# Patient Record
Sex: FEMALE | Race: BLACK OR AFRICAN AMERICAN | NOT HISPANIC OR LATINO | URBAN - METROPOLITAN AREA
[De-identification: names, ages, dates, MRNs, and addresses within clinical notes are randomized per-mention and may not be internally consistent; named-entity substitution may affect disease eponyms.]

---

## 2017-01-12 ENCOUNTER — EMERGENCY (EMERGENCY)
Facility: HOSPITAL | Age: 57
LOS: 1 days | Discharge: HOME | End: 2017-01-12
Attending: EMERGENCY MEDICINE | Admitting: EMERGENCY MEDICINE
Payer: MEDICARE

## 2017-01-12 VITALS
TEMPERATURE: 98 F | SYSTOLIC BLOOD PRESSURE: 149 MMHG | HEART RATE: 71 BPM | RESPIRATION RATE: 18 BRPM | OXYGEN SATURATION: 98 % | DIASTOLIC BLOOD PRESSURE: 86 MMHG | WEIGHT: 235.01 LBS

## 2017-01-12 VITALS
DIASTOLIC BLOOD PRESSURE: 92 MMHG | OXYGEN SATURATION: 99 % | SYSTOLIC BLOOD PRESSURE: 172 MMHG | RESPIRATION RATE: 18 BRPM | TEMPERATURE: 98 F | HEART RATE: 86 BPM

## 2017-01-12 LAB
ALBUMIN SERPL ELPH-MCNC: 3.9 G/DL — SIGNIFICANT CHANGE UP (ref 3.3–5)
ALP SERPL-CCNC: 118 U/L — SIGNIFICANT CHANGE UP (ref 40–120)
ALT FLD-CCNC: 15 U/L — SIGNIFICANT CHANGE UP (ref 4–33)
AST SERPL-CCNC: 16 U/L — SIGNIFICANT CHANGE UP (ref 4–32)
BASE EXCESS BLDV CALC-SCNC: 6.9 MMOL/L — SIGNIFICANT CHANGE UP
BASOPHILS # BLD AUTO: 0.04 K/UL — SIGNIFICANT CHANGE UP (ref 0–0.2)
BASOPHILS NFR BLD AUTO: 0.6 % — SIGNIFICANT CHANGE UP (ref 0–2)
BILIRUB SERPL-MCNC: 0.4 MG/DL — SIGNIFICANT CHANGE UP (ref 0.2–1.2)
BLOOD GAS VENOUS - CREATININE: 0.98 MG/DL — SIGNIFICANT CHANGE UP (ref 0.5–1.3)
BUN SERPL-MCNC: 22 MG/DL — SIGNIFICANT CHANGE UP (ref 7–23)
CALCIUM SERPL-MCNC: 9.8 MG/DL — SIGNIFICANT CHANGE UP (ref 8.4–10.5)
CHLORIDE BLDV-SCNC: 105 MMOL/L — SIGNIFICANT CHANGE UP (ref 96–108)
CHLORIDE SERPL-SCNC: 100 MMOL/L — SIGNIFICANT CHANGE UP (ref 98–107)
CO2 SERPL-SCNC: 28 MMOL/L — SIGNIFICANT CHANGE UP (ref 22–31)
CREAT SERPL-MCNC: 1.09 MG/DL — SIGNIFICANT CHANGE UP (ref 0.5–1.3)
EOSINOPHIL # BLD AUTO: 0.12 K/UL — SIGNIFICANT CHANGE UP (ref 0–0.5)
EOSINOPHIL NFR BLD AUTO: 1.7 % — SIGNIFICANT CHANGE UP (ref 0–6)
GAS PNL BLDV: 138 MMOL/L — SIGNIFICANT CHANGE UP (ref 136–146)
GLUCOSE BLDV-MCNC: 260 — HIGH (ref 70–99)
GLUCOSE SERPL-MCNC: 261 MG/DL — HIGH (ref 70–99)
HCO3 BLDV-SCNC: 29 MMOL/L — HIGH (ref 20–27)
HCT VFR BLD CALC: 39.4 % — SIGNIFICANT CHANGE UP (ref 34.5–45)
HCT VFR BLDV CALC: 40.1 % — SIGNIFICANT CHANGE UP (ref 34.5–45)
HGB BLD-MCNC: 12.5 G/DL — SIGNIFICANT CHANGE UP (ref 11.5–15.5)
HGB BLDV-MCNC: 13 G/DL — SIGNIFICANT CHANGE UP (ref 11.5–15.5)
IMM GRANULOCYTES NFR BLD AUTO: 0.6 % — SIGNIFICANT CHANGE UP (ref 0–1.5)
LACTATE BLDV-MCNC: 1.8 MMOL/L — SIGNIFICANT CHANGE UP (ref 0.5–2)
LYMPHOCYTES # BLD AUTO: 2.53 K/UL — SIGNIFICANT CHANGE UP (ref 1–3.3)
LYMPHOCYTES # BLD AUTO: 36.2 % — SIGNIFICANT CHANGE UP (ref 13–44)
MCHC RBC-ENTMCNC: 27.5 PG — SIGNIFICANT CHANGE UP (ref 27–34)
MCHC RBC-ENTMCNC: 31.7 % — LOW (ref 32–36)
MCV RBC AUTO: 86.6 FL — SIGNIFICANT CHANGE UP (ref 80–100)
MONOCYTES # BLD AUTO: 0.68 K/UL — SIGNIFICANT CHANGE UP (ref 0–0.9)
MONOCYTES NFR BLD AUTO: 9.7 % — SIGNIFICANT CHANGE UP (ref 2–14)
NEUTROPHILS # BLD AUTO: 3.58 K/UL — SIGNIFICANT CHANGE UP (ref 1.8–7.4)
NEUTROPHILS NFR BLD AUTO: 51.2 % — SIGNIFICANT CHANGE UP (ref 43–77)
PCO2 BLDV: 59 MMHG — HIGH (ref 41–51)
PH BLDV: 7.36 PH — SIGNIFICANT CHANGE UP (ref 7.32–7.43)
PLATELET # BLD AUTO: 241 K/UL — SIGNIFICANT CHANGE UP (ref 150–400)
PMV BLD: 12 FL — SIGNIFICANT CHANGE UP (ref 7–13)
PO2 BLDV: 38 MMHG — SIGNIFICANT CHANGE UP (ref 35–40)
POTASSIUM BLDV-SCNC: 4 MMOL/L — SIGNIFICANT CHANGE UP (ref 3.4–4.5)
POTASSIUM SERPL-MCNC: 4.2 MMOL/L — SIGNIFICANT CHANGE UP (ref 3.5–5.3)
POTASSIUM SERPL-SCNC: 4.2 MMOL/L — SIGNIFICANT CHANGE UP (ref 3.5–5.3)
PROT SERPL-MCNC: 7.8 G/DL — SIGNIFICANT CHANGE UP (ref 6–8.3)
RBC # BLD: 4.55 M/UL — SIGNIFICANT CHANGE UP (ref 3.8–5.2)
RBC # FLD: 15.7 % — HIGH (ref 10.3–14.5)
SAO2 % BLDV: 66.7 % — SIGNIFICANT CHANGE UP (ref 60–85)
SODIUM SERPL-SCNC: 143 MMOL/L — SIGNIFICANT CHANGE UP (ref 135–145)
WBC # BLD: 6.99 K/UL — SIGNIFICANT CHANGE UP (ref 3.8–10.5)
WBC # FLD AUTO: 6.99 K/UL — SIGNIFICANT CHANGE UP (ref 3.8–10.5)

## 2017-01-12 PROCEDURE — 99284 EMERGENCY DEPT VISIT MOD MDM: CPT

## 2017-01-12 PROCEDURE — 71020: CPT | Mod: 26

## 2017-01-12 RX ORDER — IPRATROPIUM/ALBUTEROL SULFATE 18-103MCG
3 AEROSOL WITH ADAPTER (GRAM) INHALATION ONCE
Qty: 0 | Refills: 0 | Status: COMPLETED | OUTPATIENT
Start: 2017-01-12 | End: 2017-01-12

## 2017-01-12 RX ADMIN — Medication 3 MILLILITER(S): at 22:33

## 2017-01-12 NOTE — ED PROVIDER NOTE - OBJECTIVE STATEMENT
55 yo F with history of diabetes, htn, gout presenting with cough and uri symptoms x 5 days productive with yellow sputum. Denies fevers, chills, rhinorrhea, otorrhea, otalgia, nausea, vomiting, constipation, diarrhea, chest pain, shortness of breath or changes in urinary habits. Pt on day 4 of azithromycin without symptom improvement

## 2017-01-12 NOTE — ED ADULT TRIAGE NOTE - CHIEF COMPLAINT QUOTE
Pt. with multiple complaints c/o not feeling well. Pt on z-pac but still c/o feeling sob. Pt also states her b/p is elevated and glucose elevated. Pt spoke to her pmd Dr. layton and told to come to ER.

## 2017-01-12 NOTE — ED PROVIDER NOTE - ATTENDING CONTRIBUTION TO CARE
Dr. Bowman:  I have personally performed a face to face bedside history and physical examination of this patient. I have discussed the history, examination, review of systems, assessment and plan of management with the resident. I have reviewed the electronic medical record and amended it to reflect my history, review of systems, physical exam, assessment and plan.    56F h/o DM, HTN, CHF, pacemaker, c/o cough and URI sx x 5 days.  Started on Z-shad 3 days ago, but continues to have similar sx.  Visiting noted her FS 300s, advised pt to go to ED for further evaluation.    Exam:  - nad  - rrr  - ctab  - abd soft, ntnd  - no pedal edema    A/P  - eval for PNA, r/o DKA  - cxr  - cbc, cmp, vbg Dr. Bowman:  I have personally performed a face to face bedside history and physical examination of this patient. I have discussed the history, examination, review of systems, assessment and plan of management with the resident. I have reviewed the electronic medical record and amended it to reflect my history, review of systems, physical exam, assessment and plan.    56F h/o DM, HTN, CHF, pacemaker, c/o cough and URI sx x 5 days.  Started on Z-shad 3 days ago, but continues to have similar sx.  Visiting nurse noted her FS 300s, advised pt to go to ED for further evaluation.    Exam:  - nad  - rrr  - ctab  - abd soft, ntnd  - no pedal edema    A/P  - eval for PNA, r/o DKA  - cxr  - cbc, cmp, vbg

## 2017-01-12 NOTE — ED PROVIDER NOTE - PMH
AICD (automatic cardioverter/defibrillator) present  ICD (Medtronic generator with Medtronic atrial (4076)and ventricular (6947) leads) 5/14/07  Cardiac Pacemaker    CHF (Congestive Heart Failure)    CKD (chronic kidney disease), stage 3 (moderate)    Diabetes Mellitus  x 10 years, denies nephropathy or retinopathy  Diabetic neuropathy    HLD (Hyperlipidemia)    HTN (hypertension)    Nonischemic cardiomyopathy  EF 20-25%  Sleep apnea  noncompliant with CPAP

## 2017-01-12 NOTE — ED ADULT NURSE NOTE - PSH
AICD (automatic cardioverter/defibrillator) present  Medtronic generator with Medtronic atrial (4076)and ventricular (6947) leads) 07  H/O:     H/O: hysterectomy

## 2017-01-12 NOTE — ED ADULT NURSE NOTE - OBJECTIVE STATEMENT
Pt received to rm 4 aaox4 ambulatory, c/o yellow sputum producing cough x 1 wk and SOB x 2 days.  Pt reports hx of HTN, DM,CHF and pacemaker placement: Normal sinus paced rhythm on monitor   Pt not in respiratory distress and denies pain at this time. Vitals as noted.  Labs and meds as ordered.  Will endorse report to primary IRON Najera.

## 2017-01-27 ENCOUNTER — LABORATORY RESULT (OUTPATIENT)
Age: 57
End: 2017-01-27

## 2017-01-27 ENCOUNTER — APPOINTMENT (OUTPATIENT)
Dept: RHEUMATOLOGY | Facility: CLINIC | Age: 57
End: 2017-01-27

## 2017-01-27 VITALS
OXYGEN SATURATION: 92 % | SYSTOLIC BLOOD PRESSURE: 131 MMHG | DIASTOLIC BLOOD PRESSURE: 83 MMHG | BODY MASS INDEX: 39.95 KG/M2 | HEIGHT: 64 IN | WEIGHT: 234 LBS | TEMPERATURE: 98.7 F | HEART RATE: 80 BPM

## 2017-01-27 DIAGNOSIS — M19.90 UNSPECIFIED OSTEOARTHRITIS, UNSPECIFIED SITE: ICD-10-CM

## 2017-01-27 LAB
APPEARANCE: ABNORMAL
BASOPHILS # BLD AUTO: 0.03 K/UL
BASOPHILS NFR BLD AUTO: 0.5 %
BILIRUBIN URINE: NEGATIVE
BLOOD URINE: NEGATIVE
COLOR: YELLOW
EOSINOPHIL # BLD AUTO: 0.2 K/UL
EOSINOPHIL NFR BLD AUTO: 3 %
GLUCOSE QUALITATIVE U: NORMAL MG/DL
HCT VFR BLD CALC: 38.1 %
HGB BLD-MCNC: 11.8 G/DL
IMM GRANULOCYTES NFR BLD AUTO: 0.3 %
KETONES URINE: NEGATIVE
LEUKOCYTE ESTERASE URINE: NEGATIVE
LYMPHOCYTES # BLD AUTO: 1.47 K/UL
LYMPHOCYTES NFR BLD AUTO: 22.3 %
MAN DIFF?: NORMAL
MCHC RBC-ENTMCNC: 28.3 PG
MCHC RBC-ENTMCNC: 31 GM/DL
MCV RBC AUTO: 91.4 FL
MONOCYTES # BLD AUTO: 0.5 K/UL
MONOCYTES NFR BLD AUTO: 7.6 %
NEUTROPHILS # BLD AUTO: 4.38 K/UL
NEUTROPHILS NFR BLD AUTO: 66.3 %
NITRITE URINE: NEGATIVE
PH URINE: 5
PLATELET # BLD AUTO: 296 K/UL
PROTEIN URINE: ABNORMAL MG/DL
RBC # BLD: 4.17 M/UL
RBC # FLD: 17.4 %
SPECIFIC GRAVITY URINE: 1.02
UROBILINOGEN URINE: 1 MG/DL
WBC # FLD AUTO: 6.6 K/UL

## 2017-01-30 LAB
ALBUMIN SERPL ELPH-MCNC: 3.7 G/DL
ALP BLD-CCNC: 111 U/L
ALT SERPL-CCNC: 10 U/L
ANION GAP SERPL CALC-SCNC: 17 MMOL/L
AST SERPL-CCNC: 11 U/L
BILIRUB SERPL-MCNC: 0.9 MG/DL
BUN SERPL-MCNC: 35 MG/DL
CALCIUM SERPL-MCNC: 9 MG/DL
CCP AB SER IA-ACNC: <8 UNITS
CHLORIDE SERPL-SCNC: 103 MMOL/L
CO2 SERPL-SCNC: 28 MMOL/L
CREAT SERPL-MCNC: 1.75 MG/DL
CRP SERPL-MCNC: 0.57 MG/DL
ERYTHROCYTE [SEDIMENTATION RATE] IN BLOOD BY WESTERGREN METHOD: 51 MM/HR
GLUCOSE SERPL-MCNC: 341 MG/DL
POTASSIUM SERPL-SCNC: 4.6 MMOL/L
PROT SERPL-MCNC: 6.7 G/DL
RF+CCP IGG SER-IMP: NEGATIVE
RHEUMATOID FACT SER QL: 14 IU/ML
SODIUM SERPL-SCNC: 148 MMOL/L
URATE SERPL-MCNC: 12.5 MG/DL

## 2017-02-24 ENCOUNTER — APPOINTMENT (OUTPATIENT)
Dept: RHEUMATOLOGY | Facility: CLINIC | Age: 57
End: 2017-02-24

## 2017-02-24 VITALS
OXYGEN SATURATION: 91 % | WEIGHT: 235 LBS | BODY MASS INDEX: 40.12 KG/M2 | TEMPERATURE: 98.4 F | DIASTOLIC BLOOD PRESSURE: 76 MMHG | SYSTOLIC BLOOD PRESSURE: 117 MMHG | HEIGHT: 64 IN | HEART RATE: 75 BPM

## 2017-02-24 RX ORDER — GABAPENTIN 600 MG/1
600 TABLET, COATED ORAL
Refills: 0 | Status: ACTIVE | COMMUNITY

## 2017-03-17 ENCOUNTER — APPOINTMENT (OUTPATIENT)
Dept: RHEUMATOLOGY | Facility: CLINIC | Age: 57
End: 2017-03-17

## 2017-03-17 VITALS
OXYGEN SATURATION: 91 % | SYSTOLIC BLOOD PRESSURE: 99 MMHG | HEART RATE: 82 BPM | DIASTOLIC BLOOD PRESSURE: 64 MMHG | HEIGHT: 64 IN

## 2017-03-17 VITALS — HEIGHT: 64 IN

## 2017-03-17 LAB
ALBUMIN SERPL ELPH-MCNC: 3.7 G/DL
ALP BLD-CCNC: 125 U/L
ALT SERPL-CCNC: 35 U/L
ANION GAP SERPL CALC-SCNC: 15 MMOL/L
AST SERPL-CCNC: 30 U/L
BILIRUB SERPL-MCNC: 0.5 MG/DL
BUN SERPL-MCNC: 53 MG/DL
CALCIUM SERPL-MCNC: 9.6 MG/DL
CHLORIDE SERPL-SCNC: 105 MMOL/L
CO2 SERPL-SCNC: 24 MMOL/L
CREAT SERPL-MCNC: 1.57 MG/DL
CRP SERPL-MCNC: <0.2 MG/DL
ERYTHROCYTE [SEDIMENTATION RATE] IN BLOOD BY WESTERGREN METHOD: 34 MM/HR
GLUCOSE SERPL-MCNC: 127 MG/DL
POTASSIUM SERPL-SCNC: 4.5 MMOL/L
PROT SERPL-MCNC: 6.7 G/DL
SODIUM SERPL-SCNC: 144 MMOL/L
URATE SERPL-MCNC: 7.4 MG/DL

## 2017-03-17 RX ORDER — COLCHICINE 0.6 MG/1
0.6 TABLET ORAL TWICE DAILY
Qty: 30 | Refills: 1 | Status: ACTIVE | COMMUNITY
Start: 2017-02-24 | End: 1900-01-01

## 2017-03-17 RX ORDER — DICLOFENAC SODIUM 10 MG/G
1 GEL TOPICAL
Qty: 1 | Refills: 3 | Status: ACTIVE | COMMUNITY
Start: 2017-03-17 | End: 1900-01-01

## 2017-03-20 RX ORDER — INSULIN GLARGINE 300 U/ML
300 INJECTION, SOLUTION SUBCUTANEOUS
Refills: 0 | Status: ACTIVE | COMMUNITY
Start: 2017-03-20

## 2017-03-20 RX ORDER — BUMETANIDE 2 MG/1
2 TABLET ORAL DAILY
Refills: 0 | Status: ACTIVE | COMMUNITY
Start: 2017-03-20

## 2017-06-06 ENCOUNTER — LABORATORY RESULT (OUTPATIENT)
Age: 57
End: 2017-06-06

## 2017-06-06 ENCOUNTER — APPOINTMENT (OUTPATIENT)
Dept: RHEUMATOLOGY | Facility: CLINIC | Age: 57
End: 2017-06-06

## 2017-06-06 VITALS — BODY MASS INDEX: 40.8 KG/M2 | WEIGHT: 239 LBS | TEMPERATURE: 98.4 F | OXYGEN SATURATION: 90 % | HEIGHT: 64 IN

## 2017-06-06 DIAGNOSIS — Z82.69 FAMILY HISTORY OF OTHER DISEASES OF THE MUSCULOSKELETAL SYSTEM AND CONNECTIVE TISSUE: ICD-10-CM

## 2017-06-06 LAB
APPEARANCE: CLEAR
BASOPHILS # BLD AUTO: 0.02 K/UL
BASOPHILS NFR BLD AUTO: 0.4 %
BILIRUBIN URINE: NEGATIVE
BLOOD URINE: NEGATIVE
COLOR: YELLOW
EOSINOPHIL # BLD AUTO: 0.17 K/UL
EOSINOPHIL NFR BLD AUTO: 3.3 %
GLUCOSE QUALITATIVE U: NORMAL MG/DL
HCT VFR BLD CALC: 43.7 %
HGB BLD-MCNC: 13.5 G/DL
IMM GRANULOCYTES NFR BLD AUTO: 0.2 %
KETONES URINE: NEGATIVE
LEUKOCYTE ESTERASE URINE: NEGATIVE
LYMPHOCYTES # BLD AUTO: 1.76 K/UL
LYMPHOCYTES NFR BLD AUTO: 34 %
MAN DIFF?: NORMAL
MCHC RBC-ENTMCNC: 27.2 PG
MCHC RBC-ENTMCNC: 30.9 GM/DL
MCV RBC AUTO: 88.1 FL
MONOCYTES # BLD AUTO: 0.52 K/UL
MONOCYTES NFR BLD AUTO: 10 %
NEUTROPHILS # BLD AUTO: 2.7 K/UL
NEUTROPHILS NFR BLD AUTO: 52.1 %
NITRITE URINE: NEGATIVE
PH URINE: 5.5
PLATELET # BLD AUTO: 237 K/UL
PROTEIN URINE: ABNORMAL MG/DL
RBC # BLD: 4.96 M/UL
RBC # FLD: 17.2 %
SPECIFIC GRAVITY URINE: 1.02
UROBILINOGEN URINE: NORMAL MG/DL
WBC # FLD AUTO: 5.18 K/UL

## 2017-06-06 RX ORDER — PREDNISONE 10 MG/1
10 TABLET ORAL
Qty: 20 | Refills: 1 | Status: DISCONTINUED | COMMUNITY
Start: 2017-01-27 | End: 2017-06-06

## 2017-06-06 RX ORDER — SACUBITRIL AND VALSARTAN 49; 51 MG/1; MG/1
49-51 TABLET, FILM COATED ORAL
Refills: 0 | Status: ACTIVE | COMMUNITY

## 2017-06-07 LAB
25(OH)D3 SERPL-MCNC: 14.9 NG/ML
ALBUMIN SERPL ELPH-MCNC: 3.7 G/DL
ALP BLD-CCNC: 104 U/L
ALT SERPL-CCNC: 15 U/L
ANION GAP SERPL CALC-SCNC: 20 MMOL/L
AST SERPL-CCNC: 14 U/L
BILIRUB SERPL-MCNC: 0.2 MG/DL
BUN SERPL-MCNC: 56 MG/DL
CALCIUM SERPL-MCNC: 9.2 MG/DL
CHLORIDE SERPL-SCNC: 100 MMOL/L
CO2 SERPL-SCNC: 21 MMOL/L
CREAT SERPL-MCNC: 1.69 MG/DL
CRP SERPL-MCNC: 0.4 MG/DL
DSDNA AB SER-ACNC: <12 IU/ML
ERYTHROCYTE [SEDIMENTATION RATE] IN BLOOD BY WESTERGREN METHOD: 49 MM/HR
GLUCOSE SERPL-MCNC: 257 MG/DL
POTASSIUM SERPL-SCNC: 5.4 MMOL/L
PROT SERPL-MCNC: 6.9 G/DL
SODIUM SERPL-SCNC: 141 MMOL/L
URATE SERPL-MCNC: 8 MG/DL

## 2017-06-08 LAB — ANA SER IF-ACNC: NEGATIVE

## 2017-06-12 RX ORDER — INSULIN GLARGINE 100 [IU]/ML
70 INJECTION, SOLUTION SUBCUTANEOUS
Qty: 0 | Refills: 0 | COMMUNITY
Start: 2017-06-12

## 2017-07-11 ENCOUNTER — INPATIENT (INPATIENT)
Facility: HOSPITAL | Age: 57
LOS: 16 days | Discharge: HOME | End: 2017-07-28
Attending: INTERNAL MEDICINE | Admitting: INTERNAL MEDICINE
Payer: MEDICARE

## 2017-07-11 VITALS
RESPIRATION RATE: 18 BRPM | SYSTOLIC BLOOD PRESSURE: 145 MMHG | OXYGEN SATURATION: 100 % | DIASTOLIC BLOOD PRESSURE: 93 MMHG | HEART RATE: 92 BPM

## 2017-07-11 DIAGNOSIS — E16.2 HYPOGLYCEMIA, UNSPECIFIED: ICD-10-CM

## 2017-07-11 LAB
ALBUMIN SERPL ELPH-MCNC: 3.8 G/DL — SIGNIFICANT CHANGE UP (ref 3.3–5)
ALP SERPL-CCNC: 72 U/L — SIGNIFICANT CHANGE UP (ref 40–120)
ALT FLD-CCNC: 21 U/L — SIGNIFICANT CHANGE UP (ref 4–33)
AST SERPL-CCNC: 36 U/L — HIGH (ref 4–32)
BASOPHILS # BLD AUTO: 0.03 K/UL — SIGNIFICANT CHANGE UP (ref 0–0.2)
BASOPHILS NFR BLD AUTO: 0.4 % — SIGNIFICANT CHANGE UP (ref 0–2)
BILIRUB SERPL-MCNC: 0.4 MG/DL — SIGNIFICANT CHANGE UP (ref 0.2–1.2)
BUN SERPL-MCNC: 62 MG/DL — HIGH (ref 7–23)
CALCIUM SERPL-MCNC: 8.3 MG/DL — LOW (ref 8.4–10.5)
CHLORIDE SERPL-SCNC: 90 MMOL/L — LOW (ref 98–107)
CO2 SERPL-SCNC: 27 MMOL/L — SIGNIFICANT CHANGE UP (ref 22–31)
CREAT SERPL-MCNC: 7.76 MG/DL — HIGH (ref 0.5–1.3)
EOSINOPHIL # BLD AUTO: 0.07 K/UL — SIGNIFICANT CHANGE UP (ref 0–0.5)
EOSINOPHIL NFR BLD AUTO: 0.8 % — SIGNIFICANT CHANGE UP (ref 0–6)
GLUCOSE SERPL-MCNC: 270 MG/DL — HIGH (ref 70–99)
HBA1C BLD-MCNC: 9.2 % — HIGH (ref 4–5.6)
HCT VFR BLD CALC: 45.3 % — HIGH (ref 34.5–45)
HGB BLD-MCNC: 14 G/DL — SIGNIFICANT CHANGE UP (ref 11.5–15.5)
IMM GRANULOCYTES # BLD AUTO: 0.09 # — SIGNIFICANT CHANGE UP
IMM GRANULOCYTES NFR BLD AUTO: 1.1 % — SIGNIFICANT CHANGE UP (ref 0–1.5)
LYMPHOCYTES # BLD AUTO: 2.49 K/UL — SIGNIFICANT CHANGE UP (ref 1–3.3)
LYMPHOCYTES # BLD AUTO: 30 % — SIGNIFICANT CHANGE UP (ref 13–44)
MCHC RBC-ENTMCNC: 27.6 PG — SIGNIFICANT CHANGE UP (ref 27–34)
MCHC RBC-ENTMCNC: 30.9 % — LOW (ref 32–36)
MCV RBC AUTO: 89.3 FL — SIGNIFICANT CHANGE UP (ref 80–100)
MONOCYTES # BLD AUTO: 1.15 K/UL — HIGH (ref 0–0.9)
MONOCYTES NFR BLD AUTO: 13.8 % — SIGNIFICANT CHANGE UP (ref 2–14)
NEUTROPHILS # BLD AUTO: 4.48 K/UL — SIGNIFICANT CHANGE UP (ref 1.8–7.4)
NEUTROPHILS NFR BLD AUTO: 53.9 % — SIGNIFICANT CHANGE UP (ref 43–77)
NRBC # FLD: 0.65 — SIGNIFICANT CHANGE UP
NRBC FLD-RTO: 7.8 — SIGNIFICANT CHANGE UP
PLATELET # BLD AUTO: 185 K/UL — SIGNIFICANT CHANGE UP (ref 150–400)
PMV BLD: 12.1 FL — SIGNIFICANT CHANGE UP (ref 7–13)
POTASSIUM SERPL-MCNC: 4.3 MMOL/L — SIGNIFICANT CHANGE UP (ref 3.5–5.3)
POTASSIUM SERPL-SCNC: 4.3 MMOL/L — SIGNIFICANT CHANGE UP (ref 3.5–5.3)
PROT SERPL-MCNC: 7.9 G/DL — SIGNIFICANT CHANGE UP (ref 6–8.3)
RBC # BLD: 5.07 M/UL — SIGNIFICANT CHANGE UP (ref 3.8–5.2)
RBC # FLD: 18.6 % — HIGH (ref 10.3–14.5)
SODIUM SERPL-SCNC: 138 MMOL/L — SIGNIFICANT CHANGE UP (ref 135–145)
WBC # BLD: 8.31 K/UL — SIGNIFICANT CHANGE UP (ref 3.8–10.5)
WBC # FLD AUTO: 8.31 K/UL — SIGNIFICANT CHANGE UP (ref 3.8–10.5)

## 2017-07-11 NOTE — ED ADULT TRIAGE NOTE - CHIEF COMPLAINT QUOTE
Patient brought to Er from home by EMS for c/o elevated blood sugar, doubled  up on her insulin. FS was 37, 25 grams d50 given via 20 gauge right AC.  now.

## 2017-07-11 NOTE — ED ADULT NURSE NOTE - OBJECTIVE STATEMENT
pt received to room 1 aox4.  pt c/o, "I took a double doseof my insuli today because my blood sugar was in the 300's".  pt reports weakness.  SL placed, labs sent, MD at bedside.  pt given a tray of food.  awaiting further orders, es lara

## 2017-07-11 NOTE — ED PROVIDER NOTE - MEDICAL DECISION MAKING DETAILS
56F w/ above history p/w hypoglycemia after taking extra dose of insulin, concerning for medication adverse effect  -labs, dinner tray

## 2017-07-11 NOTE — ED PROVIDER NOTE - OBJECTIVE STATEMENT
56F h/o T2DM, HTN, CHF presenting with hypoglycemia. Pt took toujeo 70u last night, went to bed, awoke this morning and noted FSG to be in 350s, took second dose of toujeo 70u this morning. Pt did not eat or drink this morning out of concern of raising her FSG even higher. Also took dose of novolog 30u this afternoon when FSG was still elevated. Began feeling lightheaded and confused, called EMS, was given 25g D50 with improvement of symptoms. Denies F, CP/SOB, abd pain, N/V/D.

## 2017-07-11 NOTE — ED PROVIDER NOTE - ATTENDING CONTRIBUTION TO CARE
saud: hx from pt.   states that her FS at home was elevated last nite. took her toujeo 70 last nite. took a similar does this am. this afternoon fs was still 'high' and she took 30 humalog. soon after, became weak and called ems  fs at home was in 30s, treated, and on ed arrival fs elevated.   exam: pt awake; asking for food. nad.  labs pending; will need inpt or obs stay to observe for long acting glucose lowering agent. saud: hx from pt.   states that her FS at home was elevated last nite. took her toujeo 70 last nite. took a similar does this am. this afternoon fs was still 'high' and she took 30 humalog. soon after, became weak and called ems  fs at home was in 30s, treated, and on ed arrival fs elevated.   exam: pt awake; asking for food. nad.  labs pending; will need inpt or obs stay to observe for long acting glucose lowering agent..

## 2017-07-12 DIAGNOSIS — Z29.9 ENCOUNTER FOR PROPHYLACTIC MEASURES, UNSPECIFIED: ICD-10-CM

## 2017-07-12 DIAGNOSIS — E11.9 TYPE 2 DIABETES MELLITUS WITHOUT COMPLICATIONS: ICD-10-CM

## 2017-07-12 DIAGNOSIS — R74.8 ABNORMAL LEVELS OF OTHER SERUM ENZYMES: ICD-10-CM

## 2017-07-12 DIAGNOSIS — I10 ESSENTIAL (PRIMARY) HYPERTENSION: ICD-10-CM

## 2017-07-12 DIAGNOSIS — I42.8 OTHER CARDIOMYOPATHIES: ICD-10-CM

## 2017-07-12 DIAGNOSIS — N17.9 ACUTE KIDNEY FAILURE, UNSPECIFIED: ICD-10-CM

## 2017-07-12 LAB
ALBUMIN SERPL ELPH-MCNC: 3.6 G/DL — SIGNIFICANT CHANGE UP (ref 3.3–5)
ALP SERPL-CCNC: 68 U/L — SIGNIFICANT CHANGE UP (ref 40–120)
ALT FLD-CCNC: 18 U/L — SIGNIFICANT CHANGE UP (ref 4–33)
ANISOCYTOSIS BLD QL: SLIGHT — SIGNIFICANT CHANGE UP
ANISOCYTOSIS BLD QL: SLIGHT — SIGNIFICANT CHANGE UP
APPEARANCE UR: SIGNIFICANT CHANGE UP
APTT BLD: 25.7 SEC — LOW (ref 27.5–37.4)
AST SERPL-CCNC: 27 U/L — SIGNIFICANT CHANGE UP (ref 4–32)
B-OH-BUTYR SERPL-SCNC: 0.2 MMOL/L — SIGNIFICANT CHANGE UP (ref 0–0.4)
BACTERIA # UR AUTO: HIGH
BASE EXCESS BLDA CALC-SCNC: 1 MMOL/L — SIGNIFICANT CHANGE UP
BASE EXCESS BLDA CALC-SCNC: 1.3 MMOL/L — SIGNIFICANT CHANGE UP
BASE EXCESS BLDV CALC-SCNC: 1.7 MMOL/L — SIGNIFICANT CHANGE UP
BASOPHILS # BLD AUTO: 0.03 K/UL — SIGNIFICANT CHANGE UP (ref 0–0.2)
BASOPHILS NFR BLD AUTO: 0.3 % — SIGNIFICANT CHANGE UP (ref 0–2)
BASOPHILS NFR SPEC: 1 % — SIGNIFICANT CHANGE UP (ref 0–2)
BASOPHILS NFR SPEC: 2 % — SIGNIFICANT CHANGE UP (ref 0–2)
BILIRUB SERPL-MCNC: 0.3 MG/DL — SIGNIFICANT CHANGE UP (ref 0.2–1.2)
BILIRUB UR-MCNC: SIGNIFICANT CHANGE UP
BLOOD GAS VENOUS - CREATININE: 7.8 MG/DL — HIGH (ref 0.5–1.3)
BLOOD UR QL VISUAL: NEGATIVE — SIGNIFICANT CHANGE UP
BUN SERPL-MCNC: 67 MG/DL — HIGH (ref 7–23)
BUN SERPL-MCNC: 73 MG/DL — HIGH (ref 7–23)
BUN SERPL-MCNC: 73 MG/DL — HIGH (ref 7–23)
BUN SERPL-MCNC: 80 MG/DL — HIGH (ref 7–23)
CALCIUM SERPL-MCNC: 7.7 MG/DL — LOW (ref 8.4–10.5)
CALCIUM SERPL-MCNC: 7.8 MG/DL — LOW (ref 8.4–10.5)
CALCIUM SERPL-MCNC: 7.8 MG/DL — LOW (ref 8.4–10.5)
CALCIUM SERPL-MCNC: 8.1 MG/DL — LOW (ref 8.4–10.5)
CHLORIDE BLDA-SCNC: 101 MMOL/L — SIGNIFICANT CHANGE UP (ref 96–108)
CHLORIDE BLDV-SCNC: 101 MMOL/L — SIGNIFICANT CHANGE UP (ref 96–108)
CHLORIDE SERPL-SCNC: 91 MMOL/L — LOW (ref 98–107)
CHLORIDE SERPL-SCNC: 91 MMOL/L — LOW (ref 98–107)
CHLORIDE SERPL-SCNC: 92 MMOL/L — LOW (ref 98–107)
CHLORIDE SERPL-SCNC: 92 MMOL/L — LOW (ref 98–107)
CHLORIDE UR-SCNC: < 20 MMOL/L — SIGNIFICANT CHANGE UP
CHOLEST SERPL-MCNC: 150 MG/DL — SIGNIFICANT CHANGE UP (ref 120–199)
CK MB BLD-MCNC: 0.7 — SIGNIFICANT CHANGE UP (ref 0–2.5)
CK MB BLD-MCNC: 6.55 NG/ML — HIGH (ref 1–4.7)
CK MB BLD-MCNC: 6.72 NG/ML — HIGH (ref 1–4.7)
CK MB BLD-MCNC: 6.89 NG/ML — HIGH (ref 1–4.7)
CK SERPL-CCNC: 1053 U/L — HIGH (ref 25–170)
CK SERPL-CCNC: 1165 U/L — HIGH (ref 25–170)
CK SERPL-CCNC: 849 U/L — HIGH (ref 25–170)
CK SERPL-CCNC: 959 U/L — HIGH (ref 25–170)
CO2 SERPL-SCNC: 22 MMOL/L — SIGNIFICANT CHANGE UP (ref 22–31)
CO2 SERPL-SCNC: 23 MMOL/L — SIGNIFICANT CHANGE UP (ref 22–31)
CO2 SERPL-SCNC: 26 MMOL/L — SIGNIFICANT CHANGE UP (ref 22–31)
CO2 SERPL-SCNC: 28 MMOL/L — SIGNIFICANT CHANGE UP (ref 22–31)
COLOR SPEC: HIGH
CREAT ?TM UR-MCNC: 495.32 MG/DL — SIGNIFICANT CHANGE UP
CREAT BLDA-MCNC: 6.75 MG/DL — HIGH (ref 0.5–1.3)
CREAT SERPL-MCNC: 6.89 MG/DL — HIGH (ref 0.5–1.3)
CREAT SERPL-MCNC: 7.05 MG/DL — HIGH (ref 0.5–1.3)
CREAT SERPL-MCNC: 7.89 MG/DL — HIGH (ref 0.5–1.3)
CREAT SERPL-MCNC: 7.94 MG/DL — HIGH (ref 0.5–1.3)
EOSINOPHIL # BLD AUTO: 0.09 K/UL — SIGNIFICANT CHANGE UP (ref 0–0.5)
EOSINOPHIL NFR BLD AUTO: 0.9 % — SIGNIFICANT CHANGE UP (ref 0–6)
EOSINOPHIL NFR FLD: 1 % — SIGNIFICANT CHANGE UP (ref 0–6)
EOSINOPHIL NFR FLD: 1 % — SIGNIFICANT CHANGE UP (ref 0–6)
GAS PNL BLDV: 135 MMOL/L — LOW (ref 136–146)
GIANT PLATELETS BLD QL SMEAR: PRESENT — SIGNIFICANT CHANGE UP
GIANT PLATELETS BLD QL SMEAR: PRESENT — SIGNIFICANT CHANGE UP
GLUCOSE BLDA-MCNC: 291 MG/DL — HIGH (ref 70–99)
GLUCOSE BLDA-MCNC: 320 MG/DL — HIGH (ref 70–99)
GLUCOSE BLDV-MCNC: 307 — HIGH (ref 70–99)
GLUCOSE SERPL-MCNC: 190 MG/DL — HIGH (ref 70–99)
GLUCOSE SERPL-MCNC: 248 MG/DL — HIGH (ref 70–99)
GLUCOSE SERPL-MCNC: 293 MG/DL — HIGH (ref 70–99)
GLUCOSE SERPL-MCNC: 308 MG/DL — HIGH (ref 70–99)
GLUCOSE UR-MCNC: NEGATIVE — SIGNIFICANT CHANGE UP
HBV CORE AB SER-ACNC: NONREACTIVE — SIGNIFICANT CHANGE UP
HBV SURFACE AB SER-ACNC: NONREACTIVE — SIGNIFICANT CHANGE UP
HBV SURFACE AG SER-ACNC: NEGATIVE — SIGNIFICANT CHANGE UP
HCO3 BLDA-SCNC: 23 MMOL/L — SIGNIFICANT CHANGE UP (ref 22–26)
HCO3 BLDA-SCNC: 24 MMOL/L — SIGNIFICANT CHANGE UP (ref 22–26)
HCO3 BLDV-SCNC: 23 MMOL/L — SIGNIFICANT CHANGE UP (ref 20–27)
HCT VFR BLD CALC: 43.6 % — SIGNIFICANT CHANGE UP (ref 34.5–45)
HCT VFR BLD CALC: 43.9 % — SIGNIFICANT CHANGE UP (ref 34.5–45)
HCT VFR BLDA CALC: 41.8 % — SIGNIFICANT CHANGE UP (ref 34.5–46.5)
HCT VFR BLDA CALC: 43.3 % — SIGNIFICANT CHANGE UP (ref 34.5–46.5)
HCT VFR BLDV CALC: 41.7 % — SIGNIFICANT CHANGE UP (ref 34.5–45)
HCV AB S/CO SERPL IA: 0.15 S/CO — SIGNIFICANT CHANGE UP
HCV AB SERPL-IMP: SIGNIFICANT CHANGE UP
HDLC SERPL-MCNC: 14 MG/DL — LOW (ref 45–65)
HGB BLD-MCNC: 13.6 G/DL — SIGNIFICANT CHANGE UP (ref 11.5–15.5)
HGB BLD-MCNC: 13.7 G/DL — SIGNIFICANT CHANGE UP (ref 11.5–15.5)
HGB BLDA-MCNC: 13.6 G/DL — SIGNIFICANT CHANGE UP (ref 11.5–15.5)
HGB BLDA-MCNC: 14.1 G/DL — SIGNIFICANT CHANGE UP (ref 11.5–15.5)
HGB BLDV-MCNC: 13.6 G/DL — SIGNIFICANT CHANGE UP (ref 11.5–15.5)
HYALINE CASTS # UR AUTO: SIGNIFICANT CHANGE UP (ref 0–?)
IMM GRANULOCYTES # BLD AUTO: 0.18 # — SIGNIFICANT CHANGE UP
IMM GRANULOCYTES NFR BLD AUTO: 1.9 % — HIGH (ref 0–1.5)
INR BLD: 1.17 — SIGNIFICANT CHANGE UP (ref 0.88–1.17)
KETONES UR-MCNC: SIGNIFICANT CHANGE UP
LACTATE BLDA-SCNC: 1.2 MMOL/L — SIGNIFICANT CHANGE UP (ref 0.5–2)
LACTATE BLDV-MCNC: 1.2 MMOL/L — SIGNIFICANT CHANGE UP (ref 0.5–2)
LEUKOCYTE ESTERASE UR-ACNC: NEGATIVE — SIGNIFICANT CHANGE UP
LG PLATELETS BLD QL AUTO: SLIGHT — SIGNIFICANT CHANGE UP
LG PLATELETS BLD QL AUTO: SLIGHT — SIGNIFICANT CHANGE UP
LIPID PNL WITH DIRECT LDL SERPL: 93 MG/DL — SIGNIFICANT CHANGE UP
LYMPHOCYTES # BLD AUTO: 2.7 K/UL — SIGNIFICANT CHANGE UP (ref 1–3.3)
LYMPHOCYTES # BLD AUTO: 28.4 % — SIGNIFICANT CHANGE UP (ref 13–44)
LYMPHOCYTES NFR SPEC AUTO: 27 % — SIGNIFICANT CHANGE UP (ref 13–44)
LYMPHOCYTES NFR SPEC AUTO: 37 % — SIGNIFICANT CHANGE UP (ref 13–44)
MACROCYTES BLD QL: SLIGHT — SIGNIFICANT CHANGE UP
MACROCYTES BLD QL: SLIGHT — SIGNIFICANT CHANGE UP
MAGNESIUM SERPL-MCNC: 2.4 MG/DL — SIGNIFICANT CHANGE UP (ref 1.6–2.6)
MAGNESIUM SERPL-MCNC: 2.5 MG/DL — SIGNIFICANT CHANGE UP (ref 1.6–2.6)
MANUAL SMEAR VERIFICATION: SIGNIFICANT CHANGE UP
MANUAL SMEAR VERIFICATION: SIGNIFICANT CHANGE UP
MCHC RBC-ENTMCNC: 27.8 PG — SIGNIFICANT CHANGE UP (ref 27–34)
MCHC RBC-ENTMCNC: 28.5 PG — SIGNIFICANT CHANGE UP (ref 27–34)
MCHC RBC-ENTMCNC: 31 % — LOW (ref 32–36)
MCHC RBC-ENTMCNC: 31.4 % — LOW (ref 32–36)
MCV RBC AUTO: 89.8 FL — SIGNIFICANT CHANGE UP (ref 80–100)
MCV RBC AUTO: 90.6 FL — SIGNIFICANT CHANGE UP (ref 80–100)
METAMYELOCYTES # FLD: 1 % — SIGNIFICANT CHANGE UP (ref 0–1)
MONOCYTES # BLD AUTO: 1.27 K/UL — HIGH (ref 0–0.9)
MONOCYTES NFR BLD AUTO: 13.3 % — SIGNIFICANT CHANGE UP (ref 2–14)
MONOCYTES NFR BLD: 20 % — HIGH (ref 2–9)
MONOCYTES NFR BLD: 8 % — SIGNIFICANT CHANGE UP (ref 2–9)
MUCOUS THREADS # UR AUTO: SIGNIFICANT CHANGE UP
MYELOCYTES NFR BLD: 1 % — HIGH (ref 0–0)
MYELOCYTES NFR BLD: 1 % — HIGH (ref 0–0)
NEUTROPHIL AB SER-ACNC: 38 % — LOW (ref 43–77)
NEUTROPHIL AB SER-ACNC: 49 % — SIGNIFICANT CHANGE UP (ref 43–77)
NEUTROPHILS # BLD AUTO: 5.25 K/UL — SIGNIFICANT CHANGE UP (ref 1.8–7.4)
NEUTROPHILS NFR BLD AUTO: 55.2 % — SIGNIFICANT CHANGE UP (ref 43–77)
NEUTS BAND # BLD: 3 % — SIGNIFICANT CHANGE UP (ref 0–6)
NEUTS BAND # BLD: 6 % — SIGNIFICANT CHANGE UP (ref 0–6)
NITRITE UR-MCNC: NEGATIVE — SIGNIFICANT CHANGE UP
NRBC # BLD: 4 /100WBC — SIGNIFICANT CHANGE UP
NRBC # BLD: 7 /100WBC — SIGNIFICANT CHANGE UP
NRBC # FLD: 0.61 — SIGNIFICANT CHANGE UP
NRBC # FLD: 0.65 — SIGNIFICANT CHANGE UP
NRBC FLD-RTO: 6.8 — SIGNIFICANT CHANGE UP
NRBC FLD-RTO: 6.9 — SIGNIFICANT CHANGE UP
OSMOLALITY UR: 343 MOSMO/KG — SIGNIFICANT CHANGE UP (ref 50–1200)
OTHER - HEMATOLOGY %: 3 — SIGNIFICANT CHANGE UP
PCO2 BLDA: 64 MMHG — HIGH (ref 32–48)
PCO2 BLDA: 65 MMHG — HIGH (ref 32–48)
PCO2 BLDV: 80 MMHG — CRITICAL HIGH (ref 41–51)
PH BLDA: 7.25 PH — LOW (ref 7.35–7.45)
PH BLDA: 7.26 PH — LOW (ref 7.35–7.45)
PH BLDV: 7.19 PH — CRITICAL LOW (ref 7.32–7.43)
PH UR: 5 — SIGNIFICANT CHANGE UP (ref 4.6–8)
PHOSPHATE SERPL-MCNC: 8.2 MG/DL — HIGH (ref 2.5–4.5)
PHOSPHATE SERPL-MCNC: 8.5 MG/DL — HIGH (ref 2.5–4.5)
PHOSPHATE SERPL-MCNC: 9.1 MG/DL — HIGH (ref 2.5–4.5)
PLATELET # BLD AUTO: 177 K/UL — SIGNIFICANT CHANGE UP (ref 150–400)
PLATELET # BLD AUTO: 184 K/UL — SIGNIFICANT CHANGE UP (ref 150–400)
PLATELET CLUMP BLD QL SMEAR: SIGNIFICANT CHANGE UP
PLATELET COUNT - ESTIMATE: NORMAL — SIGNIFICANT CHANGE UP
PLATELET COUNT - ESTIMATE: NORMAL — SIGNIFICANT CHANGE UP
PMV BLD: 12.5 FL — SIGNIFICANT CHANGE UP (ref 7–13)
PMV BLD: 12.7 FL — SIGNIFICANT CHANGE UP (ref 7–13)
PO2 BLDA: 53 MMHG — LOW (ref 83–108)
PO2 BLDA: 61 MMHG — LOW (ref 83–108)
PO2 BLDV: 54 MMHG — HIGH (ref 35–40)
POLYCHROMASIA BLD QL SMEAR: SLIGHT — SIGNIFICANT CHANGE UP
POLYCHROMASIA BLD QL SMEAR: SLIGHT — SIGNIFICANT CHANGE UP
POTASSIUM BLDA-SCNC: 4.5 MMOL/L — SIGNIFICANT CHANGE UP (ref 3.4–4.5)
POTASSIUM BLDA-SCNC: 4.7 MMOL/L — HIGH (ref 3.4–4.5)
POTASSIUM BLDV-SCNC: 4.6 MMOL/L — HIGH (ref 3.4–4.5)
POTASSIUM SERPL-MCNC: 4.7 MMOL/L — SIGNIFICANT CHANGE UP (ref 3.5–5.3)
POTASSIUM SERPL-MCNC: 4.8 MMOL/L — SIGNIFICANT CHANGE UP (ref 3.5–5.3)
POTASSIUM SERPL-MCNC: 5.1 MMOL/L — SIGNIFICANT CHANGE UP (ref 3.5–5.3)
POTASSIUM SERPL-MCNC: 5.8 MMOL/L — HIGH (ref 3.5–5.3)
POTASSIUM SERPL-SCNC: 4.7 MMOL/L — SIGNIFICANT CHANGE UP (ref 3.5–5.3)
POTASSIUM SERPL-SCNC: 4.8 MMOL/L — SIGNIFICANT CHANGE UP (ref 3.5–5.3)
POTASSIUM SERPL-SCNC: 5.1 MMOL/L — SIGNIFICANT CHANGE UP (ref 3.5–5.3)
POTASSIUM SERPL-SCNC: 5.8 MMOL/L — HIGH (ref 3.5–5.3)
POTASSIUM UR-SCNC: 21.2 MEQ/L — SIGNIFICANT CHANGE UP
PROT SERPL-MCNC: 7.2 G/DL — SIGNIFICANT CHANGE UP (ref 6–8.3)
PROT UR-MCNC: 100 — HIGH
PROT UR-MCNC: 68 MG/DL — SIGNIFICANT CHANGE UP
PROTHROM AB SERPL-ACNC: 13.2 SEC — HIGH (ref 9.8–13.1)
RBC # BLD: 4.81 M/UL — SIGNIFICANT CHANGE UP (ref 3.8–5.2)
RBC # BLD: 4.89 M/UL — SIGNIFICANT CHANGE UP (ref 3.8–5.2)
RBC # FLD: 18 % — HIGH (ref 10.3–14.5)
RBC # FLD: 18.1 % — HIGH (ref 10.3–14.5)
RBC CASTS # UR COMP ASSIST: SIGNIFICANT CHANGE UP (ref 0–?)
REVIEW TO FOLLOW: YES — SIGNIFICANT CHANGE UP
REVIEW TO FOLLOW: YES — SIGNIFICANT CHANGE UP
SAO2 % BLDA: 78.2 % — LOW (ref 95–99)
SAO2 % BLDA: 85.7 % — LOW (ref 95–99)
SAO2 % BLDV: 74.5 % — SIGNIFICANT CHANGE UP (ref 60–85)
SMUDGE CELLS # BLD: PRESENT — SIGNIFICANT CHANGE UP
SMUDGE CELLS # BLD: PRESENT — SIGNIFICANT CHANGE UP
SODIUM BLDA-SCNC: 129 MMOL/L — LOW (ref 136–146)
SODIUM BLDA-SCNC: 129 MMOL/L — LOW (ref 136–146)
SODIUM SERPL-SCNC: 134 MMOL/L — LOW (ref 135–145)
SODIUM SERPL-SCNC: 136 MMOL/L — SIGNIFICANT CHANGE UP (ref 135–145)
SODIUM SERPL-SCNC: 138 MMOL/L — SIGNIFICANT CHANGE UP (ref 135–145)
SODIUM SERPL-SCNC: 138 MMOL/L — SIGNIFICANT CHANGE UP (ref 135–145)
SODIUM UR-SCNC: < 20 MEQ/L — SIGNIFICANT CHANGE UP
SP GR SPEC: > 1.03 — HIGH (ref 1–1.03)
SQUAMOUS # UR AUTO: SIGNIFICANT CHANGE UP
TRIGL SERPL-MCNC: 249 MG/DL — HIGH (ref 10–149)
TROPONIN T SERPL-MCNC: 0.43 NG/ML — HIGH (ref 0–0.06)
TROPONIN T SERPL-MCNC: 0.46 NG/ML — HIGH (ref 0–0.06)
TROPONIN T SERPL-MCNC: 0.5 NG/ML — HIGH (ref 0–0.06)
TROPONIN T SERPL-MCNC: 0.53 NG/ML — HIGH (ref 0–0.06)
TSH SERPL-MCNC: 0.19 UIU/ML — LOW (ref 0.27–4.2)
UROBILINOGEN FLD QL: 0.2 E.U. — SIGNIFICANT CHANGE UP (ref 0.1–0.2)
VARIANT LYMPHS # BLD: 1 % — SIGNIFICANT CHANGE UP
WBC # BLD: 8.85 K/UL — SIGNIFICANT CHANGE UP (ref 3.8–10.5)
WBC # BLD: 9.52 K/UL — SIGNIFICANT CHANGE UP (ref 3.8–10.5)
WBC # FLD AUTO: 8.85 K/UL — SIGNIFICANT CHANGE UP (ref 3.8–10.5)
WBC # FLD AUTO: 9.52 K/UL — SIGNIFICANT CHANGE UP (ref 3.8–10.5)
WBC UR QL: SIGNIFICANT CHANGE UP (ref 0–?)

## 2017-07-12 PROCEDURE — 76937 US GUIDE VASCULAR ACCESS: CPT | Mod: 26

## 2017-07-12 PROCEDURE — 71010: CPT | Mod: 26,77

## 2017-07-12 PROCEDURE — 74176 CT ABD & PELVIS W/O CONTRAST: CPT | Mod: 26

## 2017-07-12 PROCEDURE — 36800 INSERTION OF CANNULA: CPT | Mod: GC

## 2017-07-12 PROCEDURE — 36566 INSERT TUNNELED CV CATH: CPT

## 2017-07-12 PROCEDURE — 99291 CRITICAL CARE FIRST HOUR: CPT | Mod: 25

## 2017-07-12 PROCEDURE — 71010: CPT | Mod: 26

## 2017-07-12 RX ORDER — DEXTROSE 50 % IN WATER 50 %
12.5 SYRINGE (ML) INTRAVENOUS ONCE
Qty: 0 | Refills: 0 | Status: DISCONTINUED | OUTPATIENT
Start: 2017-07-12 | End: 2017-07-12

## 2017-07-12 RX ORDER — IPRATROPIUM/ALBUTEROL SULFATE 18-103MCG
3 AEROSOL WITH ADAPTER (GRAM) INHALATION ONCE
Qty: 0 | Refills: 0 | Status: COMPLETED | OUTPATIENT
Start: 2017-07-12 | End: 2017-07-12

## 2017-07-12 RX ORDER — INSULIN LISPRO 100/ML
VIAL (ML) SUBCUTANEOUS
Qty: 0 | Refills: 0 | Status: DISCONTINUED | OUTPATIENT
Start: 2017-07-12 | End: 2017-07-12

## 2017-07-12 RX ORDER — MIDAZOLAM HYDROCHLORIDE 1 MG/ML
1 INJECTION, SOLUTION INTRAMUSCULAR; INTRAVENOUS ONCE
Qty: 0 | Refills: 0 | Status: DISCONTINUED | OUTPATIENT
Start: 2017-07-12 | End: 2017-07-12

## 2017-07-12 RX ORDER — DEXTROSE 50 % IN WATER 50 %
1 SYRINGE (ML) INTRAVENOUS ONCE
Qty: 0 | Refills: 0 | Status: DISCONTINUED | OUTPATIENT
Start: 2017-07-12 | End: 2017-07-28

## 2017-07-12 RX ORDER — BUMETANIDE 0.25 MG/ML
2 INJECTION INTRAMUSCULAR; INTRAVENOUS
Qty: 0 | Refills: 0 | Status: DISCONTINUED | OUTPATIENT
Start: 2017-07-12 | End: 2017-07-12

## 2017-07-12 RX ORDER — DEXTROSE 50 % IN WATER 50 %
1 SYRINGE (ML) INTRAVENOUS ONCE
Qty: 0 | Refills: 0 | Status: DISCONTINUED | OUTPATIENT
Start: 2017-07-12 | End: 2017-07-12

## 2017-07-12 RX ORDER — ASPIRIN/CALCIUM CARB/MAGNESIUM 324 MG
81 TABLET ORAL DAILY
Qty: 0 | Refills: 0 | Status: DISCONTINUED | OUTPATIENT
Start: 2017-07-12 | End: 2017-07-28

## 2017-07-12 RX ORDER — CALCIUM ACETATE 667 MG
667 TABLET ORAL
Qty: 0 | Refills: 0 | Status: DISCONTINUED | OUTPATIENT
Start: 2017-07-12 | End: 2017-07-28

## 2017-07-12 RX ORDER — GLUCAGON INJECTION, SOLUTION 0.5 MG/.1ML
1 INJECTION, SOLUTION SUBCUTANEOUS ONCE
Qty: 0 | Refills: 0 | Status: DISCONTINUED | OUTPATIENT
Start: 2017-07-12 | End: 2017-07-28

## 2017-07-12 RX ORDER — INSULIN HUMAN 100 [IU]/ML
4 INJECTION, SOLUTION SUBCUTANEOUS
Qty: 100 | Refills: 0 | Status: DISCONTINUED | OUTPATIENT
Start: 2017-07-12 | End: 2017-07-13

## 2017-07-12 RX ORDER — CHOLECALCIFEROL (VITAMIN D3) 125 MCG
1000 CAPSULE ORAL DAILY
Qty: 0 | Refills: 0 | Status: DISCONTINUED | OUTPATIENT
Start: 2017-07-12 | End: 2017-07-28

## 2017-07-12 RX ORDER — GABAPENTIN 400 MG/1
100 CAPSULE ORAL DAILY
Qty: 0 | Refills: 0 | Status: DISCONTINUED | OUTPATIENT
Start: 2017-07-12 | End: 2017-07-13

## 2017-07-12 RX ORDER — DEXTROSE 50 % IN WATER 50 %
25 SYRINGE (ML) INTRAVENOUS ONCE
Qty: 0 | Refills: 0 | Status: DISCONTINUED | OUTPATIENT
Start: 2017-07-12 | End: 2017-07-28

## 2017-07-12 RX ORDER — INSULIN LISPRO 100/ML
30 VIAL (ML) SUBCUTANEOUS
Qty: 0 | Refills: 0 | Status: DISCONTINUED | OUTPATIENT
Start: 2017-07-12 | End: 2017-07-12

## 2017-07-12 RX ORDER — GLUCAGON INJECTION, SOLUTION 0.5 MG/.1ML
1 INJECTION, SOLUTION SUBCUTANEOUS ONCE
Qty: 0 | Refills: 0 | Status: DISCONTINUED | OUTPATIENT
Start: 2017-07-12 | End: 2017-07-12

## 2017-07-12 RX ORDER — INSULIN LISPRO 100/ML
VIAL (ML) SUBCUTANEOUS AT BEDTIME
Qty: 0 | Refills: 0 | Status: DISCONTINUED | OUTPATIENT
Start: 2017-07-12 | End: 2017-07-12

## 2017-07-12 RX ORDER — PANTOPRAZOLE SODIUM 20 MG/1
40 TABLET, DELAYED RELEASE ORAL
Qty: 0 | Refills: 0 | Status: DISCONTINUED | OUTPATIENT
Start: 2017-07-12 | End: 2017-07-28

## 2017-07-12 RX ORDER — SODIUM CHLORIDE 9 MG/ML
1000 INJECTION, SOLUTION INTRAVENOUS
Qty: 0 | Refills: 0 | Status: DISCONTINUED | OUTPATIENT
Start: 2017-07-12 | End: 2017-07-12

## 2017-07-12 RX ORDER — CARVEDILOL PHOSPHATE 80 MG/1
25 CAPSULE, EXTENDED RELEASE ORAL EVERY 12 HOURS
Qty: 0 | Refills: 0 | Status: DISCONTINUED | OUTPATIENT
Start: 2017-07-12 | End: 2017-07-28

## 2017-07-12 RX ORDER — SODIUM CHLORIDE 9 MG/ML
3 INJECTION INTRAMUSCULAR; INTRAVENOUS; SUBCUTANEOUS EVERY 8 HOURS
Qty: 0 | Refills: 0 | Status: DISCONTINUED | OUTPATIENT
Start: 2017-07-12 | End: 2017-07-12

## 2017-07-12 RX ORDER — IPRATROPIUM/ALBUTEROL SULFATE 18-103MCG
3 AEROSOL WITH ADAPTER (GRAM) INHALATION EVERY 6 HOURS
Qty: 0 | Refills: 0 | Status: DISCONTINUED | OUTPATIENT
Start: 2017-07-12 | End: 2017-07-13

## 2017-07-12 RX ORDER — HEPARIN SODIUM 5000 [USP'U]/ML
5000 INJECTION INTRAVENOUS; SUBCUTANEOUS EVERY 12 HOURS
Qty: 0 | Refills: 0 | Status: DISCONTINUED | OUTPATIENT
Start: 2017-07-12 | End: 2017-07-12

## 2017-07-12 RX ORDER — DEXTROSE 50 % IN WATER 50 %
25 SYRINGE (ML) INTRAVENOUS ONCE
Qty: 0 | Refills: 0 | Status: DISCONTINUED | OUTPATIENT
Start: 2017-07-12 | End: 2017-07-12

## 2017-07-12 RX ORDER — INSULIN GLARGINE 100 [IU]/ML
56 INJECTION, SOLUTION SUBCUTANEOUS AT BEDTIME
Qty: 0 | Refills: 0 | Status: DISCONTINUED | OUTPATIENT
Start: 2017-07-12 | End: 2017-07-12

## 2017-07-12 RX ORDER — DEXTROSE 50 % IN WATER 50 %
12.5 SYRINGE (ML) INTRAVENOUS ONCE
Qty: 0 | Refills: 0 | Status: DISCONTINUED | OUTPATIENT
Start: 2017-07-12 | End: 2017-07-28

## 2017-07-12 RX ORDER — HEPARIN SODIUM 5000 [USP'U]/ML
5000 INJECTION INTRAVENOUS; SUBCUTANEOUS EVERY 8 HOURS
Qty: 0 | Refills: 0 | Status: DISCONTINUED | OUTPATIENT
Start: 2017-07-12 | End: 2017-07-28

## 2017-07-12 RX ORDER — ACETAMINOPHEN 500 MG
975 TABLET ORAL ONCE
Qty: 0 | Refills: 0 | Status: COMPLETED | OUTPATIENT
Start: 2017-07-12 | End: 2017-07-12

## 2017-07-12 RX ORDER — SODIUM CHLORIDE 9 MG/ML
500 INJECTION, SOLUTION INTRAVENOUS
Qty: 0 | Refills: 0 | Status: DISCONTINUED | OUTPATIENT
Start: 2017-07-12 | End: 2017-07-12

## 2017-07-12 RX ORDER — INSULIN GLARGINE 100 [IU]/ML
70 INJECTION, SOLUTION SUBCUTANEOUS AT BEDTIME
Qty: 0 | Refills: 0 | Status: DISCONTINUED | OUTPATIENT
Start: 2017-07-12 | End: 2017-07-12

## 2017-07-12 RX ORDER — INSULIN HUMAN 100 [IU]/ML
4 INJECTION, SOLUTION SUBCUTANEOUS
Qty: 100 | Refills: 0 | Status: DISCONTINUED | OUTPATIENT
Start: 2017-07-12 | End: 2017-07-12

## 2017-07-12 RX ORDER — ATORVASTATIN CALCIUM 80 MG/1
80 TABLET, FILM COATED ORAL AT BEDTIME
Qty: 0 | Refills: 0 | Status: DISCONTINUED | OUTPATIENT
Start: 2017-07-12 | End: 2017-07-19

## 2017-07-12 RX ORDER — SODIUM CHLORIDE 9 MG/ML
1000 INJECTION, SOLUTION INTRAVENOUS
Qty: 0 | Refills: 0 | Status: DISCONTINUED | OUTPATIENT
Start: 2017-07-12 | End: 2017-07-28

## 2017-07-12 RX ORDER — DRONEDARONE 400 MG/1
400 TABLET, FILM COATED ORAL
Qty: 0 | Refills: 0 | Status: DISCONTINUED | OUTPATIENT
Start: 2017-07-12 | End: 2017-07-19

## 2017-07-12 RX ADMIN — HEPARIN SODIUM 5000 UNIT(S): 5000 INJECTION INTRAVENOUS; SUBCUTANEOUS at 14:07

## 2017-07-12 RX ADMIN — Medication 3 MILLILITER(S): at 07:20

## 2017-07-12 RX ADMIN — Medication 81 MILLIGRAM(S): at 11:41

## 2017-07-12 RX ADMIN — DRONEDARONE 400 MILLIGRAM(S): 400 TABLET, FILM COATED ORAL at 17:48

## 2017-07-12 RX ADMIN — INSULIN HUMAN 2 UNIT(S)/HR: 100 INJECTION, SOLUTION SUBCUTANEOUS at 10:23

## 2017-07-12 RX ADMIN — HEPARIN SODIUM 5000 UNIT(S): 5000 INJECTION INTRAVENOUS; SUBCUTANEOUS at 23:00

## 2017-07-12 RX ADMIN — SODIUM CHLORIDE 250 MILLILITER(S): 9 INJECTION, SOLUTION INTRAVENOUS at 10:24

## 2017-07-12 RX ADMIN — SODIUM CHLORIDE 3 MILLILITER(S): 9 INJECTION INTRAMUSCULAR; INTRAVENOUS; SUBCUTANEOUS at 07:20

## 2017-07-12 RX ADMIN — MIDAZOLAM HYDROCHLORIDE 1 MILLIGRAM(S): 1 INJECTION, SOLUTION INTRAMUSCULAR; INTRAVENOUS at 21:00

## 2017-07-12 RX ADMIN — SODIUM CHLORIDE 75 MILLILITER(S): 9 INJECTION, SOLUTION INTRAVENOUS at 19:35

## 2017-07-12 RX ADMIN — Medication 125 MILLIGRAM(S): at 07:20

## 2017-07-12 RX ADMIN — Medication 3 MILLILITER(S): at 13:36

## 2017-07-12 RX ADMIN — Medication 3 MILLILITER(S): at 21:53

## 2017-07-12 RX ADMIN — Medication 667 MILLIGRAM(S): at 11:41

## 2017-07-12 RX ADMIN — SODIUM CHLORIDE 3 MILLILITER(S): 9 INJECTION INTRAMUSCULAR; INTRAVENOUS; SUBCUTANEOUS at 13:14

## 2017-07-12 RX ADMIN — Medication 1000 UNIT(S): at 12:20

## 2017-07-12 RX ADMIN — ATORVASTATIN CALCIUM 80 MILLIGRAM(S): 80 TABLET, FILM COATED ORAL at 23:00

## 2017-07-12 RX ADMIN — GABAPENTIN 100 MILLIGRAM(S): 400 CAPSULE ORAL at 23:29

## 2017-07-12 RX ADMIN — INSULIN HUMAN 5 UNIT(S)/HR: 100 INJECTION, SOLUTION SUBCUTANEOUS at 19:35

## 2017-07-12 NOTE — PROGRESS NOTE ADULT - ASSESSMENT
56yoF w/ PMH sig for HTN, HLD DM, COPD, ovarian mass who presented to the ER with hypoglycemia and developed SOB.  She was found to be in ARF with Cr of 7.05, current etiology is unknown but may be due to gabapentin use or the ovarian mass.    #Neuro  - A&O x3, no neurological deficits noted  - if AMS develops, take finger stick and assess oxygenation status    #Cardiology  - troponin 0.5, stable since admission into ER  - cardiology saw in ER, did not believe that any of her current issues are cardiac in origin  - recommend echo and EKG prn for chest pain  - on 80mg atorvastatin  - on 81 mg aspirin  - transthoracic echo    #pulmonary  - currently on 8L NC, saturating well, will likely need BiPAP for sleeping  - monitor ABG, pH 7.26 pCO2 64 pO2 61   - continuing solumedrol, nebulizers  - levofloxacin for presumed COPD exacerbation    #GI  - hx of ovarian mass, unknown type, getting CT Abdomen today  - on ppi    # Renal  - ARF with cr 7.05, baseline appears to be around 1.4, unknown etiology as this point.  possibilities include gabapentin overdose vs ovarian mass vs worsening CHF  - ABG with pH of 7.25  - consider HD tomorrow    #ID  - no current issues    #ENDO  - insulin infusion, 2U/hr, consider restarting basal insulin tomorrow    Heparin SubQ for DVT prophylaxis

## 2017-07-12 NOTE — CHART NOTE - NSCHARTNOTEFT_GEN_A_CORE
Upon changing shifts RN reports pt c/o SOB. Came to assess the patient, she c/o dyspnea at rest with SpO2 desaturating to low 90s on 4L NC. Pt denies fever, chills, chest pain or palpitations. She admits to weight gain of unknown amount for past few weeks (hasn't weighed herself).    PAST MEDICAL HISTORY    CKD (chronic kidney disease), stage 3 (moderate)  Nonischemic cardiomyopathy  Sleep apnea  Diabetic neuropathy  HTN (hypertension)  AICD (automatic cardioverter/defibrillator) present  Cardiac Pacemaker  CHF (Congestive Heart Failure)  HLD (Hyperlipidemia)  Diabetes Mellitus  Diabetes Mellitus Arising in Pregnancy  H/O:   H/O: hysterectomy  AICD (automatic cardioverter/defibrillator) present    MEDICATIONS  (STANDING):  sodium chloride 0.9% lock flush 3 milliLiter(s) IV Push every 8 hours  aspirin enteric coated 81 milliGRAM(s) Oral daily  dronedarone 400 milliGRAM(s) Oral two times a day  insulin lispro Injectable (HumaLOG) 30 Unit(s) SubCutaneous three times a day before meals  insulin lispro (HumaLOG) corrective regimen sliding scale   SubCutaneous three times a day before meals  insulin lispro (HumaLOG) corrective regimen sliding scale   SubCutaneous at bedtime  dextrose 5%. 1000 milliLiter(s) (50 mL/Hr) IV Continuous <Continuous>  dextrose 50% Injectable 12.5 Gram(s) IV Push once  dextrose 50% Injectable 25 Gram(s) IV Push once  dextrose 50% Injectable 25 Gram(s) IV Push once  cholecalciferol 1000 Unit(s) Oral daily  pantoprazole    Tablet 40 milliGRAM(s) Oral before breakfast  atorvastatin 80 milliGRAM(s) Oral at bedtime  carvedilol 25 milliGRAM(s) Oral every 12 hours  buMETAnide 2 milliGRAM(s) Oral two times a day  heparin  Injectable 5000 Unit(s) SubCutaneous every 12 hours  insulin glargine Injectable (LANTUS) 56 Unit(s) SubCutaneous at bedtime    MEDICATIONS  (PRN):  dextrose Gel 1 Dose(s) Oral once PRN Blood Glucose LESS THAN 70 milliGRAM(s)/deciliter  glucagon  Injectable 1 milliGRAM(s) IntraMuscular once PRN Glucose LESS THAN 70 milligrams/deciliter    Vital Signs Last 24 Hrs  T(C): 37.3 (2017 21:27), Max: 37.3 (2017 21:27)  T(F): 99.2 (2017 21:27), Max: 99.2 (2017 21:27)  HR: 83 (2017 06:24) (83 - 92)  BP: 124/40 (2017 06:24) (113/67 - 160/94)  BP(mean): --  RR: 22 (2017 06:24) (17 - 23)  SpO2: 100% (2017 06:24) (94% - 100%)  PHYSICAL EXAM:      Constitutional: pt well groomed in moderate respiratory distress.    Eyes: WNL    ENMT: WNL    Neck: Supple NO JVD.    Back: WNL    Respiratory: B/L Inspiratory and expiratory wheezing, using accessory muscles to breathe.    Cardiovascular: S1 S2 RRR    Gastrointestinal: Abd soft, non-tender, grossly distended    Extremities: B/L LE edema 1+    Neurological: A&O x4    Skin: WNL    Musculoskeletal: WNL    Psychiatric: WNL    56y Female currently:     PAST MEDICAL HISTORY    CKD (chronic kidney disease), stage 3 (moderate)  Nonischemic cardiomyopathy  Sleep apnea  Diabetic neuropathy  HTN (hypertension)  AICD (automatic cardioverter/defibrillator) present  Cardiac Pacemaker  CHF (Congestive Heart Failure)  HLD (Hyperlipidemia)  Diabetes Mellitus  Diabetes Mellitus Arising in Pregnancy  H/O:   H/O: hysterectomy  AICD (automatic cardioverter/defibrillator) present    MEDICATIONS  (STANDING):  sodium chloride 0.9% lock flush 3 milliLiter(s) IV Push every 8 hours  aspirin enteric coated 81 milliGRAM(s) Oral daily  dronedarone 400 milliGRAM(s) Oral two times a day  insulin lispro Injectable (HumaLOG) 30 Unit(s) SubCutaneous three times a day before meals  insulin lispro (HumaLOG) corrective regimen sliding scale   SubCutaneous three times a day before meals  insulin lispro (HumaLOG) corrective regimen sliding scale   SubCutaneous at bedtime  dextrose 5%. 1000 milliLiter(s) (50 mL/Hr) IV Continuous <Continuous>  dextrose 50% Injectable 12.5 Gram(s) IV Push once  dextrose 50% Injectable 25 Gram(s) IV Push once  dextrose 50% Injectable 25 Gram(s) IV Push once  cholecalciferol 1000 Unit(s) Oral daily  pantoprazole    Tablet 40 milliGRAM(s) Oral before breakfast  atorvastatin 80 milliGRAM(s) Oral at bedtime  carvedilol 25 milliGRAM(s) Oral every 12 hours  buMETAnide 2 milliGRAM(s) Oral two times a day  heparin  Injectable 5000 Unit(s) SubCutaneous every 12 hours  insulin glargine Injectable (LANTUS) 56 Unit(s) SubCutaneous at bedtime    MEDICATIONS  (PRN):  dextrose Gel 1 Dose(s) Oral once PRN Blood Glucose LESS THAN 70 milliGRAM(s)/deciliter  glucagon  Injectable 1 milliGRAM(s) IntraMuscular once PRN Glucose LESS THAN 70 milligrams/deciliter    Vital Signs Last 24 Hrs  T(C): 37.3 (2017 21:27), Max: 37.3 (2017 21:27)  T(F): 99.2 (2017 21:27), Max: 99.2 (2017 21:27)  HR: 83 (2017 06:24) (83 - 92)  BP: 124/40 (2017 06:24) (113/67 - 160/94)  BP(mean): --  RR: 22 (2017 06:24) (17 - 23)  SpO2: 100% (2017 06:24) (94% - 100%)        Blood Gas Venous Comprehensive (17 @ 06:52)    Blood Gas Venous - Lactate: 1.2: Please note updated reference range. mmol/L    pH, Venous: 7.19 pH    Blood Gas Venous - Chloride: 101 mmol/L    Blood Gas Venous - Creatinine: 7.80: Delta: 0.98 on   Delta: 0.98 on 17- mg/dL    pCO2, Venous: 80 mmHg    pO2, Venous: 54 mmHg    HCO3, Venous: 23 mmol/L    Base Excess, Venous: 1.7: REFERENCE RANGE = -3 + 2 mmol/L mmol/L    Oxygen Saturation, Venous: 74.5 %    Blood Gas Venous - Sodium: 135 mmol/L    Blood Gas Venous - Potassium: 4.6 mmol/L    Blood Gas Venous - Glucose: 307    Blood Gas Venous - Hemoglobin: 13.6 g/dL    Blood Gas Venous - Hematocrit: 41.7 %    PLAN: pt s/p DuoNebs given.  -ordered IV SoluMedrol 125mg x1  -Started on BIPAP  -MICU consulted, evaluated and Accepted the patient to MICU service.  -Frank ABG  -CT Chest+Abd+Pelvis w/o con ordered.  -House Renal consulted for Urgent Hemodialysis  -Transferred pt to MICU service Upon changing shifts RN reports pt c/o SOB. Came to assess the patient, she c/o dyspnea at rest with SpO2 desaturating to low 90s on 4L NC. Pt denies fever, chills, chest pain or palpitations. She admits to weight gain of unknown amount for past few weeks (hasn't weighed herself).    PAST MEDICAL HISTORY    CKD (chronic kidney disease), stage 3 (moderate)  Nonischemic cardiomyopathy  Sleep apnea  Diabetic neuropathy  HTN (hypertension)  AICD (automatic cardioverter/defibrillator) present  Cardiac Pacemaker  CHF (Congestive Heart Failure)  HLD (Hyperlipidemia)  Diabetes Mellitus  Diabetes Mellitus Arising in Pregnancy  H/O:   H/O: hysterectomy  AICD (automatic cardioverter/defibrillator) present    MEDICATIONS  (STANDING):  sodium chloride 0.9% lock flush 3 milliLiter(s) IV Push every 8 hours  aspirin enteric coated 81 milliGRAM(s) Oral daily  dronedarone 400 milliGRAM(s) Oral two times a day  insulin lispro Injectable (HumaLOG) 30 Unit(s) SubCutaneous three times a day before meals  insulin lispro (HumaLOG) corrective regimen sliding scale   SubCutaneous three times a day before meals  insulin lispro (HumaLOG) corrective regimen sliding scale   SubCutaneous at bedtime  dextrose 5%. 1000 milliLiter(s) (50 mL/Hr) IV Continuous <Continuous>  dextrose 50% Injectable 12.5 Gram(s) IV Push once  dextrose 50% Injectable 25 Gram(s) IV Push once  dextrose 50% Injectable 25 Gram(s) IV Push once  cholecalciferol 1000 Unit(s) Oral daily  pantoprazole    Tablet 40 milliGRAM(s) Oral before breakfast  atorvastatin 80 milliGRAM(s) Oral at bedtime  carvedilol 25 milliGRAM(s) Oral every 12 hours  buMETAnide 2 milliGRAM(s) Oral two times a day  heparin  Injectable 5000 Unit(s) SubCutaneous every 12 hours  insulin glargine Injectable (LANTUS) 56 Unit(s) SubCutaneous at bedtime    MEDICATIONS  (PRN):  dextrose Gel 1 Dose(s) Oral once PRN Blood Glucose LESS THAN 70 milliGRAM(s)/deciliter  glucagon  Injectable 1 milliGRAM(s) IntraMuscular once PRN Glucose LESS THAN 70 milligrams/deciliter    Vital Signs Last 24 Hrs  T(C): 37.3 (2017 21:27), Max: 37.3 (2017 21:27)  T(F): 99.2 (2017 21:27), Max: 99.2 (2017 21:27)  HR: 83 (2017 06:24) (83 - 92)  BP: 124/40 (2017 06:24) (113/67 - 160/94)  BP(mean): --  RR: 22 (2017 06:24) (17 - 23)  SpO2: 100% (2017 06:24) (94% - 100%)  PHYSICAL EXAM:      Constitutional: pt well groomed in moderate respiratory distress.    Eyes: WNL    ENMT: WNL    Neck: Supple NO JVD.    Back: WNL    Respiratory: B/L Inspiratory and expiratory wheezing, using accessory muscles to breathe.    Cardiovascular: S1 S2 RRR    Gastrointestinal: Abd soft, non-tender, grossly distended    Extremities: B/L LE edema 1+    Neurological: A&O x4    Skin: WNL    Musculoskeletal: WNL    Psychiatric: WNL    56y Female currently:     PAST MEDICAL HISTORY    CKD (chronic kidney disease), stage 3 (moderate)  Nonischemic cardiomyopathy  Sleep apnea  Diabetic neuropathy  HTN (hypertension)  AICD (automatic cardioverter/defibrillator) present  Cardiac Pacemaker  CHF (Congestive Heart Failure)  HLD (Hyperlipidemia)  Diabetes Mellitus  Diabetes Mellitus Arising in Pregnancy  H/O:   H/O: hysterectomy  AICD (automatic cardioverter/defibrillator) present    MEDICATIONS  (STANDING):  sodium chloride 0.9% lock flush 3 milliLiter(s) IV Push every 8 hours  aspirin enteric coated 81 milliGRAM(s) Oral daily  dronedarone 400 milliGRAM(s) Oral two times a day  insulin lispro Injectable (HumaLOG) 30 Unit(s) SubCutaneous three times a day before meals  insulin lispro (HumaLOG) corrective regimen sliding scale   SubCutaneous three times a day before meals  insulin lispro (HumaLOG) corrective regimen sliding scale   SubCutaneous at bedtime  dextrose 5%. 1000 milliLiter(s) (50 mL/Hr) IV Continuous <Continuous>  dextrose 50% Injectable 12.5 Gram(s) IV Push once  dextrose 50% Injectable 25 Gram(s) IV Push once  dextrose 50% Injectable 25 Gram(s) IV Push once  cholecalciferol 1000 Unit(s) Oral daily  pantoprazole    Tablet 40 milliGRAM(s) Oral before breakfast  atorvastatin 80 milliGRAM(s) Oral at bedtime  carvedilol 25 milliGRAM(s) Oral every 12 hours  buMETAnide 2 milliGRAM(s) Oral two times a day  heparin  Injectable 5000 Unit(s) SubCutaneous every 12 hours  insulin glargine Injectable (LANTUS) 56 Unit(s) SubCutaneous at bedtime    MEDICATIONS  (PRN):  dextrose Gel 1 Dose(s) Oral once PRN Blood Glucose LESS THAN 70 milliGRAM(s)/deciliter  glucagon  Injectable 1 milliGRAM(s) IntraMuscular once PRN Glucose LESS THAN 70 milligrams/deciliter    Vital Signs Last 24 Hrs  T(C): 37.3 (2017 21:27), Max: 37.3 (2017 21:27)  T(F): 99.2 (2017 21:27), Max: 99.2 (2017 21:27)  HR: 83 (2017 06:24) (83 - 92)  BP: 124/40 (2017 06:24) (113/67 - 160/94)  BP(mean): --  RR: 22 (2017 06:24) (17 - 23)  SpO2: 100% (2017 06:24) (94% - 100%)        Blood Gas Venous Comprehensive (17 @ 06:52)    Blood Gas Venous - Lactate: 1.2: Please note updated reference range. mmol/L    pH, Venous: 7.19 pH    Blood Gas Venous - Chloride: 101 mmol/L    Blood Gas Venous - Creatinine: 7.80: Delta: 0.98 on   Delta: 0.98 on 17- mg/dL    pCO2, Venous: 80 mmHg    pO2, Venous: 54 mmHg    HCO3, Venous: 23 mmol/L    Base Excess, Venous: 1.7: REFERENCE RANGE = -3 + 2 mmol/L mmol/L    Oxygen Saturation, Venous: 74.5 %    Blood Gas Venous - Sodium: 135 mmol/L    Blood Gas Venous - Potassium: 4.6 mmol/L    Blood Gas Venous - Glucose: 307    Blood Gas Venous - Hemoglobin: 13.6 g/dL    Blood Gas Venous - Hematocrit: 41.7 %    PLAN: pt s/p DuoNebs given.  -ordered IV SoluMedrol 125mg x1  -Started on BIPAP  -MICU consulted, evaluated and Accepted the patient to MICU service.  -Frank ABG  -CT Chest+Abd+Pelvis w/o con ordered.  -House Renal consulted for Urgent Hemodialysis  -Transferred pt to MICU service  medical attending  ADD:  pt was seen in ER with MICU team  ER and renal DR ren in

## 2017-07-12 NOTE — CHART NOTE - NSCHARTNOTEFT_GEN_A_CORE
Critically ill pt requring PIV access for medical management. Under US guidance identified Rt UE vein in midline forearm and successfully placed 20g x 1.88 inch angiocath into vessel. . Placement confirmed s/p with ultrasound and catheter determined to be in patent lumen of vein. Pt tolerated well w/o complication.

## 2017-07-12 NOTE — PROGRESS NOTE ADULT - SUBJECTIVE AND OBJECTIVE BOX
MICU ACCEPTANCE NOTE    CHIEF COMPLAINT:  SOB, ARF    Interval Events:  This is a 56yoF with PMHx sig of HTN, HLD, T2DM, CHF who initially presented with hypoglycemia.  She took an extra 60 units of toujeou because of hyperglycemia in the morning, developed confusion, called EMS, and was transferred to the ER.  Her hypoglycemia resolved, however, she developed respiratory distress requiring BiPAP.  She admitted to two weeks of increased sputum production.  She was found to be hypercarbic and to also have ARF with Cr of 7.05 (baseline 1.8).  Cardiology was consulted for mildly elevated troponins but did not feel that her symptoms were due to cardiac issues.      She was transferred to the MICU for management of her hypercarbic respiratory failure as well as ARF.    Per family she was noted to have an ovarian mass for which she is getting a CT abdomen this afternoon.  patient also admits to taking more gabapentin than prescribed because of her neuropathy.    REVIEW OF SYSTEMS:  Constitutional: [ ] negative [ ] fevers [ ] chills [ ] weight loss [ ] weight gain  HEENT: [ ] negative [ ] dry eyes [ ] eye irritation [ ] postnasal drip [ ] nasal congestion  CV: [ ] negative  [ ] chest pain [ ] orthopnea [ ] palpitations [ ] murmur  Resp: [ ] negative [ ] cough [ ] shortness of breath [ ] dyspnea [ ] wheezing [ ] sputum [ ] hemoptysis  GI: [ ] negative [ ] nausea [ ] vomiting [ ] diarrhea [ ] constipation [ ] abd pain [ ] dysphagia   : [ ] negative [ ] dysuria [ ] nocturia [ ] hematuria [ ] increased urinary frequency  Musculoskeletal: [ ] negative [ ] back pain [ ] myalgias [ ] arthralgias [ ] fracture  Skin: [ ] negative [ ] rash [ ] itch  Neurological: [ ] negative [ ] headache [ ] dizziness [ ] syncope [ ] weakness [ ] numbness  Psychiatric: [ ] negative [ ] anxiety [ ] depression  Endocrine: [ ] negative [ ] diabetes [ ] thyroid problem  Hematologic/Lymphatic: [ ] negative [ ] anemia [ ] bleeding problem  Allergic/Immunologic: [ ] negative [ ] itchy eyes [ ] nasal discharge [ ] hives [ ] angioedema  [x ] All other systems negative  [ ] Unable to assess ROS because ________    OBJECTIVE:  ICU Vital Signs Last 24 Hrs  T(C): 36.1 (12 Jul 2017 12:00), Max: 37.3 (11 Jul 2017 21:27)  T(F): 97 (12 Jul 2017 12:00), Max: 99.2 (11 Jul 2017 21:27)  HR: 87 (12 Jul 2017 14:00) (81 - 92)  BP: 118/56 (12 Jul 2017 14:00) (102/75 - 160/94)  BP(mean): 68 (12 Jul 2017 14:00) (68 - 86)  ABP: --  ABP(mean): --  RR: 22 (12 Jul 2017 14:00) (15 - 25)  SpO2: 95% (12 Jul 2017 14:00) (89% - 100%)      07-12 @ 07:01  -  07-12 @ 14:41  --------------------------------------------------------  IN: 893 mL / OUT: 245 mL / NET: 648 mL      CAPILLARY BLOOD GLUCOSE  247 (12 Jul 2017 14:00)      PHYSICAL EXAM:  General: NAD, very obese  Respiratory: bilateral coarse rhonchi  Cardiovascular: regular rate and rhythm, no appreciated murmurs  Abdomen: obese, non-tender, non-distended  Extremities: pulses weakly palpable in all 4 extremities  Skin: intact to gross examination  Neurological: no focal deficits noted  Psychiatry: euthymic    LINES:    HOSPITAL MEDICATIONS:  aspirin enteric coated 81 milliGRAM(s) Oral daily  heparin  Injectable 5000 Unit(s) SubCutaneous every 8 hours  levoFLOXacin IVPB   IV Intermittent   dronedarone 400 milliGRAM(s) Oral two times a day  carvedilol 25 milliGRAM(s) Oral every 12 hours  atorvastatin 80 milliGRAM(s) Oral at bedtime  insulin Infusion 2 Unit(s)/Hr IV Continuous <Continuous>  dextrose Gel 1 Dose(s) Oral once PRN  dextrose 50% Injectable 12.5 Gram(s) IV Push once  dextrose 50% Injectable 25 Gram(s) IV Push once  dextrose 50% Injectable 25 Gram(s) IV Push once  glucagon  Injectable 1 milliGRAM(s) IntraMuscular once PRN  ALBUTerol/ipratropium for Nebulization 3 milliLiter(s) Nebulizer every 6 hours  acetaminophen   Tablet. 975 milliGRAM(s) Oral once  pantoprazole    Tablet 40 milliGRAM(s) Oral before breakfast  cholecalciferol 1000 Unit(s) Oral daily  dextrose 5%. 1000 milliLiter(s) IV Continuous <Continuous>  calcium acetate 667 milliGRAM(s) Oral three times a day with meals  lactated ringers. 500 milliLiter(s) IV Continuous <Continuous>  sodium chloride 0.9% lock flush 3 milliLiter(s) IV Push every 8 hours      LABS:                        13.6   8.85  )-----------( 177      ( 12 Jul 2017 07:45 )             43.9     Hgb Trend: 13.6<--, 13.7<--, 14.0<--  07-12    136  |  91<L>  |  73<H>  ----------------------------<  293<H>  5.1   |  23  |  7.05<H>    Ca    7.8<L>      12 Jul 2017 12:45  Phos  8.2     07-12  Mg     2.4     07-12    TPro  7.2  /  Alb  3.6  /  TBili  0.3  /  DBili  x   /  AST  27  /  ALT  18  /  AlkPhos  68  07-12    Creatinine Trend: 7.05<--, 7.94<--, 7.89<--, 7.76<--  PT/INR - ( 12 Jul 2017 07:45 )   PT: 13.2 SEC;   INR: 1.17          PTT - ( 12 Jul 2017 07:45 )  PTT:25.7 SEC  Urinalysis Basic - ( 12 Jul 2017 04:25 )    Color: [place] / Appearance: x / SG: > 1.030 / pH: 5.0  Gluc: NEGATIVE / Ketone: TRACE  / Bili: MODERATE / Urobili: 0.2 E.U.   Blood: NEGATIVE / Protein: 100 / Nitrite: NEGATIVE   Leuk Esterase: NEGATIVE / RBC: 0-2 / WBC 0-2   Sq Epi: OCC / Non Sq Epi: x / Bacteria: MANY      Arterial Blood Gas:  07-12 @ 13:09  7.26/64/61/23/85.7/1.0  ABG lactate: 1.2  Arterial Blood Gas:  07-12 @ 07:35  7.25/65/53/24/78.2/1.3  ABG lactate: --    Venous Blood Gas:  07-12 @ 06:52  7.19/80/54/23/74.5  VBG Lactate: 1.2      MICROBIOLOGY:     RADIOLOGY:  [ ] Reviewed and interpreted by me    EKG:

## 2017-07-12 NOTE — H&P ADULT - ATTENDING COMMENTS
57 y/o F with hx of DM II,  HTN, systolic  CHFs/p AICD    EF  20% presenting with hypoglycemia. Patient states her blood glucose was elevated this AM to 350s; therefore, she doubled her dose of toujeo. She then did not eat much for the rest of the day and  took her dose of novolog as well. She became lightheaded and called EMS. Patient was hypoglycemic when EMS found her and was given 25g of D50 with improvement of symptoms. patient complains of SOB which is baseline for her secondary from her CHF. denies fever, chills, cough, chest pain, falls, LOC, abdominal pain, nausea, vomiting, LE edema,, dysuira, calf tenderness, orthopnea or LE edema    +On admission: comfortable, no complaints BUT she is hypoxic with respiratoy disterss seen in Er  MICU called at bed side  foly placed  @ 4 am   I was called by medical team at  3:49  about her worsening condition cardiolgy and reanl called .   Pt need urgent dialysis.   DW  MICU team  and NP  in ER in detail    Patient also states she thinks she last urinated almost a day ago

## 2017-07-12 NOTE — PATIENT PROFILE ADULT. - NS PRO ABUSE SCREEN AFRAID ANYONE YN
[facility], [employer]   Psychiatric Progress Note, Hospital Day #8        Interim History:   The patient's care was discussed with the treatment team during the daily team meeting and/or staff's chart notes were reviewed. The patient was seen in her bed. She reported feeling OK, having no serious concerns or questions for me. She met yesterday with [facility] who made her aware that this treatment team still recommended [facility]RT[facility]. Daisy made it clear that she iram like to go home. Denied Auditory hallucinations and Visual hallucinations, was seen frequently blinking during yesterday's meeting and was started on Cogentin. Today she said that blinking was better and that Cogentin might have already started helping her.         Medications:       benztropine  0.5 mg Oral BID     lidocaine  2 patch Transdermal Q24H     lidocaine   Transdermal Q24h     lidocaine   Transdermal Q8H     gabapentin   Topical Q8H     scopolamine  1 patch Transdermal Q72H    And     scopolamine   Transdermal Q8H    And     scopolamine   Transdermal Q72H     lithium  600 mg Oral BID     folic acid  1 mg Oral Daily     multivitamin, therapeutic with minerals  1 tablet Oral Daily     OLANZapine  15 mg Oral At Bedtime     OLANZapine  5 mg Oral Daily     pantoprazole  40 mg Oral QAM          Allergies:     Allergies   Allergen Reactions     Codeine Sulfate      Compazine Rash     Morphine Nausea and Vomiting     Prochlorperazine Rash          Labs/Imaging:     No results found for this or any previous visit (from the past 24 hour(s)).     Brain MRI (4/25):  IMPRESSION: Negative brain and sella MRI examination.    - ALAINA GRAFF MD         Psychiatric Examination:   /78  Pulse 107  Temp 97.9  F (36.6  C) (Oral)  Resp 18  LMP  (LMP Unknown)  SpO2 98%  Weight is 0 lbs 0 oz  There is no height or weight on file to calculate BMI.    Appearance: adequately groomed, casually dressed in scrubs, no acute distress   Attitude:   "Cooperative  Eye Contact: good, frequent blinking  Mood:  \"all right\"  Affect: mood congruent, neutral  Speech:  clear, coherent and normal prosody  Language: English language w/ appropriate syntax and vocabulary  Psychomotor Behavior: less fidgeting, no tremor noted  Gait/Station: normal gait  Thought Process: continues to be somewhat more logical and linear, reports decreased racing thoughts  Associations: less loosening of associations present   Thought Content:  no evidence of suicidal ideation or homicidal ideation, not responding to internal stimuli  Insight:  limited, but improving  Judgement:  limited, but improving  Oriented to:  time, person, and place  Attention Span and Concentration:  fair  Recent and Remote Memory:  intact  Fund of Knowledge: likely below average, not formally evaluated         Precautions:     Behavioral Orders   Procedures     Code 1 - Restrict to Unit     Routine Programming     As clinically indicated     Status 15     Every 15 minutes.          Diagnoses:     Schizoaffective Disorder, bipolar type  Cocaine Use Disorder  Benzodiazepine Use Disorder         Plan:     Continue current meds    Pt's manic sxs have been improving steadily.  She was started on Cogentin yesterday, No medication changes today. She had an intake with [facility]. Per [facility] they won't have a bed for the patient until 6/15, even, if she is accepted. Pt is committed and has an ACT team. [facility] left a message for ACT to bring to the hospital Abishey Duncan as we don't have it in our pharmacy.      " no

## 2017-07-12 NOTE — CONSULT NOTE ADULT - SUBJECTIVE AND OBJECTIVE BOX
HISTORY OF PRESENT ILLNESS:  Patient is a 56y old  Female who presents with a chief complaint of Hypoglycemia (2017 04:42)    HPI:  57 y/o F with hx of DM II,  HTN, HFrEF 20-25% s/p  AICD/PPM, CKD, sleep apnea presenting with hypoglycemia after she doubled her dose of insulin and did not eat or drink the rest of the day. She also reports she fell in the morning. Denies LOC or hitting her head. She then became lightheaded and called EMS. EMS administered 25g of D50 with improvement of symptoms.  In ED CE (+), ALISA on CKD, EKG unchanged.  Denies CP, syncope, change in baseline SOB, cough, fever or chills,     Pt's cardiologist is Dr Cody Nielson at [facility].    Allergies    penicillin (Unknown)	    MEDICATIONS:  aspirin enteric coated 81 milliGRAM(s) Oral daily  dronedarone 400 milliGRAM(s) Oral two times a day  carvedilol 25 milliGRAM(s) Oral every 12 hours  buMETAnide 2 milliGRAM(s) Oral two times a day  heparin  Injectable 5000 Unit(s) SubCutaneous every 12 hours  pantoprazole    Tablet 40 milliGRAM(s) Oral before breakfast  insulin lispro Injectable (HumaLOG) 30 Unit(s) SubCutaneous three times a day before meals  insulin lispro (HumaLOG) corrective regimen sliding scale   SubCutaneous three times a day before meals  insulin lispro (HumaLOG) corrective regimen sliding scale   SubCutaneous at bedtime  dextrose Gel 1 Dose(s) Oral once PRN  dextrose 50% Injectable 12.5 Gram(s) IV Push once  dextrose 50% Injectable 25 Gram(s) IV Push once  dextrose 50% Injectable 25 Gram(s) IV Push once  glucagon  Injectable 1 milliGRAM(s) IntraMuscular once PRN  atorvastatin 80 milliGRAM(s) Oral at bedtime  insulin glargine Injectable (LANTUS) 56 Unit(s) SubCutaneous at bedtime  dextrose 5%. 1000 milliLiter(s) IV Continuous <Continuous>  cholecalciferol 1000 Unit(s) Oral daily      PAST MEDICAL & SURGICAL HISTORY:  CKD (chronic kidney disease), stage 3 (moderate)  Nonischemic cardiomyopathy: EF 20-25%  Sleep apnea: noncompliant with CPAP  Diabetic neuropathy  HTN (hypertension)  AICD (automatic cardioverter/defibrillator) present: ICD (Medtronic generator with Medtronic atrial (1886)and ventricular (6900) leads) 07  Cardiac Pacemaker  CHF (Congestive Heart Failure)  HLD (Hyperlipidemia)  Diabetes Mellitus: x 10 years, denies nephropathy or retinopathy  H/O:   H/O: hysterectomy  AICD (automatic cardioverter/defibrillator) present: Medtronic generator with Medtronic atrial (4076)and ventricular (6947) leads) 07      FAMILY HISTORY:  No pertinent family history in first degree relatives      SOCIAL HISTORY:      Smoker: [ ] Active [ ] never  [x ] previous  Alcohol:  [ ] social [ ] daily [ x] never      REVIEW OF SYSTEMS:  CONSTITUTIONAL: (+) weakness, fevers or chills  EYES/ENT: No visual changes;  No vertigo or throat pain   NECK: No pain or stiffness  RESPIRATORY: No cough, wheezing, hemoptysis; (+) baseline shortness of breath  CARDIOVASCULAR: No chest pain, palpitations, syncope or near syncope  GASTROINTESTINAL: No abdominal or epigastric pain. No nausea, vomiting, or hematemesis; No diarrhea or constipation. No melena or hematochezia.  GENITOURINARY: No dysuria, frequency or hematuria  NEUROLOGICAL: No numbness or weakness  SKIN: No itching, burning, rashes, or lesions   All other review of systems is negative unless indicated above.    PHYSICAL EXAM:  T(C): 37.3 (17 @ 21:27), Max: 37.3 (17 @ 21:27)  HR: 91 (17 @ 04:09) (87 - 92)  BP: 113/67 (17 @ 05:12) (113/67 - 160/94)  RR: 18 (17 @ 04:09) (17 - 23)  SpO2: 100% (17 @ 04:09) (94% - 100%)  Wt(kg): --    Appearance: Normal	  HEENT:   Normal oral mucosa, PERRL, EOMI	  Lymphatic: No lymphadenopathy  Cardiovascular: Normal S1 S2, No JVD, No murmurs, No edema  Respiratory: Wheezing throughout  Psychiatry: A & O x 3, Mood & affect appropriate  Gastrointestinal:  Soft, Non-tender, + BS	  Skin: No rashes, No ecchymoses, No cyanosis	  Neurologic: Non-focal, A&Ox3, nonfocal, VÁSQUEZ x 4  Extremities: Normal range of motion, No clubbing, cyanosis or edema  Vascular: Peripheral pulses palpable 2+ bilaterally    	 	  LABS:	 	                          14.0   8.31  )-----------( 185      ( 2017 20:15 )             45.3         138  |  92<L>  |  67<H>  ----------------------------<  190<H>  4.7   |  28  |  7.89<H>      138  |  90<L>  |  62<H>  ----------------------------<  270<H>  4.3   |  27  |  7.76<H>    Ca    8.1<L>      2017 00:37  Ca    8.3<L>      2017 20:15  Mg     2.4       Mg     2.5         TPro  7.9  /  Alb  3.8  /  TBili  0.4  /  DBili  x   /  AST  36<H>  /  ALT  21  /  AlkPhos  72      LIVER FUNCTIONS - ( 2017 20:15 )  Alb: 3.8 g/dL / Pro: 7.9 g/dL / ALK PHOS: 72 u/L / ALT: 21 u/L / AST: 36 u/L / GGT: x             proBNP:   Lipid Profile:   HgA1c: Hemoglobin A1C, Whole Blood: 9.2 % ( @ 20:15)    TSH:     CARDIAC MARKERS:  Troponin T, Serum: 0.53 ng/mL ( @ 00:37)  Troponin T, Serum: 0.46 ng/mL ( @ 20:15)    Creatine Kinase, Serum: 1053 u/L ( @ 00:37)  Creatine Kinase, Serum: 1165 u/L ( @ 20:15)    CKMB: 6.89 ng/mL ( @ 00:37)    CAPILLARY BLOOD GLUCOSE  170 (2017 04:09)    TELEMETRY: 1 episode NSVT x 9 beats	    ECG: sinus rhythm with frequent PVCs 	  RADIOLOGY: pending  OTHER: renal US pending	    ASSSESSMENT/PLAN    57 y/o F with hx of DM II,  HTN, HFrEF 20-25% s/p  AICD/PPM, CKD, sleep apnea presenting with hypoglycemia after  doubling dose of insulin and s/p fall.  Now with ALISA on CKD and (+) CE. Unlikely cardiac with CKMB index 0.5, no CP and EKG unchanged.    Admit to telemetry  No need for ASA, plavix, heparin at this time  Serial EKG, PRN chest pain  labs include serial cardiac enzymes, risk factor profile to include TSH, HGBA1c, Lipid profile, CMP, Mag, Phos, CBC, pBNP  Echo to evaluate LV function and wall motion abnormality  cxr HISTORY OF PRESENT ILLNESS:  Patient is a 56y old  Female who presents with a chief complaint of Hypoglycemia (2017 04:42)    HPI:  55 y/o F with hx of DM II,  HTN, HFrEF 20-25% s/p  AICD/PPM, CKD, sleep apnea presenting with hypoglycemia after she doubled her dose of insulin and did not eat or drink the rest of the day. She also reports she fell in the morning. Denies LOC or hitting her head. She then became lightheaded and called EMS. EMS administered 25g of D50 with improvement of symptoms.  In ED CE (+), ALISA on CKD, EKG unchanged.  Denies CP, syncope, change in baseline SOB, cough, fever or chills,     Pt's cardiologist is Dr Cody Nielson at [facility].    Allergies    penicillin (Unknown)	    MEDICATIONS:  aspirin enteric coated 81 milliGRAM(s) Oral daily  dronedarone 400 milliGRAM(s) Oral two times a day  carvedilol 25 milliGRAM(s) Oral every 12 hours  buMETAnide 2 milliGRAM(s) Oral two times a day  heparin  Injectable 5000 Unit(s) SubCutaneous every 12 hours  pantoprazole    Tablet 40 milliGRAM(s) Oral before breakfast  insulin lispro Injectable (HumaLOG) 30 Unit(s) SubCutaneous three times a day before meals  insulin lispro (HumaLOG) corrective regimen sliding scale   SubCutaneous three times a day before meals  insulin lispro (HumaLOG) corrective regimen sliding scale   SubCutaneous at bedtime  dextrose Gel 1 Dose(s) Oral once PRN  dextrose 50% Injectable 12.5 Gram(s) IV Push once  dextrose 50% Injectable 25 Gram(s) IV Push once  dextrose 50% Injectable 25 Gram(s) IV Push once  glucagon  Injectable 1 milliGRAM(s) IntraMuscular once PRN  atorvastatin 80 milliGRAM(s) Oral at bedtime  insulin glargine Injectable (LANTUS) 56 Unit(s) SubCutaneous at bedtime  dextrose 5%. 1000 milliLiter(s) IV Continuous <Continuous>  cholecalciferol 1000 Unit(s) Oral daily      PAST MEDICAL & SURGICAL HISTORY:  CKD (chronic kidney disease), stage 3 (moderate)  Nonischemic cardiomyopathy: EF 20-25%  Sleep apnea: noncompliant with CPAP  Diabetic neuropathy  HTN (hypertension)  AICD (automatic cardioverter/defibrillator) present: ICD (Medtronic generator with Medtronic atrial (1346)and ventricular (6939) leads) 07  Cardiac Pacemaker  CHF (Congestive Heart Failure)  HLD (Hyperlipidemia)  Diabetes Mellitus: x 10 years, denies nephropathy or retinopathy  H/O:   H/O: hysterectomy  AICD (automatic cardioverter/defibrillator) present: Medtronic generator with Medtronic atrial (4076)and ventricular (6947) leads) 07      FAMILY HISTORY:  No pertinent family history in first degree relatives      SOCIAL HISTORY:      Smoker: [ ] Active [ ] never  [x ] previous  Alcohol:  [ ] social [ ] daily [ x] never      REVIEW OF SYSTEMS:  CONSTITUTIONAL: (+) weakness, fevers or chills  EYES/ENT: No visual changes;  No vertigo or throat pain   NECK: No pain or stiffness  RESPIRATORY: No cough, wheezing, hemoptysis; (+) baseline shortness of breath  CARDIOVASCULAR: No chest pain, palpitations, syncope or near syncope  GASTROINTESTINAL: No abdominal or epigastric pain. No nausea, vomiting, or hematemesis; No diarrhea or constipation. No melena or hematochezia.  GENITOURINARY: No dysuria, frequency or hematuria  NEUROLOGICAL: No numbness or weakness  SKIN: No itching, burning, rashes, or lesions   All other review of systems is negative unless indicated above.    PHYSICAL EXAM:  T(C): 37.3 (17 @ 21:27), Max: 37.3 (17 @ 21:27)  HR: 91 (17 @ 04:09) (87 - 92)  BP: 113/67 (17 @ 05:12) (113/67 - 160/94)  RR: 18 (17 @ 04:09) (17 - 23)  SpO2: 100% (17 @ 04:09) (94% - 100%)  Wt(kg): --    Appearance: Normal	  HEENT:   Normal oral mucosa, PERRL, EOMI	  Lymphatic: No lymphadenopathy  Cardiovascular: Normal S1 S2, No JVD, No murmurs, No edema  Respiratory: Wheezing throughout  Psychiatry: A & O x 3, Mood & affect appropriate  Gastrointestinal:  Soft, Non-tender, + BS	  Skin: No rashes, No ecchymoses, No cyanosis	  Neurologic: Non-focal, A&Ox3, nonfocal, VÁSQUEZ x 4  Extremities: Normal range of motion, No clubbing, cyanosis or edema  Vascular: Peripheral pulses palpable 2+ bilaterally    	 	  LABS:	 	                          14.0   8.31  )-----------( 185      ( 2017 20:15 )             45.3         138  |  92<L>  |  67<H>  ----------------------------<  190<H>  4.7   |  28  |  7.89<H>      138  |  90<L>  |  62<H>  ----------------------------<  270<H>  4.3   |  27  |  7.76<H>    Ca    8.1<L>      2017 00:37  Ca    8.3<L>      2017 20:15  Mg     2.4       Mg     2.5         TPro  7.9  /  Alb  3.8  /  TBili  0.4  /  DBili  x   /  AST  36<H>  /  ALT  21  /  AlkPhos  72      LIVER FUNCTIONS - ( 2017 20:15 )  Alb: 3.8 g/dL / Pro: 7.9 g/dL / ALK PHOS: 72 u/L / ALT: 21 u/L / AST: 36 u/L / GGT: x             proBNP:   Lipid Profile:   HgA1c: Hemoglobin A1C, Whole Blood: 9.2 % ( @ 20:15)    TSH:     CARDIAC MARKERS:  Troponin T, Serum: 0.53 ng/mL ( @ 00:37)  Troponin T, Serum: 0.46 ng/mL ( @ 20:15)    Creatine Kinase, Serum: 1053 u/L ( @ 00:37)  Creatine Kinase, Serum: 1165 u/L ( @ 20:15)    CKMB: 6.89 ng/mL ( @ 00:37)    CAPILLARY BLOOD GLUCOSE  170 (2017 04:09)    TELEMETRY: 1 episode NSVT x 9 beats	    ECG: sinus rhythm with frequent PVCs 	  RADIOLOGY: pending  OTHER: renal US pending	    ASSSESSMENT/PLAN    55 y/o F with hx of DM II,  HTN, HFrEF 20-25% s/p  AICD/PPM, CKD, sleep apnea presenting with hypoglycemia after  doubling dose of insulin, poor PO intake and s/p fall. Now with ALISA on CKD and (+) CE. Unlikely cardiac with CKMB index 0.5, no CP and EKG unchanged. Elevated CK most likely related to rhabomyolysis s/p fall    Admit to telemetry  No need for ASA, plavix, heparin at this time  Serial EKG, PRN chest pain  labs include serial cardiac enzymes, risk factor profile to include TSH, HGBA1c, Lipid profile, CMP, Mag, Phos, CBC, pBNP  Echo to evaluate LV function and wall motion abnormality  cxr HISTORY OF PRESENT ILLNESS:  Patient is a 56y old  Female who presents with a chief complaint of Hypoglycemia (2017 04:42)    HPI:  57 y/o F with hx of DM II,  HTN, HFrEF 20-25% s/p  AICD/PPM, CKD, sleep apnea presenting with hypoglycemia after she doubled her dose of insulin and did not eat or drink the rest of the day. She also reports she fell in the morning. Denies LOC or hitting her head. She then became lightheaded and called EMS. EMS administered 25g of D50 with improvement of symptoms.  In ED CE (+), ALISA on CKD, EKG unchanged.  Denies CP, syncope, change in baseline SOB, cough, fever or chills,     Pt's cardiologist is Dr Cody Easley at [facility].    Allergies    penicillin (Unknown)	    MEDICATIONS:    aspirin enteric coated 81 milliGRAM(s) Oral daily  dronedarone 400 milliGRAM(s) Oral two times a day  carvedilol 25 milliGRAM(s) Oral every 12 hours  buMETAnide 2 milliGRAM(s) Oral two times a day  heparin  Injectable 5000 Unit(s) SubCutaneous every 12 hours  pantoprazole    Tablet 40 milliGRAM(s) Oral before breakfast  insulin lispro Injectable (HumaLOG) 30 Unit(s) SubCutaneous three times a day before meals  insulin lispro (HumaLOG) corrective regimen sliding scale   SubCutaneous three times a day before meals  insulin lispro (HumaLOG) corrective regimen sliding scale   SubCutaneous at bedtime  dextrose Gel 1 Dose(s) Oral once PRN  dextrose 50% Injectable 12.5 Gram(s) IV Push once  dextrose 50% Injectable 25 Gram(s) IV Push once  dextrose 50% Injectable 25 Gram(s) IV Push once  glucagon  Injectable 1 milliGRAM(s) IntraMuscular once PRN  atorvastatin 80 milliGRAM(s) Oral at bedtime  insulin glargine Injectable (LANTUS) 56 Unit(s) SubCutaneous at bedtime  dextrose 5%. 1000 milliLiter(s) IV Continuous <Continuous>  cholecalciferol 1000 Unit(s) Oral daily      PAST MEDICAL & SURGICAL HISTORY:  CKD (chronic kidney disease), stage 3 (moderate)  Nonischemic cardiomyopathy: EF 20-25%  Sleep apnea: noncompliant with CPAP  Diabetic neuropathy  HTN (hypertension)  AICD (automatic cardioverter/defibrillator) present: ICD (Medtronic generator with Medtronic atrial (7566)and ventricular (6978) leads) 07  Cardiac Pacemaker  CHF (Congestive Heart Failure)  HLD (Hyperlipidemia)  Diabetes Mellitus: x 10 years, denies nephropathy or retinopathy  H/O:   H/O: hysterectomy  AICD (automatic cardioverter/defibrillator) present: Medtronic generator with Medtronic atrial (4076)and ventricular (6947) leads) 07      FAMILY HISTORY:  No pertinent family history in first degree relatives      SOCIAL HISTORY:      Smoker: [ ] Active [ ] never  [x ] previous  Alcohol:  [ ] social [ ] daily [ x] never      REVIEW OF SYSTEMS:  CONSTITUTIONAL: (+) weakness, fevers or chills  EYES/ENT: No visual changes;  No vertigo or throat pain   NECK: No pain or stiffness  RESPIRATORY: No cough, wheezing, hemoptysis; (+) baseline shortness of breath  CARDIOVASCULAR: No chest pain, palpitations, syncope or near syncope  GASTROINTESTINAL: No abdominal or epigastric pain. No nausea, vomiting, or hematemesis; No diarrhea or constipation. No melena or hematochezia.  GENITOURINARY: No dysuria, frequency or hematuria  NEUROLOGICAL: No numbness or weakness  SKIN: No itching, burning, rashes, or lesions   All other review of systems is negative unless indicated above.    PHYSICAL EXAM:  T(C): 37.3 (17 @ 21:27), Max: 37.3 (17 @ 21:27)  HR: 91 (17 @ 04:09) (87 - 92)  BP: 113/67 (17 @ 05:12) (113/67 - 160/94)  RR: 18 (17 @ 04:09) (17 - 23)  SpO2: 100% (17 @ 04:09) (94% - 100%)  Wt(kg): --    Appearance: Normal	  HEENT:   Normal oral mucosa, PERRL, EOMI	  Lymphatic: No lymphadenopathy  Cardiovascular: Normal S1 S2, No JVD, No murmurs, No edema  Respiratory: Wheezing throughout  Psychiatry: A & O x 3, Mood & affect appropriate  Gastrointestinal:  Soft, Non-tender, + BS	  Skin: No rashes, No ecchymoses, No cyanosis	  Neurologic: Non-focal, A&Ox3, nonfocal, VÁSQUEZ x 4  Extremities: Normal range of motion, No clubbing, cyanosis or edema  Vascular: Peripheral pulses palpable 2+ bilaterally    	 	  LABS:	 	                          14.0   8.31  )-----------( 185      ( 2017 20:15 )             45.3         138  |  92<L>  |  67<H>  ----------------------------<  190<H>  4.7   |  28  |  7.89<H>      138  |  90<L>  |  62<H>  ----------------------------<  270<H>  4.3   |  27  |  7.76<H>    Ca    8.1<L>      2017 00:37  Ca    8.3<L>      2017 20:15  Mg     2.4       Mg     2.5         TPro  7.9  /  Alb  3.8  /  TBili  0.4  /  DBili  x   /  AST  36<H>  /  ALT  21  /  AlkPhos  72      LIVER FUNCTIONS - ( 2017 20:15 )  Alb: 3.8 g/dL / Pro: 7.9 g/dL / ALK PHOS: 72 u/L / ALT: 21 u/L / AST: 36 u/L / GGT: x             proBNP:   Lipid Profile:   HgA1c: Hemoglobin A1C, Whole Blood: 9.2 % ( @ 20:15)    TSH:     CARDIAC MARKERS:  Troponin T, Serum: 0.53 ng/mL ( @ 00:37)  Troponin T, Serum: 0.46 ng/mL ( @ 20:15)    Creatine Kinase, Serum: 1053 u/L ( @ 00:37)  Creatine Kinase, Serum: 1165 u/L ( @ 20:15)    CKMB: 6.89 ng/mL ( @ 00:37)    CAPILLARY BLOOD GLUCOSE  170 (2017 04:09)    TELEMETRY: 1 episode NSVT x 9 beats	    ECG: sinus rhythm with frequent PVCs 	  RADIOLOGY: pending  OTHER: renal US pending	    ASSSESSMENT/PLAN    57 y/o F with hx of DM II,  HTN, HFrEF 20-25% s/p  AICD/PPM, CKD, sleep apnea presenting with hypoglycemia after  doubling dose of insulin, poor PO intake and s/p fall. Now with ALISA on CKD and (+) CE. Unlikely cardiac with CKMB index 0.5, no CP and EKG unchanged. Elevated CK most likely related to rhabomyolysis s/p fall    Admit to telemetry  No need for ASA, plavix, heparin at this time  Serial EKG, PRN chest pain  labs include serial cardiac enzymes, risk factor profile to include TSH, HGBA1c, Lipid profile, CMP, Mag, Phos, CBC, pBNP  Echo to evaluate LV function and wall motion abnormality  cxr  Clarify medications with cardiologist Dr Cody Easley [phone]

## 2017-07-12 NOTE — H&P ADULT - PROBLEM SELECTOR PLAN 3
Admit to tele  Doesn't appear fluid overloaded   Strict I&Os  cont bumex for now  hold entresto and aldactone for ARF

## 2017-07-12 NOTE — H&P ADULT - PROBLEM SELECTOR PLAN 1
Admit   check cbc, bmp, a1c, flp, tsh, trend CE  Light ordered  Monitor I&Os  Monitor lytes  will hold Entresto, and spironolactone  avoid nephrotoxic meds  Call renal consult with Dr. Macdonald in AM  f/u MD note  D/W Dr. Lou

## 2017-07-12 NOTE — H&P ADULT - ASSESSMENT
55 y/o F with hx of DM II,  HTN, systolic  CHFs/p AICD presenting with hypoglycemia. admitted for ARF and 10 beats of NSVT

## 2017-07-12 NOTE — H&P ADULT - PROBLEM SELECTOR PLAN 2
Cardiology consult called--> awaiting further recommendations  Echo ordered  Patient has no chest pain

## 2017-07-12 NOTE — H&P ADULT - HISTORY OF PRESENT ILLNESS
55 y/o F with hx of DM II,  HTN, systolic  CHFs/p AICD presenting with hypoglycemia. Patient states her blood glucose was elevated this AM to 350s; therefore, she doubled her dose of toujeo. She then did not eat much for the rest of the day and  took her dose of novology as well. She became lightheaded and called EMS. Patient was hypoglycemic when EMS found her and was given 25g of D50 with improvement of symptoms. patient complains of SOB which is baseline for her secondary from her CHF. denies fever, chills, cough, chest pain, falls, LOC, abdominal pain, nausea, vomiting, LE edema,, dysuira, calf tenderness, orthopnea or LE edema    +On admission: comfortable, no complaints    Patient also states she thinks she last urinated almost a day ago      patient also had 10 beats of NSVT in ED.

## 2017-07-12 NOTE — PROCEDURE NOTE - NSPROCDETAILS_GEN_ALL_CORE
lumen(s) aspirated and flushed/guidewire recovered/sterile technique, catheter placed/sterile dressing applied/ultrasound guidance

## 2017-07-12 NOTE — CONSULT NOTE ADULT - SUBJECTIVE AND OBJECTIVE BOX
HPI:  Ms. Bernstein is a 56 year-old woman with history of hypertension, type 2 diabetes mellitus, and systolic  CHF s/p AICD presenting with hypoglycemia. She was noted by EMS to be lightheaded and short of breath with a associated low blood sugar.     Noted on admission to have a BUN/creatinine of 70s/high 7s. Therefore a renal consultation was requested.      PAST MEDICAL & SURGICAL HISTORY:  CKD (chronic kidney disease), stage 3 (moderate)  Nonischemic cardiomyopathy: EF 20-25%  Sleep apnea: noncompliant with CPAP  Diabetic neuropathy  HTN (hypertension)  AICD (automatic cardioverter/defibrillator) present: ICD (Medtronic generator with Medtronic atrial (4076)and ventricular (6947) leads) 07  Cardiac Pacemaker  CHF (Congestive Heart Failure)  HLD (Hyperlipidemia)  Diabetes Mellitus: x 10 years, denies nephropathy or retinopathy  H/O:   H/O: hysterectomy  AICD (automatic cardioverter/defibrillator) present: Medtronic generator with Medtronic atrial (4076)and ventricular (6947) leads) 07      MEDICATIONS  (STANDING):  sodium chloride 0.9% lock flush 3 milliLiter(s) IV Push every 8 hours  aspirin enteric coated 81 milliGRAM(s) Oral daily  dronedarone 400 milliGRAM(s) Oral two times a day  insulin lispro Injectable (HumaLOG) 30 Unit(s) SubCutaneous three times a day before meals  insulin lispro (HumaLOG) corrective regimen sliding scale   SubCutaneous three times a day before meals  insulin lispro (HumaLOG) corrective regimen sliding scale   SubCutaneous at bedtime  dextrose 5%. 1000 milliLiter(s) (50 mL/Hr) IV Continuous <Continuous>  dextrose 50% Injectable 12.5 Gram(s) IV Push once  dextrose 50% Injectable 25 Gram(s) IV Push once  dextrose 50% Injectable 25 Gram(s) IV Push once  cholecalciferol 1000 Unit(s) Oral daily  pantoprazole    Tablet 40 milliGRAM(s) Oral before breakfast  atorvastatin 80 milliGRAM(s) Oral at bedtime  carvedilol 25 milliGRAM(s) Oral every 12 hours  heparin  Injectable 5000 Unit(s) SubCutaneous every 12 hours  insulin glargine Injectable (LANTUS) 56 Unit(s) SubCutaneous at bedtime      Allergies  penicillin (Unknown)    SOCIAL HISTORY:  Denies ETOh,Smoking,     FAMILY HISTORY:  No pertinent family history in first degree relatives      REVIEW OF SYSTEMS:  CONSTITUTIONAL: No weakness, fevers or chills  EYES/ENT: No visual changes;  No vertigo or throat pain   NECK: No pain or stiffness  RESPIRATORY: No cough, wheezing, hemoptysis; No shortness of breath  CARDIOVASCULAR: No chest pain or palpitations  GASTROINTESTINAL: No abdominal or epigastric pain. No nausea, vomiting, or hematemesis; No diarrhea or constipation. No melena or hematochezia.  GENITOURINARY: No dysuria, frequency or hematuria  NEUROLOGICAL: No numbness or weakness  SKIN: No itching, burning, rashes, or lesions   All other review of systems is negative unless indicated above.    VITAL:  T(C): , Max: 37.3 (17 @ 21:27)  T(F): , Max: 99.2 (17 @ 21:27)  HR: 83 (17 @ 07:50)  BP: 124/40 (17 @ 06:24)  RR: 22 (17 @ 06:24)  SpO2: 100% (17 @ 07:50)      PHYSICAL EXAM:  Constitutional: NAD, Alert  HEENT: NCAT, MMM  Neck: Supple, No JVD  Respiratory: CTA-b/l  Cardiovascular: RRR s1s2, no m/r/g  Gastrointestinal: BS+, soft, NT/ND  Extremities: No peripheral edema b/l  Neurological: no focal deficits; strength grossly intact  Psychiatric: Normal mood, normal affect  Back: no CVAT b/l  Skin: No rashes, no nevi    LABS:                        13.7   9.52  )-----------( 184      ( 2017 06:35 )             43.6     Na(138)/K(4.8)/Cl(92)/HCO3(26)/BUN(73)/Cr(7.94)Glu(308)/Ca(7.8)/Mg(2.4)/PO4(9.1)     @ 06:35  Na(138)/K(4.7)/Cl(92)/HCO3(28)/BUN(67)/Cr(7.89)Glu(190)/Ca(8.1)/Mg(2.4)/PO4(--)     @ 00:37  Na(138)/K(4.3)/Cl(90)/HCO3(27)/BUN(62)/Cr(7.76)Glu(270)/Ca(8.3)/Mg(2.5)/PO4(--)     @ 20:15    Urinalysis Basic - ( 2017 04:25 )    Color: BARRINGTON / Appearance: x / SG: > 1.030 / pH: 5.0  Gluc: NEGATIVE / Ketone: TRACE  / Bili: MODERATE / Urobili: 0.2 E.U.   Blood: NEGATIVE / Protein: 100 / Nitrite: NEGATIVE   Leuk Esterase: NEGATIVE / RBC: 0-2 / WBC 0-2   Sq Epi: OCC / Non Sq Epi: x / Bacteria: MANY      Sodium, Random Urine: < 20 meq/L ( @ 04:25)  Potassium, Random Urine: 21.2 meq/L ( 04:25)  Chloride, Random Urine: < 20 mmol/L ( 04:25)  Creatinine, Random Urine: 495.32 mg/dL ( @ 04:25)  Osmolality, Random Urine: 343 mosmo/kg ( @ 04:25)  Protein, Random Urine: 68.0 mg/dL ( @ 04:25) HPI:  Ms. Bernstein is a 56 year-old woman with history of hypertension, type 2 diabetes mellitus, and systolic CHF s/p AICD presenting with hypoglycemia. She took twice her usual amount of insulin at home yesterday, as her blood sugar was 350mg/dL. She soon developed lightheaded and shortness of breath; her blood sugar was in the 30s. She received D50 from EMS, with associated improvement in her symptoms.     Noted on admission to have a BUN/creatinine of 70s/high 7s. Therefore a renal consultation was requested.  Also noted to have a pH of 7.19 and a PCO2 of 80. Now on BIPAP and accepted into MICU.    Per patient and her PCP Dr. Unique Lou, her baseline creatinine is 1.4-1.5mg/dL. She had not urinated for 1-2 days prior to admission.    PAST MEDICAL & SURGICAL HISTORY:  CKD (chronic kidney disease), stage 3 (moderate)  Nonischemic cardiomyopathy: EF 20-25%  Sleep apnea: noncompliant with CPAP  Diabetic neuropathy  HTN (hypertension)  AICD (automatic cardioverter/defibrillator) present: ICD (Medtronic generator with Medtronic atrial (4076)and ventricular (6947) leads) 07  Cardiac Pacemaker  CHF (Congestive Heart Failure)  HLD (Hyperlipidemia)  Diabetes Mellitus: x 10 years, denies nephropathy or retinopathy  H/O:   H/O: hysterectomy  AICD (automatic cardioverter/defibrillator) present: Medtronic generator with Medtronic atrial (4076)and ventricular (6947) leads) 07      MEDICATIONS  (STANDING):  sodium chloride 0.9% lock flush 3 milliLiter(s) IV Push every 8 hours  aspirin enteric coated 81 milliGRAM(s) Oral daily  dronedarone 400 milliGRAM(s) Oral two times a day  insulin lispro Injectable (HumaLOG) 30 Unit(s) SubCutaneous three times a day before meals  insulin lispro (HumaLOG) corrective regimen sliding scale   SubCutaneous three times a day before meals  insulin lispro (HumaLOG) corrective regimen sliding scale   SubCutaneous at bedtime  dextrose 5%. 1000 milliLiter(s) (50 mL/Hr) IV Continuous <Continuous>  dextrose 50% Injectable 12.5 Gram(s) IV Push once  dextrose 50% Injectable 25 Gram(s) IV Push once  dextrose 50% Injectable 25 Gram(s) IV Push once  cholecalciferol 1000 Unit(s) Oral daily  pantoprazole    Tablet 40 milliGRAM(s) Oral before breakfast  atorvastatin 80 milliGRAM(s) Oral at bedtime  carvedilol 25 milliGRAM(s) Oral every 12 hours  heparin  Injectable 5000 Unit(s) SubCutaneous every 12 hours  insulin glargine Injectable (LANTUS) 56 Unit(s) SubCutaneous at bedtime      Allergies  penicillin (Unknown)    SOCIAL HISTORY:  Denies ETOh,Smoking,     FAMILY HISTORY:  No pertinent family history in first degree relatives      REVIEW OF SYSTEMS:  CONSTITUTIONAL: (+)generalized weakness.  EYES/ENT: No visual changes;  No vertigo or throat pain   NECK: No pain or stiffness  RESPIRATORY: (+)SOB  CARDIOVASCULAR: No chest pain or palpitations  GASTROINTESTINAL: No abdominal or epigastric pain. No nausea, vomiting, or hematemesis; No diarrhea or constipation. No melena or hematochezia.  GENITOURINARY: (+)reduced urine output  NEUROLOGICAL: No numbness or weakness  SKIN: No itching, burning, rashes, or lesions   All other review of systems is negative unless indicated above.    VITAL:  T(C): , Max: 37.3 (17 @ 21:27)  T(F): , Max: 99.2 (17 @ 21:27)  HR: 83 (17 @ 07:50)  BP: 124/40 (17 @ 06:24)  RR: 22 (17 @ 06:24)  SpO2: 100% (17 @ 07:50)      PHYSICAL EXAM:  Constitutional: Alert, obese, on BIPAP  HEENT: NCAT, DMM  Neck: Supple, No JVD  Respiratory: CTA-b/l  Cardiovascular: Irreg S1S2  Gastrointestinal: (+)distended, NT, (+)correa with dark urine  Extremities: warm x 4; trace b/l LE edema  Neurological: no focal deficits; strength grossly intact  Psychiatric: Normal mood, normal affect  Back: no CVAT b/l  Skin: No rashes, no nevi    IMAGING:  CXR (17) - c/w pulmonary edema    LABS:                        13.7   9.52  )-----------( 184      ( 2017 06:35 )             43.6     Na(138)/K(4.8)/Cl(92)/HCO3(26)/BUN(73)/Cr(7.94)Glu(308)/Ca(7.8)/Mg(2.4)/PO4(9.1)     @ 06:35  Na(138)/K(4.7)/Cl(92)/HCO3(28)/BUN(67)/Cr(7.89)Glu(190)/Ca(8.1)/Mg(2.4)/PO4(--)     @ 00:37  Na(138)/K(4.3)/Cl(90)/HCO3(27)/BUN(62)/Cr(7.76)Glu(270)/Ca(8.3)/Mg(2.5)/PO4(--)     @ 20:15    Urinalysis Basic - ( 2017 04:25 )    Color: BARRINGTON / Appearance: x / SG: > 1.030 / pH: 5.0  Gluc: NEGATIVE / Ketone: TRACE  / Bili: MODERATE / Urobili: 0.2 E.U.   Blood: NEGATIVE / Protein: 100 / Nitrite: NEGATIVE   Leuk Esterase: NEGATIVE / RBC: 0-2 / WBC 0-2   Sq Epi: OCC / Non Sq Epi: x / Bacteria: MANY      Sodium, Random Urine: < 20 meq/L ( @ 04:25)  Potassium, Random Urine: 21.2 meq/L ( @ 04:25)  Chloride, Random Urine: < 20 mmol/L ( @ 04:25)  Creatinine, Random Urine: 495.32 mg/dL ( @ 04:25)  Osmolality, Random Urine: 343 mosmo/kg ( @ 04:25)  Protein, Random Urine: 68.0 mg/dL ( @ 04:25)      (1)Renal - ALISA on CKD - oliguric - urine studies are very prerenal-appearing. Due to cardiac disease/decompensated CHF? Seems most likely.    (2)Acid-base - primary respiratory acidosis - on BIPAP and accepted into MICU. Due to decompensated CHF/"tiring out"?    (3)Hyperphosphatemia - ALISA-associated.    (4)Hypoglycemia - improved    (5)CV - seeming fluid overloaded - indicated for HD in effort to ultrafiltrate and reduce work of breathing      RECOMMEND:    (1)HD today - 2kg UF as able; shiley placement by MICU; Hep B + C studies  (2)Renal diet  (3)Phoslo 667mg po tid with meals  (4)BIPAP per MICU  (5)Dose meds for GFR<10  (6)Renal ultrasound  (7)TTE      Thank you for involving [facility] in this patient's care.    With warm regards,    Perfecto Rae MD  [facility]  [phone]

## 2017-07-13 DIAGNOSIS — I10 ESSENTIAL (PRIMARY) HYPERTENSION: ICD-10-CM

## 2017-07-13 DIAGNOSIS — E78.4 OTHER HYPERLIPIDEMIA: ICD-10-CM

## 2017-07-13 DIAGNOSIS — E11.21 TYPE 2 DIABETES MELLITUS WITH DIABETIC NEPHROPATHY: ICD-10-CM

## 2017-07-13 LAB
ALBUMIN SERPL ELPH-MCNC: 3.5 G/DL — SIGNIFICANT CHANGE UP (ref 3.3–5)
ALP SERPL-CCNC: 67 U/L — SIGNIFICANT CHANGE UP (ref 40–120)
ALT FLD-CCNC: 18 U/L — SIGNIFICANT CHANGE UP (ref 4–33)
AST SERPL-CCNC: 30 U/L — SIGNIFICANT CHANGE UP (ref 4–32)
BACTERIA UR CULT: SIGNIFICANT CHANGE UP
BILIRUB SERPL-MCNC: 0.4 MG/DL — SIGNIFICANT CHANGE UP (ref 0.2–1.2)
BUN SERPL-MCNC: 57 MG/DL — HIGH (ref 7–23)
CALCIUM SERPL-MCNC: 7.8 MG/DL — LOW (ref 8.4–10.5)
CHLORIDE SERPL-SCNC: 91 MMOL/L — LOW (ref 98–107)
CO2 SERPL-SCNC: 18 MMOL/L — LOW (ref 22–31)
CREAT SERPL-MCNC: 5.02 MG/DL — HIGH (ref 0.5–1.3)
GLUCOSE SERPL-MCNC: 179 MG/DL — HIGH (ref 70–99)
HBV SURFACE AB SER-ACNC: <3 MLU/ML — LOW
HBV SURFACE AG SER-ACNC: NEGATIVE — SIGNIFICANT CHANGE UP
MAGNESIUM SERPL-MCNC: 2.1 MG/DL — SIGNIFICANT CHANGE UP (ref 1.6–2.6)
PHOSPHATE SERPL-MCNC: 7 MG/DL — HIGH (ref 2.5–4.5)
POTASSIUM SERPL-MCNC: 5.4 MMOL/L — HIGH (ref 3.5–5.3)
POTASSIUM SERPL-SCNC: 5.4 MMOL/L — HIGH (ref 3.5–5.3)
PROT SERPL-MCNC: 7.1 G/DL — SIGNIFICANT CHANGE UP (ref 6–8.3)
SODIUM SERPL-SCNC: 133 MMOL/L — LOW (ref 135–145)
SPECIMEN SOURCE: SIGNIFICANT CHANGE UP

## 2017-07-13 PROCEDURE — 99291 CRITICAL CARE FIRST HOUR: CPT

## 2017-07-13 PROCEDURE — 99223 1ST HOSP IP/OBS HIGH 75: CPT

## 2017-07-13 RX ORDER — CHLORHEXIDINE GLUCONATE 213 G/1000ML
1 SOLUTION TOPICAL DAILY
Qty: 0 | Refills: 0 | Status: DISCONTINUED | OUTPATIENT
Start: 2017-07-13 | End: 2017-07-15

## 2017-07-13 RX ORDER — IPRATROPIUM/ALBUTEROL SULFATE 18-103MCG
3 AEROSOL WITH ADAPTER (GRAM) INHALATION EVERY 6 HOURS
Qty: 0 | Refills: 0 | Status: DISCONTINUED | OUTPATIENT
Start: 2017-07-13 | End: 2017-07-16

## 2017-07-13 RX ORDER — INSULIN LISPRO 100/ML
VIAL (ML) SUBCUTANEOUS EVERY 6 HOURS
Qty: 0 | Refills: 0 | Status: DISCONTINUED | OUTPATIENT
Start: 2017-07-13 | End: 2017-07-14

## 2017-07-13 RX ORDER — NOREPINEPHRINE BITARTRATE/D5W 8 MG/250ML
0.01 PLASTIC BAG, INJECTION (ML) INTRAVENOUS
Qty: 8 | Refills: 0 | Status: DISCONTINUED | OUTPATIENT
Start: 2017-07-13 | End: 2017-07-13

## 2017-07-13 RX ORDER — NOREPINEPHRINE BITARTRATE/D5W 8 MG/250ML
0.01 PLASTIC BAG, INJECTION (ML) INTRAVENOUS
Qty: 8 | Refills: 0 | Status: DISCONTINUED | OUTPATIENT
Start: 2017-07-13 | End: 2017-07-14

## 2017-07-13 RX ORDER — INSULIN GLARGINE 100 [IU]/ML
70 INJECTION, SOLUTION SUBCUTANEOUS AT BEDTIME
Qty: 0 | Refills: 0 | Status: DISCONTINUED | OUTPATIENT
Start: 2017-07-13 | End: 2017-07-17

## 2017-07-13 RX ORDER — HUMAN INSULIN 100 [IU]/ML
20 INJECTION, SUSPENSION SUBCUTANEOUS EVERY 6 HOURS
Qty: 0 | Refills: 0 | Status: DISCONTINUED | OUTPATIENT
Start: 2017-07-13 | End: 2017-07-13

## 2017-07-13 RX ORDER — GABAPENTIN 400 MG/1
100 CAPSULE ORAL DAILY
Qty: 0 | Refills: 0 | Status: DISCONTINUED | OUTPATIENT
Start: 2017-07-13 | End: 2017-07-28

## 2017-07-13 RX ORDER — MIDODRINE HYDROCHLORIDE 2.5 MG/1
20 TABLET ORAL THREE TIMES A DAY
Qty: 0 | Refills: 0 | Status: DISCONTINUED | OUTPATIENT
Start: 2017-07-13 | End: 2017-07-14

## 2017-07-13 RX ADMIN — HEPARIN SODIUM 5000 UNIT(S): 5000 INJECTION INTRAVENOUS; SUBCUTANEOUS at 13:06

## 2017-07-13 RX ADMIN — DRONEDARONE 400 MILLIGRAM(S): 400 TABLET, FILM COATED ORAL at 06:51

## 2017-07-13 RX ADMIN — Medication 1000 UNIT(S): at 13:06

## 2017-07-13 RX ADMIN — Medication 2: at 18:14

## 2017-07-13 RX ADMIN — CHLORHEXIDINE GLUCONATE 1 APPLICATION(S): 213 SOLUTION TOPICAL at 12:00

## 2017-07-13 RX ADMIN — DRONEDARONE 400 MILLIGRAM(S): 400 TABLET, FILM COATED ORAL at 18:28

## 2017-07-13 RX ADMIN — HEPARIN SODIUM 5000 UNIT(S): 5000 INJECTION INTRAVENOUS; SUBCUTANEOUS at 22:52

## 2017-07-13 RX ADMIN — GABAPENTIN 100 MILLIGRAM(S): 400 CAPSULE ORAL at 02:58

## 2017-07-13 RX ADMIN — ATORVASTATIN CALCIUM 80 MILLIGRAM(S): 80 TABLET, FILM COATED ORAL at 22:52

## 2017-07-13 RX ADMIN — Medication 81 MILLIGRAM(S): at 13:06

## 2017-07-13 RX ADMIN — HUMAN INSULIN 20 UNIT(S): 100 INJECTION, SUSPENSION SUBCUTANEOUS at 12:05

## 2017-07-13 RX ADMIN — MIDODRINE HYDROCHLORIDE 20 MILLIGRAM(S): 2.5 TABLET ORAL at 20:19

## 2017-07-13 RX ADMIN — Medication 3 MILLILITER(S): at 10:50

## 2017-07-13 RX ADMIN — Medication 3 MILLILITER(S): at 03:00

## 2017-07-13 RX ADMIN — INSULIN GLARGINE 70 UNIT(S): 100 INJECTION, SOLUTION SUBCUTANEOUS at 22:52

## 2017-07-13 RX ADMIN — Medication 2.04 MICROGRAM(S)/KG/MIN: at 02:01

## 2017-07-13 RX ADMIN — PANTOPRAZOLE SODIUM 40 MILLIGRAM(S): 20 TABLET, DELAYED RELEASE ORAL at 06:56

## 2017-07-13 RX ADMIN — GABAPENTIN 100 MILLIGRAM(S): 400 CAPSULE ORAL at 13:06

## 2017-07-13 RX ADMIN — HEPARIN SODIUM 5000 UNIT(S): 5000 INJECTION INTRAVENOUS; SUBCUTANEOUS at 06:58

## 2017-07-13 RX ADMIN — Medication 667 MILLIGRAM(S): at 18:24

## 2017-07-13 NOTE — PROGRESS NOTE ADULT - SUBJECTIVE AND OBJECTIVE BOX
CHIEF COMPLAINT:    SUBJECTIVE:     REVIEW OF SYSTEMS:    CONSTITUTIONAL: (  )  weakness,  (  ) fevers or chills  EYES/ENT: (  )visual changes;     NECK: (  ) pain or stiffness  RESPIRATORY:   (  )cough, wheezing, hemoptysis;  (  ) shortness of breath  CARDIOVASCULAR:  (  )chest pain or palpitations  GASTROINTESTINAL:   (  )abdominal or epigastric pain.  (  ) nausea, vomiting, or hematemesis;   (   ) diarrhea or constipation.   GENITOURINARY:   (    ) dysuria, frequency or hematuria  NEUROLOGICAL:  (   ) numbness or weakness   All other review of systems is negative unless indicated above    Vital Signs Last 24 Hrs  T(C): 35.8 (13 Jul 2017 04:00), Max: 36.1 (12 Jul 2017 12:00)  T(F): 96.4 (13 Jul 2017 04:00), Max: 97 (12 Jul 2017 12:00)  HR: 80 (13 Jul 2017 08:00) (72 - 88)  BP: 143/51 (13 Jul 2017 08:00) (68/36 - 157/116)  BP(mean): 69 (13 Jul 2017 08:00) (44 - 125)  RR: 14 (13 Jul 2017 08:00) (14 - 29)  SpO2: 90% (13 Jul 2017 08:00) (85% - 100%)    I&O's Summary    12 Jul 2017 07:01  -  13 Jul 2017 07:00  --------------------------------------------------------  IN: 1896.5 mL / OUT: 2035 mL / NET: -138.5 mL        CAPILLARY BLOOD GLUCOSE  195 (13 Jul 2017 08:15)  195 (13 Jul 2017 07:00)  197 (13 Jul 2017 06:13)  177 (13 Jul 2017 05:00)  133 (13 Jul 2017 04:00)  138 (13 Jul 2017 03:00)  114 (13 Jul 2017 02:00)  114 (13 Jul 2017 01:28)  149 (13 Jul 2017 00:00)  160 (12 Jul 2017 23:00)  159 (12 Jul 2017 22:00)  194 (12 Jul 2017 21:00)  227 (12 Jul 2017 19:31)  225 (12 Jul 2017 18:00)  250 (12 Jul 2017 17:20)  250 (12 Jul 2017 16:00)  243 (12 Jul 2017 15:00)  247 (12 Jul 2017 14:00)  267 (12 Jul 2017 12:50)  283 (12 Jul 2017 11:30)  274 (12 Jul 2017 10:10)  288 (12 Jul 2017 09:10)          PHYSICAL EXAM:    Constitutional:  (   ) NAD,   (   )awake and alert  HEENT: PERR, EOMI,    Neck: Soft and supple, No LAD, No JVD  Respiratory:  (    Breath sounds are clear bilaterally,    (   ) wheezing, rales or rhonchi  Cardiovascular:     (   )S1 and S2, regular rate and rhythm, no Murmurs, gallops or rubs  Gastrointestinal:  (   )Bowel Sounds present, soft,   (  )nontender, nondistended,    Extremities:    (  ) peripheral edema  Vascular: 2+ peripheral pulses  Neurological:    (    )A/O x 3,   (  ) focal deficits  Musculoskeletal:    (   )  normal strength b/l upper  (     ) normal  lower extremities  Skin: No rashes    MEDICATIONS:  MEDICATIONS  (STANDING):  aspirin enteric coated 81 milliGRAM(s) Oral daily  dronedarone 400 milliGRAM(s) Oral two times a day  cholecalciferol 1000 Unit(s) Oral daily  pantoprazole    Tablet 40 milliGRAM(s) Oral before breakfast  atorvastatin 80 milliGRAM(s) Oral at bedtime  carvedilol 25 milliGRAM(s) Oral every 12 hours  insulin Infusion 2 Unit(s)/Hr (2 mL/Hr) IV Continuous <Continuous>  dextrose 5%. 1000 milliLiter(s) (50 mL/Hr) IV Continuous <Continuous>  dextrose 50% Injectable 12.5 Gram(s) IV Push once  dextrose 50% Injectable 25 Gram(s) IV Push once  dextrose 50% Injectable 25 Gram(s) IV Push once  levoFLOXacin IVPB   IV Intermittent   calcium acetate 667 milliGRAM(s) Oral three times a day with meals  heparin  Injectable 5000 Unit(s) SubCutaneous every 8 hours  ALBUTerol/ipratropium for Nebulization 3 milliLiter(s) Nebulizer every 6 hours  norepinephrine Infusion 0.01 MICROgram(s)/kG/Min (2.044 mL/Hr) IV Continuous <Continuous>  gabapentin 100 milliGRAM(s) Oral daily  chlorhexidine 4% Liquid 1 Application(s) Topical daily      LABS: All Labs Reviewed:                        13.6   8.85  )-----------( 177      ( 12 Jul 2017 07:45 )             43.9     07-13    133<L>  |  91<L>  |  57<H>  ----------------------------<  179<H>  5.4<H>   |  18<L>  |  5.02<H>    Ca    7.8<L>      13 Jul 2017 05:20  Phos  7.0     07-13  Mg     2.1     07-13    TPro  7.1  /  Alb  3.5  /  TBili  0.4  /  DBili  x   /  AST  30  /  ALT  18  /  AlkPhos  67  07-13    PT/INR - ( 12 Jul 2017 07:45 )   PT: 13.2 SEC;   INR: 1.17          PTT - ( 12 Jul 2017 07:45 )  PTT:25.7 SEC  CARDIAC MARKERS ( 12 Jul 2017 12:45 )  x     / 0.43 ng/mL / 849 u/L / 6.55 ng/mL / x      CARDIAC MARKERS ( 12 Jul 2017 06:35 )  x     / 0.50 ng/mL / 959 u/L / 6.72 ng/mL / x      CARDIAC MARKERS ( 12 Jul 2017 00:37 )  x     / 0.53 ng/mL / 1053 u/L / 6.89 ng/mL / x      CARDIAC MARKERS ( 11 Jul 2017 20:15 )  x     / 0.46 ng/mL / 1165 u/L / x     / x          Blood Culture:   Urine Culture      RADIOLOGY/EKG:    ASSESSMENT AND PLAN:    DVT PPX:    ADVANCED DIRECTIVE:    DISPOSITION: CHIEF COMPLAINT: Pt seen in MICU her condition improved significantly after H.D  Off BIPAP and on nasal cannula. On Levophed gtt (pressor requirements trending down as of this am).  Still. Oliguric.    SUBJECTIVE:     REVIEW OF SYSTEMS:    CONSTITUTIONAL: (- )  weakness,  ( - ) fevers or chills  EYES/ENT: ( - )visual changes;     NECK: ( - ) pain or stiffness  RESPIRATORY:   ( - )cough, wheezing, hemoptysis;  ( - ) shortness of breath  CARDIOVASCULAR:  ( - )chest pain or palpitations  GASTROINTESTINAL:   ( - )abdominal or epigastric pain.  ( - ) nausea, vomiting, or hematemesis;   (  - ) diarrhea or constipation.   GENITOURINARY:   (  -  ) dysuria, + anuria  NEUROLOGICAL:  ( +  ) numbness or weakness   All other review of systems is negative unless indicated above    Vital Signs Last 24 Hrs  T(C): 35.8 (13 Jul 2017 04:00), Max: 36.1 (12 Jul 2017 12:00)  T(F): 96.4 (13 Jul 2017 04:00), Max: 97 (12 Jul 2017 12:00)  HR: 80 (13 Jul 2017 08:00) (72 - 88)  BP: 143/51 (13 Jul 2017 08:00) (68/36 - 157/116)  BP(mean): 69 (13 Jul 2017 08:00) (44 - 125)  RR: 14 (13 Jul 2017 08:00) (14 - 29)  SpO2: 90% (13 Jul 2017 08:00) (85% - 100%)    I&O's Summary    12 Jul 2017 07:01  -  13 Jul 2017 07:00  --------------------------------------------------------  IN: 1896.5 mL / OUT: 2035 mL / NET: -138.5 mL        CAPILLARY BLOOD GLUCOSE  195 (13 Jul 2017 08:15)  195 (13 Jul 2017 07:00)  197 (13 Jul 2017 06:13)  177 (13 Jul 2017 05:00)  133 (13 Jul 2017 04:00)  138 (13 Jul 2017 03:00)  114 (13 Jul 2017 02:00)  114 (13 Jul 2017 01:28)  149 (13 Jul 2017 00:00)  160 (12 Jul 2017 23:00)  159 (12 Jul 2017 22:00)  194 (12 Jul 2017 21:00)  227 (12 Jul 2017 19:31)  225 (12 Jul 2017 18:00)  250 (12 Jul 2017 17:20)  250 (12 Jul 2017 16:00)  243 (12 Jul 2017 15:00)  247 (12 Jul 2017 14:00)  267 (12 Jul 2017 12:50)  283 (12 Jul 2017 11:30)  274 (12 Jul 2017 10:10)  288 (12 Jul 2017 09:10)          PHYSICAL EXAM:    Constitutional:  (  - ) NAD,   ( +  )awake and alert  HEENT: PERR, EOMI,    Neck: Soft and supple, No LAD, No JVD  Respiratory:  (  + Breath sounds are clear bilaterally,    (  - ) wheezing, rales or rhonchi  Cardiovascular:     (   +)S1 and S2, regular rate and rhythm, no Murmurs, gallops or rubs  Gastrointestinal:  (  + )Bowel Sounds present, soft,   (-  )nontender, nondistended,    Extremities:    (  -) peripheral edema  Vascular: 2+ peripheral pulses  Neurological:    (  +  )A/O x 3,   (  ) focal deficits  Musculoskeletal:    (  + )  normal strength b/l upper  (  -   ) normal  lower extremities  Skin: No rashes    MEDICATIONS:  MEDICATIONS  (STANDING):  aspirin enteric coated 81 milliGRAM(s) Oral daily  dronedarone 400 milliGRAM(s) Oral two times a day  cholecalciferol 1000 Unit(s) Oral daily  pantoprazole    Tablet 40 milliGRAM(s) Oral before breakfast  atorvastatin 80 milliGRAM(s) Oral at bedtime  carvedilol 25 milliGRAM(s) Oral every 12 hours  insulin Infusion 2 Unit(s)/Hr (2 mL/Hr) IV Continuous <Continuous>  dextrose 5%. 1000 milliLiter(s) (50 mL/Hr) IV Continuous <Continuous>  dextrose 50% Injectable 12.5 Gram(s) IV Push once  dextrose 50% Injectable 25 Gram(s) IV Push once  dextrose 50% Injectable 25 Gram(s) IV Push once  levoFLOXacin IVPB   IV Intermittent   calcium acetate 667 milliGRAM(s) Oral three times a day with meals  heparin  Injectable 5000 Unit(s) SubCutaneous every 8 hours  ALBUTerol/ipratropium for Nebulization 3 milliLiter(s) Nebulizer every 6 hours  norepinephrine Infusion 0.01 MICROgram(s)/kG/Min (2.044 mL/Hr) IV Continuous <Continuous>  gabapentin 100 milliGRAM(s) Oral daily  chlorhexidine 4% Liquid 1 Application(s) Topical daily      LABS: All Labs Reviewed:                        13.6   8.85  )-----------( 177      ( 12 Jul 2017 07:45 )             43.9     07-13    133<L>  |  91<L>  |  57<H>  ----------------------------<  179<H>  5.4<H>   |  18<L>  |  5.02<H>    Ca    7.8<L>      13 Jul 2017 05:20  Phos  7.0     07-13  Mg     2.1     07-13    TPro  7.1  /  Alb  3.5  /  TBili  0.4  /  DBili  x   /  AST  30  /  ALT  18  /  AlkPhos  67  07-13    PT/INR - ( 12 Jul 2017 07:45 )   PT: 13.2 SEC;   INR: 1.17          PTT - ( 12 Jul 2017 07:45 )  PTT:25.7 SEC  CARDIAC MARKERS ( 12 Jul 2017 12:45 )  x     / 0.43 ng/mL / 849 u/L / 6.55 ng/mL / x      CARDIAC MARKERS ( 12 Jul 2017 06:35 )  x     / 0.50 ng/mL / 959 u/L / 6.72 ng/mL / x      CARDIAC MARKERS ( 12 Jul 2017 00:37 )  x     / 0.53 ng/mL / 1053 u/L / 6.89 ng/mL / x      CARDIAC MARKERS ( 11 Jul 2017 20:15 )  x     / 0.46 ng/mL / 1165 u/L / x     / x        : Blood Gas Arterial Comprehensive (07.12.17 @ 13:09)    pH, Arterial: 7.26 pH    pCO2, Arterial: 64 mmHg    pO2, Arterial: 61 mmHg    HCO3, Arterial: 23 mmol/L    Base Excess, Arterial: 1.0: BASE EXCESS: REFERENCE RANGE = 0 (+/-) 2 mmol/l mmol/L    Oxygen Saturation, Arterial: 85.7 %    Blood Gas Arterial - Sodium: 129 mmol/L    Blood Gas Arterial - Potassium: 4.7 mmol/L    Blood Gas Arterial - Glucose: 291 mg/dL    Blood Gas Arterial - Chloride: 101 mmol/L    Blood Gas Arterial - Creatinine: 6.75 mg/dL    Blood Gas Arterial - Lactate: 1.2: Please note updated reference range. mmol/L    Blood Gas Arterial - Hemoglobin: 14.1 g/dL    Blood Gas Arterial - Hematocrit: 43.3 %      Urine Culture      RADIOLOGY/EKG:    ASSESSMENT AND PLAN:     56F w/ HTN, DM2, none compiant with diet and HFrEF-AICD  ,   - SP  hypoglycemia, stable  - respiratory acidosis, Acid-base - primary respiratory acidosis - presumed more due to COPD than CHF. Improving  - Renal/ ALISA - ; superimposed oligoanuric ALISA - s/p HD yesterday. Urine lytes c/w prerenal azotemia.   for HD today.    .DW dr briones @bed side    -Hyperphosphatemia - ALISA-associated. Improving.    -CAD/CV - EF 20%;  continue  same meds

## 2017-07-13 NOTE — CONSULT NOTE ADULT - PROBLEM SELECTOR RECOMMENDATION 9
Stop NPH.  Start Lantus 70u qhs  Humalog 10/10/10  Humalog moderate scale qac and qhs  will follow and adjust insulin based on glucose values.  DC on Toujeo and Novolog TBD

## 2017-07-13 NOTE — PROGRESS NOTE ADULT - SUBJECTIVE AND OBJECTIVE BOX
CHIEF COMPLAINT:    Interval Events:    REVIEW OF SYSTEMS:  Constitutional: [ ] negative [ ] fevers [ ] chills [ ] weight loss [ ] weight gain  HEENT: [ ] negative [ ] dry eyes [ ] eye irritation [ ] postnasal drip [ ] nasal congestion  CV: [ ] negative  [ ] chest pain [ ] orthopnea [ ] palpitations [ ] murmur  Resp: [ ] negative [ ] cough [ ] shortness of breath [ ] dyspnea [ ] wheezing [ ] sputum [ ] hemoptysis  GI: [ ] negative [ ] nausea [ ] vomiting [ ] diarrhea [ ] constipation [ ] abd pain [ ] dysphagia   : [ ] negative [ ] dysuria [ ] nocturia [ ] hematuria [ ] increased urinary frequency  Musculoskeletal: [ ] negative [ ] back pain [ ] myalgias [ ] arthralgias [ ] fracture  Skin: [ ] negative [ ] rash [ ] itch  Neurological: [ ] negative [ ] headache [ ] dizziness [ ] syncope [ ] weakness [ ] numbness  Psychiatric: [ ] negative [ ] anxiety [ ] depression  Endocrine: [ ] negative [ ] diabetes [ ] thyroid problem  Hematologic/Lymphatic: [ ] negative [ ] anemia [ ] bleeding problem  Allergic/Immunologic: [ ] negative [ ] itchy eyes [ ] nasal discharge [ ] hives [ ] angioedema  [ ] All other systems negative  [ ] Unable to assess ROS because ________    OBJECTIVE:  ICU Vital Signs Last 24 Hrs  T(C): 35.8 (13 Jul 2017 04:00), Max: 36.1 (12 Jul 2017 12:00)  T(F): 96.4 (13 Jul 2017 04:00), Max: 97 (12 Jul 2017 12:00)  HR: 79 (13 Jul 2017 07:00) (72 - 88)  BP: 157/116 (13 Jul 2017 07:00) (68/36 - 157/116)  BP(mean): 125 (13 Jul 2017 07:00) (44 - 125)  ABP: --  ABP(mean): --  RR: 21 (13 Jul 2017 07:00) (15 - 29)  SpO2: 94% (13 Jul 2017 07:00) (85% - 100%)        07-12 @ 07:01  -  07-13 @ 07:00  --------------------------------------------------------  IN: 1896.5 mL / OUT: 2035 mL / NET: -138.5 mL      CAPILLARY BLOOD GLUCOSE  195 (13 Jul 2017 07:00)          PHYSICAL EXAM:  General:   HEENT:   Lymph Nodes:  Neck:   Respiratory:   Cardiovascular:   Abdomen:   Extremities:   Skin:   Neurological:  Psychiatry:    LINES:    HOSPITAL MEDICATIONS:  aspirin enteric coated 81 milliGRAM(s) Oral daily  heparin  Injectable 5000 Unit(s) SubCutaneous every 8 hours    levoFLOXacin IVPB   IV Intermittent     dronedarone 400 milliGRAM(s) Oral two times a day  carvedilol 25 milliGRAM(s) Oral every 12 hours  norepinephrine Infusion 0.01 MICROgram(s)/kG/Min IV Continuous <Continuous>    atorvastatin 80 milliGRAM(s) Oral at bedtime  insulin Infusion 2 Unit(s)/Hr IV Continuous <Continuous>  dextrose Gel 1 Dose(s) Oral once PRN  dextrose 50% Injectable 12.5 Gram(s) IV Push once  dextrose 50% Injectable 25 Gram(s) IV Push once  dextrose 50% Injectable 25 Gram(s) IV Push once  glucagon  Injectable 1 milliGRAM(s) IntraMuscular once PRN    ALBUTerol/ipratropium for Nebulization 3 milliLiter(s) Nebulizer every 6 hours    gabapentin 100 milliGRAM(s) Oral daily    pantoprazole    Tablet 40 milliGRAM(s) Oral before breakfast        cholecalciferol 1000 Unit(s) Oral daily  dextrose 5%. 1000 milliLiter(s) IV Continuous <Continuous>  calcium acetate 667 milliGRAM(s) Oral three times a day with meals      chlorhexidine 4% Liquid 1 Application(s) Topical daily            LABS:                        13.6   8.85  )-----------( 177      ( 12 Jul 2017 07:45 )             43.9     Hgb Trend: 13.6<--, 13.7<--, 14.0<--  07-13    133<L>  |  91<L>  |  57<H>  ----------------------------<  179<H>  5.4<H>   |  18<L>  |  5.02<H>    Ca    7.8<L>      13 Jul 2017 05:20  Phos  7.0     07-13  Mg     2.1     07-13    TPro  7.1  /  Alb  3.5  /  TBili  0.4  /  DBili  x   /  AST  30  /  ALT  18  /  AlkPhos  67  07-13    Creatinine Trend: 5.02<--, 6.89<--, 7.05<--, 7.94<--, 7.89<--, 7.76<--  PT/INR - ( 12 Jul 2017 07:45 )   PT: 13.2 SEC;   INR: 1.17          PTT - ( 12 Jul 2017 07:45 )  PTT:25.7 SEC  Urinalysis Basic - ( 12 Jul 2017 04:25 )    Color: BARRINGTON / Appearance: x / SG: > 1.030 / pH: 5.0  Gluc: NEGATIVE / Ketone: TRACE  / Bili: MODERATE / Urobili: 0.2 E.U.   Blood: NEGATIVE / Protein: 100 / Nitrite: NEGATIVE   Leuk Esterase: NEGATIVE / RBC: 0-2 / WBC 0-2   Sq Epi: OCC / Non Sq Epi: x / Bacteria: MANY      Arterial Blood Gas:  07-12 @ 13:09  7.26/64/61/23/85.7/1.0  ABG lactate: 1.2  Arterial Blood Gas:  07-12 @ 07:35  7.25/65/53/24/78.2/1.3  ABG lactate: --    Venous Blood Gas:  07-12 @ 06:52  7.19/80/54/23/74.5  VBG Lactate: 1.2      MICROBIOLOGY:     RADIOLOGY:  [ ] Reviewed and interpreted by me    EKG: CHIEF COMPLAINT:  SOB, dyspnea    Interval Events:  Overnight a HD catheter was placed and HD was completed with 1L removed.  She was started on pressors overnight for hypotension but developed ectopy and non-sustained episodes of ventricular tachycardia, so pressors were decreased to minimum necessary.  She is now on 0.05 norepinephrine.  No complaints today, feels much better than yesterday.    REVIEW OF SYSTEMS:  Constitutional: [x ] negative [ ] fevers [ ] chills [ ] weight loss [ ] weight gain  HEENT: [ ] negative [ ] dry eyes [ ] eye irritation [ ] postnasal drip [ ] nasal congestion  CV: [ ] negative  [ ] chest pain [ ] orthopnea [ ] palpitations [ ] murmur  Resp: [x ] negative [ ] cough [ ] shortness of breath [ ] dyspnea [ ] wheezing [ ] sputum [ ] hemoptysis  GI: [x ] negative [ ] nausea [ ] vomiting [ ] diarrhea [ ] constipation [ ] abd pain [ ] dysphagia   : [ ] negative [ ] dysuria [ ] nocturia [ ] hematuria [ ] increased urinary frequency  Musculoskeletal: [ ] negative [ ] back pain [ ] myalgias [ ] arthralgias [ ] fracture  Skin: [ ] negative [ ] rash [ ] itch  Neurological: [ ] negative [ ] headache [ ] dizziness [ ] syncope [ ] weakness [ ] numbness  Psychiatric: [ ] negative [ ] anxiety [ ] depression  Endocrine: [ ] negative [ ] diabetes [ ] thyroid problem  Hematologic/Lymphatic: [ ] negative [ ] anemia [ ] bleeding problem  Allergic/Immunologic: [ ] negative [ ] itchy eyes [ ] nasal discharge [ ] hives [ ] angioedema  [ ] All other systems negative  [ ] Unable to assess ROS because ________    OBJECTIVE:  ICU Vital Signs Last 24 Hrs  T(C): 35.8 (13 Jul 2017 04:00), Max: 36.1 (12 Jul 2017 12:00)  T(F): 96.4 (13 Jul 2017 04:00), Max: 97 (12 Jul 2017 12:00)  HR: 79 (13 Jul 2017 07:00) (72 - 88)  BP: 157/116 (13 Jul 2017 07:00) (68/36 - 157/116)  BP(mean): 125 (13 Jul 2017 07:00) (44 - 125)  ABP: --  ABP(mean): --  RR: 21 (13 Jul 2017 07:00) (15 - 29)  SpO2: 94% (13 Jul 2017 07:00) (85% - 100%)        07-12 @ 07:01  -  07-13 @ 07:00  --------------------------------------------------------  IN: 1896.5 mL / OUT: 2035 mL / NET: -138.5 mL      CAPILLARY BLOOD GLUCOSE  195 (13 Jul 2017 07:00)      PHYSICAL EXAM:  General: NAD, obese, sitting in the chair upright  Lymph Nodes: no palpable cervical lymphadenopathy  Neck: wide neck  Respiratory:  distant sounds but without obvious wheeze, rhonchi, rales, crackles  Cardiovascular:  distant sounds, no appreciated murmurs  Abdomen: morbidly obese, bowel sounds distant  Extremities: moving all 4 extremities  Skin: intact to gross observation  Neurological: no focal neurological deficits  Psychiatry: euthymic    LINES:    HOSPITAL MEDICATIONS:  aspirin enteric coated 81 milliGRAM(s) Oral daily  heparin  Injectable 5000 Unit(s) SubCutaneous every 8 hours  levoFLOXacin IVPB   IV Intermittent   dronedarone 400 milliGRAM(s) Oral two times a day  carvedilol 25 milliGRAM(s) Oral every 12 hours  norepinephrine Infusion 0.01 MICROgram(s)/kG/Min IV Continuous <Continuous>  atorvastatin 80 milliGRAM(s) Oral at bedtime  insulin Infusion 2 Unit(s)/Hr IV Continuous <Continuous>  dextrose Gel 1 Dose(s) Oral once PRN  dextrose 50% Injectable 12.5 Gram(s) IV Push once  dextrose 50% Injectable 25 Gram(s) IV Push once  dextrose 50% Injectable 25 Gram(s) IV Push once  glucagon  Injectable 1 milliGRAM(s) IntraMuscular once PRN  ALBUTerol/ipratropium for Nebulization 3 milliLiter(s) Nebulizer every 6 hours  gabapentin 100 milliGRAM(s) Oral daily  pantoprazole    Tablet 40 milliGRAM(s) Oral before breakfast  cholecalciferol 1000 Unit(s) Oral daily  dextrose 5%. 1000 milliLiter(s) IV Continuous <Continuous>  calcium acetate 667 milliGRAM(s) Oral three times a day with meals  chlorhexidine 4% Liquid 1 Application(s) Topical daily        LABS:                        13.6   8.85  )-----------( 177      ( 12 Jul 2017 07:45 )             43.9     Hgb Trend: 13.6<--, 13.7<--, 14.0<--  07-13    133<L>  |  91<L>  |  57<H>  ----------------------------<  179<H>  5.4<H>   |  18<L>  |  5.02<H>    Ca    7.8<L>      13 Jul 2017 05:20  Phos  7.0     07-13  Mg     2.1     07-13    TPro  7.1  /  Alb  3.5  /  TBili  0.4  /  DBili  x   /  AST  30  /  ALT  18  /  AlkPhos  67  07-13    Creatinine Trend: 5.02<--, 6.89<--, 7.05<--, 7.94<--, 7.89<--, 7.76<--  PT/INR - ( 12 Jul 2017 07:45 )   PT: 13.2 SEC;   INR: 1.17          PTT - ( 12 Jul 2017 07:45 )  PTT:25.7 SEC  Urinalysis Basic - ( 12 Jul 2017 04:25 )    Color: BARRINGTON / Appearance: x / SG: > 1.030 / pH: 5.0  Gluc: NEGATIVE / Ketone: TRACE  / Bili: MODERATE / Urobili: 0.2 E.U.   Blood: NEGATIVE / Protein: 100 / Nitrite: NEGATIVE   Leuk Esterase: NEGATIVE / RBC: 0-2 / WBC 0-2   Sq Epi: OCC / Non Sq Epi: x / Bacteria: MANY      Arterial Blood Gas:  07-12 @ 13:09  7.26/64/61/23/85.7/1.0  ABG lactate: 1.2  Arterial Blood Gas:  07-12 @ 07:35  7.25/65/53/24/78.2/1.3  ABG lactate: --    Venous Blood Gas:  07-12 @ 06:52  7.19/80/54/23/74.5  VBG Lactate: 1.2

## 2017-07-13 NOTE — PROGRESS NOTE ADULT - SUBJECTIVE AND OBJECTIVE BOX
MICU Transfer Note    Transfer from: MICU    Transfer to: ( x ) Medicine    (  ) Telemetry     (   ) RCU        (    ) Palliative         (   ) Stroke Unit          (   ) __________________    Accepting Physician:  Signout given to:     MICU COURSE:  This is a 56yoF with PMHx sig of HTN, COPD,T2DM, CHF who initially presented to the ED on 07.11.17 with hypoglycemia after taking too much insulin for a high fingerstick in the morning.  Her hypoglycemia resolved in the ER with several ampules of D50, however, she developed respiratory distress requiring BiPAP.  She admitted to two weeks of increased sputum production.  In the ED she was found to be hypercarbic and to also have ARF with Cr of 7.05 (baseline 1.8) with hyperglycemia.  The ED placed her on BiPAP and consulted cardiology and MICU.  She was transferred to the MICU on 07.12.17 for management of her hypercarbic respiratory failure as well as ARF.   Se was weaned off of BiPAP in the MICU onto nasal canula O2 and was comfortable.  She was started on levofloxacin and steroids per her history of COPD.  A CT abdomen/pelvis was ordered based on a history of ovarian mass and renal failure which was negative.  Nephrology was consulted for management of acute renal failure.  HD was completed on 07.12.17 and again 07.13.17.  She did require some pressor support with levophed overnight on 07.12.17, but was weaned off this AM.  Endocrinology was consulted for her uncontrolled T2DM.    ASSESSMENT & PLAN:   56yoF w/ PMH sig for HTN, HLD DM, COPD, who presented to the ER with hypoglycemia and developed SOB.  She was found to be in ARF and is currently on HD per nephrology.      #Neuro  - A&O x3, no neurological deficits noted  - if AMS develops, take finger stick and assess oxygenation status    #Cardiology  - troponin 0.5, stable since admission into ER  - cardiology saw in ER, did not believe that any of her current issues are cardiac in origin  - recommend echo and EKG prn for chest pain  - on 80mg atorvastatin  - on 81 mg aspirin  - transthoracic echo  - weaned off of pressors    #pulmonary  - currently on 2L NC, saturating well, will likely need BiPAP for sleeping  - if AMS develops check ABG  - continuing solumedrol, nebulizers  - levofloxacin for presumed COPD exacerbation    #GI  - CT abdomen wnl  - on ppi  - can resume eating    # Renal  - ARF with cr 5.05, baseline appears to be around 1.4, unknown etiology as this point.    - ABG with pH of 7.25  - renal consulted  - recommendations of tte, phoslo 667mg po tid w/ meals, renal diet, and dosing medications for GFR<10    #ID  - no current issues    #ENDO  - endocrine consulted, appreciate recommendations  - insulin management per endocrinology    Heparin SubQ for DVT prophylaxis      FOR FOLLOW UP:  -Nephrology to continue with HD recommendations  -Endocrinology to continue insulin recommendations  -Consider ABDIEL/OHS w/u MICU Transfer Note    Transfer from: MICU    Transfer to: ( x ) Medicine    (  ) Telemetry     (   ) RCU        (    ) Palliative         (   ) Stroke Unit          (   ) __________________    Accepting Physician:  Signout given to:     MICU COURSE:  This is a 56yoF with PMHx sig of HTN, COPD,T2DM, CHF who initially presented to the ED on 07.11.17 with hypoglycemia after taking too much insulin for a high fingerstick in the morning.  Her hypoglycemia resolved in the ER with several ampules of D50, however, she developed respiratory distress requiring BiPAP.  She admitted to two weeks of increased sputum production.  In the ED she was found to be hypercarbic and to also have ARF with Cr of 7.05 (baseline 1.8) with hyperglycemia.  The ED placed her on BiPAP and consulted cardiology and MICU.  She was transferred to the MICU on 07.12.17 for management of her hypercarbic respiratory failure as well as ARF.   Se was weaned off of BiPAP in the MICU onto nasal canula O2 and was comfortable.  She was started on levofloxacin and steroids per her history of COPD.  A CT abdomen/pelvis was ordered based on a history of ovarian mass and renal failure which was negative.  Nephrology was consulted for management of acute renal failure.  HD was completed on 07.12.17 and again 07.13.17.  She did require some pressor support with levophed overnight on 07.12.17, but was weaned off this AM.  Endocrinology was consulted for her uncontrolled T2DM.    ASSESSMENT & PLAN:   56yoF w/ PMH sig for HTN, HLD DM, COPD, who presented to the ER with hypoglycemia and developed SOB.  She was found to be in ARF and is currently on HD per nephrology.      #Neuro  - A&O x3, no neurological deficits noted  - if AMS develops, take finger stick and assess oxygenation status    #Cardiology  - troponin 0.5, stable since admission into ER  - cardiology saw in ER, did not believe that any of her current issues are cardiac in origin  - recommend echo and EKG prn for chest pain  - on 80mg atorvastatin  - on 81 mg aspirin  - transthoracic echo  - weaned off of pressors    #pulmonary  - currently on 2L NC, saturating well, will likely need BiPAP for sleeping  - if AMS develops check ABG  - continuing solumedrol, nebulizers  - levofloxacin for presumed COPD exacerbation    #GI  - CT abdomen wnl  - on ppi  - can resume eating    # Renal  - ARF with cr 5.05, baseline appears to be around 1.4, unknown etiology as this point.    - ABG with pH of 7.25  - renal consulted  - recommendations of tte, phoslo 667mg po tid w/ meals, renal diet, and dosing medications for GFR<10    #ID  - no current issues    #ENDO  - endocrine consulted, appreciate recommendations  - insulin management per endocrinology  - consider ABDIEL w/u    Heparin SubQ for DVT prophylaxis      FOR FOLLOW UP:  -Nephrology to continue with HD recommendations  -Endocrinology to continue insulin recommendations  -Consider ABDIEL/OHS w/u

## 2017-07-13 NOTE — PROGRESS NOTE ADULT - SUBJECTIVE AND OBJECTIVE BOX
Intubated/sedated. On Levophed gtt. S/p HD yesterday with net UF of 1100cc.      VITAL:  T(C): , Max: 36.1 (07-12-17 @ 12:00)  T(F): , Max: 97 (07-12-17 @ 12:00)  HR: 76 (07-13-17 @ 07:24)  BP: 157/116 (07-13-17 @ 07:00)  BP(mean): 125 (07-13-17 @ 07:00)  RR: 21 (07-13-17 @ 07:00)  SpO2: 95% (07-13-17 @ 07:24)      PHYSICAL EXAM:  Constitutional: Alert, obese, on BIPAP  HEENT: NCAT, DMM  Neck: Supple, No JVD  Respiratory: CTA-b/l  Cardiovascular: Irreg S1S2  Gastrointestinal: (+)distended, NT, (+)correa with dark urine  Extremities: warm x 4; trace b/l LE edema      LABS:                        13.6   8.85  )-----------( 177      ( 12 Jul 2017 07:45 )             43.9     Na(133)/K(5.4)/Cl(91)/HCO3(18)/BUN(57)/Cr(5.02)Glu(179)/Ca(7.8)/Mg(2.1)/PO4(7.0)    07-13 @ 05:20  Na(134)/K(5.8)/Cl(91)/HCO3(22)/BUN(80)/Cr(6.89)Glu(248)/Ca(7.7)/Mg(2.4)/PO4(8.5)    07-12 @ 18:58  Na(136)/K(5.1)/Cl(91)/HCO3(23)/BUN(73)/Cr(7.05)Glu(293)/Ca(7.8)/Mg(2.4)/PO4(8.2)    07-12 @ 12:45  Na(138)/K(4.8)/Cl(92)/HCO3(26)/BUN(73)/Cr(7.94)Glu(308)/Ca(7.8)/Mg(2.4)/PO4(9.1)    07-12 @ 06:35  Na(138)/K(4.7)/Cl(92)/HCO3(28)/BUN(67)/Cr(7.89)Glu(190)/Ca(8.1)/Mg(2.4)/PO4(--)    07-12 @ 00:37  Na(138)/K(4.3)/Cl(90)/HCO3(27)/BUN(62)/Cr(7.76)Glu(270)/Ca(8.3)/Mg(2.5)/PO4(--)    07-11 @ 20:15    Urinalysis Basic - ( 12 Jul 2017 04:25 )    Color: BARRINGTON / Appearance: x / SG: > 1.030 / pH: 5.0  Gluc: NEGATIVE / Ketone: TRACE  / Bili: MODERATE / Urobili: 0.2 E.U.   Blood: NEGATIVE / Protein: 100 / Nitrite: NEGATIVE   Leuk Esterase: NEGATIVE / RBC: 0-2 / WBC 0-2   Sq Epi: OCC / Non Sq Epi: x / Bacteria: MANY      Sodium, Random Urine: < 20 meq/L (07-12 @ 04:25)  Potassium, Random Urine: 21.2 meq/L (07-12 @ 04:25)  Chloride, Random Urine: < 20 mmol/L (07-12 @ 04:25)  Creatinine, Random Urine: 495.32 mg/dL (07-12 @ 04:25)  Osmolality, Random Urine: 343 mosmo/kg (07-12 @ 04:25)  Protein, Random Urine: 68.0 mg/dL (07-12 @ 04:25)    IMPRESSION: 56F w/ HTN, DM2, and HFrEF-AICD, 7/12/17 a/w hypoglycemia, respiratory acidosis, and ALISA    (1)Renal - base creat mid 1s; superimposed oligoanuric ALISA - s/p HD yesterday. Urine lytes c/w prerenal azotemia.    (2)Acid-base - primary respiratory acidosis - on BIPAP and accepted into MICU. Due to decompensated CHF/"tiring out"?    (3)Hyperphosphatemia - ALISA-associated.    (4)Hypoglycemia - improved    (5)CV - seeming fluid overloaded - indicated for HD in effort to ultrafiltrate and reduce work of breathing      RECOMMEND:    (1)HD again today -   (2)Pressors/vent per MICU  (3)F/u TTE and Renal US  (4)Dose new meds for GFR<10/HD            Perfecto Rae MD  [facility]  [phone] Off BIPAP and on nasal cannula. On Levophed gtt (pressor requirements trending down as of this am). S/p HD yesterday with net UF of 1100cc. Oliguric.      VITAL:  T(C): , Max: 36.1 (07-12-17 @ 12:00)  T(F): , Max: 97 (07-12-17 @ 12:00)  HR: 76 (07-13-17 @ 07:24)  BP: 157/116 (07-13-17 @ 07:00)  BP(mean): 125 (07-13-17 @ 07:00)  RR: 21 (07-13-17 @ 07:00)  SpO2: 95% (07-13-17 @ 07:24)      PHYSICAL EXAM:  Constitutional: Alert, obese, on BIPAP  HEENT: NCAT, DMM  Neck: Supple, No JVD  Respiratory: CTA-b/l  Cardiovascular: Irreg S1S2  Gastrointestinal: (+)distended, NT, (+)correa with dark urine  Extremities: warm x 4; trace b/l LE edema      LABS:                        13.6   8.85  )-----------( 177      ( 12 Jul 2017 07:45 )             43.9     Na(133)/K(5.4)/Cl(91)/HCO3(18)/BUN(57)/Cr(5.02)Glu(179)/Ca(7.8)/Mg(2.1)/PO4(7.0)    07-13 @ 05:20  Na(134)/K(5.8)/Cl(91)/HCO3(22)/BUN(80)/Cr(6.89)Glu(248)/Ca(7.7)/Mg(2.4)/PO4(8.5)    07-12 @ 18:58  Na(136)/K(5.1)/Cl(91)/HCO3(23)/BUN(73)/Cr(7.05)Glu(293)/Ca(7.8)/Mg(2.4)/PO4(8.2)    07-12 @ 12:45  Na(138)/K(4.8)/Cl(92)/HCO3(26)/BUN(73)/Cr(7.94)Glu(308)/Ca(7.8)/Mg(2.4)/PO4(9.1)    07-12 @ 06:35  Na(138)/K(4.7)/Cl(92)/HCO3(28)/BUN(67)/Cr(7.89)Glu(190)/Ca(8.1)/Mg(2.4)/PO4(--)    07-12 @ 00:37  Na(138)/K(4.3)/Cl(90)/HCO3(27)/BUN(62)/Cr(7.76)Glu(270)/Ca(8.3)/Mg(2.5)/PO4(--)    07-11 @ 20:15    Urinalysis Basic - ( 12 Jul 2017 04:25 )    Color: BARRINGTON / Appearance: x / SG: > 1.030 / pH: 5.0  Gluc: NEGATIVE / Ketone: TRACE  / Bili: MODERATE / Urobili: 0.2 E.U.   Blood: NEGATIVE / Protein: 100 / Nitrite: NEGATIVE   Leuk Esterase: NEGATIVE / RBC: 0-2 / WBC 0-2   Sq Epi: OCC / Non Sq Epi: x / Bacteria: MANY      Sodium, Random Urine: < 20 meq/L (07-12 @ 04:25)  Potassium, Random Urine: 21.2 meq/L (07-12 @ 04:25)  Chloride, Random Urine: < 20 mmol/L (07-12 @ 04:25)  Creatinine, Random Urine: 495.32 mg/dL (07-12 @ 04:25)  Osmolality, Random Urine: 343 mosmo/kg (07-12 @ 04:25)  Protein, Random Urine: 68.0 mg/dL (07-12 @ 04:25)    IMPRESSION: 56F w/ HTN, DM2, and HFrEF-AICD, 7/12/17 a/w hypoglycemia, respiratory acidosis, and ALISA    (1)Renal - base creat mid 1s; superimposed oligoanuric ALISA - s/p HD yesterday. Urine lytes c/w prerenal azotemia. Indicated for HD again today.    (2)Acid-base - primary respiratory acidosis - presumed more due to COPD than CHF. Improving.    (3)Hyperphosphatemia - ALISA-associated. Improving.    (4)CV - EF 20%; however, now on pressors s/p UF yesterday. Oliguric. Discussed with MICU team - will only plan for 500cc UF today.      RECOMMEND:  (1)HD again today - 500cc UF, 2K bath  (2)wean off pressors per MICU  (3)F/u TTE and Renal US  (4)Dose new meds for GFR<10/HD          Perfecto Rae MD  [facility]  [phone]    30minutes critical care time spent Off BIPAP and on nasal cannula. On Levophed gtt (pressor requirements trending down as of this am). S/p HD yesterday with net UF of 1100cc. Oliguric.    No pain. shortness of breath improved.    VITAL:  T(C): , Max: 36.1 (07-12-17 @ 12:00)  T(F): , Max: 97 (07-12-17 @ 12:00)  HR: 76 (07-13-17 @ 07:24)  BP: 157/116 (07-13-17 @ 07:00)  BP(mean): 125 (07-13-17 @ 07:00)  RR: 21 (07-13-17 @ 07:00)  SpO2: 95% (07-13-17 @ 07:24)      PHYSICAL EXAM:  Constitutional: Alert, obese, on NCO2  HEENT: NCAT, DMM  Neck: Supple, (+)shiley  Respiratory: (+)bibasilar crackles  Cardiovascular: Irreg S1S2  Gastrointestinal: (+)distended, NT, (+  Extremities: warm x 4; trace b/l LE edema      LABS:                        13.6   8.85  )-----------( 177      ( 12 Jul 2017 07:45 )             43.9     Na(133)/K(5.4)/Cl(91)/HCO3(18)/BUN(57)/Cr(5.02)Glu(179)/Ca(7.8)/Mg(2.1)/PO4(7.0)    07-13 @ 05:20  Na(134)/K(5.8)/Cl(91)/HCO3(22)/BUN(80)/Cr(6.89)Glu(248)/Ca(7.7)/Mg(2.4)/PO4(8.5)    07-12 @ 18:58  Na(136)/K(5.1)/Cl(91)/HCO3(23)/BUN(73)/Cr(7.05)Glu(293)/Ca(7.8)/Mg(2.4)/PO4(8.2)    07-12 @ 12:45  Na(138)/K(4.8)/Cl(92)/HCO3(26)/BUN(73)/Cr(7.94)Glu(308)/Ca(7.8)/Mg(2.4)/PO4(9.1)    07-12 @ 06:35  Na(138)/K(4.7)/Cl(92)/HCO3(28)/BUN(67)/Cr(7.89)Glu(190)/Ca(8.1)/Mg(2.4)/PO4(--)    07-12 @ 00:37  Na(138)/K(4.3)/Cl(90)/HCO3(27)/BUN(62)/Cr(7.76)Glu(270)/Ca(8.3)/Mg(2.5)/PO4(--)    07-11 @ 20:15    Urinalysis Basic - ( 12 Jul 2017 04:25 )    Color: BARRINGTON / Appearance: x / SG: > 1.030 / pH: 5.0  Gluc: NEGATIVE / Ketone: TRACE  / Bili: MODERATE / Urobili: 0.2 E.U.   Blood: NEGATIVE / Protein: 100 / Nitrite: NEGATIVE   Leuk Esterase: NEGATIVE / RBC: 0-2 / WBC 0-2   Sq Epi: OCC / Non Sq Epi: x / Bacteria: MANY      Sodium, Random Urine: < 20 meq/L (07-12 @ 04:25)  Potassium, Random Urine: 21.2 meq/L (07-12 @ 04:25)  Chloride, Random Urine: < 20 mmol/L (07-12 @ 04:25)  Creatinine, Random Urine: 495.32 mg/dL (07-12 @ 04:25)  Osmolality, Random Urine: 343 mosmo/kg (07-12 @ 04:25)  Protein, Random Urine: 68.0 mg/dL (07-12 @ 04:25)    IMPRESSION: 56F w/ HTN, DM2, and HFrEF-AICD, 7/12/17 a/w hypoglycemia, respiratory acidosis, and ALISA    (1)Renal - base creat mid 1s; superimposed oligoanuric ALISA - s/p HD yesterday. Urine lytes c/w prerenal azotemia. Indicated for HD again today.    (2)Acid-base - primary respiratory acidosis - presumed more due to COPD than CHF. Improving.    (3)Hyperphosphatemia - ALISA-associated. Improving.    (4)CV - EF 20%; however, now on pressors s/p UF yesterday. Oliguric. Discussed with MICU team - will only plan for 500cc UF today.      RECOMMEND:  (1)HD again today - 500cc UF, 2K bath  (2)wean off pressors per MICU  (3)F/u TTE and Renal US  (4)Dose new meds for GFR<10/HD          Perfecto Rae MD  [facility]  [phone]    30minutes critical care time spent

## 2017-07-13 NOTE — CONSULT NOTE ADULT - SUBJECTIVE AND OBJECTIVE BOX
HPI:  55 y/o F with hx of DM 2,  HTN, systolic  CHFs/p AICD presenting with hyperglycemia + hypoglycemia and dyspnea. Patient states her blood glucose was elevated this AM to 350s; therefore, she doubled her dose of toujeo 70u x 2. She then did not eat much for the rest of the day and  took her dose of novolog 30u as well. She became lightheaded and called EMS. Patient was hypoglycemic when EMS found her and was given 25g of D50 with improvement of symptoms. patient complains of SOB which is baseline for her secondary from her CHF. denies fever, chills, cough, chest pain, falls, LOC, abdominal pain, nausea, vomiting, LE edema,, dysuira, calf tenderness, orthopnea or LE edema.  She c/o decreased vision, tingling in extremities, fatigue.  DM home regimen: Toujeo 70u daily and Novolog 30/30/30.  Checks FS 4 x daily, reports fluctuating values.  Diet heavy in bread.  She received insulin drip yesterday and today received NPH 20u x 1.  Sees endocrine Dr. Tong.      PAST MEDICAL & SURGICAL HISTORY:  CKD (chronic kidney disease), stage 3 (moderate)  Nonischemic cardiomyopathy: EF 20-25%  Sleep apnea: noncompliant with CPAP  Diabetic neuropathy  HTN (hypertension)  AICD (automatic cardioverter/defibrillator) present: ICD (Medtronic generator with Medtronic atrial (4076)and ventricular (6947) leads) 07  Cardiac Pacemaker  CHF (Congestive Heart Failure)  HLD (Hyperlipidemia)  Diabetes Mellitus: x 10 years, denies nephropathy or retinopathy  H/O:   H/O: hysterectomy  AICD (automatic cardioverter/defibrillator) present: Medtronic generator with Medtronic atrial (4076)and ventricular (6947) leads) 07      FAMILY HISTORY:  Mother - DM, HTN, CHF      Social History: 1ppd since teens, quit earlier this year, lives at home by herself. no etoh.    Outpatient Medications:     omeprazole 40 mg oral delayed release capsule: 1 cap(s) orally once a day, Last Dose Taken:    · 	Metoprolol Succinate  mg oral tablet, extended release: 1 tab(s) orally once a day, Last Dose Taken:    · 	losartan 25 mg oral tablet: 1 tab(s) orally once a day, Last Dose Taken:    · 	aspirin 81 mg oral delayed release tablet: 1 tab(s) orally once a day, Last Dose Taken:    · 	gabapentin 600 mg oral tablet: 1 tab(s) orally 3 times a day, Last Dose Taken:    · 	Vitamin D3 2000 intl units oral capsule: 1 cap(s) orally once a day  · 	Coreg 25 mg oral tablet: 1 tab(s) orally 2 times a day, Last Dose Taken:    · 	Zetia 10 mg oral tablet: 1 tab(s) orally once a day, Last Dose Taken:    · 	Multaq 400 mg oral tablet: 1 tab(s) orally 2 times a day, Last Dose Taken:    · 	Crestor 20 mg oral tablet: 1 tab(s) orally once a day (at bedtime), Last Dose Taken:    · 	Bumex: 2 milligram(s)  2 times a day, Last Dose Taken:    · 	NovoLOG 100 units/mL subcutaneous solution: 30 unit(s) subcutaneous 3 times a day, Last Dose Taken:    · 	Toujeo SoloStar 300 units/mL subcutaneous solution: 70 unit(s) subcutaneous once a day (at bedtime), Last Dose Taken:    · 	Entresto 49 mg-51 mg oral tablet: 1 tab(s) orally 2 times a day, Last Dose Taken:    · 	Uloric 40 mg oral tablet: 1 tab(s) orally once a day, Last Dose Taken:    · 	spironolactone 25 mg oral tablet: 1 tab(s) orally 2 times a day, Last Dose Taken:      MEDICATIONS  (STANDING):  aspirin enteric coated 81 milliGRAM(s) Oral daily  dronedarone 400 milliGRAM(s) Oral two times a day  cholecalciferol 1000 Unit(s) Oral daily  pantoprazole    Tablet 40 milliGRAM(s) Oral before breakfast  atorvastatin 80 milliGRAM(s) Oral at bedtime  carvedilol 25 milliGRAM(s) Oral every 12 hours  dextrose 5%. 1000 milliLiter(s) (50 mL/Hr) IV Continuous <Continuous>  dextrose 50% Injectable 12.5 Gram(s) IV Push once  dextrose 50% Injectable 25 Gram(s) IV Push once  dextrose 50% Injectable 25 Gram(s) IV Push once  levoFLOXacin IVPB   IV Intermittent   calcium acetate 667 milliGRAM(s) Oral three times a day with meals  heparin  Injectable 5000 Unit(s) SubCutaneous every 8 hours  ALBUTerol/ipratropium for Nebulization 3 milliLiter(s) Nebulizer every 6 hours  norepinephrine Infusion 0.01 MICROgram(s)/kG/Min (2.044 mL/Hr) IV Continuous <Continuous>  gabapentin 100 milliGRAM(s) Oral daily  chlorhexidine 4% Liquid 1 Application(s) Topical daily  insulin NPH human recombinant 20 Unit(s) SubCutaneous every 6 hours  insulin lispro (HumaLOG) corrective regimen sliding scale   SubCutaneous every 6 hours    MEDICATIONS  (PRN):  dextrose Gel 1 Dose(s) Oral once PRN Blood Glucose LESS THAN 70 milliGRAM(s)/deciLiter  glucagon  Injectable 1 milliGRAM(s) IntraMuscular once PRN Glucose <70 milliGRAM(s)/deciLiter      Allergies    penicillin (Unknown)    Intolerances      Review of Systems:  Constitutional: + fatigue  Eyes: decreased vision vision  Neuro: tingling in all extremities  HEENT: No pain  Cardiovascular: No chest pain, palpitations  Respiratory: +SOB, no cough  GI: No nausea, vomiting, abdominal pain  : No dysuria  Skin: no rash  Psych: no depression  Endocrine: no polyuria, polydipsia  Hem/lymph: no swelling  Osteoporosis: no fractures    ALL OTHER SYSTEMS REVIEWED AND NEGATIVE      PHYSICAL EXAM:  VITALS: T(C): 36 (17 @ 14:20)  T(F): 96.8 (17 @ 14:20), Max: 96.8 (17 @ 08:00)  HR: 71 (17 @ 15:22) (71 - 85)  BP: 134/39 (17 @ 15:00) (68/36 - 157/116)  RR:  (14 - 29)  SpO2:  (85% - 100%)  Wt(kg): --  GENERAL: NAD, well-groomed, well-developed  EYES: No proptosis, no lid lag, anicteric  HEENT:  Atraumatic, Normocephalic, moist mucous membranes  RESPIRATORY: Clear to auscultation bilaterally; No rales, rhonchi, wheezing  CARDIOVASCULAR: Regular rate and rhythm; No murmurs; no peripheral edema  GI: Soft, nontender, non distended, normal bowel sounds  SKIN: Dry, intact, No rashes or lesions  MUSCULOSKELETAL: Full range of motion, normal strength  NEURO: sensation intact, extraocular movements intact, no tremor  PSYCH: Alert and oriented x 3, normal affect, normal mood    CAPILLARY BLOOD GLUCOSE  209 ( @ 12:00)  218 ( @ 11:15)  282 ( @ 09:45)  195 ( 08:15)  195 ( @ 07:00)  197 ( @ 06:13)  177 ( @ 05:00)  133 ( @ 04:00)  138 ( @ 03:00)  114 ( @ 02:00)  114 ( @ 01:28)  149 ( @ 00:00)  160 ( @ 23:00)  159 ( @ 22:00)  194 ( @ 21:00)  227 ( @ 19:31)  225 ( @ 18:00)  250 ( @ 17:20)  250 ( @ 16:00)  243 ( @ 15:00)  247 ( @ 14:00)  267 ( @ 12:50)  283 ( @ 11:30)  274 ( @ 10:10)  288 ( @ 09:10)  170 ( @ 04:09)  267 ( @ 21:27)  259 ( @ 19:38)                            13.6   8.85  )-----------( 177      ( 2017 07:45 )             43.9           133<L>  |  91<L>  |  57<H>  ----------------------------<  179<H>  5.4<H>   |  18<L>  |  5.02<H>    EGFR if : 10  EGFR if non : 9     Ca    7.8<L>        Mg     2.1       Phos  7.0         TPro  7.1  /  Alb  3.5  /  TBili  0.4  /  DBili  x   /  AST  30  /  ALT  18  /  AlkPhos  67        Thyroid Function Tests:   @ 06:35 TSH 0.19 FreeT4 -- T3 -- Anti TPO -- Anti Thyroglobulin Ab -- TSI --      Hemoglobin A1C, Whole Blood: 9.2 % <H> [4.0 - 5.6] (17 @ 20:15)      - Chol 150 LDL 93 HDL 14<L> Trig 249<H>    Radiology:

## 2017-07-13 NOTE — PROGRESS NOTE ADULT - ASSESSMENT
56yoF w/ PMH sig for HTN, HLD DM, COPD, ovarian mass who presented to the ER with hypoglycemia and developed SOB.  She was found to be in ARF, current Cr is 5.05, she is now on HD per renal.  She received one HD session last night and will receive HD again with 500mL this afternoon.     #Neuro  - A&O x3, no neurological deficits noted  - if AMS develops, take finger stick and assess oxygenation status    #Cardiology  - troponin 0.5, stable since admission into ER  - cardiology saw in ER, did not believe that any of her current issues are cardiac in origin  - recommend echo and EKG prn for chest pain  - on 80mg atorvastatin  - on 81 mg aspirin  - transthoracic echo    #pulmonary  - currently on 8L NC, saturating well, will likely need BiPAP for sleeping  - monitor ABG, pH 7.26 pCO2 64 pO2 61   - continuing solumedrol, nebulizers  - levofloxacin for presumed COPD exacerbation    #GI  - hx of ovarian mass, unknown type, getting CT Abdomen today  - on ppi    # Renal  - ARF with cr 7.05, baseline appears to be around 1.4, unknown etiology as this point.  possibilities include gabapentin overdose vs ovarian mass vs worsening CHF  - ABG with pH of 7.25  - consider HD tomorrow    #ID  - no current issues    #ENDO  - insulin infusion, 2U/hr, consider restarting basal insulin tomorrow    Heparin SubQ for DVT prophylaxis 56yoF w/ PMH sig for HTN, HLD DM, COPD, ovarian mass who presented to the ER with hypoglycemia and developed SOB.  She was found to be in ARF, current Cr is 5.05, she is now on HD per renal.  She received one HD session last night and will receive HD again with 500mL this afternoon.     #Neuro  - A&O x3, no neurological deficits noted  - if AMS develops, take finger stick and assess oxygenation status    #Cardiology  - troponin 0.5, stable since admission into ER  - cardiology saw in ER, did not believe that any of her current issues are cardiac in origin  - recommend echo and EKG prn for chest pain  - on 80mg atorvastatin  - on 81 mg aspirin  - transthoracic echo    #pulmonary  - currently on 2L NC, saturating well, will likely need BiPAP for sleeping  - monitor ABG, pH 7.26 pCO2 64 pO2 61   - continuing solumedrol, nebulizers  - levofloxacin for presumed COPD exacerbation    #GI  - CT abdomen wnl  - on ppi  - can resume eating    # Renal  - ARF with cr 5.05, baseline appears to be around 1.4, unknown etiology as this point.    - ABG with pH of 7.25  - renal consulted  - HD done last night, another HD will be done today  - recommendations of tte, phoslo 667mg po tid w/ meals, renal diet, and dosing medications for GFR<10    #ID  - no current issues    #ENDO  - insulin infusion, 2U/hr, will d/c at 2pm  - restarting insulin 20u nph qid at noon  - can restart eating, renal diet  - endocrine consulted, appreciate recommendations    Heparin SubQ for DVT prophylaxis

## 2017-07-14 DIAGNOSIS — E83.51 HYPOCALCEMIA: ICD-10-CM

## 2017-07-14 LAB
ALBUMIN SERPL ELPH-MCNC: 2.4 G/DL — LOW (ref 3.3–5)
ALBUMIN SERPL ELPH-MCNC: 3.1 G/DL — LOW (ref 3.3–5)
ALP SERPL-CCNC: 47 U/L — SIGNIFICANT CHANGE UP (ref 40–120)
ALP SERPL-CCNC: 64 U/L — SIGNIFICANT CHANGE UP (ref 40–120)
ALT FLD-CCNC: 12 U/L — SIGNIFICANT CHANGE UP (ref 4–33)
ALT FLD-CCNC: 13 U/L — SIGNIFICANT CHANGE UP (ref 4–33)
ANISOCYTOSIS BLD QL: SLIGHT — SIGNIFICANT CHANGE UP
AST SERPL-CCNC: 23 U/L — SIGNIFICANT CHANGE UP (ref 4–32)
AST SERPL-CCNC: 25 U/L — SIGNIFICANT CHANGE UP (ref 4–32)
BASOPHILS # BLD AUTO: 0.01 K/UL — SIGNIFICANT CHANGE UP (ref 0–0.2)
BASOPHILS NFR BLD AUTO: 0.1 % — SIGNIFICANT CHANGE UP (ref 0–2)
BASOPHILS NFR SPEC: 0 % — SIGNIFICANT CHANGE UP (ref 0–2)
BILIRUB SERPL-MCNC: 0.3 MG/DL — SIGNIFICANT CHANGE UP (ref 0.2–1.2)
BILIRUB SERPL-MCNC: 0.4 MG/DL — SIGNIFICANT CHANGE UP (ref 0.2–1.2)
BUN SERPL-MCNC: 52 MG/DL — HIGH (ref 7–23)
BUN SERPL-MCNC: 65 MG/DL — HIGH (ref 7–23)
BURR CELLS BLD QL SMEAR: PRESENT — SIGNIFICANT CHANGE UP
CA-I BLD-SCNC: 0.89 MMOL/L — LOW (ref 1.03–1.23)
CALCIUM SERPL-MCNC: 6.1 MG/DL — CRITICAL LOW (ref 8.4–10.5)
CALCIUM SERPL-MCNC: 7.9 MG/DL — LOW (ref 8.4–10.5)
CHLORIDE SERPL-SCNC: 106 MMOL/L — SIGNIFICANT CHANGE UP (ref 98–107)
CHLORIDE SERPL-SCNC: 96 MMOL/L — LOW (ref 98–107)
CO2 SERPL-SCNC: 18 MMOL/L — LOW (ref 22–31)
CO2 SERPL-SCNC: 21 MMOL/L — LOW (ref 22–31)
CREAT SERPL-MCNC: 3.71 MG/DL — HIGH (ref 0.5–1.3)
CREAT SERPL-MCNC: 4.85 MG/DL — HIGH (ref 0.5–1.3)
EOSINOPHIL # BLD AUTO: 0.03 K/UL — SIGNIFICANT CHANGE UP (ref 0–0.5)
EOSINOPHIL NFR BLD AUTO: 0.4 % — SIGNIFICANT CHANGE UP (ref 0–6)
EOSINOPHIL NFR FLD: 0 % — SIGNIFICANT CHANGE UP (ref 0–6)
GLUCOSE SERPL-MCNC: 110 MG/DL — HIGH (ref 70–99)
GLUCOSE SERPL-MCNC: 172 MG/DL — HIGH (ref 70–99)
HCT VFR BLD CALC: 39.9 % — SIGNIFICANT CHANGE UP (ref 34.5–45)
HGB BLD-MCNC: 12.5 G/DL — SIGNIFICANT CHANGE UP (ref 11.5–15.5)
IMM GRANULOCYTES # BLD AUTO: 0.1 # — SIGNIFICANT CHANGE UP
IMM GRANULOCYTES NFR BLD AUTO: 1.3 % — SIGNIFICANT CHANGE UP (ref 0–1.5)
LYMPHOCYTES # BLD AUTO: 2.02 K/UL — SIGNIFICANT CHANGE UP (ref 1–3.3)
LYMPHOCYTES # BLD AUTO: 26.9 % — SIGNIFICANT CHANGE UP (ref 13–44)
LYMPHOCYTES NFR SPEC AUTO: 22 % — SIGNIFICANT CHANGE UP (ref 13–44)
MACROCYTES BLD QL: SLIGHT — SIGNIFICANT CHANGE UP
MAGNESIUM SERPL-MCNC: 2 MG/DL — SIGNIFICANT CHANGE UP (ref 1.6–2.6)
MAGNESIUM SERPL-MCNC: 2.5 MG/DL — SIGNIFICANT CHANGE UP (ref 1.6–2.6)
MANUAL SMEAR VERIFICATION: SIGNIFICANT CHANGE UP
MCHC RBC-ENTMCNC: 27.7 PG — SIGNIFICANT CHANGE UP (ref 27–34)
MCHC RBC-ENTMCNC: 31.3 % — LOW (ref 32–36)
MCV RBC AUTO: 88.3 FL — SIGNIFICANT CHANGE UP (ref 80–100)
METAMYELOCYTES # FLD: 1 % — SIGNIFICANT CHANGE UP (ref 0–1)
MICROCYTES BLD QL: SLIGHT — SIGNIFICANT CHANGE UP
MONOCYTES # BLD AUTO: 0.7 K/UL — SIGNIFICANT CHANGE UP (ref 0–0.9)
MONOCYTES NFR BLD AUTO: 9.3 % — SIGNIFICANT CHANGE UP (ref 2–14)
MONOCYTES NFR BLD: 7 % — SIGNIFICANT CHANGE UP (ref 2–9)
NEUTROPHIL AB SER-ACNC: 66 % — SIGNIFICANT CHANGE UP (ref 43–77)
NEUTROPHILS # BLD AUTO: 4.65 K/UL — SIGNIFICANT CHANGE UP (ref 1.8–7.4)
NEUTROPHILS NFR BLD AUTO: 62 % — SIGNIFICANT CHANGE UP (ref 43–77)
NEUTS BAND # BLD: 1 % — SIGNIFICANT CHANGE UP (ref 0–6)
NRBC # BLD: 5 /100WBC — SIGNIFICANT CHANGE UP
NRBC # FLD: 0.28 — SIGNIFICANT CHANGE UP
NRBC FLD-RTO: 3.7 — SIGNIFICANT CHANGE UP
OTHER - HEMATOLOGY %: 3 — SIGNIFICANT CHANGE UP
OVALOCYTES BLD QL SMEAR: SLIGHT — SIGNIFICANT CHANGE UP
PHOSPHATE SERPL-MCNC: 5.5 MG/DL — HIGH (ref 2.5–4.5)
PHOSPHATE SERPL-MCNC: 6.5 MG/DL — HIGH (ref 2.5–4.5)
PLATELET # BLD AUTO: 98 K/UL — LOW (ref 150–400)
PLATELET COUNT - ESTIMATE: SIGNIFICANT CHANGE UP
PMV BLD: 13.3 FL — HIGH (ref 7–13)
POIKILOCYTOSIS BLD QL AUTO: SLIGHT — SIGNIFICANT CHANGE UP
POLYCHROMASIA BLD QL SMEAR: SLIGHT — SIGNIFICANT CHANGE UP
POTASSIUM SERPL-MCNC: 3.9 MMOL/L — SIGNIFICANT CHANGE UP (ref 3.5–5.3)
POTASSIUM SERPL-MCNC: 4.7 MMOL/L — SIGNIFICANT CHANGE UP (ref 3.5–5.3)
POTASSIUM SERPL-SCNC: 3.9 MMOL/L — SIGNIFICANT CHANGE UP (ref 3.5–5.3)
POTASSIUM SERPL-SCNC: 4.7 MMOL/L — SIGNIFICANT CHANGE UP (ref 3.5–5.3)
PROT SERPL-MCNC: 5.3 G/DL — LOW (ref 6–8.3)
PROT SERPL-MCNC: 6.5 G/DL — SIGNIFICANT CHANGE UP (ref 6–8.3)
RBC # BLD: 4.52 M/UL — SIGNIFICANT CHANGE UP (ref 3.8–5.2)
RBC # FLD: 18 % — HIGH (ref 10.3–14.5)
SCHISTOCYTES BLD QL AUTO: SLIGHT — SIGNIFICANT CHANGE UP
SODIUM SERPL-SCNC: 138 MMOL/L — SIGNIFICANT CHANGE UP (ref 135–145)
SODIUM SERPL-SCNC: 139 MMOL/L — SIGNIFICANT CHANGE UP (ref 135–145)
WBC # BLD: 7.51 K/UL — SIGNIFICANT CHANGE UP (ref 3.8–10.5)
WBC # FLD AUTO: 7.51 K/UL — SIGNIFICANT CHANGE UP (ref 3.8–10.5)

## 2017-07-14 PROCEDURE — 93306 TTE W/DOPPLER COMPLETE: CPT | Mod: 26

## 2017-07-14 PROCEDURE — 99232 SBSQ HOSP IP/OBS MODERATE 35: CPT

## 2017-07-14 PROCEDURE — 99291 CRITICAL CARE FIRST HOUR: CPT

## 2017-07-14 RX ORDER — CALCIUM GLUCONATE 100 MG/ML
2 VIAL (ML) INTRAVENOUS ONCE
Qty: 2 | Refills: 0 | Status: COMPLETED | OUTPATIENT
Start: 2017-07-14 | End: 2017-07-14

## 2017-07-14 RX ORDER — INSULIN LISPRO 100/ML
6 VIAL (ML) SUBCUTANEOUS
Qty: 0 | Refills: 0 | Status: DISCONTINUED | OUTPATIENT
Start: 2017-07-14 | End: 2017-07-18

## 2017-07-14 RX ORDER — NOREPINEPHRINE BITARTRATE/D5W 8 MG/250ML
0.01 PLASTIC BAG, INJECTION (ML) INTRAVENOUS
Qty: 8 | Refills: 0 | Status: DISCONTINUED | OUTPATIENT
Start: 2017-07-14 | End: 2017-07-14

## 2017-07-14 RX ORDER — INSULIN LISPRO 100/ML
VIAL (ML) SUBCUTANEOUS
Qty: 0 | Refills: 0 | Status: DISCONTINUED | OUTPATIENT
Start: 2017-07-14 | End: 2017-07-18

## 2017-07-14 RX ORDER — CALCIUM CARBONATE 500(1250)
1 TABLET ORAL
Qty: 0 | Refills: 0 | Status: DISCONTINUED | OUTPATIENT
Start: 2017-07-14 | End: 2017-07-17

## 2017-07-14 RX ADMIN — HEPARIN SODIUM 5000 UNIT(S): 5000 INJECTION INTRAVENOUS; SUBCUTANEOUS at 14:11

## 2017-07-14 RX ADMIN — Medication 81 MILLIGRAM(S): at 11:48

## 2017-07-14 RX ADMIN — Medication 667 MILLIGRAM(S): at 11:48

## 2017-07-14 RX ADMIN — Medication 2: at 11:49

## 2017-07-14 RX ADMIN — Medication 6 UNIT(S): at 17:57

## 2017-07-14 RX ADMIN — CHLORHEXIDINE GLUCONATE 1 APPLICATION(S): 213 SOLUTION TOPICAL at 11:49

## 2017-07-14 RX ADMIN — GABAPENTIN 100 MILLIGRAM(S): 400 CAPSULE ORAL at 11:48

## 2017-07-14 RX ADMIN — Medication 200 GRAM(S): at 17:19

## 2017-07-14 RX ADMIN — Medication 1 TABLET(S): at 17:58

## 2017-07-14 RX ADMIN — DRONEDARONE 400 MILLIGRAM(S): 400 TABLET, FILM COATED ORAL at 17:09

## 2017-07-14 RX ADMIN — Medication 1000 UNIT(S): at 11:48

## 2017-07-14 RX ADMIN — HEPARIN SODIUM 5000 UNIT(S): 5000 INJECTION INTRAVENOUS; SUBCUTANEOUS at 06:30

## 2017-07-14 RX ADMIN — MIDODRINE HYDROCHLORIDE 20 MILLIGRAM(S): 2.5 TABLET ORAL at 06:32

## 2017-07-14 RX ADMIN — Medication 8: at 17:56

## 2017-07-14 RX ADMIN — DRONEDARONE 400 MILLIGRAM(S): 400 TABLET, FILM COATED ORAL at 06:30

## 2017-07-14 RX ADMIN — Medication 667 MILLIGRAM(S): at 17:09

## 2017-07-14 RX ADMIN — Medication 4: at 00:05

## 2017-07-14 RX ADMIN — Medication 667 MILLIGRAM(S): at 08:34

## 2017-07-14 RX ADMIN — Medication: at 06:33

## 2017-07-14 RX ADMIN — CARVEDILOL PHOSPHATE 25 MILLIGRAM(S): 80 CAPSULE, EXTENDED RELEASE ORAL at 06:30

## 2017-07-14 RX ADMIN — PANTOPRAZOLE SODIUM 40 MILLIGRAM(S): 20 TABLET, DELAYED RELEASE ORAL at 06:32

## 2017-07-14 NOTE — DIETITIAN INITIAL EVALUATION ADULT. - PERTINENT MEDS FT
Levophed, Levaquin, Coreg, Proamatine, Neurontin, Lipitor, Lantus, Humalog, PHOSLO, Vit. D3, Protonix DR

## 2017-07-14 NOTE — DIETITIAN INITIAL EVALUATION ADULT. - ENERGY NEEDS
Estimated daily nutrition need 1150 - 1462 kcal/d ( 11 - 14 kcal/kg actual wt. ) protein 109 gm/d ( 2.0 gm /kg ideal wt. - 54.5 kg )

## 2017-07-14 NOTE — PROGRESS NOTE ADULT - PROBLEM SELECTOR PLAN 1
BG improved.  Continue Lantus 70 u qhs  Add Humalog 6/6/6 with meals.  Continue Humalog moderate scale qac and qhs.

## 2017-07-14 NOTE — PROGRESS NOTE ADULT - SUBJECTIVE AND OBJECTIVE BOX
CHIEF COMPLAINT:    Interval Events:    REVIEW OF SYSTEMS:  Constitutional: [ ] negative [ ] fevers [ ] chills [ ] weight loss [ ] weight gain  HEENT: [ ] negative [ ] dry eyes [ ] eye irritation [ ] postnasal drip [ ] nasal congestion  CV: [ ] negative  [ ] chest pain [ ] orthopnea [ ] palpitations [ ] murmur  Resp: [ ] negative [ ] cough [ ] shortness of breath [ ] dyspnea [ ] wheezing [ ] sputum [ ] hemoptysis  GI: [ ] negative [ ] nausea [ ] vomiting [ ] diarrhea [ ] constipation [ ] abd pain [ ] dysphagia   : [ ] negative [ ] dysuria [ ] nocturia [ ] hematuria [ ] increased urinary frequency  Musculoskeletal: [ ] negative [ ] back pain [ ] myalgias [ ] arthralgias [ ] fracture  Skin: [ ] negative [ ] rash [ ] itch  Neurological: [ ] negative [ ] headache [ ] dizziness [ ] syncope [ ] weakness [ ] numbness  Psychiatric: [ ] negative [ ] anxiety [ ] depression  Endocrine: [ ] negative [ ] diabetes [ ] thyroid problem  Hematologic/Lymphatic: [ ] negative [ ] anemia [ ] bleeding problem  Allergic/Immunologic: [ ] negative [ ] itchy eyes [ ] nasal discharge [ ] hives [ ] angioedema  [ ] All other systems negative  [ ] Unable to assess ROS because ________    OBJECTIVE:  ICU Vital Signs Last 24 Hrs  T(C): 36.1 (14 Jul 2017 03:30), Max: 36.6 (13 Jul 2017 20:00)  T(F): 97 (14 Jul 2017 03:30), Max: 97.8 (13 Jul 2017 20:00)  HR: 63 (14 Jul 2017 06:20) (63 - 80)  BP: 130/50 (14 Jul 2017 04:45) (92/51 - 157/116)  BP(mean): 69 (14 Jul 2017 04:45) (54 - 125)  ABP: --  ABP(mean): --  RR: 18 (14 Jul 2017 04:45) (14 - 24)  SpO2: 98% (14 Jul 2017 06:20) (90% - 100%)        07-12 @ 07:01  -  07-13 @ 07:00  --------------------------------------------------------  IN: 1896.5 mL / OUT: 2075 mL / NET: -178.5 mL    07-13 @ 07:01 - 07-14 @ 06:41  --------------------------------------------------------  IN: 803 mL / OUT: 1645 mL / NET: -842 mL      CAPILLARY BLOOD GLUCOSE  223 (13 Jul 2017 23:59)          PHYSICAL EXAM:  General:   HEENT:   Lymph Nodes:  Neck:   Respiratory:   Cardiovascular:   Abdomen:   Extremities:   Skin:   Neurological:  Psychiatry:    LINES:    HOSPITAL MEDICATIONS:  aspirin enteric coated 81 milliGRAM(s) Oral daily  heparin  Injectable 5000 Unit(s) SubCutaneous every 8 hours    levoFLOXacin IVPB 500 milliGRAM(s) IV Intermittent every 48 hours  levoFLOXacin IVPB   IV Intermittent     dronedarone 400 milliGRAM(s) Oral two times a day  carvedilol 25 milliGRAM(s) Oral every 12 hours  midodrine 20 milliGRAM(s) Oral three times a day  norepinephrine Infusion 0.01 MICROgram(s)/kG/Min IV Continuous <Continuous>    atorvastatin 80 milliGRAM(s) Oral at bedtime  dextrose Gel 1 Dose(s) Oral once PRN  dextrose 50% Injectable 12.5 Gram(s) IV Push once  dextrose 50% Injectable 25 Gram(s) IV Push once  dextrose 50% Injectable 25 Gram(s) IV Push once  glucagon  Injectable 1 milliGRAM(s) IntraMuscular once PRN  insulin lispro (HumaLOG) corrective regimen sliding scale   SubCutaneous every 6 hours  insulin glargine Injectable (LANTUS) 70 Unit(s) SubCutaneous at bedtime    ALBUTerol/ipratropium for Nebulization 3 milliLiter(s) Nebulizer every 6 hours PRN    gabapentin 100 milliGRAM(s) Oral daily    pantoprazole    Tablet 40 milliGRAM(s) Oral before breakfast        cholecalciferol 1000 Unit(s) Oral daily  dextrose 5%. 1000 milliLiter(s) IV Continuous <Continuous>  calcium acetate 667 milliGRAM(s) Oral three times a day with meals      chlorhexidine 4% Liquid 1 Application(s) Topical daily            LABS:                        12.5   7.51  )-----------( 98       ( 14 Jul 2017 04:00 )             39.9     Hgb Trend: 12.5<--, 13.6<--, 13.7<--, 14.0<--  07-14    139  |  106  |  52<H>  ----------------------------<  110<H>  3.9   |  18<L>  |  3.71<H>    Ca    6.1<LL>      14 Jul 2017 04:00  Phos  5.5     07-14  Mg     2.0     07-14    TPro  5.3<L>  /  Alb  2.4<L>  /  TBili  0.3  /  DBili  x   /  AST  23  /  ALT  12  /  AlkPhos  47  07-14    Creatinine Trend: 3.71<--, 5.02<--, 6.89<--, 7.05<--, 7.94<--, 7.89<--  PT/INR - ( 12 Jul 2017 07:45 )   PT: 13.2 SEC;   INR: 1.17          PTT - ( 12 Jul 2017 07:45 )  PTT:25.7 SEC    Arterial Blood Gas:  07-12 @ 13:09  7.26/64/61/23/85.7/1.0  ABG lactate: 1.2  Arterial Blood Gas:  07-12 @ 07:35  7.25/65/53/24/78.2/1.3  ABG lactate: --    Venous Blood Gas:  07-12 @ 06:52  7.19/80/54/23/74.5  VBG Lactate: 1.2      MICROBIOLOGY:     RADIOLOGY:  [ ] Reviewed and interpreted by me    EKG: CHIEF COMPLAINT:    Interval Events:    REVIEW OF SYSTEMS:  Constitutional: [x ] negative [ ] fevers [ ] chills [ ] weight loss [ ] weight gain  HEENT: [ ] negative [ ] dry eyes [ ] eye irritation [ ] postnasal drip [ ] nasal congestion  CV: [ x] negative  [ ] chest pain [ ] orthopnea [ ] palpitations [ ] murmur  Resp: [ x] negative [ ] cough [ ] shortness of breath [ ] dyspnea [ ] wheezing [ ] sputum [ ] hemoptysis  GI: [x ] negative [ ] nausea [ ] vomiting [ ] diarrhea [ ] constipation [ ] abd pain [ ] dysphagia   : [x ] negative [ ] dysuria [ ] nocturia [ ] hematuria [ ] increased urinary frequency  Musculoskeletal: [ ] negative [ ] back pain [ ] myalgias [ ] arthralgias [ ] fracture  Skin: [ ] negative [ ] rash [ ] itch  Neurological: [ x] negative [ ] headache [ ] dizziness [ ] syncope [ ] weakness [ ] numbness  Psychiatric: [ ] negative [ ] anxiety [ ] depression  Endocrine: [ ] negative [ ] diabetes [ ] thyroid problem  Hematologic/Lymphatic: [ ] negative [ ] anemia [ ] bleeding problem  Allergic/Immunologic: [ ] negative [ ] itchy eyes [ ] nasal discharge [ ] hives [ ] angioedema  [ ] All other systems negative    OBJECTIVE:  ICU Vital Signs Last 24 Hrs  T(C): 36.1 (2017 03:30), Max: 36.6 (2017 20:00)  T(F): 97 (2017 03:30), Max: 97.8 (2017 20:00)  HR: 63 (2017 06:20) (63 - 80)  BP: 130/50 (2017 04:45) (92/51 - 157/116)  BP(mean): 69 (2017 04:45) (54 - 125)  ABP: --  ABP(mean): --  RR: 18 (2017 04:45) (14 - 24)  SpO2: 98% (2017 06:20) (90% - 100%)         @ 07:01  -  -13 @ 07:00  --------------------------------------------------------  IN: 1896.5 mL / OUT: 2075 mL / NET: -178.5 mL     @ 07: @ 06:41  --------------------------------------------------------  IN: 803 mL / OUT: 1645 mL / NET: -842 mL      CAPILLARY BLOOD GLUCOSE  223 (2017 23:59)      PHYSICAL EXAM:  General: sleeping upright in armchair, NAD, comfortable appearing  Neck: obese  Respiratory:  breath sounds diminished due to obesity, but no apparent crackles/murmurs   Cardiovascular: heart sounds diminished but no apparent murmur  Abdomen: morbidly obese, but nt nd  Skin: intact to gross observation  Neurological: no focal deficits noted  Psychiatry: euthymic    LINES:    HOSPITAL MEDICATIONS:  aspirin enteric coated 81 milliGRAM(s) Oral daily  heparin  Injectable 5000 Unit(s) SubCutaneous every 8 hours  levoFLOXacin IVPB 500 milliGRAM(s) IV Intermittent every 48 hours  levoFLOXacin IVPB   IV Intermittent   dronedarone 400 milliGRAM(s) Oral two times a day  carvedilol 25 milliGRAM(s) Oral every 12 hours  midodrine 20 milliGRAM(s) Oral three times a day  norepinephrine Infusion 0.01 MICROgram(s)/kG/Min IV Continuous <Continuous>  atorvastatin 80 milliGRAM(s) Oral at bedtime  dextrose Gel 1 Dose(s) Oral once PRN  dextrose 50% Injectable 12.5 Gram(s) IV Push once  dextrose 50% Injectable 25 Gram(s) IV Push once  dextrose 50% Injectable 25 Gram(s) IV Push once  glucagon  Injectable 1 milliGRAM(s) IntraMuscular once PRN  insulin lispro (HumaLOG) corrective regimen sliding scale   SubCutaneous every 6 hours  insulin glargine Injectable (LANTUS) 70 Unit(s) SubCutaneous at bedtime  ALBUTerol/ipratropium for Nebulization 3 milliLiter(s) Nebulizer every 6 hours PRN  gabapentin 100 milliGRAM(s) Oral daily  pantoprazole    Tablet 40 milliGRAM(s) Oral before breakfast  cholecalciferol 1000 Unit(s) Oral daily  dextrose 5%. 1000 milliLiter(s) IV Continuous <Continuous>  calcium acetate 667 milliGRAM(s) Oral three times a day with meals  chlorhexidine 4% Liquid 1 Application(s) Topical daily        LABS:                        12.5   7.51  )-----------( 98       ( 2017 04:00 )             39.9     Hgb Trend: 12.5<--, 13.6<--, 13.7<--, 14.0<--      139  |  106  |  52<H>  ----------------------------<  110<H>  3.9   |  18<L>  |  3.71<H>    Ca    6.1<LL>      2017 04:00  Phos  5.5     14  Mg     2.0     14    TPro  5.3<L>  /  Alb  2.4<L>  /  TBili  0.3  /  DBili  x   /  AST  23  /  ALT  12  /  AlkPhos  47      Creatinine Trend: 3.71<--, 5.02<--, 6.89<--, 7.05<--, 7.94<--, 7.89<--  PT/INR - ( 2017 07:45 )   PT: 13.2 SEC;   INR: 1.17          PTT - ( 2017 07:45 )  PTT:25.7 SEC    Arterial Blood Gas:   @ 13:09  7.26/64/61/23/85.7/1.0  ABG lactate: 1.2  Arterial Blood Gas:   @ 07:35  7.25/65/53/24/78.2/1.3  ABG lactate: --    Venous Blood Gas:   @ 06:52  7.19/80/54/23/74.5  VBG Lactate: 1.2      EK lead EKG this AM

## 2017-07-14 NOTE — PROGRESS NOTE ADULT - SUBJECTIVE AND OBJECTIVE BOX
No pain, no shortness of breath - feels well today      VITAL:  T(C): , Max: 36.6 (07-13-17 @ 20:00)  T(F): , Max: 97.8 (07-13-17 @ 20:00)  HR: 51 (07-14-17 @ 10:00)  BP: 106/60 (07-14-17 @ 10:00)  BP(mean): 70 (07-14-17 @ 10:00)  RR: 15 (07-14-17 @ 10:00)  SpO2: 100% (07-14-17 @ 10:00)      PHYSICAL EXAM:  Constitutional: Alert, obese, on NCO2  HEENT: NCAT, DMM  Neck: Supple, (+)shiley  Respiratory: grossly clear b/l  Cardiovascular: Irreg S1S2  Gastrointestinal: soft, NTND  Extremities: warm x 4; trace b/l LE edema      LABS:                        12.5   7.51  )-----------( 98       ( 14 Jul 2017 04:00 )             39.9     Na(139)/K(3.9)/Cl(106)/HCO3(18)/BUN(52)/Cr(3.71)Glu(110)/Ca(6.1)/Mg(2.0)/PO4(5.5)    07-14 @ 04:00  Na(133)/K(5.4)/Cl(91)/HCO3(18)/BUN(57)/Cr(5.02)Glu(179)/Ca(7.8)/Mg(2.1)/PO4(7.0)    07-13 @ 05:20  Na(134)/K(5.8)/Cl(91)/HCO3(22)/BUN(80)/Cr(6.89)Glu(248)/Ca(7.7)/Mg(2.4)/PO4(8.5)    07-12 @ 18:58  Na(136)/K(5.1)/Cl(91)/HCO3(23)/BUN(73)/Cr(7.05)Glu(293)/Ca(7.8)/Mg(2.4)/PO4(8.2)    07-12 @ 12:45  Na(138)/K(4.8)/Cl(92)/HCO3(26)/BUN(73)/Cr(7.94)Glu(308)/Ca(7.8)/Mg(2.4)/PO4(9.1)    07-12 @ 06:35  Na(138)/K(4.7)/Cl(92)/HCO3(28)/BUN(67)/Cr(7.89)Glu(190)/Ca(8.1)/Mg(2.4)/PO4(--)    07-12 @ 00:37  Na(138)/K(4.3)/Cl(90)/HCO3(27)/BUN(62)/Cr(7.76)Glu(270)/Ca(8.3)/Mg(2.5)/PO4(--)    07-11 @ 20:15    CT A/P I- (7/12) - no hydronephrosis    IMPRESSION: 56F w/ HTN, DM2, and HFrEF-AICD, 7/12/17 a/w hypoglycemia, respiratory acidosis, and ALISA    (1)Renal - base creat mid 1s; superimposed oligoanuric ALISA - s/p HD yesterday. Urine lytes c/w prerenal azotemia. Indicated for HD again today.    (2)Acid-base - primary respiratory acidosis - presumed more due to COPD than CHF. Improving.    (3)Hyperphosphatemia - ALISA-associated. Improving.    (4)CV - EF 20%; however, now on pressors s/p UF yesterday. Oliguric. Discussed with MICU team - will only plan for 500cc UF today.      RECOMMEND:  (1)No HD today; will plan for HD tomorrow and reassess next week whether dialysis is still required; high-calcium bath dialysate tomorrow.  (2)Recheck serum total calcium and ionized calcium; give at least 1 amp of IV calcium gluconate if repeat Ca <6.5 and/or ionized calcium is <0.85  (3)Continue Phoslo      Plan d/w'd MICU team.      Perfecto Rae MD  [facility]  [phone]

## 2017-07-14 NOTE — DIETITIAN INITIAL EVALUATION ADULT. - PERTINENT LABORATORY DATA
7/14/17 BUN 52 high, Cr. 3.71 high, Glu. 110 high, Ca 6.1 low , Phos. 5.5 high, ; 7/11/17 Hemoglobin A1C  9.2%

## 2017-07-14 NOTE — PROGRESS NOTE ADULT - PROBLEM SELECTOR PLAN 2
Corrected calcium 8.6 low normal.  Would add calcium carbonate 1250mg 1 tab BID.  Continue cholecalciferol.

## 2017-07-14 NOTE — DIETITIAN INITIAL EVALUATION ADULT. - OTHER INFO
Nutrition consult received on 7/12/17 for BMI > 40 . Pt. alert, oriented , reports fair to good appetite, PO intake, no N/V, diarrhea, constipation, no known food allergies, no difficulty chewing, swallowing , wt. increase related to fluid retention . Pt. states she was on renal diabetic diet prior to admission and follows up w/ renal RDN.

## 2017-07-14 NOTE — PROGRESS NOTE ADULT - ASSESSMENT
56yoF w/ PMH sig for HTN, HLD DM, COPD, who presented to the ER with hypoglycemia and developed SOB.  She was found to be in ARF and is currently on HD per nephrology.  Hemodynamically stable, off of pressors, bedboard.      #Neuro  - A&O x3, no neurological deficits noted  - if AMS develops, take finger stick and assess oxygenation status    #Cardiology  - troponin 0.5, stable since admission into ER  - cardiology saw in ER, did not believe that any of her current issues are cardiac in origin  - recommend echo and EKG prn for chest pain  - on 80mg atorvastatin  - on 81 mg aspirin  - transthoracic echo  - weaned off of pressors    #pulmonary  - currently on 2L NC, saturating well, will likely need BiPAP for sleeping  - if AMS develops check ABG  - continuing solumedrol, nebulizers  - levofloxacin for presumed COPD exacerbation    #GI  - CT abdomen wnl  - on ppi  - can resume eating    # Renal  - ARF with cr 3.71, baseline appears to be around 1.4, unknown etiology as this point.    - ABG with pH of 7.25  - renal consulted  - recommendations of tte, phoslo 667mg po tid w/ meals, renal diet, and dosing medications for GFR<10    #ID  - no current issues    #ENDO  - endocrine consulted, appreciate recommendations  - insulin management per endocrinology    Heparin SubQ for DVT prophylaxis

## 2017-07-14 NOTE — PROGRESS NOTE ADULT - SUBJECTIVE AND OBJECTIVE BOX
Chief Complaint: DM2    History: tolerating po. No hypoglycemia.    MEDICATIONS  (STANDING):  aspirin enteric coated 81 milliGRAM(s) Oral daily  dronedarone 400 milliGRAM(s) Oral two times a day  cholecalciferol 1000 Unit(s) Oral daily  pantoprazole    Tablet 40 milliGRAM(s) Oral before breakfast  atorvastatin 80 milliGRAM(s) Oral at bedtime  carvedilol 25 milliGRAM(s) Oral every 12 hours  dextrose 5%. 1000 milliLiter(s) (50 mL/Hr) IV Continuous <Continuous>  dextrose 50% Injectable 12.5 Gram(s) IV Push once  dextrose 50% Injectable 25 Gram(s) IV Push once  dextrose 50% Injectable 25 Gram(s) IV Push once  calcium acetate 667 milliGRAM(s) Oral three times a day with meals  heparin  Injectable 5000 Unit(s) SubCutaneous every 8 hours  gabapentin 100 milliGRAM(s) Oral daily  chlorhexidine 4% Liquid 1 Application(s) Topical daily  insulin glargine Injectable (LANTUS) 70 Unit(s) SubCutaneous at bedtime  norepinephrine Infusion 0.01 MICROgram(s)/kG/Min (2.044 mL/Hr) IV Continuous <Continuous>  insulin lispro (HumaLOG) corrective regimen sliding scale   SubCutaneous Before meals and at bedtime  calcium gluconate IVPB 2 Gram(s) IV Intermittent once    MEDICATIONS  (PRN):  dextrose Gel 1 Dose(s) Oral once PRN Blood Glucose LESS THAN 70 milliGRAM(s)/deciLiter  glucagon  Injectable 1 milliGRAM(s) IntraMuscular once PRN Glucose <70 milliGRAM(s)/deciLiter  ALBUTerol/ipratropium for Nebulization 3 milliLiter(s) Nebulizer every 6 hours PRN Shortness of Breath and/or Wheezing      Allergies    penicillin (Unknown)    Intolerances      Review of Systems:  Constitutional: No fever  Eyes: No blurry vision  Neuro: No tremors  HEENT: No pain  Cardiovascular: No chest pain, palpitations  Respiratory: No SOB, no cough  GI: No nausea, vomiting, abdominal pain  : No dysuria  Skin: no rash  Psych: no depression  Endocrine: no polyuria, polydipsia  Hem/lymph: no swelling  Osteoporosis: no fractures    ALL OTHER SYSTEMS REVIEWED AND NEGATIVE      PHYSICAL EXAM:  VITALS: T(C): 36.2 (07-14-17 @ 12:00)  T(F): 97.2 (07-14-17 @ 12:00), Max: 97.8 (07-13-17 @ 20:00)  HR: 70 (07-14-17 @ 15:49) (51 - 79)  BP: 124/70 (07-14-17 @ 12:00) (96/63 - 144/70)  RR:  (15 - 22)  SpO2:  (93% - 100%)  Wt(kg): --  GENERAL: NAD, well-groomed, well-developed  EYES: No proptosis, no lid lag, anicteric  HEENT:  Atraumatic, Normocephalic, moist mucous membranes  RESPIRATORY: Clear to auscultation bilaterally; No rales, rhonchi, wheezing  CARDIOVASCULAR: Regular rate and rhythm; No murmurs; no peripheral edema  GI: Soft, nontender, non distended  SKIN: Dry, intact, No rashes or lesions  MUSCULOSKELETAL: Full range of motion, normal strength  NEURO: extraocular movements intact, no tremor  PSYCH: Alert and oriented x 3, normal affect, normal mood    CAPILLARY BLOOD GLUCOSE  194 (07-14 @ 11:47)  176 (07-14 @ 06:41)  223 (07-13 @ 23:59)  190 (07-13 @ 18:10)  164 (07-13 @ 16:36)  209 (07-13 @ 12:00)  218 (07-13 @ 11:15)  282 (07-13 @ 09:45)  195 (07-13 @ 08:15)  195 (07-13 @ 07:00)  197 (07-13 @ 06:13)  177 (07-13 @ 05:00)  133 (07-13 @ 04:00)  138 (07-13 @ 03:00)  114 (07-13 @ 02:00)  114 (07-13 @ 01:28)  149 (07-13 @ 00:00)  160 (07-12 @ 23:00)  159 (07-12 @ 22:00)  194 (07-12 @ 21:00)  227 (07-12 @ 19:31)  225 (07-12 @ 18:00)  250 (07-12 @ 17:20)  250 (07-12 @ 16:00)  243 (07-12 @ 15:00)  247 (07-12 @ 14:00)  267 (07-12 @ 12:50)  283 (07-12 @ 11:30)  274 (07-12 @ 10:10)  288 (07-12 @ 09:10)  170 (07-12 @ 04:09)  267 (07-11 @ 21:27)  259 (07-11 @ 19:38)      07-14    138  |  96<L>  |  65<H>  ----------------------------<  172<H>  4.7   |  21<L>  |  4.85<H>    EGFR if : 11  EGFR if non : 9     Ca    7.9<L>      07-14  Mg     2.5     07-14  Phos  6.5     07-14    TPro  6.5  /  Alb  3.1<L>  /  TBili  0.4  /  DBili  x   /  AST  25  /  ALT  13  /  AlkPhos  64  07-14          Thyroid Function Tests:  07-12 @ 06:35 TSH 0.19 FreeT4 -- T3 -- Anti TPO -- Anti Thyroglobulin Ab -- TSI --      Hemoglobin A1C, Whole Blood: 9.2 % <H> [4.0 - 5.6] (07-11-17 @ 20:15)

## 2017-07-14 NOTE — PROGRESS NOTE ADULT - SUBJECTIVE AND OBJECTIVE BOX
CHIEF COMPLAINT:  : Pt seen in MICU   at  3  pm  her condition improved significantly after H.D pt urinating  Light  d/c  Off BIPAP and on nasal cannula. Off Levophed     SUBJECTIVE:     REVIEW OF SYSTEMS:    CONSTITUTIONAL: (- )  weakness,  ( - ) fevers or chills  EYES/ENT: ( - )visual changes;     NECK: ( - ) pain or stiffness  RESPIRATORY:   ( - )cough, wheezing, hemoptysis;  ( - ) shortness of breath  CARDIOVASCULAR:  ( - )chest pain or palpitations  GASTROINTESTINAL:   ( - )abdominal or epigastric pain.  ( - ) nausea, vomiting, or hematemesis;   (  - ) diarrhea or constipation.   GENITOURINARY:   (  -  ) dysuria, + anuria  NEUROLOGICAL:  ( +  ) numbness or weakness   All other review of systems is negative unless indicated above        Vital Signs Last 24 Hrs  T(C): 36.3 (14 Jul 2017 18:57), Max: 36.6 (13 Jul 2017 20:00)  T(F): 97.4 (14 Jul 2017 18:57), Max: 97.8 (13 Jul 2017 20:00)  HR: 60 (14 Jul 2017 18:57) (51 - 79)  BP: 117/67 (14 Jul 2017 18:57) (96/63 - 144/70)  BP(mean): 87 (14 Jul 2017 18:00) (54 - 96)  RR: 20 (14 Jul 2017 18:57) (15 - 22)  SpO2: 100% (14 Jul 2017 18:57) (93% - 100%)    I&O's Summary    13 Jul 2017 07:01  -  14 Jul 2017 07:00  --------------------------------------------------------  IN: 803 mL / OUT: 1645 mL / NET: -842 mL        CAPILLARY BLOOD GLUCOSE  305 (14 Jul 2017 17:00)  194 (14 Jul 2017 11:47)  176 (14 Jul 2017 06:41)  223 (13 Jul 2017 23:59)          PHYSICAL EXAM:      Constitutional:  (  - ) NAD,   ( +  )awake and alert  HEENT: PERR, EOMI,    Neck: Soft and supple, No LAD, No JVD  Respiratory:  (  + Breath sounds are clear bilaterally,    (  - ) wheezing, rales or rhonchi  Cardiovascular:     (   +)S1 and S2, regular rate and rhythm, no Murmurs, gallops or rubs  Gastrointestinal:  (  + )Bowel Sounds present, soft,   (-  )nontender, nondistended,    Extremities:    (  -) peripheral edema  Vascular: 2+ peripheral pulses  Neurological:    (  +  )A/O 3,   ( - ) focal deficits  Musculoskeletal:    (  + )  normal strength b/l upper  (  -   ) normal  lower extremities  Skin: No rashes  MEDICATIONS:  MEDICATIONS  (STANDING):  aspirin enteric coated 81 milliGRAM(s) Oral daily  dronedarone 400 milliGRAM(s) Oral two times a day  cholecalciferol 1000 Unit(s) Oral daily  pantoprazole    Tablet 40 milliGRAM(s) Oral before breakfast  atorvastatin 80 milliGRAM(s) Oral at bedtime  carvedilol 25 milliGRAM(s) Oral every 12 hours  dextrose 5%. 1000 milliLiter(s) (50 mL/Hr) IV Continuous <Continuous>  dextrose 50% Injectable 12.5 Gram(s) IV Push once  dextrose 50% Injectable 25 Gram(s) IV Push once  dextrose 50% Injectable 25 Gram(s) IV Push once  calcium acetate 667 milliGRAM(s) Oral three times a day with meals  heparin  Injectable 5000 Unit(s) SubCutaneous every 8 hours  gabapentin 100 milliGRAM(s) Oral daily  chlorhexidine 4% Liquid 1 Application(s) Topical daily  insulin glargine Injectable (LANTUS) 70 Unit(s) SubCutaneous at bedtime  insulin lispro (HumaLOG) corrective regimen sliding scale   SubCutaneous Before meals and at bedtime  calcium carbonate 1250 mG (OsCal) 1 Tablet(s) Oral two times a day  insulin lispro Injectable (HumaLOG) 6 Unit(s) SubCutaneous three times a day before meals      LABS: All Labs Reviewed:                        12.5   7.51  )-----------( 98       ( 14 Jul 2017 04:00 )             39.9     07-14    138  |  96<L>  |  65<H>  ----------------------------<  172<H>  4.7   |  21<L>  |  4.85<H>    Ca    7.9<L>      14 Jul 2017 13:42  Phos  6.5     07-14  Mg     2.5     07-14    TPro  6.5  /  Alb  3.1<L>  /  TBili  0.4  /  DBili  x   /  AST  25  /  ALT  13  /  AlkPhos  64  07-14          Blood Culture:   Urine Culture      RADIOLOGY/EKG:    ASSESSMENT AND PLAN:  ASSESSMENT AND PLAN:     56F w/ HTN, DM2, none compliant with diet and HFrEF-AICD  ,   - SP  hypoglycemia, stable  - respiratory acidosis, Acid-base - primary respiratory acidosis - presumed more due to COPD than CHF. Improving  - Renal/ ALISA - ; superimposed oligoanuric ALISA - s/p HD yesterday.    -DM currently Lantus 70 at bedtime and Humalog 6 units before meals    -Hyperphosphatemia - ALISA-associated. Improving.    -CAD/CV - EF 20%;  continue  same meds  patient can be transferred to the floor

## 2017-07-14 NOTE — DIETITIAN INITIAL EVALUATION ADULT. - NS AS NUTRI INTERV MEALS SNACK
Fluid - modified diet/Mineral - modified diet/General/healthful diet/Protein - modified diet/Carbohydrate - modified diet

## 2017-07-14 NOTE — DIETITIAN INITIAL EVALUATION ADULT. - NS AS NUTRI INTERV MEALS SNACK3
General/healthful diet/Mineral - modified diet/Energy - modified diet/Protein - modified diet/Fluid - modified diet/Carbohydrate - modified diet

## 2017-07-14 NOTE — DIETITIAN INITIAL EVALUATION ADULT. - ETIOLOGY
related to physiological causes - renal and endocrine dysfunction related to excessive energy intake , physical inactivity

## 2017-07-15 DIAGNOSIS — E16.2 HYPOGLYCEMIA, UNSPECIFIED: ICD-10-CM

## 2017-07-15 DIAGNOSIS — J44.1 CHRONIC OBSTRUCTIVE PULMONARY DISEASE WITH (ACUTE) EXACERBATION: ICD-10-CM

## 2017-07-15 DIAGNOSIS — I25.10 ATHEROSCLEROTIC HEART DISEASE OF NATIVE CORONARY ARTERY WITHOUT ANGINA PECTORIS: ICD-10-CM

## 2017-07-15 LAB
BASOPHILS # BLD AUTO: 0.03 K/UL — SIGNIFICANT CHANGE UP (ref 0–0.2)
BASOPHILS NFR BLD AUTO: 0.5 % — SIGNIFICANT CHANGE UP (ref 0–2)
BUN SERPL-MCNC: 76 MG/DL — HIGH (ref 7–23)
CA-I BLD-SCNC: 1.09 MMOL/L — SIGNIFICANT CHANGE UP (ref 1.03–1.23)
CALCIUM SERPL-MCNC: 8.9 MG/DL — SIGNIFICANT CHANGE UP (ref 8.4–10.5)
CHLORIDE SERPL-SCNC: 96 MMOL/L — LOW (ref 98–107)
CO2 SERPL-SCNC: 25 MMOL/L — SIGNIFICANT CHANGE UP (ref 22–31)
CREAT SERPL-MCNC: 4.89 MG/DL — HIGH (ref 0.5–1.3)
EOSINOPHIL # BLD AUTO: 0.08 K/UL — SIGNIFICANT CHANGE UP (ref 0–0.5)
EOSINOPHIL NFR BLD AUTO: 1.3 % — SIGNIFICANT CHANGE UP (ref 0–6)
GLUCOSE SERPL-MCNC: 192 MG/DL — HIGH (ref 70–99)
HCT VFR BLD CALC: 42.2 % — SIGNIFICANT CHANGE UP (ref 34.5–45)
HGB BLD-MCNC: 13.1 G/DL — SIGNIFICANT CHANGE UP (ref 11.5–15.5)
IMM GRANULOCYTES # BLD AUTO: 0.11 # — SIGNIFICANT CHANGE UP
IMM GRANULOCYTES NFR BLD AUTO: 1.7 % — HIGH (ref 0–1.5)
LYMPHOCYTES # BLD AUTO: 1.64 K/UL — SIGNIFICANT CHANGE UP (ref 1–3.3)
LYMPHOCYTES # BLD AUTO: 25.9 % — SIGNIFICANT CHANGE UP (ref 13–44)
MAGNESIUM SERPL-MCNC: 2.6 MG/DL — SIGNIFICANT CHANGE UP (ref 1.6–2.6)
MCHC RBC-ENTMCNC: 27.8 PG — SIGNIFICANT CHANGE UP (ref 27–34)
MCHC RBC-ENTMCNC: 31 % — LOW (ref 32–36)
MCV RBC AUTO: 89.4 FL — SIGNIFICANT CHANGE UP (ref 80–100)
MONOCYTES # BLD AUTO: 0.67 K/UL — SIGNIFICANT CHANGE UP (ref 0–0.9)
MONOCYTES NFR BLD AUTO: 10.6 % — SIGNIFICANT CHANGE UP (ref 2–14)
NEUTROPHILS # BLD AUTO: 3.79 K/UL — SIGNIFICANT CHANGE UP (ref 1.8–7.4)
NEUTROPHILS NFR BLD AUTO: 60 % — SIGNIFICANT CHANGE UP (ref 43–77)
NRBC # FLD: 0.11 — SIGNIFICANT CHANGE UP
NRBC FLD-RTO: 1.7 — SIGNIFICANT CHANGE UP
PHOSPHATE SERPL-MCNC: 6.4 MG/DL — HIGH (ref 2.5–4.5)
PLATELET # BLD AUTO: 151 K/UL — SIGNIFICANT CHANGE UP (ref 150–400)
PMV BLD: 13.2 FL — HIGH (ref 7–13)
POTASSIUM SERPL-MCNC: 4 MMOL/L — SIGNIFICANT CHANGE UP (ref 3.5–5.3)
POTASSIUM SERPL-SCNC: 4 MMOL/L — SIGNIFICANT CHANGE UP (ref 3.5–5.3)
RBC # BLD: 4.72 M/UL — SIGNIFICANT CHANGE UP (ref 3.8–5.2)
RBC # FLD: 18.1 % — HIGH (ref 10.3–14.5)
SODIUM SERPL-SCNC: 139 MMOL/L — SIGNIFICANT CHANGE UP (ref 135–145)
WBC # BLD: 6.32 K/UL — SIGNIFICANT CHANGE UP (ref 3.8–10.5)
WBC # FLD AUTO: 6.32 K/UL — SIGNIFICANT CHANGE UP (ref 3.8–10.5)

## 2017-07-15 RX ORDER — ACETAMINOPHEN 500 MG
650 TABLET ORAL EVERY 6 HOURS
Qty: 0 | Refills: 0 | Status: DISCONTINUED | OUTPATIENT
Start: 2017-07-15 | End: 2017-07-28

## 2017-07-15 RX ORDER — ALTEPLASE 100 MG
2 KIT INTRAVENOUS ONCE
Qty: 0 | Refills: 0 | Status: COMPLETED | OUTPATIENT
Start: 2017-07-15 | End: 2017-07-15

## 2017-07-15 RX ADMIN — Medication 2: at 01:01

## 2017-07-15 RX ADMIN — Medication 2: at 18:12

## 2017-07-15 RX ADMIN — Medication 667 MILLIGRAM(S): at 12:50

## 2017-07-15 RX ADMIN — Medication 667 MILLIGRAM(S): at 08:56

## 2017-07-15 RX ADMIN — Medication 3 MILLILITER(S): at 21:45

## 2017-07-15 RX ADMIN — Medication 1 TABLET(S): at 06:54

## 2017-07-15 RX ADMIN — Medication 2: at 08:56

## 2017-07-15 RX ADMIN — Medication 650 MILLIGRAM(S): at 04:56

## 2017-07-15 RX ADMIN — Medication 200 MILLIGRAM(S): at 14:30

## 2017-07-15 RX ADMIN — Medication 1000 UNIT(S): at 12:50

## 2017-07-15 RX ADMIN — DRONEDARONE 400 MILLIGRAM(S): 400 TABLET, FILM COATED ORAL at 06:54

## 2017-07-15 RX ADMIN — ALTEPLASE 2 MILLIGRAM(S): KIT at 22:30

## 2017-07-15 RX ADMIN — HEPARIN SODIUM 5000 UNIT(S): 5000 INJECTION INTRAVENOUS; SUBCUTANEOUS at 06:54

## 2017-07-15 RX ADMIN — Medication 650 MILLIGRAM(S): at 18:13

## 2017-07-15 RX ADMIN — Medication 6 UNIT(S): at 18:12

## 2017-07-15 RX ADMIN — GABAPENTIN 100 MILLIGRAM(S): 400 CAPSULE ORAL at 12:50

## 2017-07-15 RX ADMIN — Medication 1 TABLET(S): at 18:12

## 2017-07-15 RX ADMIN — Medication 650 MILLIGRAM(S): at 06:48

## 2017-07-15 RX ADMIN — Medication 81 MILLIGRAM(S): at 12:50

## 2017-07-15 RX ADMIN — HEPARIN SODIUM 5000 UNIT(S): 5000 INJECTION INTRAVENOUS; SUBCUTANEOUS at 14:31

## 2017-07-15 RX ADMIN — Medication 2: at 12:49

## 2017-07-15 RX ADMIN — PANTOPRAZOLE SODIUM 40 MILLIGRAM(S): 20 TABLET, DELAYED RELEASE ORAL at 06:55

## 2017-07-15 RX ADMIN — INSULIN GLARGINE 70 UNIT(S): 100 INJECTION, SOLUTION SUBCUTANEOUS at 01:00

## 2017-07-15 RX ADMIN — Medication 650 MILLIGRAM(S): at 18:43

## 2017-07-15 RX ADMIN — Medication 667 MILLIGRAM(S): at 18:12

## 2017-07-15 RX ADMIN — CARVEDILOL PHOSPHATE 25 MILLIGRAM(S): 80 CAPSULE, EXTENDED RELEASE ORAL at 06:54

## 2017-07-15 RX ADMIN — DRONEDARONE 400 MILLIGRAM(S): 400 TABLET, FILM COATED ORAL at 18:12

## 2017-07-15 RX ADMIN — ATORVASTATIN CALCIUM 80 MILLIGRAM(S): 80 TABLET, FILM COATED ORAL at 00:59

## 2017-07-15 RX ADMIN — HEPARIN SODIUM 5000 UNIT(S): 5000 INJECTION INTRAVENOUS; SUBCUTANEOUS at 00:59

## 2017-07-15 RX ADMIN — Medication 6 UNIT(S): at 08:56

## 2017-07-15 RX ADMIN — Medication 6 UNIT(S): at 12:49

## 2017-07-15 NOTE — PROGRESS NOTE ADULT - SUBJECTIVE AND OBJECTIVE BOX
CHIEF COMPLAINT:patient condition stable was transferred to medical floor last night no complaint today    SUBJECTIVE:     REVIEW OF SYSTEMS:    CONSTITUTIONAL: ( - )  weakness,  (-  ) fevers or chills  EYES/ENT: ( - )visual changes;     NECK: (-  ) pain or stiffness  RESPIRATORY:   ( - )cough, wheezing, hemoptysis;  ( - ) shortness of breath  CARDIOVASCULAR:  (-  )chest pain or palpitations  GASTROINTESTINAL:   (-  )abdominal or epigastric pain.  (-  ) nausea, vomiting, or hematemesis;   ( -) diarrhea or constipation.   GENITOURINARY:   (   - ) dysuria, frequency or hematuria  NEUROLOGICAL:  ( -  ) numbness or weakness   All other review of systems is negative unless indicated above    Vital Signs Last 24 Hrs  T(C): 36.7 (15 Jul 2017 20:40), Max: 37.1 (15 Jul 2017 03:30)  T(F): 98 (15 Jul 2017 20:40), Max: 98.7 (15 Jul 2017 03:30)  HR: 77 (15 Jul 2017 20:40) (64 - 100)  BP: 128/64 (15 Jul 2017 20:40) (98/60 - 140/88)  BP(mean): --  RR: 18 (15 Jul 2017 20:40) (17 - 20)  SpO2: 97% (15 Jul 2017 18:09) (97% - 100%)    I&O's Summary    15 Jul 2017 07:01  -  15 Jul 2017 21:55  --------------------------------------------------------  IN: 840 mL / OUT: 50 mL / NET: 790 mL        CAPILLARY BLOOD GLUCOSE  163 (15 Jul 2017 17:40)  184 (15 Jul 2017 12:21)  170 (15 Jul 2017 08:44)  198 (15 Jul 2017 01:00)  215 (14 Jul 2017 22:56)          PHYSICAL EXAM:    Constitutional:  (   -) NAD,   ( +  )awake and alert  HEENT: PERR, EOMI,    Neck: Soft and supple, No LAD, No JVD  Respiratory:  (  +  Breath sounds are clear bilaterally,    (  - ) wheezing, rales or rhonchi  Cardiovascular:     (   +)S1 and S2, regular rate and rhythm, no Murmurs, gallops or rubs  Gastrointestinal:  (   +)Bowel Sounds present, soft,   (-)nontender, nondistended,    Extremities:    (  -) peripheral edema  Vascular: 2+ peripheral pulses  Neurological:    (  +  )A/O x 3,   ( - ) focal deficits  Musculoskeletal:    (  + )  normal strength b/l upper  (  +   ) normal  lower extremities  Skin: No rashes    MEDICATIONS:  MEDICATIONS  (STANDING):  aspirin enteric coated 81 milliGRAM(s) Oral daily  dronedarone 400 milliGRAM(s) Oral two times a day  cholecalciferol 1000 Unit(s) Oral daily  pantoprazole    Tablet 40 milliGRAM(s) Oral before breakfast  atorvastatin 80 milliGRAM(s) Oral at bedtime  carvedilol 25 milliGRAM(s) Oral every 12 hours  dextrose 5%. 1000 milliLiter(s) (50 mL/Hr) IV Continuous <Continuous>  dextrose 50% Injectable 12.5 Gram(s) IV Push once  dextrose 50% Injectable 25 Gram(s) IV Push once  dextrose 50% Injectable 25 Gram(s) IV Push once  calcium acetate 667 milliGRAM(s) Oral three times a day with meals  heparin  Injectable 5000 Unit(s) SubCutaneous every 8 hours  gabapentin 100 milliGRAM(s) Oral daily  insulin glargine Injectable (LANTUS) 70 Unit(s) SubCutaneous at bedtime  insulin lispro (HumaLOG) corrective regimen sliding scale   SubCutaneous Before meals and at bedtime  calcium carbonate 1250 mG (OsCal) 1 Tablet(s) Oral two times a day  insulin lispro Injectable (HumaLOG) 6 Unit(s) SubCutaneous three times a day before meals      LABS: All Labs Reviewed:                        13.1   6.32  )-----------( 151      ( 15 Jul 2017 03:00 )             42.2     07-15    139  |  96<L>  |  76<H>  ----------------------------<  192<H>  4.0   |  25  |  4.89<H>    Ca    8.9      15 Jul 2017 03:00  Phos  6.4     07-15  Mg     2.6     07-15    TPro  6.5  /  Alb  3.1<L>  /  TBili  0.4  /  DBili  x   /  AST  25  /  ALT  13  /  AlkPhos  64  07-14          Blood Culture:   Urine Culture      RADIOLOGY/EKG:< from: Xray Chest 1 View AP- PORTABLE-Urgent (07.12.17 @ 22:02) >  IMPRESSION:  Right IJ Shiley catheter with tip in the SVC. No retained   guidewire or pneumothorax.    Question mild pulmonary vascular congestion versus vascular crowding from   low lung findings.    Question of left retrocardiac opacity which could be in part due to   overlying soft tissues however pleural effusion with passive atelectasis,   atelectasis of other cause, and/or pneumonia is not excluded.    < end of copied text >      ASSESSMENT AND PLAN:  1-status post hypoglycemia/diabetes continue Lantus 70 units and NovoLog 6 units before meals  2-ALISA/CRF for hemodialysis tonight  3-CHF/CAD r left ventricular dysfunction ccontinue current management    DISPOSITION:

## 2017-07-15 NOTE — PROGRESS NOTE ADULT - SUBJECTIVE AND OBJECTIVE BOX
Patient is a 56y old  Female who presents with a chief complaint of hypoglycemia (12 Jul 2017 18:07)      SUBJECTIVE / OVERNIGHT EVENTS:    Pt. seen and examined at bedside. AAOX3. Denies overnight events. C/o headache 5/10 due to shiley catheter. Last bm was yesterday. Able to void normally. Denies fever, chills, nausea, vomiting, abdominal pain, chest pain, shortness of breath, diarrhea, constipation, leg pain.    MEDICATIONS  (STANDING):  aspirin enteric coated 81 milliGRAM(s) Oral daily  dronedarone 400 milliGRAM(s) Oral two times a day  cholecalciferol 1000 Unit(s) Oral daily  pantoprazole    Tablet 40 milliGRAM(s) Oral before breakfast  atorvastatin 80 milliGRAM(s) Oral at bedtime  carvedilol 25 milliGRAM(s) Oral every 12 hours  dextrose 5%. 1000 milliLiter(s) (50 mL/Hr) IV Continuous <Continuous>  dextrose 50% Injectable 12.5 Gram(s) IV Push once  dextrose 50% Injectable 25 Gram(s) IV Push once  dextrose 50% Injectable 25 Gram(s) IV Push once  calcium acetate 667 milliGRAM(s) Oral three times a day with meals  heparin  Injectable 5000 Unit(s) SubCutaneous every 8 hours  gabapentin 100 milliGRAM(s) Oral daily  insulin glargine Injectable (LANTUS) 70 Unit(s) SubCutaneous at bedtime  insulin lispro (HumaLOG) corrective regimen sliding scale   SubCutaneous Before meals and at bedtime  calcium carbonate 1250 mG (OsCal) 1 Tablet(s) Oral two times a day  insulin lispro Injectable (HumaLOG) 6 Unit(s) SubCutaneous three times a day before meals    MEDICATIONS  (PRN):  dextrose Gel 1 Dose(s) Oral once PRN Blood Glucose LESS THAN 70 milliGRAM(s)/deciLiter  glucagon  Injectable 1 milliGRAM(s) IntraMuscular once PRN Glucose <70 milliGRAM(s)/deciLiter  ALBUTerol/ipratropium for Nebulization 3 milliLiter(s) Nebulizer every 6 hours PRN Shortness of Breath and/or Wheezing  acetaminophen   Tablet. 650 milliGRAM(s) Oral every 6 hours PRN Mild Pain (1 - 3)      Vital Signs Last 24 Hrs  T(C): 36.6 (07-15-17 @ 04:52), Max: 37.1 (07-15-17 @ 03:30)  HR: 70 (07-15-17 @ 06:51) (51 - 88)  BP: 112/82 (07-15-17 @ 06:51) (99/39 - 133/100)  RR: 20 (07-15-17 @ 06:51) (15 - 21)  SpO2: 100% (07-15-17 @ 06:51) (97% - 100%)  CAPILLARY BLOOD GLUCOSE  198 (15 Jul 2017 01:00)  215 (14 Jul 2017 22:56)  305 (14 Jul 2017 17:00)  194 (14 Jul 2017 11:47)        I&O's Summary      PHYSICAL EXAM:  GENERAL: NAD, obese  HEAD:  Atraumatic, Normocephalic  EYES: EOMI, PERRLA, conjunctiva and sclera clear  NECK: Supple, No JVD, shiley in place  CHEST/LUNG: Clear to auscultation bilaterally; No wheeze  HEART: Regular rate and rhythm; No murmurs, rubs, or gallops  ABDOMEN: Soft, Nontender, Nondistended; Bowel sounds present  EXTREMITIES:  2+ Peripheral Pulses, No clubbing, cyanosis, or edema  PSYCH: AAOx3  NEUROLOGY: non-focal  SKIN: White lesion appreciated on upper forehead below hairline    LABS:  (07-15 @ 03:00)                        13.1  6.32 )-----------( 151                 42.2    Neutrophils = 3.79 (60.0%)  Lymphocytes = 1.64 (25.9%)  Eosinophils = 0.08 (1.3%)  Basophils = 0.03 (0.5%)  Monocytes = 0.67 (10.6%)  Bands = --%    WBC Trend: 6.32<--, 7.51<--, 8.85<--  Hb Trend: 13.1<--, 12.5<--, 13.6<--, 13.7<--, 14.0<--  Plt Trend: 151<--, 98<--, 177<--, 184<--, 185<--  07-14    138  |  96<L>  |  65<H>  ----------------------------<  172<H>  4.7   |  21<L>  |  4.85<H>    Ca    7.9<L>      14 Jul 2017 13:42  Phos  6.5     07-14  Mg     2.5     07-14    TPro  6.5  /  Alb  3.1<L>  /  TBili  0.4  /  DBili  x   /  AST  25  /  ALT  13  /  AlkPhos  64  07-14    Creatinine Trend: 4.85<--, 3.71<--, 5.02<--, 6.89<--, 7.05<--, 7.94<--            Microbiology:  Urine Cx:  Blood Cx:    RADIOLOGY & ADDITIONAL TESTS:  X- Ray:  CT:  Ultrasound:  [ ] imaging personally reviewed and interpreted by me    Consultant(s) Notes Reviewed:      Care Discussed with Consultants/Other Providers:

## 2017-07-15 NOTE — PROGRESS NOTE ADULT - ASSESSMENT
56yoF w/ PMH sig for HTN, HLD DM, COPD, who presented to the ER with hypoglycemia and developed SOB.  She was found to be in ARF and is currently on HD per nephrology.  Hemodynamically stable, off of pressors, downgraded to floor.

## 2017-07-15 NOTE — PROGRESS NOTE ADULT - PROBLEM SELECTOR PLAN 2
Cr baseline 1.8, Cr. 7.05 on admission, currently 4.85 (trending down)  Jose in place for HD in rt IJ, received HD on 7/12 and 7/13.   f/u nephro recs for possible removal of jose cath

## 2017-07-15 NOTE — PROGRESS NOTE ADULT - SUBJECTIVE AND OBJECTIVE BOX
Patient seen and examined in chair with no new complaints.  NAD noted.  No new complaints.    REVIEW OF SYSTEMS:  Unremarkable; denies cp, sob, palpitations n/v    MEDICATIONS  (STANDING):  aspirin enteric coated 81 milliGRAM(s) Oral daily  dronedarone 400 milliGRAM(s) Oral two times a day  cholecalciferol 1000 Unit(s) Oral daily  pantoprazole    Tablet 40 milliGRAM(s) Oral before breakfast  atorvastatin 80 milliGRAM(s) Oral at bedtime  carvedilol 25 milliGRAM(s) Oral every 12 hours  dextrose 5%. 1000 milliLiter(s) (50 mL/Hr) IV Continuous <Continuous>  dextrose 50% Injectable 12.5 Gram(s) IV Push once  dextrose 50% Injectable 25 Gram(s) IV Push once  dextrose 50% Injectable 25 Gram(s) IV Push once  calcium acetate 667 milliGRAM(s) Oral three times a day with meals  heparin  Injectable 5000 Unit(s) SubCutaneous every 8 hours  gabapentin 100 milliGRAM(s) Oral daily  insulin glargine Injectable (LANTUS) 70 Unit(s) SubCutaneous at bedtime  insulin lispro (HumaLOG) corrective regimen sliding scale   SubCutaneous Before meals and at bedtime  calcium carbonate 1250 mG (OsCal) 1 Tablet(s) Oral two times a day  insulin lispro Injectable (HumaLOG) 6 Unit(s) SubCutaneous three times a day before meals      VITAL:  T(C): , Max: 37.1 (07-15-17 @ 03:30)  T(F): , Max: 98.7 (07-15-17 @ 03:30)  HR: 65 (07-15-17 @ 10:11)  BP: 98/60 (07-15-17 @ 10:11)  BP(mean): 87 (07-14-17 @ 18:00)  RR: 18 (07-15-17 @ 10:11)  SpO2: 99% (07-15-17 @ 10:11)  Wt(kg): --    I and O's:        PHYSICAL EXAM:    Constitutional: NAD  HEENT: PERRLA, EOMI,  MMM  Neck: No LAD, No JVD  Respiratory: CTAB  Cardiovascular: S1 and S2  Gastrointestinal: BS+, soft, NT/ND  Extremities: Trace l/e edema  Neurological: A/O x 3, no focal deficits  Psychiatric: Normal mood, normal affect  : No Light  Skin: No rashes  Access: Right neck [facility]    LABS:                        13.1   6.32  )-----------( 151      ( 15 Jul 2017 03:00 )             42.2     07-15    139  |  96<L>  |  76<H>  ----------------------------<  192<H>  4.0   |  25  |  4.89<H>    Ca    8.9      15 Jul 2017 03:00  Phos  6.4     07-15  Mg     2.6     07-15    TPro  6.5  /  Alb  3.1<L>  /  TBili  0.4  /  DBili  x   /  AST  25  /  ALT  13  /  AlkPhos  64  07-14      IMPRESSION: 56F w/ HTN, DM2, and HFrEF-AICD, 7/12/17 a/w hypoglycemia, respiratory acidosis, and ALISA    (1)Renal - ALISA:  non-oliguric; ~Baseline creatinine mid 1s; post HD during hospitalization with overall improvement in creatinine noted.  Urine lytes consistent with prerenal azotemia.    (2) Respiratory acidosis:  likely dt COPD; overall improving    (3)Hyperphosphatemia - ALISA associated. Slowly improving; remains on phos binders + renal diet    (4)CV - EF 20%;  off pressors at this time.  Scheduled for HD today.      RECOMMENDATIONS  (1)Plan for HD today as ordered           -Ordered for high Ca+ bath dialysate with HD  (2)No need for IV calcium repletion at this time  (3)Continue calcium acetate 667mg PO TID as ordered  (4) Continue Oscal supplementation as ordered

## 2017-07-16 PROCEDURE — 93010 ELECTROCARDIOGRAM REPORT: CPT

## 2017-07-16 PROCEDURE — 93971 EXTREMITY STUDY: CPT | Mod: 26

## 2017-07-16 RX ORDER — METOCLOPRAMIDE HCL 10 MG
10 TABLET ORAL ONCE
Qty: 0 | Refills: 0 | Status: COMPLETED | OUTPATIENT
Start: 2017-07-16 | End: 2017-07-16

## 2017-07-16 RX ORDER — COLCHICINE 0.6 MG
0.6 TABLET ORAL ONCE
Qty: 0 | Refills: 0 | Status: COMPLETED | OUTPATIENT
Start: 2017-07-16 | End: 2017-07-16

## 2017-07-16 RX ORDER — IPRATROPIUM/ALBUTEROL SULFATE 18-103MCG
3 AEROSOL WITH ADAPTER (GRAM) INHALATION EVERY 6 HOURS
Qty: 0 | Refills: 0 | Status: DISCONTINUED | OUTPATIENT
Start: 2017-07-16 | End: 2017-07-28

## 2017-07-16 RX ADMIN — Medication 667 MILLIGRAM(S): at 12:46

## 2017-07-16 RX ADMIN — Medication 200 MILLIGRAM(S): at 19:17

## 2017-07-16 RX ADMIN — Medication 2: at 12:47

## 2017-07-16 RX ADMIN — DRONEDARONE 400 MILLIGRAM(S): 400 TABLET, FILM COATED ORAL at 06:18

## 2017-07-16 RX ADMIN — Medication 1 TABLET(S): at 18:05

## 2017-07-16 RX ADMIN — Medication 6 UNIT(S): at 18:05

## 2017-07-16 RX ADMIN — Medication 200 MILLIGRAM(S): at 00:53

## 2017-07-16 RX ADMIN — Medication 3 MILLILITER(S): at 11:50

## 2017-07-16 RX ADMIN — Medication 200 MILLIGRAM(S): at 06:54

## 2017-07-16 RX ADMIN — Medication 667 MILLIGRAM(S): at 09:02

## 2017-07-16 RX ADMIN — Medication 3 MILLILITER(S): at 16:24

## 2017-07-16 RX ADMIN — HEPARIN SODIUM 5000 UNIT(S): 5000 INJECTION INTRAVENOUS; SUBCUTANEOUS at 00:53

## 2017-07-16 RX ADMIN — DRONEDARONE 400 MILLIGRAM(S): 400 TABLET, FILM COATED ORAL at 18:05

## 2017-07-16 RX ADMIN — Medication 10 MILLIGRAM(S): at 23:21

## 2017-07-16 RX ADMIN — HEPARIN SODIUM 5000 UNIT(S): 5000 INJECTION INTRAVENOUS; SUBCUTANEOUS at 06:18

## 2017-07-16 RX ADMIN — Medication 6 UNIT(S): at 09:01

## 2017-07-16 RX ADMIN — Medication 0.6 MILLIGRAM(S): at 21:50

## 2017-07-16 RX ADMIN — ATORVASTATIN CALCIUM 80 MILLIGRAM(S): 80 TABLET, FILM COATED ORAL at 00:53

## 2017-07-16 RX ADMIN — HEPARIN SODIUM 5000 UNIT(S): 5000 INJECTION INTRAVENOUS; SUBCUTANEOUS at 15:46

## 2017-07-16 RX ADMIN — ATORVASTATIN CALCIUM 80 MILLIGRAM(S): 80 TABLET, FILM COATED ORAL at 21:52

## 2017-07-16 RX ADMIN — Medication 6 UNIT(S): at 12:47

## 2017-07-16 RX ADMIN — INSULIN GLARGINE 70 UNIT(S): 100 INJECTION, SOLUTION SUBCUTANEOUS at 21:51

## 2017-07-16 RX ADMIN — Medication 650 MILLIGRAM(S): at 15:46

## 2017-07-16 RX ADMIN — PANTOPRAZOLE SODIUM 40 MILLIGRAM(S): 20 TABLET, DELAYED RELEASE ORAL at 06:18

## 2017-07-16 RX ADMIN — CARVEDILOL PHOSPHATE 25 MILLIGRAM(S): 80 CAPSULE, EXTENDED RELEASE ORAL at 06:18

## 2017-07-16 RX ADMIN — Medication 2: at 18:05

## 2017-07-16 RX ADMIN — CARVEDILOL PHOSPHATE 25 MILLIGRAM(S): 80 CAPSULE, EXTENDED RELEASE ORAL at 18:05

## 2017-07-16 RX ADMIN — Medication 1000 UNIT(S): at 12:45

## 2017-07-16 RX ADMIN — Medication 667 MILLIGRAM(S): at 18:05

## 2017-07-16 RX ADMIN — Medication 650 MILLIGRAM(S): at 16:30

## 2017-07-16 RX ADMIN — Medication 1 TABLET(S): at 06:18

## 2017-07-16 RX ADMIN — Medication 2: at 21:50

## 2017-07-16 RX ADMIN — GABAPENTIN 100 MILLIGRAM(S): 400 CAPSULE ORAL at 12:45

## 2017-07-16 RX ADMIN — HEPARIN SODIUM 5000 UNIT(S): 5000 INJECTION INTRAVENOUS; SUBCUTANEOUS at 21:51

## 2017-07-16 RX ADMIN — Medication 3 MILLILITER(S): at 23:30

## 2017-07-16 RX ADMIN — Medication 81 MILLIGRAM(S): at 12:45

## 2017-07-16 NOTE — PROGRESS NOTE ADULT - SUBJECTIVE AND OBJECTIVE BOX
CHIEF COMPLAINT:  patient laying in the bed without discomfort or complaint HD yesterday was unsuccessful due to non-working shiley; HD was only able to remove 0.1kg fluid. Returning to HD today  SUBJECTIVE:     REVIEW OF SYSTEMS:    CONSTITUTIONAL: (+  )  weakness,  ( - ) fevers or chills  EYES/ENT: ( - )visual changes;     NECK: (-  ) pain or stiffness  RESPIRATORY:   ( - )cough, wheezing, hemoptysis;  (  +) shortness of breath  CARDIOVASCULAR:  ( - )chest pain or palpitations  GASTROINTESTINAL:   (-  )abdominal or epigastric pain.  (-  ) nausea, vomiting, or hematemesis;   ( -  ) diarrhea or constipation.   GENITOURINARY:   (   - ) dysuria, frequency or hematuria  NEUROLOGICAL:  (  - ) numbness or weakness   All other review of systems is negative unless indicated above    Vital Signs Last 24 Hrs  T(C): 37.1 (16 Jul 2017 21:06), Max: 37.1 (16 Jul 2017 13:42)  T(F): 98.7 (16 Jul 2017 21:06), Max: 98.8 (16 Jul 2017 13:42)  HR: 80 (16 Jul 2017 21:06) (78 - 88)  BP: 132/77 (16 Jul 2017 21:06) (105/56 - 142/80)  BP(mean): --  RR: 18 (16 Jul 2017 21:06) (18 - 20)  SpO2: 100% (16 Jul 2017 21:06) (94% - 100%)    I&O's Summary    15 Jul 2017 07:01  -  16 Jul 2017 07:00  --------------------------------------------------------  IN: 1080 mL / OUT: 350 mL / NET: 730 mL        CAPILLARY BLOOD GLUCOSE  152 (16 Jul 2017 21:40)  170 (16 Jul 2017 17:38)  190 (16 Jul 2017 12:26)  135 (16 Jul 2017 08:22)  146 (16 Jul 2017 06:25)  97 (16 Jul 2017 00:41)      PHYSICAL EXAM:  GENERAL: NAD, well-developed  HEAD:  Atraumatic, Normocephalic  EYES: EOMI, PERRLA, conjunctiva and sclera clear  NECK: Supple, No JVD  CHEST/LUNG: Clear to auscultation bilaterally; No wheeze  HEART: Regular rate and rhythm; No murmurs, rubs, or gallops  ABDOMEN: Soft, Nontender, Nondistended; Bowel sounds present  EXTREMITIES:  2+ Peripheral Pulses, No clubbing, cyanosis, or edema  PSYCH: AAOx3  NEUROLOGY: non-focal  SKIN: No rashes or lesions      MEDICATIONS:  MEDICATIONS  (STANDING):  aspirin enteric coated 81 milliGRAM(s) Oral daily  dronedarone 400 milliGRAM(s) Oral two times a day  cholecalciferol 1000 Unit(s) Oral daily  pantoprazole    Tablet 40 milliGRAM(s) Oral before breakfast  atorvastatin 80 milliGRAM(s) Oral at bedtime  carvedilol 25 milliGRAM(s) Oral every 12 hours  dextrose 5%. 1000 milliLiter(s) (50 mL/Hr) IV Continuous <Continuous>  dextrose 50% Injectable 12.5 Gram(s) IV Push once  dextrose 50% Injectable 25 Gram(s) IV Push once  dextrose 50% Injectable 25 Gram(s) IV Push once  calcium acetate 667 milliGRAM(s) Oral three times a day with meals  heparin  Injectable 5000 Unit(s) SubCutaneous every 8 hours  gabapentin 100 milliGRAM(s) Oral daily  insulin glargine Injectable (LANTUS) 70 Unit(s) SubCutaneous at bedtime  insulin lispro (HumaLOG) corrective regimen sliding scale   SubCutaneous Before meals and at bedtime  calcium carbonate 1250 mG (OsCal) 1 Tablet(s) Oral two times a day  insulin lispro Injectable (HumaLOG) 6 Unit(s) SubCutaneous three times a day before meals  ALBUTerol/ipratropium for Nebulization 3 milliLiter(s) Nebulizer every 6 hours      LABS: All Labs Reviewed:                        13.1   6.32  )-----------( 151      ( 15 Jul 2017 03:00 )             42.2     07-15    139  |  96<L>  |  76<H>  ----------------------------<  192<H>  4.0   |  25  |  4.89<H>    Ca    8.9      15 Jul 2017 03:00  Phos  6.4     07-15  Mg     2.6     07-15            Blood Culture:   Urine Culture      RADIOLOGY/EKG:    ASSESSMENT AND PLAN:      Assessment and Plan:   · Assessment	  56yoF w/ PMH sig for HTN, HLD DM, COPD, who presented to the ER with hypoglycemia and developed SOB.  She was found to be in ARF and is currently on HD per nephrology.  Hemodynamically stable, off of pressors, downgraded to floor.    Problem/Plan - 1:  ·  Problem: Hypoglycemia.  Plan: -FS stable, last glucose 172  -c/w ISS humalog and home med lantus qhs.     Problem/Plan - 2:  ·  Problem: Acute on chronic kidney failure.  Plan: Cr baseline 1.8, Cr. 7.05 on admission, currently 4.85 (trending down)  Shiley in place for HD in rt IJ, received HD on 7/12 and 7/13.   f/u nephro recs for possible removal of shiley cath.     Problem/Plan - 3:  ·  Problem: Chronic obstructive pulmonary disease with acute exacerbation.  Plan: resolving, denies sob  dc levaquin 7/14.     Problem/Plan - 4:  ·  Problem: Coronary artery disease involving native coronary artery of native heart without angina pectoris.  Plan: -c/w home meds lipitor, aspirin  -TTE demonstrated mitral annular calcification.     Problem/Plan - 5:  ·  Problem: Need for prophylactic measure.  Plan: DVT ppx  protonix  c/w gabapentin for chronic pain  hypocalcemia: on D3 and phoslo.     DISPOSITION:

## 2017-07-16 NOTE — PROGRESS NOTE ADULT - SUBJECTIVE AND OBJECTIVE BOX
Patient seen and examined in bed with no new complaints.  NAD noted.  No new complaints.    REVIEW OF SYSTEMS:  Unremarkable; denies cp, sob, palpitations n/v    MEDICATIONS  (STANDING):  aspirin enteric coated 81 milliGRAM(s) Oral daily  dronedarone 400 milliGRAM(s) Oral two times a day  cholecalciferol 1000 Unit(s) Oral daily  pantoprazole    Tablet 40 milliGRAM(s) Oral before breakfast  atorvastatin 80 milliGRAM(s) Oral at bedtime  carvedilol 25 milliGRAM(s) Oral every 12 hours  dextrose 5%. 1000 milliLiter(s) (50 mL/Hr) IV Continuous <Continuous>  dextrose 50% Injectable 12.5 Gram(s) IV Push once  dextrose 50% Injectable 25 Gram(s) IV Push once  dextrose 50% Injectable 25 Gram(s) IV Push once  calcium acetate 667 milliGRAM(s) Oral three times a day with meals  heparin  Injectable 5000 Unit(s) SubCutaneous every 8 hours  gabapentin 100 milliGRAM(s) Oral daily  insulin glargine Injectable (LANTUS) 70 Unit(s) SubCutaneous at bedtime  insulin lispro (HumaLOG) corrective regimen sliding scale   SubCutaneous Before meals and at bedtime  calcium carbonate 1250 mG (OsCal) 1 Tablet(s) Oral two times a day  insulin lispro Injectable (HumaLOG) 6 Unit(s) SubCutaneous three times a day before meals      VITAL:  T(C): , Max: 37.1 (07-15-17 @ 03:30)  T(F): , Max: 98.7 (07-15-17 @ 03:30)  HR: 65 (07-15-17 @ 10:11)  BP: 98/60 (07-15-17 @ 10:11)  BP(mean): 87 (07-14-17 @ 18:00)  RR: 18 (07-15-17 @ 10:11)  SpO2: 99% (07-15-17 @ 10:11)  Wt(kg): --    I and O's:      PHYSICAL EXAM:    Constitutional: NAD  HEENT: PERRLA, EOMI,  MMM  Neck: No LAD, No JVD  Respiratory: CTAB  Cardiovascular: S1 and S2  Gastrointestinal: BS+, soft, NT/ND  Extremities: Trace l/e edema  Neurological: A/O x 3, no focal deficits  Psychiatric: Normal mood, normal affect  : No Light  Skin: No rashes  Access: Right neck shiley    LABS:                        13.1   6.32  )-----------( 151      ( 15 Jul 2017 03:00 )             42.2     07-15    139  |  96<L>  |  76<H>  ----------------------------<  192<H>  4.0   |  25  |  4.89<H>    Ca    8.9      15 Jul 2017 03:00  Phos  6.4     07-15  Mg     2.6     07-15    TPro  6.5  /  Alb  3.1<L>  /  TBili  0.4  /  DBili  x   /  AST  25  /  ALT  13  /  AlkPhos  64  07-14      IMPRESSION: 56F w/ HTN, DM2, and HFrEF-AICD, 7/12/17 a/w hypoglycemia, respiratory acidosis, and ALISA    (1)Renal - ALISA:  non-oliguric; ~Baseline creatinine mid 1s; post HD during hospitalization with overall improvement in creatinine noted.  Urine lytes consistent with prerenal azotemia.  S/P HD yesterday; 0.8kg removed; tolerated well.    (2) Respiratory acidosis:  likely dt COPD; overall improving; patient denies NICHOLSON/SOB.    (3)Hyperphosphatemia - ALISA associated. Slowly improving; no new labs today    (4)CV - EF 20%;  off pressors at this time.  S/P HD yesterday      RECOMMENDATIONS  1.  No need for additional HD at this time; will continue to trend renal function  2.  BMP tomorrow AM  3.  Maintain shiley in place for now  4.  Continue calcium acetate 667mg PO TID as ordered; trend phos level  5.   Continue Oscal supplementation as ordered

## 2017-07-16 NOTE — PROGRESS NOTE ADULT - SUBJECTIVE AND OBJECTIVE BOX
Patient is a 56y old  Female who presents with a chief complaint of hypoglycemia (12 Jul 2017 18:07)      SUBJECTIVE / OVERNIGHT EVENTS:    Pt. seen and examined at bedside. HD yesterday was unsuccessful due to non-working shiley; HD was only able to remove 0.1kg fluid. Returning to HD today. Denies HA/f/c/n/v/d abdominal pain, constipation.    MEDICATIONS  (STANDING):  aspirin enteric coated 81 milliGRAM(s) Oral daily  dronedarone 400 milliGRAM(s) Oral two times a day  cholecalciferol 1000 Unit(s) Oral daily  pantoprazole    Tablet 40 milliGRAM(s) Oral before breakfast  atorvastatin 80 milliGRAM(s) Oral at bedtime  carvedilol 25 milliGRAM(s) Oral every 12 hours  dextrose 5%. 1000 milliLiter(s) (50 mL/Hr) IV Continuous <Continuous>  dextrose 50% Injectable 12.5 Gram(s) IV Push once  dextrose 50% Injectable 25 Gram(s) IV Push once  dextrose 50% Injectable 25 Gram(s) IV Push once  calcium acetate 667 milliGRAM(s) Oral three times a day with meals  heparin  Injectable 5000 Unit(s) SubCutaneous every 8 hours  gabapentin 100 milliGRAM(s) Oral daily  insulin glargine Injectable (LANTUS) 70 Unit(s) SubCutaneous at bedtime  insulin lispro (HumaLOG) corrective regimen sliding scale   SubCutaneous Before meals and at bedtime  calcium carbonate 1250 mG (OsCal) 1 Tablet(s) Oral two times a day  insulin lispro Injectable (HumaLOG) 6 Unit(s) SubCutaneous three times a day before meals    MEDICATIONS  (PRN):  dextrose Gel 1 Dose(s) Oral once PRN Blood Glucose LESS THAN 70 milliGRAM(s)/deciLiter  glucagon  Injectable 1 milliGRAM(s) IntraMuscular once PRN Glucose <70 milliGRAM(s)/deciLiter  ALBUTerol/ipratropium for Nebulization 3 milliLiter(s) Nebulizer every 6 hours PRN Shortness of Breath and/or Wheezing  acetaminophen   Tablet. 650 milliGRAM(s) Oral every 6 hours PRN Mild Pain (1 - 3)  guaiFENesin   Syrup  (Sugar-Free) 200 milliGRAM(s) Oral every 6 hours PRN Cough      Vital Signs Last 24 Hrs  T(C): 36.2 (07-16-17 @ 05:26), Max: 36.8 (07-15-17 @ 12:20)  HR: 84 (07-16-17 @ 06:16) (65 - 100)  BP: 122/72 (07-16-17 @ 05:26) (98/60 - 140/88)  RR: 18 (07-16-17 @ 05:26) (17 - 20)  SpO2: 95% (07-16-17 @ 06:16) (94% - 99%)  CAPILLARY BLOOD GLUCOSE  135 (16 Jul 2017 08:22)  146 (16 Jul 2017 06:25)  97 (16 Jul 2017 00:41)  163 (15 Jul 2017 17:40)  184 (15 Jul 2017 12:21)  170 (15 Jul 2017 08:44)        I&O's Summary    15 Jul 2017 07:01  -  16 Jul 2017 07:00  --------------------------------------------------------  IN: 1080 mL / OUT: 350 mL / NET: 730 mL        PHYSICAL EXAM:  GENERAL: NAD, well-developed  HEAD:  Atraumatic, Normocephalic  EYES: EOMI, PERRLA, conjunctiva and sclera clear  NECK: Supple, No JVD  CHEST/LUNG: Clear to auscultation bilaterally; No wheeze  HEART: Regular rate and rhythm; No murmurs, rubs, or gallops  ABDOMEN: Soft, Nontender, Nondistended; Bowel sounds present  EXTREMITIES:  2+ Peripheral Pulses, No clubbing, cyanosis, or edema  PSYCH: AAOx3  NEUROLOGY: non-focal  SKIN: No rashes or lesions    LABS:    WBC Trend: 6.32<--, 7.51<--, 8.85<--  Hb Trend: 13.1<--, 12.5<--, 13.6<--, 13.7<--, 14.0<--  Plt Trend: 151<--, 98<--, 177<--, 184<--, 185<--  07-15    139  |  96<L>  |  76<H>  ----------------------------<  192<H>  4.0   |  25  |  4.89<H>    Ca    8.9      15 Jul 2017 03:00  Phos  6.4     07-15  Mg     2.6     07-15    TPro  6.5  /  Alb  3.1<L>  /  TBili  0.4  /  DBili  x   /  AST  25  /  ALT  13  /  AlkPhos  64  07-14    Creatinine Trend: 4.89<--, 4.85<--, 3.71<--, 5.02<--, 6.89<--, 7.05<--            Microbiology:  Urine Cx:  Blood Cx:    RADIOLOGY & ADDITIONAL TESTS:  X- Ray:  CT:  Ultrasound:  [ ] imaging personally reviewed and interpreted by me    Consultant(s) Notes Reviewed:      Care Discussed with Consultants/Other Providers:

## 2017-07-17 LAB
BUN SERPL-MCNC: 70 MG/DL — HIGH (ref 7–23)
CALCIUM SERPL-MCNC: 9 MG/DL — SIGNIFICANT CHANGE UP (ref 8.4–10.5)
CHLORIDE SERPL-SCNC: 97 MMOL/L — LOW (ref 98–107)
CO2 SERPL-SCNC: 26 MMOL/L — SIGNIFICANT CHANGE UP (ref 22–31)
CREAT SERPL-MCNC: 3.6 MG/DL — HIGH (ref 0.5–1.3)
GLUCOSE SERPL-MCNC: 44 MG/DL — CRITICAL LOW (ref 70–99)
HCT VFR BLD CALC: 35.9 % — SIGNIFICANT CHANGE UP (ref 34.5–45)
HGB BLD-MCNC: 11.6 G/DL — SIGNIFICANT CHANGE UP (ref 11.5–15.5)
MAGNESIUM SERPL-MCNC: 2.3 MG/DL — SIGNIFICANT CHANGE UP (ref 1.6–2.6)
MCHC RBC-ENTMCNC: 28.9 PG — SIGNIFICANT CHANGE UP (ref 27–34)
MCHC RBC-ENTMCNC: 32.3 % — SIGNIFICANT CHANGE UP (ref 32–36)
MCV RBC AUTO: 89.5 FL — SIGNIFICANT CHANGE UP (ref 80–100)
NRBC # FLD: 0.02 — SIGNIFICANT CHANGE UP
PHOSPHATE SERPL-MCNC: 3.7 MG/DL — SIGNIFICANT CHANGE UP (ref 2.5–4.5)
PLATELET # BLD AUTO: 139 K/UL — LOW (ref 150–400)
PMV BLD: 12.7 FL — SIGNIFICANT CHANGE UP (ref 7–13)
POTASSIUM SERPL-MCNC: 4.7 MMOL/L — SIGNIFICANT CHANGE UP (ref 3.5–5.3)
POTASSIUM SERPL-SCNC: 4.7 MMOL/L — SIGNIFICANT CHANGE UP (ref 3.5–5.3)
RBC # BLD: 4.01 M/UL — SIGNIFICANT CHANGE UP (ref 3.8–5.2)
RBC # FLD: 17.6 % — HIGH (ref 10.3–14.5)
SODIUM SERPL-SCNC: 138 MMOL/L — SIGNIFICANT CHANGE UP (ref 135–145)
WBC # BLD: 6.88 K/UL — SIGNIFICANT CHANGE UP (ref 3.8–10.5)
WBC # FLD AUTO: 6.88 K/UL — SIGNIFICANT CHANGE UP (ref 3.8–10.5)

## 2017-07-17 PROCEDURE — 71010: CPT | Mod: 26

## 2017-07-17 PROCEDURE — 99232 SBSQ HOSP IP/OBS MODERATE 35: CPT

## 2017-07-17 RX ORDER — INSULIN GLARGINE 100 [IU]/ML
55 INJECTION, SOLUTION SUBCUTANEOUS AT BEDTIME
Qty: 0 | Refills: 0 | Status: DISCONTINUED | OUTPATIENT
Start: 2017-07-17 | End: 2017-07-18

## 2017-07-17 RX ADMIN — HEPARIN SODIUM 5000 UNIT(S): 5000 INJECTION INTRAVENOUS; SUBCUTANEOUS at 15:16

## 2017-07-17 RX ADMIN — HEPARIN SODIUM 5000 UNIT(S): 5000 INJECTION INTRAVENOUS; SUBCUTANEOUS at 22:35

## 2017-07-17 RX ADMIN — CARVEDILOL PHOSPHATE 25 MILLIGRAM(S): 80 CAPSULE, EXTENDED RELEASE ORAL at 17:34

## 2017-07-17 RX ADMIN — DRONEDARONE 400 MILLIGRAM(S): 400 TABLET, FILM COATED ORAL at 17:34

## 2017-07-17 RX ADMIN — Medication 200 MILLIGRAM(S): at 01:37

## 2017-07-17 RX ADMIN — Medication 667 MILLIGRAM(S): at 09:48

## 2017-07-17 RX ADMIN — Medication 3 MILLILITER(S): at 03:55

## 2017-07-17 RX ADMIN — Medication 3 MILLILITER(S): at 15:44

## 2017-07-17 RX ADMIN — INSULIN GLARGINE 55 UNIT(S): 100 INJECTION, SOLUTION SUBCUTANEOUS at 22:35

## 2017-07-17 RX ADMIN — Medication 2: at 12:43

## 2017-07-17 RX ADMIN — Medication 1000 UNIT(S): at 12:44

## 2017-07-17 RX ADMIN — PANTOPRAZOLE SODIUM 40 MILLIGRAM(S): 20 TABLET, DELAYED RELEASE ORAL at 06:02

## 2017-07-17 RX ADMIN — GABAPENTIN 100 MILLIGRAM(S): 400 CAPSULE ORAL at 12:44

## 2017-07-17 RX ADMIN — Medication 81 MILLIGRAM(S): at 12:43

## 2017-07-17 RX ADMIN — HEPARIN SODIUM 5000 UNIT(S): 5000 INJECTION INTRAVENOUS; SUBCUTANEOUS at 06:01

## 2017-07-17 RX ADMIN — Medication 100 MILLIGRAM(S): at 17:33

## 2017-07-17 RX ADMIN — Medication 6 UNIT(S): at 12:43

## 2017-07-17 RX ADMIN — Medication 3 MILLILITER(S): at 22:25

## 2017-07-17 RX ADMIN — Medication 667 MILLIGRAM(S): at 17:34

## 2017-07-17 RX ADMIN — ATORVASTATIN CALCIUM 80 MILLIGRAM(S): 80 TABLET, FILM COATED ORAL at 22:35

## 2017-07-17 RX ADMIN — DRONEDARONE 400 MILLIGRAM(S): 400 TABLET, FILM COATED ORAL at 06:02

## 2017-07-17 RX ADMIN — Medication 6 UNIT(S): at 18:17

## 2017-07-17 RX ADMIN — Medication 3 MILLILITER(S): at 11:06

## 2017-07-17 RX ADMIN — Medication 667 MILLIGRAM(S): at 12:43

## 2017-07-17 RX ADMIN — Medication 1 TABLET(S): at 06:02

## 2017-07-17 RX ADMIN — Medication 200 MILLIGRAM(S): at 09:47

## 2017-07-17 RX ADMIN — CARVEDILOL PHOSPHATE 25 MILLIGRAM(S): 80 CAPSULE, EXTENDED RELEASE ORAL at 06:02

## 2017-07-17 RX ADMIN — Medication 6 UNIT(S): at 09:47

## 2017-07-17 RX ADMIN — Medication 2: at 22:35

## 2017-07-17 NOTE — PROGRESS NOTE ADULT - SUBJECTIVE AND OBJECTIVE BOX
Chief Complaint: DM2    History: Tolerating po. Noted with hypoglycemia on serum this AM to 44 mg/dl.    MEDICATIONS  (STANDING):  aspirin enteric coated 81 milliGRAM(s) Oral daily  dronedarone 400 milliGRAM(s) Oral two times a day  cholecalciferol 1000 Unit(s) Oral daily  pantoprazole    Tablet 40 milliGRAM(s) Oral before breakfast  atorvastatin 80 milliGRAM(s) Oral at bedtime  carvedilol 25 milliGRAM(s) Oral every 12 hours  dextrose 5%. 1000 milliLiter(s) (50 mL/Hr) IV Continuous <Continuous>  dextrose 50% Injectable 12.5 Gram(s) IV Push once  dextrose 50% Injectable 25 Gram(s) IV Push once  dextrose 50% Injectable 25 Gram(s) IV Push once  calcium acetate 667 milliGRAM(s) Oral three times a day with meals  heparin  Injectable 5000 Unit(s) SubCutaneous every 8 hours  gabapentin 100 milliGRAM(s) Oral daily  insulin glargine Injectable (LANTUS) 70 Unit(s) SubCutaneous at bedtime  insulin lispro (HumaLOG) corrective regimen sliding scale   SubCutaneous Before meals and at bedtime  insulin lispro Injectable (HumaLOG) 6 Unit(s) SubCutaneous three times a day before meals  ALBUTerol/ipratropium for Nebulization 3 milliLiter(s) Nebulizer every 6 hours    MEDICATIONS  (PRN):  dextrose Gel 1 Dose(s) Oral once PRN Blood Glucose LESS THAN 70 milliGRAM(s)/deciLiter  glucagon  Injectable 1 milliGRAM(s) IntraMuscular once PRN Glucose <70 milliGRAM(s)/deciLiter  acetaminophen   Tablet. 650 milliGRAM(s) Oral every 6 hours PRN Mild Pain (1 - 3)  guaiFENesin   Syrup  (Sugar-Free) 200 milliGRAM(s) Oral every 6 hours PRN Cough      Allergies    penicillin (Unknown)    Intolerances      Review of Systems:  ALL OTHER SYSTEMS REVIEWED AND NEGATIVE        PHYSICAL EXAM:  VITALS: T(C): 36.3 (07-17-17 @ 14:09)  T(F): 97.4 (07-17-17 @ 14:09), Max: 98.7 (07-16-17 @ 21:06)  HR: 81 (07-17-17 @ 15:44) (76 - 105)  BP: 136/79 (07-17-17 @ 14:09) (129/73 - 142/80)  RR:  (17 - 18)  SpO2:  (94% - 100%)  Wt(kg): --  GENERAL: NAD, well-groomed, well-developed  EYES: No proptosis, no lid lag, anicteric  HEENT:  Atraumatic, Normocephalic, moist mucous membranes  SKIN: neck shiley in place  MUSCULOSKELETAL: Full range of motion, normal strength  NEURO: extraocular movements intact, no tremor  PSYCH: Alert and oriented x 3, normal affect, normal mood    CAPILLARY BLOOD GLUCOSE  156 (07-17 @ 12:40)  91 (07-17 @ 09:00)  152 (07-16 @ 21:40)  170 (07-16 @ 17:38)  190 (07-16 @ 12:26)  135 (07-16 @ 08:22)  146 (07-16 @ 06:25)  97 (07-16 @ 00:41)  163 (07-15 @ 17:40)  184 (07-15 @ 12:21)  170 (07-15 @ 08:44)  198 (07-15 @ 01:00)  215 (07-14 @ 22:56)  305 (07-14 @ 17:00)      07-17    138  |  97<L>  |  70<H>  ----------------------------<  44<LL>  4.7   |  26  |  3.60<H>    EGFR if : 15  EGFR if non : 13    Ca    9.0      07-17  Mg     2.3     07-17  Phos  3.7     07-17            Thyroid Function Tests:  07-12 @ 06:35 TSH 0.19 FreeT4 -- T3 -- Anti TPO -- Anti Thyroglobulin Ab -- TSI --      Hemoglobin A1C, Whole Blood: 9.2 % <H> [4.0 - 5.6] (07-11-17 @ 20:15)

## 2017-07-17 NOTE — PROGRESS NOTE ADULT - PROBLEM SELECTOR PLAN 1
Hypoglycemia this AM.  Decrease Lantus to 55 u qhs  continue Humalog 6/6/6 with meals.  Continue Humalog moderate scale qac and qhs.

## 2017-07-17 NOTE — PROGRESS NOTE ADULT - PROBLEM SELECTOR PLAN 2
Cr baseline 1.8, Cr. 7.05 on admission, currently 4.85 (trending down)  Jose in place for HD in rt IJ, received HD on 7/12 and 7/13.   nephro recs: no HD today, will follow bloodwork to see if patient needs outpatient HD

## 2017-07-17 NOTE — PROGRESS NOTE ADULT - SUBJECTIVE AND OBJECTIVE BOX
Patient is a 56y old  Female who presents with a chief complaint of hypoglycemia (12 Jul 2017 18:07)      SUBJECTIVE / OVERNIGHT EVENTS:    Pt. seen and examined at bedside. overnight the patient complained about bilateral medial foot pain and was given colchicine. also c/o cough with sputum. cxr negative for overload. also c/o peeling skin. Denies headache, f/c/n/v/d/cp/sob    MEDICATIONS  (STANDING):  aspirin enteric coated 81 milliGRAM(s) Oral daily  dronedarone 400 milliGRAM(s) Oral two times a day  cholecalciferol 1000 Unit(s) Oral daily  pantoprazole    Tablet 40 milliGRAM(s) Oral before breakfast  atorvastatin 80 milliGRAM(s) Oral at bedtime  carvedilol 25 milliGRAM(s) Oral every 12 hours  dextrose 5%. 1000 milliLiter(s) (50 mL/Hr) IV Continuous <Continuous>  dextrose 50% Injectable 12.5 Gram(s) IV Push once  dextrose 50% Injectable 25 Gram(s) IV Push once  dextrose 50% Injectable 25 Gram(s) IV Push once  calcium acetate 667 milliGRAM(s) Oral three times a day with meals  heparin  Injectable 5000 Unit(s) SubCutaneous every 8 hours  gabapentin 100 milliGRAM(s) Oral daily  insulin glargine Injectable (LANTUS) 70 Unit(s) SubCutaneous at bedtime  insulin lispro (HumaLOG) corrective regimen sliding scale   SubCutaneous Before meals and at bedtime  insulin lispro Injectable (HumaLOG) 6 Unit(s) SubCutaneous three times a day before meals  ALBUTerol/ipratropium for Nebulization 3 milliLiter(s) Nebulizer every 6 hours    MEDICATIONS  (PRN):  dextrose Gel 1 Dose(s) Oral once PRN Blood Glucose LESS THAN 70 milliGRAM(s)/deciLiter  glucagon  Injectable 1 milliGRAM(s) IntraMuscular once PRN Glucose <70 milliGRAM(s)/deciLiter  acetaminophen   Tablet. 650 milliGRAM(s) Oral every 6 hours PRN Mild Pain (1 - 3)  guaiFENesin   Syrup  (Sugar-Free) 200 milliGRAM(s) Oral every 6 hours PRN Cough      Vital Signs Last 24 Hrs  T(C): 36.9 (07-17-17 @ 05:32), Max: 37.1 (07-16-17 @ 13:42)  HR: 76 (07-17-17 @ 11:10) (76 - 105)  BP: 129/73 (07-17-17 @ 05:32) (129/73 - 142/80)  RR: 17 (07-17-17 @ 05:32) (17 - 18)  SpO2: 94% (07-17-17 @ 11:10) (94% - 100%)  CAPILLARY BLOOD GLUCOSE  156 (17 Jul 2017 12:40)  91 (17 Jul 2017 09:00)  152 (16 Jul 2017 21:40)  170 (16 Jul 2017 17:38)        I&O's Summary    16 Jul 2017 07:01  -  17 Jul 2017 07:00  --------------------------------------------------------  IN: 720 mL / OUT: 0 mL / NET: 720 mL        PHYSICAL EXAM:  GENERAL: NAD, well-developed  HEAD:  Atraumatic, Normocephalic  EYES: EOMI, PERRLA, conjunctiva and sclera clear  NECK: Supple, No JVD  CHEST/LUNG: bilateral rhonchi in lower lung fields  HEART: Regular rate and rhythm; No murmurs, rubs, or gallops  ABDOMEN: Soft, Nontender, Nondistended; Bowel sounds present  EXTREMITIES:  2+ Peripheral Pulses, No clubbing, cyanosis, or edema  PSYCH: AAOx3  NEUROLOGY: non-focal  SKIN: No rashes or lesions    LABS:  (07-17 @ 05:30)                        11.6  6.88 )-----------( 139                 35.9    Neutrophils = -- (--%)  Lymphocytes = -- (--%)  Eosinophils = -- (--%)  Basophils = -- (--%)  Monocytes = -- (--%)  Bands = --%    WBC Trend: 6.88<--, 6.32<--, 7.51<--  Hb Trend: 11.6<--, 13.1<--, 12.5<--, 13.6<--, 13.7<--  Plt Trend: 139<--, 151<--, 98<--, 177<--, 184<--  07-17    138  |  97<L>  |  70<H>  ----------------------------<  44<LL>  4.7   |  26  |  3.60<H>    Ca    9.0      17 Jul 2017 05:50  Phos  3.7     07-17  Mg     2.3     07-17      Creatinine Trend: 3.60<--, 4.89<--, 4.85<--, 3.71<--, 5.02<--, 6.89<--            Microbiology:  Urine Cx:  Blood Cx:    RADIOLOGY & ADDITIONAL TESTS:  X- Ray:  CT:  Ultrasound:  [ ] imaging personally reviewed and interpreted by me    Consultant(s) Notes Reviewed:      Care Discussed with Consultants/Other Providers: Patient is a 56y old  Female who presents with a chief complaint of hypoglycemia (12 Jul 2017 18:07)      SUBJECTIVE / OVERNIGHT EVENTS:    Pt. seen and examined at bedside. overnight the patient complained about bilateral medial foot pain and was given colchicine, states pain is improving. also c/o cough with sputum. cxr negative for overload. also c/o peeling skin. States she is urinating more than she has been recently. Denies headache, f/c/n/v/d/cp/sob    MEDICATIONS  (STANDING):  aspirin enteric coated 81 milliGRAM(s) Oral daily  dronedarone 400 milliGRAM(s) Oral two times a day  cholecalciferol 1000 Unit(s) Oral daily  pantoprazole    Tablet 40 milliGRAM(s) Oral before breakfast  atorvastatin 80 milliGRAM(s) Oral at bedtime  carvedilol 25 milliGRAM(s) Oral every 12 hours  dextrose 5%. 1000 milliLiter(s) (50 mL/Hr) IV Continuous <Continuous>  dextrose 50% Injectable 12.5 Gram(s) IV Push once  dextrose 50% Injectable 25 Gram(s) IV Push once  dextrose 50% Injectable 25 Gram(s) IV Push once  calcium acetate 667 milliGRAM(s) Oral three times a day with meals  heparin  Injectable 5000 Unit(s) SubCutaneous every 8 hours  gabapentin 100 milliGRAM(s) Oral daily  insulin glargine Injectable (LANTUS) 70 Unit(s) SubCutaneous at bedtime  insulin lispro (HumaLOG) corrective regimen sliding scale   SubCutaneous Before meals and at bedtime  insulin lispro Injectable (HumaLOG) 6 Unit(s) SubCutaneous three times a day before meals  ALBUTerol/ipratropium for Nebulization 3 milliLiter(s) Nebulizer every 6 hours    MEDICATIONS  (PRN):  dextrose Gel 1 Dose(s) Oral once PRN Blood Glucose LESS THAN 70 milliGRAM(s)/deciLiter  glucagon  Injectable 1 milliGRAM(s) IntraMuscular once PRN Glucose <70 milliGRAM(s)/deciLiter  acetaminophen   Tablet. 650 milliGRAM(s) Oral every 6 hours PRN Mild Pain (1 - 3)  guaiFENesin   Syrup  (Sugar-Free) 200 milliGRAM(s) Oral every 6 hours PRN Cough      Vital Signs Last 24 Hrs  T(C): 36.9 (07-17-17 @ 05:32), Max: 37.1 (07-16-17 @ 13:42)  HR: 76 (07-17-17 @ 11:10) (76 - 105)  BP: 129/73 (07-17-17 @ 05:32) (129/73 - 142/80)  RR: 17 (07-17-17 @ 05:32) (17 - 18)  SpO2: 94% (07-17-17 @ 11:10) (94% - 100%)  CAPILLARY BLOOD GLUCOSE  156 (17 Jul 2017 12:40)  91 (17 Jul 2017 09:00)  152 (16 Jul 2017 21:40)  170 (16 Jul 2017 17:38)        I&O's Summary    16 Jul 2017 07:01  -  17 Jul 2017 07:00  --------------------------------------------------------  IN: 720 mL / OUT: 0 mL / NET: 720 mL        PHYSICAL EXAM:  GENERAL: NAD, well-developed  HEAD:  Atraumatic, Normocephalic  EYES: EOMI, PERRLA, conjunctiva and sclera clear  NECK: Supple, No JVD  CHEST/LUNG: bilateral rhonchi in lower lung fields  HEART: Regular rate and rhythm; No murmurs, rubs, or gallops  ABDOMEN: Soft, Nontender, Nondistended; Bowel sounds present  EXTREMITIES:  2+ Peripheral Pulses, No clubbing, cyanosis, or edema  PSYCH: AAOx3  NEUROLOGY: non-focal  SKIN: No rashes or lesions    LABS:  (07-17 @ 05:30)                        11.6  6.88 )-----------( 139                 35.9    Neutrophils = -- (--%)  Lymphocytes = -- (--%)  Eosinophils = -- (--%)  Basophils = -- (--%)  Monocytes = -- (--%)  Bands = --%    WBC Trend: 6.88<--, 6.32<--, 7.51<--  Hb Trend: 11.6<--, 13.1<--, 12.5<--, 13.6<--, 13.7<--  Plt Trend: 139<--, 151<--, 98<--, 177<--, 184<--  07-17    138  |  97<L>  |  70<H>  ----------------------------<  44<LL>  4.7   |  26  |  3.60<H>    Ca    9.0      17 Jul 2017 05:50  Phos  3.7     07-17  Mg     2.3     07-17      Creatinine Trend: 3.60<--, 4.89<--, 4.85<--, 3.71<--, 5.02<--, 6.89<--            Microbiology:  Urine Cx:  Blood Cx:    RADIOLOGY & ADDITIONAL TESTS:  X- Ray:  CT:  Ultrasound:  [ ] imaging personally reviewed and interpreted by me    Consultant(s) Notes Reviewed:      Care Discussed with Consultants/Other Providers:

## 2017-07-17 NOTE — PROGRESS NOTE ADULT - SUBJECTIVE AND OBJECTIVE BOX
No pain, no shortness of breath      VITAL:  T(C): , Max: 37.1 (07-16-17 @ 13:42)  T(F): , Max: 98.8 (07-16-17 @ 13:42)  HR: 85 (07-17-17 @ 07:11)  BP: 129/73 (07-17-17 @ 05:32)  BP(mean): --  RR: 17 (07-17-17 @ 05:32)  SpO2: 100% (07-17-17 @ 07:11)      PHYSICAL EXAM:  Constitutional: Alert, obese, on NCO2  HEENT: NCAT, DMM  Neck: Supple, (+)shiley  Respiratory: grossly clear b/l  Cardiovascular: Irreg S1S2  Gastrointestinal: soft, NTND  Extremities: warm x 4; trace b/l LE edema      LABS:                        11.6   6.88  )-----------( 139      ( 17 Jul 2017 05:30 )             35.9     Na(139)/K(4.0)/Cl(96)/HCO3(25)/BUN(76)/Cr(4.89)Glu(192)/Ca(8.9)/Mg(2.6)/PO4(6.4)    07-15 @ 03:00  Na(138)/K(4.7)/Cl(96)/HCO3(21)/BUN(65)/Cr(4.85)Glu(172)/Ca(7.9)/Mg(2.5)/PO4(6.5)    07-14 @ 13:42        IMPRESSION: 56F w/ HTN, DM2, and HFrEF(20%)-AICD, 7/12/17 a/w hypoglycemia, respiratory acidosis, and ALISA    (1)Renal - base creat mid 1s; superimposed oligoanuric ALISA - dialyzed multiple times this admission; last dialyzed Saturday 7/15/17. Does she need further HD? Will depend on results of bloodwork this week.    (2)Hyperphosphatemia - ALISA-associated. On Phoslo TID with meals    (3)Hypocalcemia - improving with Phoslo/Oscal - could try discontinuing the Oscal at this point.    (4)CV - normotensive at this point.      RECOMMEND:  (1)D/C Oscal; continue Phoslo for now  (2)BMP +Mg + PO4 daily  (3)No HD - will determine if patient needs further HD, and if patient requires setup of outpatient HD based on results of bloodwork this week.          Perfecto Rae MD  [facility]  [phone] No pain, no shortness of breath      VITAL:  T(C): , Max: 37.1 (07-16-17 @ 13:42)  T(F): , Max: 98.8 (07-16-17 @ 13:42)  HR: 85 (07-17-17 @ 07:11)  BP: 129/73 (07-17-17 @ 05:32)  BP(mean): --  RR: 17 (07-17-17 @ 05:32)  SpO2: 100% (07-17-17 @ 07:11)      PHYSICAL EXAM:  Constitutional: Alert, obese, NAD  HEENT: NCAT, DMM  Neck: Supple, (+)RIJ shiley  Respiratory: grossly clear b/l  Cardiovascular: Irreg S1S2  Gastrointestinal: soft, NTND  Extremities: warm x 4; trace b/l LE edema      LABS:                        11.6   6.88  )-----------( 139      ( 17 Jul 2017 05:30 )             35.9     Na(139)/K(4.0)/Cl(96)/HCO3(25)/BUN(76)/Cr(4.89)Glu(192)/Ca(8.9)/Mg(2.6)/PO4(6.4)    07-15 @ 03:00  Na(138)/K(4.7)/Cl(96)/HCO3(21)/BUN(65)/Cr(4.85)Glu(172)/Ca(7.9)/Mg(2.5)/PO4(6.5)    07-14 @ 13:42        IMPRESSION: 56F w/ HTN, DM2, and HFrEF(20%)-AICD, 7/12/17 a/w hypoglycemia, respiratory acidosis, and ALISA    (1)Renal - CKD3 with base creatinine mid 1s; ALISA requiring dialysis of late. Does she still need dialysis? Unclear at this point. Would look to hold off with HD for now, and figure out over next few days if dialysis is still needed (based on bloodwork). If requires further HD this week, would then plan to set her up for chronic HD.    (2)Hyperphosphatemia - ALISA-associated. On Phoslo TID with meals    (3)Hypocalcemia - improving with Phoslo/Oscal - could try discontinuing the Oscal at this point.    (4)CV - normotensive at this point.      RECOMMEND:  (1)D/C Oscal; continue Phoslo for now  (2)BMP +Mg + PO4 daily  (3)No HD - will determine if patient needs further HD, and if patient requires setup of outpatient HD based on results of bloodwork this week.  (4)Maintain MATHEW diop.    D/W'd house staff.      Perfecto Rae MD  [facility]  [phone]

## 2017-07-17 NOTE — PROGRESS NOTE ADULT - SUBJECTIVE AND OBJECTIVE BOX
CHIEF COMPLAINT: sitting on chair  tired?    SUBJECTIVE:     REVIEW OF SYSTEMS:    CONSTITUTIONAL: (+  )  weakness,  ( - ) fevers or chills  EYES/ENT: ( - )visual changes;     NECK: ( - ) pain or stiffness  RESPIRATORY:   (  -)cough, wheezing, hemoptysis;  ( + ) shortness of breath  CARDIOVASCULAR:  (-  )chest pain or palpitations  GASTROINTESTINAL:   ( - )abdominal or epigastric pain.  ( - ) nausea, vomiting, or hematemesis;   (-   ) diarrhea or constipation.   GENITOURINARY:   (  -  ) dysuria, frequency or hematuria  NEUROLOGICAL:  ( +  ) numbness or weakness   All other review of systems is negative unless indicated above    Vital Signs Last 24 Hrs  T(C): 36.9 (17 Jul 2017 05:32), Max: 37.1 (16 Jul 2017 13:42)  T(F): 98.4 (17 Jul 2017 05:32), Max: 98.8 (16 Jul 2017 13:42)  HR: 85 (17 Jul 2017 07:11) (78 - 105)  BP: 129/73 (17 Jul 2017 05:32) (129/73 - 142/80)  BP(mean): --  RR: 17 (17 Jul 2017 05:32) (17 - 18)  SpO2: 100% (17 Jul 2017 07:11) (96% - 100%)    I&O's Summary    16 Jul 2017 07:01  -  17 Jul 2017 07:00  --------------------------------------------------------  IN: 720 mL / OUT: 0 mL / NET: 720 mL        CAPILLARY BLOOD GLUCOSE  152 (16 Jul 2017 21:40)  170 (16 Jul 2017 17:38)  190 (16 Jul 2017 12:26)          PHYSICAL EXAM:    Constitutional:  ( -  ) NAD,   ( +  )awake and alert  HEENT: PERR, EOMI,    Neck: Soft and supple, No LAD, No JVD  Respiratory:  ( +   Breath sounds are clear bilaterally,    (  - ) wheezing, rales or rhonchi  Cardiovascular:     ( +  )S1 and S2, regular rate and rhythm, no Murmurs, gallops or rubs  Gastrointestinal:  ( +  )Bowel Sounds present, soft,   ( - )nontender, nondistended,    Extremities:    ( - ) peripheral edema  Vascular: 2+ peripheral pulses  Neurological:    (   + )A/O x 3,   (-  ) focal deficits  Musculoskeletal:    (  + )  normal strength b/l upper  (   +  ) normal  lower extremities  Skin: No rashes    MEDICATIONS:  MEDICATIONS  (STANDING):  aspirin enteric coated 81 milliGRAM(s) Oral daily  dronedarone 400 milliGRAM(s) Oral two times a day  cholecalciferol 1000 Unit(s) Oral daily  pantoprazole    Tablet 40 milliGRAM(s) Oral before breakfast  atorvastatin 80 milliGRAM(s) Oral at bedtime  carvedilol 25 milliGRAM(s) Oral every 12 hours  dextrose 5%. 1000 milliLiter(s) (50 mL/Hr) IV Continuous <Continuous>  dextrose 50% Injectable 12.5 Gram(s) IV Push once  dextrose 50% Injectable 25 Gram(s) IV Push once  dextrose 50% Injectable 25 Gram(s) IV Push once  calcium acetate 667 milliGRAM(s) Oral three times a day with meals  heparin  Injectable 5000 Unit(s) SubCutaneous every 8 hours  gabapentin 100 milliGRAM(s) Oral daily  insulin glargine Injectable (LANTUS) 70 Unit(s) SubCutaneous at bedtime  insulin lispro (HumaLOG) corrective regimen sliding scale   SubCutaneous Before meals and at bedtime  calcium carbonate 1250 mG (OsCal) 1 Tablet(s) Oral two times a day  insulin lispro Injectable (HumaLOG) 6 Unit(s) SubCutaneous three times a day before meals  ALBUTerol/ipratropium for Nebulization 3 milliLiter(s) Nebulizer every 6 hours      LABS: All Labs Reviewed:                        11.6   6.88  )-----------( 139      ( 17 Jul 2017 05:30 )             35.9                 Blood Culture:   Urine Culture      RADIOLOGY/EKG:    ASSESSMENT AND PLAN:  1- s/p ALISA  FOR ? HD TODAY will ask renal  2- DM/s/p hypoglycemia stable  3- CAD/CHF  STABLE continue PIBAP    DVT PPX:    ADVANCED DIRECTIVE:    DISPOSITION:

## 2017-07-18 LAB
BUN SERPL-MCNC: 89 MG/DL — HIGH (ref 7–23)
CALCIUM SERPL-MCNC: 9.4 MG/DL — SIGNIFICANT CHANGE UP (ref 8.4–10.5)
CHLORIDE SERPL-SCNC: 96 MMOL/L — LOW (ref 98–107)
CO2 SERPL-SCNC: 20 MMOL/L — LOW (ref 22–31)
CREAT SERPL-MCNC: 3.39 MG/DL — HIGH (ref 0.5–1.3)
GLUCOSE SERPL-MCNC: 101 MG/DL — HIGH (ref 70–99)
HCT VFR BLD CALC: 35.5 % — SIGNIFICANT CHANGE UP (ref 34.5–45)
HGB BLD-MCNC: 11.4 G/DL — LOW (ref 11.5–15.5)
MAGNESIUM SERPL-MCNC: 2.3 MG/DL — SIGNIFICANT CHANGE UP (ref 1.6–2.6)
MCHC RBC-ENTMCNC: 28.8 PG — SIGNIFICANT CHANGE UP (ref 27–34)
MCHC RBC-ENTMCNC: 32.1 % — SIGNIFICANT CHANGE UP (ref 32–36)
MCV RBC AUTO: 89.6 FL — SIGNIFICANT CHANGE UP (ref 80–100)
NRBC # FLD: 0 — SIGNIFICANT CHANGE UP
PHOSPHATE SERPL-MCNC: 4.2 MG/DL — SIGNIFICANT CHANGE UP (ref 2.5–4.5)
PLATELET # BLD AUTO: 163 K/UL — SIGNIFICANT CHANGE UP (ref 150–400)
PMV BLD: 12.1 FL — SIGNIFICANT CHANGE UP (ref 7–13)
POTASSIUM SERPL-MCNC: 5.3 MMOL/L — SIGNIFICANT CHANGE UP (ref 3.5–5.3)
POTASSIUM SERPL-SCNC: 5.3 MMOL/L — SIGNIFICANT CHANGE UP (ref 3.5–5.3)
RBC # BLD: 3.96 M/UL — SIGNIFICANT CHANGE UP (ref 3.8–5.2)
RBC # FLD: 17.2 % — HIGH (ref 10.3–14.5)
SODIUM SERPL-SCNC: 134 MMOL/L — LOW (ref 135–145)
WBC # BLD: 7.15 K/UL — SIGNIFICANT CHANGE UP (ref 3.8–10.5)
WBC # FLD AUTO: 7.15 K/UL — SIGNIFICANT CHANGE UP (ref 3.8–10.5)

## 2017-07-18 PROCEDURE — 99232 SBSQ HOSP IP/OBS MODERATE 35: CPT

## 2017-07-18 RX ORDER — INSULIN LISPRO 100/ML
VIAL (ML) SUBCUTANEOUS
Qty: 0 | Refills: 0 | Status: DISCONTINUED | OUTPATIENT
Start: 2017-07-18 | End: 2017-07-28

## 2017-07-18 RX ORDER — SENNA PLUS 8.6 MG/1
1 TABLET ORAL DAILY
Qty: 0 | Refills: 0 | Status: DISCONTINUED | OUTPATIENT
Start: 2017-07-18 | End: 2017-07-28

## 2017-07-18 RX ORDER — DOCUSATE SODIUM 100 MG
100 CAPSULE ORAL DAILY
Qty: 0 | Refills: 0 | Status: DISCONTINUED | OUTPATIENT
Start: 2017-07-18 | End: 2017-07-28

## 2017-07-18 RX ORDER — INSULIN LISPRO 100/ML
7 VIAL (ML) SUBCUTANEOUS
Qty: 0 | Refills: 0 | Status: DISCONTINUED | OUTPATIENT
Start: 2017-07-18 | End: 2017-07-25

## 2017-07-18 RX ORDER — FUROSEMIDE 40 MG
80 TABLET ORAL DAILY
Qty: 0 | Refills: 0 | Status: DISCONTINUED | OUTPATIENT
Start: 2017-07-18 | End: 2017-07-18

## 2017-07-18 RX ORDER — INSULIN GLARGINE 100 [IU]/ML
50 INJECTION, SOLUTION SUBCUTANEOUS AT BEDTIME
Qty: 0 | Refills: 0 | Status: DISCONTINUED | OUTPATIENT
Start: 2017-07-18 | End: 2017-07-21

## 2017-07-18 RX ORDER — FUROSEMIDE 40 MG
80 TABLET ORAL DAILY
Qty: 0 | Refills: 0 | Status: DISCONTINUED | OUTPATIENT
Start: 2017-07-18 | End: 2017-07-19

## 2017-07-18 RX ORDER — INSULIN LISPRO 100/ML
VIAL (ML) SUBCUTANEOUS AT BEDTIME
Qty: 0 | Refills: 0 | Status: DISCONTINUED | OUTPATIENT
Start: 2017-07-18 | End: 2017-07-28

## 2017-07-18 RX ADMIN — Medication 80 MILLIGRAM(S): at 14:47

## 2017-07-18 RX ADMIN — Medication 81 MILLIGRAM(S): at 13:21

## 2017-07-18 RX ADMIN — SENNA PLUS 1 TABLET(S): 8.6 TABLET ORAL at 13:24

## 2017-07-18 RX ADMIN — Medication 1000 UNIT(S): at 13:21

## 2017-07-18 RX ADMIN — Medication 2: at 17:43

## 2017-07-18 RX ADMIN — CARVEDILOL PHOSPHATE 25 MILLIGRAM(S): 80 CAPSULE, EXTENDED RELEASE ORAL at 17:45

## 2017-07-18 RX ADMIN — Medication 100 MILLIGRAM(S): at 02:28

## 2017-07-18 RX ADMIN — DRONEDARONE 400 MILLIGRAM(S): 400 TABLET, FILM COATED ORAL at 07:05

## 2017-07-18 RX ADMIN — PANTOPRAZOLE SODIUM 40 MILLIGRAM(S): 20 TABLET, DELAYED RELEASE ORAL at 07:05

## 2017-07-18 RX ADMIN — Medication 3 MILLILITER(S): at 20:51

## 2017-07-18 RX ADMIN — ATORVASTATIN CALCIUM 80 MILLIGRAM(S): 80 TABLET, FILM COATED ORAL at 21:54

## 2017-07-18 RX ADMIN — DRONEDARONE 400 MILLIGRAM(S): 400 TABLET, FILM COATED ORAL at 17:45

## 2017-07-18 RX ADMIN — Medication 3 MILLILITER(S): at 10:07

## 2017-07-18 RX ADMIN — INSULIN GLARGINE 50 UNIT(S): 100 INJECTION, SOLUTION SUBCUTANEOUS at 22:26

## 2017-07-18 RX ADMIN — CARVEDILOL PHOSPHATE 25 MILLIGRAM(S): 80 CAPSULE, EXTENDED RELEASE ORAL at 07:05

## 2017-07-18 RX ADMIN — Medication 200 MILLIGRAM(S): at 09:20

## 2017-07-18 RX ADMIN — Medication 3 MILLILITER(S): at 04:42

## 2017-07-18 RX ADMIN — Medication 667 MILLIGRAM(S): at 12:32

## 2017-07-18 RX ADMIN — HEPARIN SODIUM 5000 UNIT(S): 5000 INJECTION INTRAVENOUS; SUBCUTANEOUS at 07:05

## 2017-07-18 RX ADMIN — HEPARIN SODIUM 5000 UNIT(S): 5000 INJECTION INTRAVENOUS; SUBCUTANEOUS at 13:22

## 2017-07-18 RX ADMIN — Medication 7 UNIT(S): at 17:43

## 2017-07-18 RX ADMIN — Medication 3 MILLILITER(S): at 16:20

## 2017-07-18 RX ADMIN — Medication 100 MILLIGRAM(S): at 10:53

## 2017-07-18 RX ADMIN — Medication 6 UNIT(S): at 08:38

## 2017-07-18 RX ADMIN — Medication 100 MILLIGRAM(S): at 13:24

## 2017-07-18 RX ADMIN — Medication 667 MILLIGRAM(S): at 08:38

## 2017-07-18 RX ADMIN — Medication 6 UNIT(S): at 12:32

## 2017-07-18 RX ADMIN — Medication 2: at 12:32

## 2017-07-18 RX ADMIN — Medication 667 MILLIGRAM(S): at 17:45

## 2017-07-18 RX ADMIN — GABAPENTIN 100 MILLIGRAM(S): 400 CAPSULE ORAL at 13:21

## 2017-07-18 RX ADMIN — HEPARIN SODIUM 5000 UNIT(S): 5000 INJECTION INTRAVENOUS; SUBCUTANEOUS at 21:54

## 2017-07-18 NOTE — PROGRESS NOTE ADULT - PROBLEM SELECTOR PLAN 1
No further hypoglycemia today but FS boderline low this AM.  Decrease Lantus to 50 u qhs  increase Humalog to 7/7/7 with meals.  Continue Humalog moderate scale qac and qhs.

## 2017-07-18 NOTE — PROGRESS NOTE ADULT - SUBJECTIVE AND OBJECTIVE BOX
CHIEF COMPLAINT: no event overnight     SUBJECTIVE:     REVIEW OF SYSTEMS:    CONSTITUTIONAL: ( +)  weakness,  ( - ) fevers or chills  EYES/ENT: ( - )visual changes;     NECK: ( - ) pain or stiffness  RESPIRATORY:   ( - )cough, wheezing, hemoptysis;  (-  ) shortness of breath  CARDIOVASCULAR:  (-  )chest pain or palpitations  GASTROINTESTINAL:   ( - )abdominal or epigastric pain.  ( - ) nausea, vomiting, or hematemesis;   (  - ) diarrhea or constipation.   GENITOURINARY:   (  -  ) dysuria, frequency or hematuria  NEUROLOGICAL:  (  + ) numbness or weakness   All other review of systems is negative unless indicated above    Vital Signs Last 24 Hrs  T(C): 37.1 (18 Jul 2017 06:12), Max: 37.1 (18 Jul 2017 06:12)  T(F): 98.7 (18 Jul 2017 06:12), Max: 98.7 (18 Jul 2017 06:12)  HR: 78 (18 Jul 2017 07:36) (76 - 87)  BP: 150/81 (18 Jul 2017 06:12) (136/79 - 150/81)  BP(mean): --  RR: 20 (18 Jul 2017 06:12) (18 - 20)  SpO2: 97% (18 Jul 2017 07:36) (94% - 100%)    I&O's Summary      CAPILLARY BLOOD GLUCOSE  97 (18 Jul 2017 08:32)  153 (17 Jul 2017 22:34)  145 (17 Jul 2017 17:41)  156 (17 Jul 2017 12:40)          PHYSICAL EXAM:    Constitutional:  (  - ) NAD,   (  + )awake and alert  HEENT: PERR, EOMI,    Neck: Soft and supple, No LAD, No JVD  Respiratory:  ( -  Breath sounds are clear bilaterally,    (  - ) wheezing, rales or rhonchi  Cardiovascular:     (  + )S1 and S2, regular rate and rhythm, no Murmurs, gallops or rubs  Gastrointestinal:  (  + )Bowel Sounds present, soft,   (-  )nontender, nondistended,    Extremities:    ( - ) peripheral edema  Vascular: 2+ peripheral pulses  Neurological:    (  +  )A/O x 3,   ( - ) focal deficits  Musculoskeletal:    (  + )  normal strength b/l upper  (   -  ) normal  lower extremities  Skin: No rashes    MEDICATIONS:  MEDICATIONS  (STANDING):  aspirin enteric coated 81 milliGRAM(s) Oral daily  dronedarone 400 milliGRAM(s) Oral two times a day  cholecalciferol 1000 Unit(s) Oral daily  pantoprazole    Tablet 40 milliGRAM(s) Oral before breakfast  atorvastatin 80 milliGRAM(s) Oral at bedtime  carvedilol 25 milliGRAM(s) Oral every 12 hours  dextrose 5%. 1000 milliLiter(s) (50 mL/Hr) IV Continuous <Continuous>  dextrose 50% Injectable 12.5 Gram(s) IV Push once  dextrose 50% Injectable 25 Gram(s) IV Push once  dextrose 50% Injectable 25 Gram(s) IV Push once  calcium acetate 667 milliGRAM(s) Oral three times a day with meals  heparin  Injectable 5000 Unit(s) SubCutaneous every 8 hours  gabapentin 100 milliGRAM(s) Oral daily  insulin lispro (HumaLOG) corrective regimen sliding scale   SubCutaneous Before meals and at bedtime  insulin lispro Injectable (HumaLOG) 6 Unit(s) SubCutaneous three times a day before meals  ALBUTerol/ipratropium for Nebulization 3 milliLiter(s) Nebulizer every 6 hours  insulin glargine Injectable (LANTUS) 55 Unit(s) SubCutaneous at bedtime  senna 1 Tablet(s) Oral daily  docusate sodium 100 milliGRAM(s) Oral daily      LABS: All Labs Reviewed:                        11.6   6.88  )-----------( 139      ( 17 Jul 2017 05:30 )             35.9     07-18    134<L>  |  96<L>  |  89<H>  ----------------------------<  101<H>  5.3   |  20<L>  |  3.39<H>    Ca    9.4      18 Jul 2017 06:20  Phos  4.2     07-18  Mg     2.3     07-18            Blood Culture:   Urine Culture      RADIOLOGY/EKG:    ASSESSMENT AND PLAN:  1- ALISA    for HD as per renal  2-DM / hypoglycemia  stable   3 -chf/ cad / HTN  stable        DISPOSITION:  home

## 2017-07-18 NOTE — PROGRESS NOTE ADULT - SUBJECTIVE AND OBJECTIVE BOX
Patient is a 56y old  Female who presents with a chief complaint of hypoglycemia (12 Jul 2017 18:07)      SUBJECTIVE / OVERNIGHT EVENTS:    Pt. seen and examined at bedside. states she is making urine. has not had a bowel movement in 3 days. denies f/c/n/v/d/cp/sob.     MEDICATIONS  (STANDING):  aspirin enteric coated 81 milliGRAM(s) Oral daily  dronedarone 400 milliGRAM(s) Oral two times a day  cholecalciferol 1000 Unit(s) Oral daily  pantoprazole    Tablet 40 milliGRAM(s) Oral before breakfast  atorvastatin 80 milliGRAM(s) Oral at bedtime  carvedilol 25 milliGRAM(s) Oral every 12 hours  dextrose 5%. 1000 milliLiter(s) (50 mL/Hr) IV Continuous <Continuous>  dextrose 50% Injectable 12.5 Gram(s) IV Push once  dextrose 50% Injectable 25 Gram(s) IV Push once  dextrose 50% Injectable 25 Gram(s) IV Push once  calcium acetate 667 milliGRAM(s) Oral three times a day with meals  heparin  Injectable 5000 Unit(s) SubCutaneous every 8 hours  gabapentin 100 milliGRAM(s) Oral daily  insulin lispro (HumaLOG) corrective regimen sliding scale   SubCutaneous Before meals and at bedtime  insulin lispro Injectable (HumaLOG) 6 Unit(s) SubCutaneous three times a day before meals  ALBUTerol/ipratropium for Nebulization 3 milliLiter(s) Nebulizer every 6 hours  insulin glargine Injectable (LANTUS) 55 Unit(s) SubCutaneous at bedtime  senna 1 Tablet(s) Oral daily  docusate sodium 100 milliGRAM(s) Oral daily    MEDICATIONS  (PRN):  dextrose Gel 1 Dose(s) Oral once PRN Blood Glucose LESS THAN 70 milliGRAM(s)/deciLiter  glucagon  Injectable 1 milliGRAM(s) IntraMuscular once PRN Glucose <70 milliGRAM(s)/deciLiter  acetaminophen   Tablet. 650 milliGRAM(s) Oral every 6 hours PRN Mild Pain (1 - 3)  guaiFENesin   Syrup  (Sugar-Free) 200 milliGRAM(s) Oral every 6 hours PRN Cough  benzonatate 100 milliGRAM(s) Oral every 8 hours PRN Cough      Vital Signs Last 24 Hrs  T(C): 37.1 (07-18-17 @ 06:12), Max: 37.1 (07-18-17 @ 06:12)  HR: 78 (07-18-17 @ 07:36) (76 - 87)  BP: 150/81 (07-18-17 @ 06:12) (136/79 - 150/81)  RR: 20 (07-18-17 @ 06:12) (18 - 20)  SpO2: 97% (07-18-17 @ 07:36) (94% - 100%)  CAPILLARY BLOOD GLUCOSE  97 (18 Jul 2017 08:32)  153 (17 Jul 2017 22:34)  145 (17 Jul 2017 17:41)  156 (17 Jul 2017 12:40)  91 (17 Jul 2017 09:00)        I&O's Summary      PHYSICAL EXAM:  GENERAL: NAD, well-developed  HEAD:  Atraumatic, Normocephalic  EYES: EOMI, PERRLA, conjunctiva and sclera clear  NECK: Supple, No JVD  CHEST/LUNG: Clear to auscultation bilaterally; No wheeze  HEART: Regular rate and rhythm; No murmurs, rubs, or gallops  ABDOMEN: Soft, Nontender, Nondistended; Bowel sounds present  EXTREMITIES:  2+ Peripheral Pulses, No clubbing, cyanosis, or edema  PSYCH: AAOx3  NEUROLOGY: non-focal  SKIN: No rashes or lesions    LABS:    WBC Trend: 6.88<--, 6.32<--, 7.51<--  Hb Trend: 11.6<--, 13.1<--, 12.5<--, 13.6<--, 13.7<--  Plt Trend: 139<--, 151<--, 98<--, 177<--, 184<--  07-18    134<L>  |  96<L>  |  89<H>  ----------------------------<  101<H>  5.3   |  20<L>  |  3.39<H>    Ca    9.4      18 Jul 2017 06:20  Phos  4.2     07-18  Mg     2.3     07-18      Creatinine Trend: 3.39<--, 3.60<--, 4.89<--, 4.85<--, 3.71<--, 5.02<--            Microbiology:  Urine Cx:  Blood Cx:    RADIOLOGY & ADDITIONAL TESTS:  X- Ray:  CT:  Ultrasound:  [ ] imaging personally reviewed and interpreted by me    Consultant(s) Notes Reviewed:      Care Discussed with Consultants/Other Providers:

## 2017-07-18 NOTE — PROGRESS NOTE ADULT - SUBJECTIVE AND OBJECTIVE BOX
No pain, no shortness of breath      VITAL:  T(C): , Max: 37.1 (07-18-17 @ 06:12)  T(F): , Max: 98.7 (07-18-17 @ 06:12)  HR: 74 (07-18-17 @ 11:17)  BP: 150/81 (07-18-17 @ 06:12)  BP(mean): --  RR: 20 (07-18-17 @ 06:12)  SpO2: 96% (07-18-17 @ 11:17)      PHYSICAL EXAM:  Constitutional: Alert, obese, NAD  HEENT: NCAT, DMM  Neck: Supple, (+)RIJ shiley  Respiratory: grossly clear b/l  Cardiovascular: Irreg S1S2  Gastrointestinal: soft, NTND  Extremities: warm x 4; trace b/l LE edema      LABS:                        11.4   7.15  )-----------( 163      ( 18 Jul 2017 09:30 )             35.5     Na(134)/K(5.3)/Cl(96)/HCO3(20)/BUN(89)/Cr(3.39)Glu(101)/Ca(9.4)/Mg(2.3)/PO4(4.2)    07-18 @ 06:20  Na(138)/K(4.7)/Cl(97)/HCO3(26)/BUN(70)/Cr(3.60)Glu(44)/Ca(9.0)/Mg(2.3)/PO4(3.7)    07-17 @ 05:50      IMPRESSION: 56F w/ HTN, DM2, and HFrEF(20%)-AICD, 7/12/17 a/w hypoglycemia, respiratory acidosis, and ALISA    (1)Renal - CKD3 with base creatinine mid 1s; ALISA requiring dialysis of late. Does she still need dialysis? Although her creatinine has trended down as of today (relative to yesterday), her BUN is trending up. Her potassium is trending up as well. Although I think she will not need further dialysis this admission, I am not certain of this at this point. Therefore, I would favor maintaining the shiley for now.    (2)Hyperphosphatemia - ALISA-associated. On Phoslo TID with meals. Improving.    (3)Hypocalcemia - improved; on Phoslo, which contains calcium.    (4)Hyperkalemia - mild; no therapy for it needed for now.      RECOMMEND:  (1)Continue Phoslo for now  (2)BMP +Mg + PO4 daily  (3)No HD today  (4)Maintain MATHEW diop.            Perfecto Rae MD  [facility],   [phone] No pain, no shortness of breath, (+)swelling (increased from baseline, she says)      VITAL:  T(C): , Max: 37.1 (07-18-17 @ 06:12)  T(F): , Max: 98.7 (07-18-17 @ 06:12)  HR: 74 (07-18-17 @ 11:17)  BP: 150/81 (07-18-17 @ 06:12)  BP(mean): --  RR: 20 (07-18-17 @ 06:12)  SpO2: 96% (07-18-17 @ 11:17)      PHYSICAL EXAM:  Constitutional: Alert, obese, NAD  HEENT: NCAT, DMM  Neck: Supple, (+)RIJ shiley  Respiratory: grossly clear b/l  Cardiovascular: Irreg S1S2  Gastrointestinal: soft, NTND  Extremities: 2-3+ b/l LE edema      LABS:                        11.4   7.15  )-----------( 163      ( 18 Jul 2017 09:30 )             35.5     Na(134)/K(5.3)/Cl(96)/HCO3(20)/BUN(89)/Cr(3.39)Glu(101)/Ca(9.4)/Mg(2.3)/PO4(4.2)    07-18 @ 06:20  Na(138)/K(4.7)/Cl(97)/HCO3(26)/BUN(70)/Cr(3.60)Glu(44)/Ca(9.0)/Mg(2.3)/PO4(3.7)    07-17 @ 05:50      IMPRESSION: 56F w/ HTN, DM2, and HFrEF(20%)-AICD, 7/12/17 a/w hypoglycemia, respiratory acidosis, and ALISA    (1)Renal - CKD3 with base creatinine mid 1s; ALISA requiring dialysis of late. Does she still need dialysis? Although her creatinine has trended down as of today (relative to yesterday), her BUN is trending up. Her potassium is trending up as well. Although I think she will not need further dialysis this admission, I am not certain of this at this point. Therefore, I would favor maintaining the shiley for now.    (2)Hyperphosphatemia - ALISA-associated. On Phoslo TID with meals. Improving.    (3)Hypocalcemia - improved; on Phoslo, which contains calcium.    (4)Hyperkalemia - mild; no therapy for it needed for now.      RECOMMEND:  (1)Continue Phoslo for now  (2)BMP +Mg + PO4 daily  (3)No HD today  (4)Maintain RIJ shiley.  (5)Can add Lasix 80mg iv qd for now    D/W'D house staff Dr. Reyes, and with primary attending Dr. Lou.      Perfecto Rae MD  [facility]  [phone]

## 2017-07-18 NOTE — PROGRESS NOTE ADULT - PROBLEM SELECTOR PLAN 2
Cr baseline 1.8, Cr. 7.05 on admission, currently 3.39 (trending down)  Jose in place for HD in rt IJ, received HD on 7/12 and 7/13.   nephro recs: no HD today, will follow bloodwork to see if patient needs outpatient HD

## 2017-07-18 NOTE — PROGRESS NOTE ADULT - SUBJECTIVE AND OBJECTIVE BOX
Chief Complaint: DM2    History: Tolerating po, no hypoglycemia symptoms    MEDICATIONS  (STANDING):  aspirin enteric coated 81 milliGRAM(s) Oral daily  dronedarone 400 milliGRAM(s) Oral two times a day  cholecalciferol 1000 Unit(s) Oral daily  pantoprazole    Tablet 40 milliGRAM(s) Oral before breakfast  atorvastatin 80 milliGRAM(s) Oral at bedtime  carvedilol 25 milliGRAM(s) Oral every 12 hours  dextrose 5%. 1000 milliLiter(s) (50 mL/Hr) IV Continuous <Continuous>  dextrose 50% Injectable 12.5 Gram(s) IV Push once  dextrose 50% Injectable 25 Gram(s) IV Push once  dextrose 50% Injectable 25 Gram(s) IV Push once  calcium acetate 667 milliGRAM(s) Oral three times a day with meals  heparin  Injectable 5000 Unit(s) SubCutaneous every 8 hours  gabapentin 100 milliGRAM(s) Oral daily  insulin lispro (HumaLOG) corrective regimen sliding scale   SubCutaneous Before meals and at bedtime  insulin lispro Injectable (HumaLOG) 6 Unit(s) SubCutaneous three times a day before meals  ALBUTerol/ipratropium for Nebulization 3 milliLiter(s) Nebulizer every 6 hours  insulin glargine Injectable (LANTUS) 55 Unit(s) SubCutaneous at bedtime  senna 1 Tablet(s) Oral daily  docusate sodium 100 milliGRAM(s) Oral daily  furosemide   Injectable 80 milliGRAM(s) IV Push daily    MEDICATIONS  (PRN):  dextrose Gel 1 Dose(s) Oral once PRN Blood Glucose LESS THAN 70 milliGRAM(s)/deciLiter  glucagon  Injectable 1 milliGRAM(s) IntraMuscular once PRN Glucose <70 milliGRAM(s)/deciLiter  acetaminophen   Tablet. 650 milliGRAM(s) Oral every 6 hours PRN Mild Pain (1 - 3)  guaiFENesin   Syrup  (Sugar-Free) 200 milliGRAM(s) Oral every 6 hours PRN Cough  benzonatate 100 milliGRAM(s) Oral every 8 hours PRN Cough      Allergies    penicillin (Unknown)    Intolerances      Review of Systems:  Constitutional: No fever  Eyes: No blurry vision  Neuro: No tremors  HEENT: No pain  Cardiovascular: No chest pain, palpitations  Respiratory: No SOB, no cough  GI: No nausea, vomiting, abdominal pain  : No dysuria  Skin: no rash  Psych: no depression  Endocrine: no polyuria, polydipsia  Hem/lymph: no swelling  Osteoporosis: no fractures    ALL OTHER SYSTEMS REVIEWED AND NEGATIVE      PHYSICAL EXAM:  VITALS: T(C): 37.1 (07-18-17 @ 14:36)  T(F): 98.8 (07-18-17 @ 14:36), Max: 98.8 (07-18-17 @ 14:36)  HR: 80 (07-18-17 @ 14:36) (74 - 87)  BP: 127/74 (07-18-17 @ 14:36) (127/74 - 150/81)  RR:  (18 - 20)  SpO2:  (94% - 100%)  Wt(kg): --  GENERAL: NAD, well-groomed, well-developed  EYES: No proptosis, no lid lag, anicteric  HEENT:  Atraumatic, Normocephalic, moist mucous membranes  RESPIRATORY: nonlabored  CARDIOVASCULAR: + R neck shiley in place  NEURO: extraocular movements intact, no tremor  PSYCH: Alert and oriented x 3, normal affect, normal mood    CAPILLARY BLOOD GLUCOSE  173 (07-18 @ 12:29)  97 (07-18 @ 08:32)  153 (07-17 @ 22:34)  145 (07-17 @ 17:41)  156 (07-17 @ 12:40)  91 (07-17 @ 09:00)  152 (07-16 @ 21:40)  170 (07-16 @ 17:38)  190 (07-16 @ 12:26)  135 (07-16 @ 08:22)  146 (07-16 @ 06:25)  97 (07-16 @ 00:41)  163 (07-15 @ 17:40)      07-18    134<L>  |  96<L>  |  89<H>  ----------------------------<  101<H>  5.3   |  20<L>  |  3.39<H>    EGFR if : 16  EGFR if non : 14    Ca    9.4      07-18  Mg     2.3     07-18  Phos  4.2     07-18            Thyroid Function Tests:  07-12 @ 06:35 TSH 0.19 FreeT4 -- T3 -- Anti TPO -- Anti Thyroglobulin Ab -- TSI --      Hemoglobin A1C, Whole Blood: 9.2 % <H> [4.0 - 5.6] (07-11-17 @ 20:15)

## 2017-07-19 DIAGNOSIS — I50.20 UNSPECIFIED SYSTOLIC (CONGESTIVE) HEART FAILURE: ICD-10-CM

## 2017-07-19 LAB
BUN SERPL-MCNC: 100 MG/DL — HIGH (ref 7–23)
CALCIUM SERPL-MCNC: 9.6 MG/DL — SIGNIFICANT CHANGE UP (ref 8.4–10.5)
CHLORIDE SERPL-SCNC: 98 MMOL/L — SIGNIFICANT CHANGE UP (ref 98–107)
CO2 SERPL-SCNC: 27 MMOL/L — SIGNIFICANT CHANGE UP (ref 22–31)
CREAT SERPL-MCNC: 3.58 MG/DL — HIGH (ref 0.5–1.3)
GLUCOSE SERPL-MCNC: 131 MG/DL — HIGH (ref 70–99)
HCT VFR BLD CALC: 35 % — SIGNIFICANT CHANGE UP (ref 34.5–45)
HGB BLD-MCNC: 10.9 G/DL — LOW (ref 11.5–15.5)
MAGNESIUM SERPL-MCNC: 2.3 MG/DL — SIGNIFICANT CHANGE UP (ref 1.6–2.6)
MCHC RBC-ENTMCNC: 27.9 PG — SIGNIFICANT CHANGE UP (ref 27–34)
MCHC RBC-ENTMCNC: 31.1 % — LOW (ref 32–36)
MCV RBC AUTO: 89.5 FL — SIGNIFICANT CHANGE UP (ref 80–100)
NRBC # FLD: 0 — SIGNIFICANT CHANGE UP
PHOSPHATE SERPL-MCNC: 5.4 MG/DL — HIGH (ref 2.5–4.5)
PLATELET # BLD AUTO: 187 K/UL — SIGNIFICANT CHANGE UP (ref 150–400)
PMV BLD: 12.2 FL — SIGNIFICANT CHANGE UP (ref 7–13)
POTASSIUM SERPL-MCNC: 5.2 MMOL/L — SIGNIFICANT CHANGE UP (ref 3.5–5.3)
POTASSIUM SERPL-SCNC: 5.2 MMOL/L — SIGNIFICANT CHANGE UP (ref 3.5–5.3)
RBC # BLD: 3.91 M/UL — SIGNIFICANT CHANGE UP (ref 3.8–5.2)
RBC # FLD: 17.2 % — HIGH (ref 10.3–14.5)
SODIUM SERPL-SCNC: 137 MMOL/L — SIGNIFICANT CHANGE UP (ref 135–145)
WBC # BLD: 6.46 K/UL — SIGNIFICANT CHANGE UP (ref 3.8–10.5)
WBC # FLD AUTO: 6.46 K/UL — SIGNIFICANT CHANGE UP (ref 3.8–10.5)

## 2017-07-19 PROCEDURE — 71010: CPT | Mod: 26

## 2017-07-19 RX ORDER — MINERAL OIL
133 OIL (ML) MISCELLANEOUS ONCE
Qty: 0 | Refills: 0 | Status: COMPLETED | OUTPATIENT
Start: 2017-07-19 | End: 2017-07-19

## 2017-07-19 RX ORDER — ATORVASTATIN CALCIUM 80 MG/1
40 TABLET, FILM COATED ORAL AT BEDTIME
Qty: 0 | Refills: 0 | Status: DISCONTINUED | OUTPATIENT
Start: 2017-07-19 | End: 2017-07-28

## 2017-07-19 RX ORDER — POLYETHYLENE GLYCOL 3350 17 G/17G
17 POWDER, FOR SOLUTION ORAL DAILY
Qty: 0 | Refills: 0 | Status: DISCONTINUED | OUTPATIENT
Start: 2017-07-19 | End: 2017-07-28

## 2017-07-19 RX ORDER — IPRATROPIUM/ALBUTEROL SULFATE 18-103MCG
3 AEROSOL WITH ADAPTER (GRAM) INHALATION ONCE
Qty: 0 | Refills: 0 | Status: COMPLETED | OUTPATIENT
Start: 2017-07-19 | End: 2017-07-19

## 2017-07-19 RX ORDER — FUROSEMIDE 40 MG
80 TABLET ORAL
Qty: 0 | Refills: 0 | Status: DISCONTINUED | OUTPATIENT
Start: 2017-07-19 | End: 2017-07-24

## 2017-07-19 RX ADMIN — Medication 1000 UNIT(S): at 12:24

## 2017-07-19 RX ADMIN — Medication 100 MILLIGRAM(S): at 09:36

## 2017-07-19 RX ADMIN — Medication 100 MILLIGRAM(S): at 18:35

## 2017-07-19 RX ADMIN — Medication 667 MILLIGRAM(S): at 18:35

## 2017-07-19 RX ADMIN — GABAPENTIN 100 MILLIGRAM(S): 400 CAPSULE ORAL at 12:24

## 2017-07-19 RX ADMIN — DRONEDARONE 400 MILLIGRAM(S): 400 TABLET, FILM COATED ORAL at 18:35

## 2017-07-19 RX ADMIN — Medication 200 MILLIGRAM(S): at 18:36

## 2017-07-19 RX ADMIN — Medication 2: at 08:56

## 2017-07-19 RX ADMIN — Medication 3 MILLILITER(S): at 16:01

## 2017-07-19 RX ADMIN — Medication 200 MILLIGRAM(S): at 10:42

## 2017-07-19 RX ADMIN — Medication 100 MILLIGRAM(S): at 12:25

## 2017-07-19 RX ADMIN — POLYETHYLENE GLYCOL 3350 17 GRAM(S): 17 POWDER, FOR SOLUTION ORAL at 12:24

## 2017-07-19 RX ADMIN — Medication 7 UNIT(S): at 14:03

## 2017-07-19 RX ADMIN — Medication 3 MILLILITER(S): at 04:15

## 2017-07-19 RX ADMIN — PANTOPRAZOLE SODIUM 40 MILLIGRAM(S): 20 TABLET, DELAYED RELEASE ORAL at 06:17

## 2017-07-19 RX ADMIN — DRONEDARONE 400 MILLIGRAM(S): 400 TABLET, FILM COATED ORAL at 06:17

## 2017-07-19 RX ADMIN — Medication: at 14:03

## 2017-07-19 RX ADMIN — HEPARIN SODIUM 5000 UNIT(S): 5000 INJECTION INTRAVENOUS; SUBCUTANEOUS at 06:17

## 2017-07-19 RX ADMIN — Medication 3 MILLILITER(S): at 09:39

## 2017-07-19 RX ADMIN — SENNA PLUS 1 TABLET(S): 8.6 TABLET ORAL at 12:25

## 2017-07-19 RX ADMIN — Medication 80 MILLIGRAM(S): at 18:54

## 2017-07-19 RX ADMIN — Medication 3 MILLILITER(S): at 11:13

## 2017-07-19 RX ADMIN — Medication 650 MILLIGRAM(S): at 18:53

## 2017-07-19 RX ADMIN — Medication 667 MILLIGRAM(S): at 08:57

## 2017-07-19 RX ADMIN — Medication 200 MILLIGRAM(S): at 00:06

## 2017-07-19 RX ADMIN — Medication 650 MILLIGRAM(S): at 19:34

## 2017-07-19 RX ADMIN — Medication 7 UNIT(S): at 18:38

## 2017-07-19 RX ADMIN — Medication 81 MILLIGRAM(S): at 12:24

## 2017-07-19 RX ADMIN — Medication 5 MILLIGRAM(S): at 08:57

## 2017-07-19 RX ADMIN — Medication 7 UNIT(S): at 08:56

## 2017-07-19 RX ADMIN — HEPARIN SODIUM 5000 UNIT(S): 5000 INJECTION INTRAVENOUS; SUBCUTANEOUS at 15:21

## 2017-07-19 RX ADMIN — CARVEDILOL PHOSPHATE 25 MILLIGRAM(S): 80 CAPSULE, EXTENDED RELEASE ORAL at 18:35

## 2017-07-19 RX ADMIN — Medication 80 MILLIGRAM(S): at 06:17

## 2017-07-19 RX ADMIN — Medication 133 MILLILITER(S): at 18:38

## 2017-07-19 RX ADMIN — CARVEDILOL PHOSPHATE 25 MILLIGRAM(S): 80 CAPSULE, EXTENDED RELEASE ORAL at 06:17

## 2017-07-19 RX ADMIN — Medication 667 MILLIGRAM(S): at 12:24

## 2017-07-19 NOTE — PROGRESS NOTE ADULT - PROBLEM SELECTOR PLAN 2
Cr baseline 1.8, Cr. 7.05 on admission, currently 3.39 (trending down)  Jose in place for HD in rt IJ, received HD on 7/12 and 7/13.   cxr revealed diffuse pulmonary edema  nephro recs: HD today due to overloaded status

## 2017-07-19 NOTE — PROGRESS NOTE ADULT - SUBJECTIVE AND OBJECTIVE BOX
No pain, no shortness of breath      VITAL:  T(C): , Max: 37.1 (07-18-17 @ 14:36)  T(F): , Max: 98.8 (07-18-17 @ 14:36)  HR: 88 (07-19-17 @ 07:30)  BP: 144/76 (07-19-17 @ 06:28)  BP(mean): --  RR: 18 (07-19-17 @ 06:28)  SpO2: 96% (07-19-17 @ 07:30)      PHYSICAL EXAM:  Constitutional: Alert, obese, NAD  HEENT: NCAT, DMM  Neck: Supple, (+)RIJ shiley  Respiratory: grossly clear b/l  Cardiovascular: Irreg S1S2  Gastrointestinal: soft, NTND  Extremities: 2-3+ b/l LE edema      LABS:                        10.9   6.46  )-----------( 187      ( 19 Jul 2017 06:25 )             35.0     Na(134)/K(5.3)/Cl(96)/HCO3(20)/BUN(89)/Cr(3.39)Glu(101)/Ca(9.4)/Mg(2.3)/PO4(4.2)    07-18 @ 06:20  Na(138)/K(4.7)/Cl(97)/HCO3(26)/BUN(70)/Cr(3.60)Glu(44)/Ca(9.0)/Mg(2.3)/PO4(3.7)    07-17 @ 05:50        IMPRESSION: 56F w/ HTN, DM2, and HFrEF(20%)-AICD, 7/12/17 a/w hypoglycemia, respiratory acidosis, and ALISA    (1)Renal - CKD3 with base creatinine mid 1s; ALISA superimposed; s/p multiple HD sessions this admission; last dialyzed Saturday 7/15/17. Hopefully she does not require any further dialysis during this admission. Labs today are pending.    (2)Hyperphosphatemia - ALISA-associated. On Phoslo TID with meals.     (3)Hyperkalemia - mild; no therapy for it needed as of 7/18/17; labs today are pending.    (4)CV - edematous - now on standing IV lasix - hopefully she can tolerate the diuresis without further worsening of her renal parameters.    RECOMMEND:  (1)Lasix 80mg iv qd  (2)BMP at least daily for now  (3)Would favor d/c of shiley today if:          (a)K+ 5.5 or lower   AND          (b)BUN 95 or lower  AND          (c)Creatinine 3.6 or lower            Perfecto Rae MD  [facility]  [phone] No pain, no shortness of breath. Reduced urine output of late, per patient.       VITAL:  T(C): , Max: 37.1 (07-18-17 @ 14:36)  T(F): , Max: 98.8 (07-18-17 @ 14:36)  HR: 88 (07-19-17 @ 07:30)  BP: 144/76 (07-19-17 @ 06:28)  BP(mean): --  RR: 18 (07-19-17 @ 06:28)  SpO2: 96% (07-19-17 @ 07:30)      PHYSICAL EXAM:  Constitutional: Lethargic but alert; obese  HEENT: NCAT, DMM  Neck: Supple, (+)RIJ shiley  Respiratory: grossly clear b/l  Cardiovascular: Irreg S1S2  Gastrointestinal: soft, NTND  Extremities: 2-3+ b/l LE edema      LABS:                        10.9   6.46  )-----------( 187      ( 19 Jul 2017 06:25 )             35.0     Na(134)/K(5.3)/Cl(96)/HCO3(20)/BUN(89)/Cr(3.39)Glu(101)/Ca(9.4)/Mg(2.3)/PO4(4.2)    07-18 @ 06:20  Na(138)/K(4.7)/Cl(97)/HCO3(26)/BUN(70)/Cr(3.60)Glu(44)/Ca(9.0)/Mg(2.3)/PO4(3.7)    07-17 @ 05:50        IMPRESSION: 56F w/ HTN, DM2, and HFrEF(20%)-AICD, 7/12/17 a/w hypoglycemia, respiratory acidosis, and ALISA    (1)Renal - CKD3 with base creatinine mid 1s; ALISA superimposed; s/p multiple HD sessions this admission; last dialyzed Saturday 7/15/17. Hopefully she does not require any further dialysis during this admission. Labs today are pending.    (2)Hyperphosphatemia - ALISA-associated. On Phoslo TID with meals.     (3)Hyperkalemia - mild; no therapy for it needed as of 7/18/17; labs today are pending.    (4)CV - edematous - now on standing IV lasix - hopefully she can tolerate the diuresis without further worsening of her renal parameters.    RECOMMEND:  (1)Lasix 80mg iv qd  (2)BMP at least daily for now  (3)Would favor d/c of shiley today if:          (a)K+ 5.5 or lower   AND          (b)BUN 95 or lower  AND          (c)Creatinine 3.6 or lower            Perfecto Rae MD  [facility]  [phone]

## 2017-07-19 NOTE — PROGRESS NOTE ADULT - SUBJECTIVE AND OBJECTIVE BOX
Patient is a 56y old  Female who presents with a chief complaint of hypoglycemia (12 Jul 2017 18:07)      SUBJECTIVE / OVERNIGHT EVENTS:    Pt. seen and examined at bedside. States she was feeling short of breath and was coughing up copious sputum. States she was able to void as much as yesterday. C/o constipation. Denies f/c/n/v/d/cp    MEDICATIONS  (STANDING):  aspirin enteric coated 81 milliGRAM(s) Oral daily  dronedarone 400 milliGRAM(s) Oral two times a day  cholecalciferol 1000 Unit(s) Oral daily  pantoprazole    Tablet 40 milliGRAM(s) Oral before breakfast  atorvastatin 80 milliGRAM(s) Oral at bedtime  carvedilol 25 milliGRAM(s) Oral every 12 hours  dextrose 5%. 1000 milliLiter(s) (50 mL/Hr) IV Continuous <Continuous>  dextrose 50% Injectable 12.5 Gram(s) IV Push once  dextrose 50% Injectable 25 Gram(s) IV Push once  dextrose 50% Injectable 25 Gram(s) IV Push once  calcium acetate 667 milliGRAM(s) Oral three times a day with meals  heparin  Injectable 5000 Unit(s) SubCutaneous every 8 hours  gabapentin 100 milliGRAM(s) Oral daily  ALBUTerol/ipratropium for Nebulization 3 milliLiter(s) Nebulizer every 6 hours  senna 1 Tablet(s) Oral daily  docusate sodium 100 milliGRAM(s) Oral daily  insulin lispro Injectable (HumaLOG) 7 Unit(s) SubCutaneous three times a day before meals  insulin glargine Injectable (LANTUS) 50 Unit(s) SubCutaneous at bedtime  insulin lispro (HumaLOG) corrective regimen sliding scale   SubCutaneous three times a day before meals  insulin lispro (HumaLOG) corrective regimen sliding scale   SubCutaneous at bedtime  polyethylene glycol 3350 17 Gram(s) Oral daily  furosemide   Injectable 80 milliGRAM(s) IV Push two times a day    MEDICATIONS  (PRN):  dextrose Gel 1 Dose(s) Oral once PRN Blood Glucose LESS THAN 70 milliGRAM(s)/deciLiter  glucagon  Injectable 1 milliGRAM(s) IntraMuscular once PRN Glucose <70 milliGRAM(s)/deciLiter  acetaminophen   Tablet. 650 milliGRAM(s) Oral every 6 hours PRN Mild Pain (1 - 3)  guaiFENesin   Syrup  (Sugar-Free) 200 milliGRAM(s) Oral every 6 hours PRN Cough  benzonatate 100 milliGRAM(s) Oral every 8 hours PRN Cough  bisacodyl 5 milliGRAM(s) Oral every 12 hours PRN Constipation      Vital Signs Last 24 Hrs  T(C): 36.5 (07-19-17 @ 12:09), Max: 36.8 (07-19-17 @ 06:28)  HR: 89 (07-19-17 @ 18:29) (80 - 92)  BP: 138/68 (07-19-17 @ 18:29) (102/55 - 144/76)  RR: 20 (07-19-17 @ 18:29) (17 - 20)  SpO2: 98% (07-19-17 @ 18:29) (95% - 100%)  CAPILLARY BLOOD GLUCOSE  126 (19 Jul 2017 17:35)  173 (19 Jul 2017 12:09)  177 (19 Jul 2017 08:41)  125 (18 Jul 2017 22:25)        I&O's Summary    18 Jul 2017 07:01  -  19 Jul 2017 07:00  --------------------------------------------------------  IN: 0 mL / OUT: 1000 mL / NET: -1000 mL    19 Jul 2017 07:01  -  19 Jul 2017 18:39  --------------------------------------------------------  IN: 0 mL / OUT: 720 mL / NET: -720 mL        PHYSICAL EXAM:  GENERAL: NAD, obese, overloaded  HEAD:  Atraumatic, Normocephalic  EYES: EOMI, PERRLA, conjunctiva and sclera clear  NECK: Supple, No JVD  CHEST/LUNG: Clear to auscultation bilaterally; No wheeze  HEART: Regular rate and rhythm; No murmurs, rubs, or gallops  ABDOMEN: Soft, Nontender, Nondistended; Bowel sounds present  EXTREMITIES:  2+ Peripheral Pulses, No clubbing, cyanosis, or edema  PSYCH: AAOx3  NEUROLOGY: non-focal  SKIN: No rashes or lesions    LABS:  (07-19 @ 06:25)                        10.9  6.46 )-----------( 187                 35.0    Neutrophils = -- (--%)  Lymphocytes = -- (--%)  Eosinophils = -- (--%)  Basophils = -- (--%)  Monocytes = -- (--%)  Bands = --%    WBC Trend: 6.46<--, 7.15<--, 6.88<--  Hb Trend: 10.9<--, 11.4<--, 11.6<--, 13.1<--, 12.5<--  Plt Trend: 187<--, 163<--, 139<--, 151<--, 98<--  07-19    137  |  98  |  100<H>  ----------------------------<  131<H>  5.2   |  27  |  3.58<H>    Ca    9.6      19 Jul 2017 06:25  Phos  5.4     07-19  Mg     2.3     07-19      Creatinine Trend: 3.58<--, 3.39<--, 3.60<--, 4.89<--, 4.85<--, 3.71<--            Microbiology:  Urine Cx:  Blood Cx:    RADIOLOGY & ADDITIONAL TESTS:  X- Ray:  CT:  Ultrasound:  [ ] imaging personally reviewed and interpreted by me    Consultant(s) Notes Reviewed:      Care Discussed with Consultants/Other Providers:

## 2017-07-19 NOTE — PROGRESS NOTE ADULT - SUBJECTIVE AND OBJECTIVE BOX
CHIEF COMPLAINT she is lethargic and tired in the chair complaining of constipation and no bowel movement for the past 3 days    SUBJECTIVE: cconstipation    REVIEW OF SYSTEMS:    CONSTITUTIONAL: ( + )  weakness,  ( - ) fevers or chills  EYES/ENT: (-  )visual changes;     NECK: (  -) pain or stiffness  RESPIRATORY:   ( - )cough, wheezing, hemoptysis;  ( +       ) shortness of breath  CARDIOVASCULAR:  (-  )chest pain or palpitations  GASTROINTESTINAL:   ( - )abdominal or epigastric pain.  ( - ) nausea, vomiting, or hematemesis;   (  + ) diarrhea or constipation.   GENITOURINARY:   (   - ) dysuria, frequency or hematuria  NEUROLOGICAL:  ( +  ) numbness or weakness   All other review of systems is negative unless indicated above    Vital Signs Last 24 Hrs  T(C): 36.5 (19 Jul 2017 12:09), Max: 36.8 (19 Jul 2017 06:28)  T(F): 97.7 (19 Jul 2017 12:09), Max: 98.2 (19 Jul 2017 06:28)  HR: 89 (19 Jul 2017 18:29) (80 - 92)  BP: 138/68 (19 Jul 2017 18:29) (102/55 - 144/76)  BP(mean): --  RR: 20 (19 Jul 2017 18:29) (17 - 20)  SpO2: 98% (19 Jul 2017 18:29) (95% - 100%)    I&O's Summary    18 Jul 2017 07:01  -  19 Jul 2017 07:00  --------------------------------------------------------  IN: 0 mL / OUT: 1000 mL / NET: -1000 mL    19 Jul 2017 07:01  -  19 Jul 2017 18:49  --------------------------------------------------------  IN: 0 mL / OUT: 720 mL / NET: -720 mL        CAPILLARY BLOOD GLUCOSE  126 (19 Jul 2017 17:35)  173 (19 Jul 2017 12:09)  177 (19 Jul 2017 08:41)  125 (18 Jul 2017 22:25)          PHYSICAL EXAM:    Constitutional:  ( - ) NAD,   ( +  )awake and alert  HEENT: PERR, EOMI,    Neck: Soft and supple, No LAD, No JVD  Respiratory:  (  -  Breath sounds are clear bilaterally,    ( -) wheezing, rales or rhonchi  Cardiovascular:     (  + )S1 and S2, regular rate and rhythm, no Murmurs, gallops or rubs  Gastrointestinal:  ( +  )Bowel Sounds present, soft,   ( - )nontender, nondistended,    Extremities:    ( - ) peripheral edema  Vascular: 2+ peripheral pulses  Neurological:    (  +  )A/O x 3,   ( - ) focal deficits  Musculoskeletal:    ( -  )  normal strength b/l upper  (  -   ) normal  lower extremities  Skin: No rashes    MEDICATIONS:  MEDICATIONS  (STANDING):  aspirin enteric coated 81 milliGRAM(s) Oral daily  dronedarone 400 milliGRAM(s) Oral two times a day  cholecalciferol 1000 Unit(s) Oral daily  pantoprazole    Tablet 40 milliGRAM(s) Oral before breakfast  carvedilol 25 milliGRAM(s) Oral every 12 hours  dextrose 5%. 1000 milliLiter(s) (50 mL/Hr) IV Continuous <Continuous>  dextrose 50% Injectable 12.5 Gram(s) IV Push once  dextrose 50% Injectable 25 Gram(s) IV Push once  dextrose 50% Injectable 25 Gram(s) IV Push once  calcium acetate 667 milliGRAM(s) Oral three times a day with meals  heparin  Injectable 5000 Unit(s) SubCutaneous every 8 hours  gabapentin 100 milliGRAM(s) Oral daily  ALBUTerol/ipratropium for Nebulization 3 milliLiter(s) Nebulizer every 6 hours  senna 1 Tablet(s) Oral daily  docusate sodium 100 milliGRAM(s) Oral daily  insulin lispro Injectable (HumaLOG) 7 Unit(s) SubCutaneous three times a day before meals  insulin glargine Injectable (LANTUS) 50 Unit(s) SubCutaneous at bedtime  insulin lispro (HumaLOG) corrective regimen sliding scale   SubCutaneous three times a day before meals  insulin lispro (HumaLOG) corrective regimen sliding scale   SubCutaneous at bedtime  polyethylene glycol 3350 17 Gram(s) Oral daily  furosemide   Injectable 80 milliGRAM(s) IV Push two times a day  atorvastatin 40 milliGRAM(s) Oral at bedtime      LABS: All Labs Reviewed:                        10.9   6.46  )-----------( 187      ( 19 Jul 2017 06:25 )             35.0     07-19    137  |  98  |  100<H>  ----------------------------<  131<H>  5.2   |  27  |  3.58<H>    Ca    9.6      19 Jul 2017 06:25  Phos  5.4     07-19  Mg     2.3     07-19            Blood Culture:   Urine Culture      RADIOLOGY/EKG:    ASSESSMENT AND PLAN:  1-ALISA  discussed with renal to be dialyzed tonight    2- CAD/CHF the current management    3 -DM/S/P HYPOGLYCEMIA stable with current insulin dose  4- constipation will use Fleet enema tonight    DVT PPX:    ADVANCED DIRECTIVE:    DISPOSITION: I had a long discussion with patient and  team  is not safe to go home t 2 weeks for medical management and decision about possible chronic    HD  she agreed

## 2017-07-19 NOTE — PROGRESS NOTE ADULT - SUBJECTIVE AND OBJECTIVE BOX
House Cardiology Progress Note  Consult Spectra [id number]    Interval Events:  Asked to evaluate pt's HF.  Previously evaluated for elevated enzymes in setting of hypoglycemia.    Review of Systems:  Constitutional: [ ] Fever [ ] Chills [ ] Fatigue [ ] Weight change   HEENT: [ ] Blurred vision [ ] Eye Pain [ ] Headache [ ] Runny nose [ ] Sore Throat   Respiratory: [ ] Cough [ ] Wheezing [ ] Shortness of breath  Cardiovascular: [ ] Chest Pain [ ] Palpitations [ ] NICHOLSON [ ] PND [ ] Orthopnea  Gastrointestinal: [ ] Abdominal Pain [ ] Diarrhea [ ] Constipation [ ] Hemorrhoids [ ] Nausea [ ] Vomiting  Genitourinary: [ ] Nocturia [ ] Dysuria [ ] Incontinence  Extremities: [ ] Swelling [ ] Joint Pain  Neurologic: [ ] Focal deficit [ ] Paresthesias [ ] Syncope  Skin: [ ] Rash [ ] Ecchymoses [ ] Wounds [ ] Lesions  Psychiatry: [ ] Depression [ ] Suicidal/Homicidal Ideation [ ] Anxiety [ ] Sleep Disturbances  [ ] 10 point review of systems is otherwise negative except as mentioned above            [ ]Unable to obtain      MEDICATIONS:  aspirin enteric coated 81 milliGRAM(s) Oral daily  dronedarone 400 milliGRAM(s) Oral two times a day  carvedilol 25 milliGRAM(s) Oral every 12 hours  heparin  Injectable 5000 Unit(s) SubCutaneous every 8 hours  furosemide   Injectable 80 milliGRAM(s) IV Push two times a day      guaiFENesin   Syrup  (Sugar-Free) 200 milliGRAM(s) Oral every 6 hours PRN  ALBUTerol/ipratropium for Nebulization 3 milliLiter(s) Nebulizer every 6 hours  benzonatate 100 milliGRAM(s) Oral every 8 hours PRN    gabapentin 100 milliGRAM(s) Oral daily  acetaminophen   Tablet. 650 milliGRAM(s) Oral every 6 hours PRN    pantoprazole    Tablet 40 milliGRAM(s) Oral before breakfast  senna 1 Tablet(s) Oral daily  docusate sodium 100 milliGRAM(s) Oral daily  polyethylene glycol 3350 17 Gram(s) Oral daily  bisacodyl 5 milliGRAM(s) Oral every 12 hours PRN    atorvastatin 80 milliGRAM(s) Oral at bedtime  dextrose Gel 1 Dose(s) Oral once PRN  dextrose 50% Injectable 12.5 Gram(s) IV Push once  dextrose 50% Injectable 25 Gram(s) IV Push once  dextrose 50% Injectable 25 Gram(s) IV Push once  glucagon  Injectable 1 milliGRAM(s) IntraMuscular once PRN  insulin lispro Injectable (HumaLOG) 7 Unit(s) SubCutaneous three times a day before meals  insulin glargine Injectable (LANTUS) 50 Unit(s) SubCutaneous at bedtime  insulin lispro (HumaLOG) corrective regimen sliding scale   SubCutaneous three times a day before meals  insulin lispro (HumaLOG) corrective regimen sliding scale   SubCutaneous at bedtime    cholecalciferol 1000 Unit(s) Oral daily  dextrose 5%. 1000 milliLiter(s) IV Continuous <Continuous>  calcium acetate 667 milliGRAM(s) Oral three times a day with meals        PHYSICAL EXAM:  T(C): 36.5 (17 @ 12:09), Max: 36.8 (17 @ 06:28)  HR: 82 (17 @ 12:09) (80 - 92)  BP: 102/57 (17 @ 12:09) (102/55 - 144/76)  RR: 17 (17 @ 12:09) (16 - 18)  SpO2: 98% (17 @ 12:09) (95% - 100%)  Wt(kg): --  I&O's Summary    2017 07:  -  2017 07:00  --------------------------------------------------------  IN: 0 mL / OUT: 1000 mL / NET: -1000 mL    2017 07:01  -  2017 16:00  --------------------------------------------------------  IN: 0 mL / OUT: 720 mL / NET: -720 mL      Daily     Daily Weight in k.5 (2017 06:28)    Appearance: Normal	  HEENT:   Normal oral mucosa, PERRL, EOMI	  Lymphatic: No lymphadenopathy  Cardiovascular: Normal S1 S2, No JVD, No murmurs, No edema  Respiratory: Lungs clear to auscultation	  Psychiatry: A & O x 3, Mood & affect appropriate  Gastrointestinal:  soft nt nd, normoactive bs  Skin: No rashes, No ecchymoses, No cyanosis	  Neurologic: Non-focal  Extremities: Normal range of motion, No clubbing, cyanosis  Vascular: Peripheral pulses palpable 2+ bilaterally    LABS:	 	    CBC Full  -  ( 2017 06:25 )  WBC Count : 6.46 K/uL  Hemoglobin : 10.9 g/dL  Hematocrit : 35.0 %  Platelet Count - Automated : 187 K/uL  Mean Cell Volume : 89.5 fL  Mean Cell Hemoglobin : 27.9 pg  Mean Cell Hemoglobin Concentration : 31.1 %        137  |  98  |  100<H>  ----------------------------<  131<H>  5.2   |  27  |  3.58<H>      134<L>  |  96<L>  |  89<H>  ----------------------------<  101<H>  5.3   |  20<L>  |  3.39<H>    Ca    9.6      2017 06:25  Ca    9.4      2017 06:20  Phos  5.4     -  Phos  4.2     07-18  Mg     2.3     -19  Mg     2.3     -18        proBNP:   Lipid Profile:   HgA1c: Hemoglobin A1C, Whole Blood: 9.2 % ( @ 20:15)    TSH:     CARDIAC MARKERS:      TELEMETRY: 	    ECG:  	  RADIOLOGY:  OTHER: 	    PREVIOUS DIAGNOSTIC TESTING:    [x] Echocardiogram: < from: Transthoracic Echocardiogram (17 @ 09:48) >  CONCLUSIONS:  1. Mitral annular calcification, otherwise normal mitral  valve. Moderate mitral regurgitation.  2. Moderate left ventricular enlargement.  3. Severe global left ventricular systolic dysfunction.  4. Right ventricular enlargement with decreased right  ventricular systolic function.  A device wire is noted in  the right heart.  [x] Catheterization: < from: Cardiac Cath Lab (12 @ 14:28) >  Coronary vessels: The coronary circulation is right dominant.  LM:      LM: Normal.  LAD:      LAD: Normal.  CX:      Circumflex: Normal.  RCA:      RCA: Normal.  Complications: There were no complications.  [x] Stress Test:  	< from: Nuclear Stress Test, Pharmacologic (11 @ 10:14) >  IMPRESSIONS:Abnormal Study  * Myocardial Perfusion SPECT results are abnormal.  * There is a somewhat non-homogeneous (patchy) pattern of  tracer uptake with pronounced apical thinning that is most  consistent with non-specific dilated cardiomyopathy,  rather than epicardial CAD.  * Post-stress gated wall motion analysis was performed  (LVEF = 18 %;LVEDV = 271 ml.), revealing severe diffuse  hypokinesis. House Cardiology Progress Note  Consult Spectra [id number]    Interval Events:  Asked to evaluate pt's HF.  Previously evaluated for elevated enzymes in setting of hypoglycemia /medication overdose.    Review of Systems:  Constitutional: [ ] Fever [ ] Chills [ ] Fatigue [ ] Weight change   HEENT: [ ] Blurred vision [ ] Eye Pain [ ] Headache [ ] Runny nose [ ] Sore Throat   Respiratory: [ ] Cough [ ] Wheezing [ ] Shortness of breath  Cardiovascular: [ ] Chest Pain [ ] Palpitations [ ] NICHOLSON [ ] PND [ ] Orthopnea  Gastrointestinal: [ ] Abdominal Pain [ ] Diarrhea [ ] Constipation [ ] Hemorrhoids [ ] Nausea [ ] Vomiting  Genitourinary: [ ] Nocturia [ ] Dysuria [ ] Incontinence  Extremities: [ ] Swelling [ ] Joint Pain  Neurologic: [ ] Focal deficit [ ] Paresthesias [ ] Syncope  Skin: [ ] Rash [ ] Ecchymoses [ ] Wounds [ ] Lesions  Psychiatry: [ ] Depression [ ] Suicidal/Homicidal Ideation [ ] Anxiety [ ] Sleep Disturbances  [ ] 10 point review of systems is otherwise negative except as mentioned above            [ ]Unable to obtain      MEDICATIONS:  aspirin enteric coated 81 milliGRAM(s) Oral daily  dronedarone 400 milliGRAM(s) Oral two times a day  carvedilol 25 milliGRAM(s) Oral every 12 hours  heparin  Injectable 5000 Unit(s) SubCutaneous every 8 hours  furosemide   Injectable 80 milliGRAM(s) IV Push two times a day      guaiFENesin   Syrup  (Sugar-Free) 200 milliGRAM(s) Oral every 6 hours PRN  ALBUTerol/ipratropium for Nebulization 3 milliLiter(s) Nebulizer every 6 hours  benzonatate 100 milliGRAM(s) Oral every 8 hours PRN    gabapentin 100 milliGRAM(s) Oral daily  acetaminophen   Tablet. 650 milliGRAM(s) Oral every 6 hours PRN    pantoprazole    Tablet 40 milliGRAM(s) Oral before breakfast  senna 1 Tablet(s) Oral daily  docusate sodium 100 milliGRAM(s) Oral daily  polyethylene glycol 3350 17 Gram(s) Oral daily  bisacodyl 5 milliGRAM(s) Oral every 12 hours PRN    atorvastatin 80 milliGRAM(s) Oral at bedtime  dextrose Gel 1 Dose(s) Oral once PRN  dextrose 50% Injectable 12.5 Gram(s) IV Push once  dextrose 50% Injectable 25 Gram(s) IV Push once  dextrose 50% Injectable 25 Gram(s) IV Push once  glucagon  Injectable 1 milliGRAM(s) IntraMuscular once PRN  insulin lispro Injectable (HumaLOG) 7 Unit(s) SubCutaneous three times a day before meals  insulin glargine Injectable (LANTUS) 50 Unit(s) SubCutaneous at bedtime  insulin lispro (HumaLOG) corrective regimen sliding scale   SubCutaneous three times a day before meals  insulin lispro (HumaLOG) corrective regimen sliding scale   SubCutaneous at bedtime    cholecalciferol 1000 Unit(s) Oral daily  dextrose 5%. 1000 milliLiter(s) IV Continuous <Continuous>  calcium acetate 667 milliGRAM(s) Oral three times a day with meals        PHYSICAL EXAM:  T(C): 36.5 (17 @ 12:09), Max: 36.8 (17 @ 06:28)  HR: 82 (17 @ 12:09) (80 - 92)  BP: 102/57 (17 @ 12:09) (102/55 - 144/76)  RR: 17 (17 @ 12:09) (16 - 18)  SpO2: 98% (17 @ 12:09) (95% - 100%)  Wt(kg): --  I&O's Summary    2017 07:  -  2017 07:00  --------------------------------------------------------  IN: 0 mL / OUT: 1000 mL / NET: -1000 mL    2017 07:01  -  2017 16:00  --------------------------------------------------------  IN: 0 mL / OUT: 720 mL / NET: -720 mL      Daily     Daily Weight in k.5 (2017 06:28)    Appearance: Normal	  HEENT:   Normal oral mucosa, PERRL, EOMI	  Lymphatic: No lymphadenopathy  Cardiovascular: Normal S1 S2, No JVD, No murmurs, No edema  Respiratory: Lungs clear to auscultation	  Psychiatry: A & O x 3, Mood & affect appropriate  Gastrointestinal:  soft nt nd, normoactive bs  Skin: No rashes, No ecchymoses, No cyanosis	  Neurologic: Non-focal  Extremities: Normal range of motion, No clubbing, cyanosis  Vascular: Peripheral pulses palpable 2+ bilaterally    LABS:	 	    CBC Full  -  ( 2017 06:25 )  WBC Count : 6.46 K/uL  Hemoglobin : 10.9 g/dL  Hematocrit : 35.0 %  Platelet Count - Automated : 187 K/uL  Mean Cell Volume : 89.5 fL  Mean Cell Hemoglobin : 27.9 pg  Mean Cell Hemoglobin Concentration : 31.1 %        137  |  98  |  100<H>  ----------------------------<  131<H>  5.2   |  27  |  3.58<H>      134<L>  |  96<L>  |  89<H>  ----------------------------<  101<H>  5.3   |  20<L>  |  3.39<H>    Ca    9.6      2017 06:25  Ca    9.4      2017 06:20  Phos  5.4     -  Phos  4.2     -18  Mg     2.3     -19  Mg     2.3     -18        proBNP:   Lipid Profile:   HgA1c: Hemoglobin A1C, Whole Blood: 9.2 % ( @ 20:15)    TSH:     CARDIAC MARKERS:      TELEMETRY: 	    ECG:  	  RADIOLOGY:  OTHER: 	    PREVIOUS DIAGNOSTIC TESTING:    [x] Echocardiogram: < from: Transthoracic Echocardiogram (17 @ 09:48) >  CONCLUSIONS:  1. Mitral annular calcification, otherwise normal mitral  valve. Moderate mitral regurgitation.  2. Moderate left ventricular enlargement.  3. Severe global left ventricular systolic dysfunction.  4. Right ventricular enlargement with decreased right  ventricular systolic function.  A device wire is noted in  the right heart.  [x] Catheterization: < from: Cardiac Cath Lab (12 @ 14:28) >  Coronary vessels: The coronary circulation is right dominant.  LM:      LM: Normal.  LAD:      LAD: Normal.  CX:      Circumflex: Normal.  RCA:      RCA: Normal.  Complications: There were no complications.  [x] Stress Test:  	< from: Nuclear Stress Test, Pharmacologic (11 @ 10:14) >  IMPRESSIONS:Abnormal Study  * Myocardial Perfusion SPECT results are abnormal.  * There is a somewhat non-homogeneous (patchy) pattern of  tracer uptake with pronounced apical thinning that is most  consistent with non-specific dilated cardiomyopathy,  rather than epicardial CAD.  * Post-stress gated wall motion analysis was performed  (LVEF = 18 %;LVEDV = 271 ml.), revealing severe diffuse  hypokinesis. [facility] Progress Note  Consult Spectra [id number]    Interval Events:  Asked to evaluate pt's HF treatment.  Previously evaluated for elevated enzymes in setting of hypoglycemia /medication overdose.    Pt reports she feels SOB and orthopnea, but is improved from prior.    Review of Systems:  Constitutional: [- ] Fever [ -] Chills [ ] Fatigue [ ] Weight change   HEENT: [ ] Blurred vision [ ] Eye Pain [- ] Headache [ ] Runny nose [ ] Sore Throat   Respiratory: [ ] Cough [ ] Wheezing [x ] Shortness of breath  Cardiovascular: [- ] Chest Pain [- ] Palpitations [x ] NICHOLSON [ ] PND [x ] Orthopnea  Gastrointestinal: [ ] Abdominal Pain [ ] Diarrhea [ ] Constipation [ ] Hemorrhoids [- ] Nausea [- ] Vomiting  Genitourinary: [ ] Nocturia [- ] Dysuria [ ] Incontinence  Extremities: [x ] Swelling [- ] Joint Pain  Neurologic: [ ] Focal deficit [ ] Paresthesias [- ] Syncope  Skin: [ -] Rash [ ] Ecchymoses [ ] Wounds [ ] Lesions  Psychiatry: [- ] Depression [ ] Suicidal/Homicidal Ideation [ ] Anxiety [ ] Sleep Disturbances  [ x] 10 point review of systems is otherwise negative except as mentioned above            [ ]Unable to obtain      MEDICATIONS:  aspirin enteric coated 81 milliGRAM(s) Oral daily  dronedarone 400 milliGRAM(s) Oral two times a day  carvedilol 25 milliGRAM(s) Oral every 12 hours  heparin  Injectable 5000 Unit(s) SubCutaneous every 8 hours  furosemide   Injectable 80 milliGRAM(s) IV Push two times a day  guaiFENesin   Syrup  (Sugar-Free) 200 milliGRAM(s) Oral every 6 hours PRN  ALBUTerol/ipratropium for Nebulization 3 milliLiter(s) Nebulizer every 6 hours  benzonatate 100 milliGRAM(s) Oral every 8 hours PRN  gabapentin 100 milliGRAM(s) Oral daily  acetaminophen   Tablet. 650 milliGRAM(s) Oral every 6 hours PRN  pantoprazole    Tablet 40 milliGRAM(s) Oral before breakfast  senna 1 Tablet(s) Oral daily  docusate sodium 100 milliGRAM(s) Oral daily  polyethylene glycol 3350 17 Gram(s) Oral daily  bisacodyl 5 milliGRAM(s) Oral every 12 hours PRN  atorvastatin 80 milliGRAM(s) Oral at bedtime  insulin lispro Injectable (HumaLOG) 7 Unit(s) SubCutaneous three times a day before meals  insulin glargine Injectable (LANTUS) 50 Unit(s) SubCutaneous at bedtime  insulin lispro (HumaLOG) corrective regimen sliding scale   SubCutaneous three times a day before meals  insulin lispro (HumaLOG) corrective regimen sliding scale   SubCutaneous at bedtime  calcium acetate 667 milliGRAM(s) Oral three times a day with meals    PHYSICAL EXAM:  T(C): 36.5 (17 @ 12:09), Max: 36.8 (17 @ 06:28)  HR: 82 (17 @ 12:09) (80 - 92)  BP: 102/57 (17 @ 12:09) (102/55 - 144/76)  RR: 17 (17 @ 12:09) (16 - 18)  SpO2: 98% (17 @ 12:09) (95% - 100%)  Wt(kg): --  I&O's Summary    2017 07:  -  2017 07:00  --------------------------------------------------------  IN: 0 mL / OUT: 1000 mL / NET: -1000 mL    2017 07:01  -  2017 16:00  --------------------------------------------------------  IN: 0 mL / OUT: 720 mL / NET: -720 mL      Daily     Daily Weight in k.5 (2017 06:28)    Appearance: obese, no distress. boxes of [employer] at bedside  HEENT:   mmm	  Cardiovascular: s1s2 rrr, 2+ LE edema  Respiratory: mildly decreased breath sounds at the R base  Psychiatry: Mood & affect appropriate  Gastrointestinal:  soft nt nd, normoactive bs  Skin: No rashes	  Neurologic: grossly non-focal    LABS:	 	  CBC Full  -  ( 2017 06:25 )  WBC Count : 6.46 K/uL  Hemoglobin : 10.9 g/dL  Hematocrit : 35.0 %  Platelet Count - Automated : 187 K/uL        137  |  98  |  100<H>  ----------------------------<  131<H>  5.2   |  27  |  3.58<H>  07    134<L>  |  96<L>  |  89<H>  ----------------------------<  101<H>  5.3   |  20<L>  |  3.39<H>    Ca    9.6      2017 06:25  Ca    9.4      2017 06:20  Phos  5.4       Phos  4.2       Mg     2.3       Mg     2.3         PREVIOUS DIAGNOSTIC TESTING:    [x] Echocardiogram: < from: Transthoracic Echocardiogram (17 @ 09:48) >  CONCLUSIONS:  1. Mitral annular calcification, otherwise normal mitral  valve. Moderate mitral regurgitation.  2. Moderate left ventricular enlargement.  3. Severe global left ventricular systolic dysfunction.  4. Right ventricular enlargement with decreased right  ventricular systolic function.  A device wire is noted in  the right heart.  [x] Catheterization: < from: Cardiac Cath Lab (12 @ 14:28) >  Coronary vessels: The coronary circulation is right dominant.  LM:      LM: Normal.  LAD:      LAD: Normal.  CX:      Circumflex: Normal.  RCA:      RCA: Normal.  Complications: There were no complications.  [x] Stress Test:  	< from: Nuclear Stress Test, Pharmacologic (11 @ 10:14) >  IMPRESSIONS:Abnormal Study  * Myocardial Perfusion SPECT results are abnormal.  * There is a somewhat non-homogeneous (patchy) pattern of  tracer uptake with pronounced apical thinning that is most  consistent with non-specific dilated cardiomyopathy,  rather than epicardial CAD.  * Post-stress gated wall motion analysis was performed  (LVEF = 18 %;LVEDV = 271 ml.), revealing severe diffuse  hypokinesis.

## 2017-07-19 NOTE — PROGRESS NOTE ADULT - ASSESSMENT
56F HTN, DM, HFrEF 20-25% s/p  AICD, CKD, ABDIEL, admitted for hypoglycemia with hospital course c/b acute renal failure requiring HD. Pt with persistent fluid overload, with intent to trial off HD.  Pt remains on IV furosemide 80mg BID  carvedilol 25mg q12h  dronedarone 400mg BID  pt previously on Entresto. hold   hold spironolactone  dronedarone?>?  crestor 20  bumex 2 56F HTN, DM, HFrEF 20-25% s/p  AICD, CKD, ABDIEL, admitted for hypoglycemia with hospital course c/b acute renal failure requiring HD. Pt with persistent fluid overload, with intent to trial off HD.  -cont IV furosemide 80mg BID. Monitor I/O's, daily weights. pt appears to have voided 720mL's, but unclear intake. pt previously on bumex as an outpt  -lifestyle modifications. advised pt to avoid salty foods such as fried chicken  -will arrange to have pt's Med[employer] ICD interrogated  -cont carvedilol 25mg q12h  -please stop dronedarone. this medication is contraindicated given the pt's decompensated systolic heart failure and should not be continued post-discharge. if this medication was being administered for an arrhythmia will need to evaluate the role of other antiarrhythmics  -hold Entresto and spironolactone given the ARF  -decrease atorvastatin to 40mg daily.

## 2017-07-20 DIAGNOSIS — E78.5 HYPERLIPIDEMIA, UNSPECIFIED: ICD-10-CM

## 2017-07-20 LAB
BUN SERPL-MCNC: 100 MG/DL — HIGH (ref 7–23)
BUN SERPL-MCNC: 103 MG/DL — HIGH (ref 7–23)
BUN SERPL-MCNC: 106 MG/DL — HIGH (ref 7–23)
CALCIUM SERPL-MCNC: 9.2 MG/DL — SIGNIFICANT CHANGE UP (ref 8.4–10.5)
CALCIUM SERPL-MCNC: 9.5 MG/DL — SIGNIFICANT CHANGE UP (ref 8.4–10.5)
CALCIUM SERPL-MCNC: 9.5 MG/DL — SIGNIFICANT CHANGE UP (ref 8.4–10.5)
CHLORIDE SERPL-SCNC: 100 MMOL/L — SIGNIFICANT CHANGE UP (ref 98–107)
CHLORIDE SERPL-SCNC: 99 MMOL/L — SIGNIFICANT CHANGE UP (ref 98–107)
CHLORIDE SERPL-SCNC: 99 MMOL/L — SIGNIFICANT CHANGE UP (ref 98–107)
CO2 SERPL-SCNC: 26 MMOL/L — SIGNIFICANT CHANGE UP (ref 22–31)
CO2 SERPL-SCNC: 29 MMOL/L — SIGNIFICANT CHANGE UP (ref 22–31)
CO2 SERPL-SCNC: 30 MMOL/L — SIGNIFICANT CHANGE UP (ref 22–31)
CREAT SERPL-MCNC: 3.18 MG/DL — HIGH (ref 0.5–1.3)
CREAT SERPL-MCNC: 3.51 MG/DL — HIGH (ref 0.5–1.3)
CREAT SERPL-MCNC: 3.56 MG/DL — HIGH (ref 0.5–1.3)
GLUCOSE SERPL-MCNC: 103 MG/DL — HIGH (ref 70–99)
GLUCOSE SERPL-MCNC: 114 MG/DL — HIGH (ref 70–99)
GLUCOSE SERPL-MCNC: 124 MG/DL — HIGH (ref 70–99)
HCT VFR BLD CALC: 35.2 % — SIGNIFICANT CHANGE UP (ref 34.5–45)
HGB BLD-MCNC: 10.9 G/DL — LOW (ref 11.5–15.5)
MAGNESIUM SERPL-MCNC: 2.1 MG/DL — SIGNIFICANT CHANGE UP (ref 1.6–2.6)
MAGNESIUM SERPL-MCNC: 2.2 MG/DL — SIGNIFICANT CHANGE UP (ref 1.6–2.6)
MAGNESIUM SERPL-MCNC: 2.2 MG/DL — SIGNIFICANT CHANGE UP (ref 1.6–2.6)
MCHC RBC-ENTMCNC: 27.5 PG — SIGNIFICANT CHANGE UP (ref 27–34)
MCHC RBC-ENTMCNC: 31 % — LOW (ref 32–36)
MCV RBC AUTO: 88.9 FL — SIGNIFICANT CHANGE UP (ref 80–100)
NRBC # FLD: 0 — SIGNIFICANT CHANGE UP
PHOSPHATE SERPL-MCNC: 4.6 MG/DL — HIGH (ref 2.5–4.5)
PHOSPHATE SERPL-MCNC: 5.2 MG/DL — HIGH (ref 2.5–4.5)
PHOSPHATE SERPL-MCNC: 5.3 MG/DL — HIGH (ref 2.5–4.5)
PLATELET # BLD AUTO: 203 K/UL — SIGNIFICANT CHANGE UP (ref 150–400)
PMV BLD: 12.4 FL — SIGNIFICANT CHANGE UP (ref 7–13)
POTASSIUM SERPL-MCNC: 5.5 MMOL/L — HIGH (ref 3.5–5.3)
POTASSIUM SERPL-MCNC: 5.5 MMOL/L — HIGH (ref 3.5–5.3)
POTASSIUM SERPL-MCNC: 6 MMOL/L — HIGH (ref 3.5–5.3)
POTASSIUM SERPL-SCNC: 5.5 MMOL/L — HIGH (ref 3.5–5.3)
POTASSIUM SERPL-SCNC: 5.5 MMOL/L — HIGH (ref 3.5–5.3)
POTASSIUM SERPL-SCNC: 6 MMOL/L — HIGH (ref 3.5–5.3)
RBC # BLD: 3.96 M/UL — SIGNIFICANT CHANGE UP (ref 3.8–5.2)
RBC # FLD: 16.9 % — HIGH (ref 10.3–14.5)
SODIUM SERPL-SCNC: 140 MMOL/L — SIGNIFICANT CHANGE UP (ref 135–145)
SODIUM SERPL-SCNC: 140 MMOL/L — SIGNIFICANT CHANGE UP (ref 135–145)
SODIUM SERPL-SCNC: 142 MMOL/L — SIGNIFICANT CHANGE UP (ref 135–145)
WBC # BLD: 6.5 K/UL — SIGNIFICANT CHANGE UP (ref 3.8–10.5)
WBC # FLD AUTO: 6.5 K/UL — SIGNIFICANT CHANGE UP (ref 3.8–10.5)

## 2017-07-20 PROCEDURE — 99232 SBSQ HOSP IP/OBS MODERATE 35: CPT

## 2017-07-20 PROCEDURE — 93282 PRGRMG EVAL IMPLANTABLE DFB: CPT | Mod: 26,GC

## 2017-07-20 PROCEDURE — 93010 ELECTROCARDIOGRAM REPORT: CPT | Mod: 76

## 2017-07-20 RX ORDER — ALTEPLASE 100 MG
2 KIT INTRAVENOUS ONCE
Qty: 0 | Refills: 0 | Status: COMPLETED | OUTPATIENT
Start: 2017-07-20 | End: 2017-07-20

## 2017-07-20 RX ORDER — IPRATROPIUM/ALBUTEROL SULFATE 18-103MCG
3 AEROSOL WITH ADAPTER (GRAM) INHALATION ONCE
Qty: 0 | Refills: 0 | Status: COMPLETED | OUTPATIENT
Start: 2017-07-20 | End: 2017-07-20

## 2017-07-20 RX ORDER — DEXTROSE 50 % IN WATER 50 %
50 SYRINGE (ML) INTRAVENOUS ONCE
Qty: 0 | Refills: 0 | Status: DISCONTINUED | OUTPATIENT
Start: 2017-07-20 | End: 2017-07-20

## 2017-07-20 RX ORDER — SODIUM POLYSTYRENE SULFONATE 4.1 MEQ/G
30 POWDER, FOR SUSPENSION ORAL ONCE
Qty: 0 | Refills: 0 | Status: DISCONTINUED | OUTPATIENT
Start: 2017-07-20 | End: 2017-07-20

## 2017-07-20 RX ORDER — INSULIN HUMAN 100 [IU]/ML
10 INJECTION, SOLUTION SUBCUTANEOUS ONCE
Qty: 0 | Refills: 0 | Status: DISCONTINUED | OUTPATIENT
Start: 2017-07-20 | End: 2017-07-20

## 2017-07-20 RX ORDER — ALBUTEROL 90 UG/1
2.5 AEROSOL, METERED ORAL ONCE
Qty: 0 | Refills: 0 | Status: DISCONTINUED | OUTPATIENT
Start: 2017-07-20 | End: 2017-07-20

## 2017-07-20 RX ADMIN — Medication 1000 UNIT(S): at 12:24

## 2017-07-20 RX ADMIN — Medication 81 MILLIGRAM(S): at 12:25

## 2017-07-20 RX ADMIN — SENNA PLUS 1 TABLET(S): 8.6 TABLET ORAL at 12:25

## 2017-07-20 RX ADMIN — GABAPENTIN 100 MILLIGRAM(S): 400 CAPSULE ORAL at 12:25

## 2017-07-20 RX ADMIN — Medication 7 UNIT(S): at 09:06

## 2017-07-20 RX ADMIN — Medication 7 UNIT(S): at 12:24

## 2017-07-20 RX ADMIN — PANTOPRAZOLE SODIUM 40 MILLIGRAM(S): 20 TABLET, DELAYED RELEASE ORAL at 07:04

## 2017-07-20 RX ADMIN — HEPARIN SODIUM 5000 UNIT(S): 5000 INJECTION INTRAVENOUS; SUBCUTANEOUS at 14:11

## 2017-07-20 RX ADMIN — Medication 650 MILLIGRAM(S): at 16:12

## 2017-07-20 RX ADMIN — Medication 650 MILLIGRAM(S): at 17:03

## 2017-07-20 RX ADMIN — ALTEPLASE 2 MILLIGRAM(S): KIT at 20:32

## 2017-07-20 RX ADMIN — Medication 100 MILLIGRAM(S): at 16:07

## 2017-07-20 RX ADMIN — Medication 80 MILLIGRAM(S): at 07:04

## 2017-07-20 RX ADMIN — Medication 3 MILLILITER(S): at 10:08

## 2017-07-20 RX ADMIN — Medication 3 MILLILITER(S): at 15:33

## 2017-07-20 RX ADMIN — Medication 100 MILLIGRAM(S): at 12:24

## 2017-07-20 RX ADMIN — CARVEDILOL PHOSPHATE 25 MILLIGRAM(S): 80 CAPSULE, EXTENDED RELEASE ORAL at 07:04

## 2017-07-20 RX ADMIN — INSULIN GLARGINE 50 UNIT(S): 100 INJECTION, SOLUTION SUBCUTANEOUS at 00:56

## 2017-07-20 RX ADMIN — Medication 100 MILLIGRAM(S): at 02:50

## 2017-07-20 RX ADMIN — Medication 80 MILLIGRAM(S): at 17:35

## 2017-07-20 RX ADMIN — POLYETHYLENE GLYCOL 3350 17 GRAM(S): 17 POWDER, FOR SOLUTION ORAL at 12:24

## 2017-07-20 RX ADMIN — CARVEDILOL PHOSPHATE 25 MILLIGRAM(S): 80 CAPSULE, EXTENDED RELEASE ORAL at 17:35

## 2017-07-20 RX ADMIN — HEPARIN SODIUM 5000 UNIT(S): 5000 INJECTION INTRAVENOUS; SUBCUTANEOUS at 07:04

## 2017-07-20 RX ADMIN — INSULIN GLARGINE 50 UNIT(S): 100 INJECTION, SOLUTION SUBCUTANEOUS at 22:26

## 2017-07-20 RX ADMIN — Medication 200 MILLIGRAM(S): at 20:24

## 2017-07-20 RX ADMIN — Medication 5 MILLIGRAM(S): at 09:06

## 2017-07-20 RX ADMIN — Medication 667 MILLIGRAM(S): at 17:35

## 2017-07-20 RX ADMIN — Medication 7 UNIT(S): at 17:35

## 2017-07-20 RX ADMIN — Medication 667 MILLIGRAM(S): at 12:24

## 2017-07-20 RX ADMIN — Medication 667 MILLIGRAM(S): at 09:06

## 2017-07-20 RX ADMIN — Medication 3 MILLILITER(S): at 03:23

## 2017-07-20 RX ADMIN — Medication 3 MILLILITER(S): at 22:40

## 2017-07-20 RX ADMIN — Medication 200 MILLIGRAM(S): at 14:20

## 2017-07-20 RX ADMIN — Medication 4: at 12:24

## 2017-07-20 RX ADMIN — ATORVASTATIN CALCIUM 40 MILLIGRAM(S): 80 TABLET, FILM COATED ORAL at 00:56

## 2017-07-20 NOTE — CHART NOTE - NSCHARTNOTEFT_GEN_A_CORE
ELECTROPHYSIOLOGY    Device Interrogation Performed                                  Date/Time:      7/20/2017    @ 10:45am                                     Implanted 07/23/2013  :       [employer]                                 Model:           Evera XT dual chamber ICD                     Mode:                   VVI                                    Rate:  40     Atrial Lead:  P wave amplitude:               2.9           mv            Impedance                        399              Ohms  Threshold                          0.5                V @    0.5            ms      Ventricular Lead: RV  R wave amplitude            11.3               mv  Impedance                      342                 Ohms  Threshold                        0.75                V @     0.5            ms                                      Battery Status:                     Good              Underlying Rhythm:             Normal sinus rhythm    Events/Observation:            Multiple episodes of NSVT since June 2017 which correspond to the increase in the Optivol fluid index level.                                           The last episode was 07/15/2017. It was the longest -16 beats with average ventricular rate of 188bpm.                                            The fastest was 221 BPM. No therapies delivered.      Impression/Plan:  Normal ICD function.   Normal sensing and pacing via iterative testing. Good battery status. Excellent threshold capture.  No reprogramming.

## 2017-07-20 NOTE — PROGRESS NOTE ADULT - SUBJECTIVE AND OBJECTIVE BOX
No pain, no shortness of breath      VITAL:  T(C): , Max: 36.7 (07-19-17 @ 21:16)  T(F): , Max: 98.1 (07-19-17 @ 21:16)  HR: 91 (07-20-17 @ 07:35)  BP: 122/63 (07-20-17 @ 06:36)  BP(mean): --  RR: 20 (07-20-17 @ 06:36)  SpO2: 96% (07-20-17 @ 07:35)      PHYSICAL EXAM:  Constitutional: Lethargic but alert; obese  HEENT: NCAT, DMM  Neck: Supple, (+)RIJ shiley  Respiratory: grossly clear b/l  Cardiovascular: Irreg S1S2  Gastrointestinal: soft, NTND  Extremities: 2-3+ b/l LE edema      LABS:                        10.9   6.50  )-----------( 203      ( 20 Jul 2017 06:40 )             35.2     Na(140)/K(5.5)/Cl(99)/HCO3(26)/BUN(103)/Cr(3.51)Glu(114)/Ca(9.5)/Mg(2.2)/PO4(5.3)    07-20 @ 06:40  Na(137)/K(5.2)/Cl(98)/HCO3(27)/BUN(100)/Cr(3.58)Glu(131)/Ca(9.6)/Mg(2.3)/PO4(5.4)    07-19 @ 06:25  Na(134)/K(5.3)/Cl(96)/HCO3(20)/BUN(89)/Cr(3.39)Glu(101)/Ca(9.4)/Mg(2.3)/PO4(4.2)    07-18 @ 06:20      IMPRESSION: 56F w/ HTN, DM2, and HFrEF(20%)-AICD, 7/12/17 a/w hypoglycemia, respiratory acidosis, and ALISA    (1)Renal - CKD3 with base creatinine mid 1s; ALISA superimposed; s/p multiple HD sessions this admission; last received regular dialysis Saturday 7/15/17; underwent PUF yesterday. Unclear if she will be able to leave [facility] without being dependent on chronic dialysis.    (2)Hyperphosphatemia - ALISA-associated. On Phoslo TID with meals.     (3)Hyperkalemia - slowly worsening; if she ends up not on chronic dialysis, we could potentially manage her hyperkalemia as an outpatient with kayexelate versus veltassa if needed.    (4)CV - edematous and with pulmonary edema; s/p PUF yesterday.    RECOMMEND:  (1)Aggressive diuretics; Is<Os  (2)Could potentially plan for PUF again today.  (3)Continue renal diet        Perfecto Rae MD  [facility]  [phone] No shortness of breath; (+)cough/phlegm      VITAL:  T(C): , Max: 36.7 (07-19-17 @ 21:16)  T(F): , Max: 98.1 (07-19-17 @ 21:16)  HR: 91 (07-20-17 @ 07:35)  BP: 122/63 (07-20-17 @ 06:36)  BP(mean): --  RR: 20 (07-20-17 @ 06:36)  SpO2: 96% (07-20-17 @ 07:35)      PHYSICAL EXAM:  Constitutional: NAD; obese, increased alertness relative to yesterday  HEENTN: DMM, (+)RIJ shiley  Respiratory: distant b/l  Cardiovascular: Irreg S1S2  Gastrointestinal: soft, NTND  Extremities: 2-3+ b/l LE edema      LABS:                        10.9   6.50  )-----------( 203      ( 20 Jul 2017 06:40 )             35.2     Na(140)/K(5.5)/Cl(99)/HCO3(26)/BUN(103)/Cr(3.51)Glu(114)/Ca(9.5)/Mg(2.2)/PO4(5.3)    07-20 @ 06:40  Na(137)/K(5.2)/Cl(98)/HCO3(27)/BUN(100)/Cr(3.58)Glu(131)/Ca(9.6)/Mg(2.3)/PO4(5.4)    07-19 @ 06:25  Na(134)/K(5.3)/Cl(96)/HCO3(20)/BUN(89)/Cr(3.39)Glu(101)/Ca(9.4)/Mg(2.3)/PO4(4.2)    07-18 @ 06:20      IMPRESSION: 56F w/ HTN, DM2, and HFrEF(20%)-AICD, 7/12/17 a/w hypoglycemia, respiratory acidosis, and ALISA    (1)Renal - CKD3 with base creatinine mid 1s; ALISA superimposed; s/p multiple HD sessions this admission; last received regular dialysis Saturday 7/15/17; underwent PUF yesterday. Unclear if she will be able to leave [facility] without being dependent on chronic dialysis. No need for HD today.    (2)Hyperphosphatemia - ALISA-associated. On Phoslo TID with meals.     (3)Hyperkalemia - slowly worsening; if she ends up not on chronic dialysis, we could potentially manage her hyperkalemia as an outpatient with kayexelate versus veltassa if needed.    (4)CV - resolving pulmonary edema s/p HD yesterday. Nonoliguric on lasix. Hopefully we can get by with diuresis with lasix rather than having to perform further PUF. No PUF needed at present.    RECOMMEND:  (1)Aggressive diuretics; Is<Os  (2)Continue renal diet; counselled patient on avoiding oranges, orange juice, and bananas        Perfecto Rae MD  [facility]  [phone]

## 2017-07-20 NOTE — PROGRESS NOTE ADULT - SUBJECTIVE AND OBJECTIVE BOX
Chief Complaint: Uncontrolled DM2    S: Pt. w/o complaints. States appetite is good. No c/o N/V.    MEDICATIONS  (STANDING):  aspirin enteric coated 81 milliGRAM(s) Oral daily  cholecalciferol 1000 Unit(s) Oral daily  insulin lispro Injectable (HumaLOG) 7 Unit(s) SubCutaneous three times a day before meals  insulin glargine Injectable (LANTUS) 50 Unit(s) SubCutaneous at bedtime  insulin lispro (HumaLOG) corrective regimen sliding scale   SubCutaneous three times a day before meals  insulin lispro (HumaLOG) corrective regimen sliding scale   SubCutaneous at bedtime  atorvastatin 40 milliGRAM(s) Oral at bedtime    Allergies    penicillin (Unknown)    Intolerances      Review of Systems:  Constitutional: No fever  Eyes: No blurry vision  Neuro: No tremors  HEENT: No pain  Cardiovascular: No chest pain, palpitations  Respiratory: No SOB, no cough  GI: No nausea, vomiting, abdominal pain  : No dysuria  Skin: no rash  Psych: no depression  Endocrine: no polyuria, polydipsia  Hem/lymph: no swelling  Osteoporosis: no fractures    ALL OTHER SYSTEMS REVIEWED AND NEGATIVE    UNABLE TO OBTAIN    PHYSICAL EXAM:  VITALS: T(C): 36.7 (07-20-17 @ 14:43)  T(F): 98 (07-20-17 @ 14:43), Max: 98.1 (07-19-17 @ 21:16)  HR: 76 (07-20-17 @ 15:33) (76 - 92)  BP: 145/81 (07-20-17 @ 14:43) (119/58 - 145/81)  RR:  (18 - 20)  SpO2:  (95% - 100%)  Wt(kg): --  GENERAL: NAD, well-groomed, well-developed  EYES: No proptosis,anicteric  HEENT:  Atraumatic, Normocephalic, moist mucous membranes  MUSCULOSKELETAL: Full range of motion  NEURO: no tremor  PSYCH: Alert and oriented x 3, normal affect, normal mood    CAPILLARY BLOOD GLUCOSE  201 (07-20 @ 12:20)  132 (07-20 @ 09:00)  105 (07-19 @ 23:30)  126 (07-19 @ 17:35)  173 (07-19 @ 12:09)  177 (07-19 @ 08:41)  125 (07-18 @ 22:25)  154 (07-18 @ 17:20)  173 (07-18 @ 12:29)  97 (07-18 @ 08:32)  153 (07-17 @ 22:34)  145 (07-17 @ 17:41)      07-20    140  |  99  |  103<H>  ----------------------------<  114<H>  5.5<H>   |  26  |  3.51<H>    EGFR if : 16  EGFR if non : 14    Ca    9.5      07-20  Mg     2.2     07-20  Phos  5.3     07-20            Thyroid Function Tests:  07-12 @ 06:35 TSH 0.19 FreeT4 -- T3 -- Anti TPO -- Anti Thyroglobulin Ab -- TSI --      Hemoglobin A1C, Whole Blood: 9.2 % <H> [4.0 - 5.6] (07-11-17 @ 20:15)

## 2017-07-20 NOTE — PROGRESS NOTE ADULT - SUBJECTIVE AND OBJECTIVE BOX
CHIEF COMPLAINT:weak and lethargic    SUBJECTIVE:     REVIEW OF SYSTEMS:    CONSTITUTIONAL: ( + )  weakness,  ( - ) fevers or chills  EYES/ENT: ( - )visual changes;     NECK: (  ) pain or stiffness  RESPIRATORY:   ( - )cough, wheezing, hemoptysis;  ( + ) shortness of breath  CARDIOVASCULAR:  (  =)chest pain or palpitations  GASTROINTESTINAL:   ( = )abdominal or epigastric pain.  ( = ) nausea, vomiting, or hematemesis;   ( +  ) diarrhea or constipation.   GENITOURINARY:   (   - ) dysuria, frequency or hematuria  NEUROLOGICAL:  (  + ) numbness or weakness   All other review of systems is negative unless indicated above    Vital Signs Last 24 Hrs  T(C): 36.6 (20 Jul 2017 21:40), Max: 36.9 (20 Jul 2017 21:12)  T(F): 97.8 (20 Jul 2017 21:40), Max: 98.5 (20 Jul 2017 21:12)  HR: 87 (20 Jul 2017 21:40) (76 - 91)  BP: 120/77 (20 Jul 2017 21:40) (105/77 - 145/81)  BP(mean): --  RR: 17 (20 Jul 2017 21:40) (16 - 20)  SpO2: 100% (20 Jul 2017 21:12) (95% - 100%)    I&O's Summary    19 Jul 2017 07:01  -  20 Jul 2017 07:00  --------------------------------------------------------  IN: 400 mL / OUT: 3620 mL / NET: -3220 mL    20 Jul 2017 07:01  -  20 Jul 2017 22:32  --------------------------------------------------------  IN: 318 mL / OUT: 600 mL / NET: -282 mL        CAPILLARY BLOOD GLUCOSE  121 (20 Jul 2017 22:21)  100 (20 Jul 2017 17:32)  201 (20 Jul 2017 12:20)  132 (20 Jul 2017 09:00)  105 (19 Jul 2017 23:30)          PHYSICAL EXAM:    Constitutional:  (  no ) NAD,   (   yes )awake and alert  HEENT: PERR, EOMI,    Neck: Soft and supple, No LAD, No JVD  Respiratory:  ( -   Breath sounds are clear bilaterally,    ( no) wheezing, rales or rhonchi  Cardiovascular:     ( +  )S1 and S2, regular rate and rhythm, no Murmurs, gallops or rubs  Gastrointestinal:  (  + )Bowel Sounds present, soft,   ( - )nontender, nondistended,    Extremities:    (-  ) peripheral edema  Vascular: 2+ peripheral pulses  Neurological:    (  +  )A/O x 3,   ( = ) focal deficits  Musculoskeletal:    (   =)  normal strength b/l upper  (   =  ) normal  lower extremities  Skin: No rashes    MEDICATIONS:  MEDICATIONS  (STANDING):  aspirin enteric coated 81 milliGRAM(s) Oral daily  cholecalciferol 1000 Unit(s) Oral daily  pantoprazole    Tablet 40 milliGRAM(s) Oral before breakfast  carvedilol 25 milliGRAM(s) Oral every 12 hours  dextrose 5%. 1000 milliLiter(s) (50 mL/Hr) IV Continuous <Continuous>  dextrose 50% Injectable 12.5 Gram(s) IV Push once  dextrose 50% Injectable 25 Gram(s) IV Push once  dextrose 50% Injectable 25 Gram(s) IV Push once  calcium acetate 667 milliGRAM(s) Oral three times a day with meals  heparin  Injectable 5000 Unit(s) SubCutaneous every 8 hours  gabapentin 100 milliGRAM(s) Oral daily  ALBUTerol/ipratropium for Nebulization 3 milliLiter(s) Nebulizer every 6 hours  senna 1 Tablet(s) Oral daily  docusate sodium 100 milliGRAM(s) Oral daily  insulin lispro Injectable (HumaLOG) 7 Unit(s) SubCutaneous three times a day before meals  insulin glargine Injectable (LANTUS) 50 Unit(s) SubCutaneous at bedtime  insulin lispro (HumaLOG) corrective regimen sliding scale   SubCutaneous three times a day before meals  insulin lispro (HumaLOG) corrective regimen sliding scale   SubCutaneous at bedtime  polyethylene glycol 3350 17 Gram(s) Oral daily  furosemide   Injectable 80 milliGRAM(s) IV Push two times a day  atorvastatin 40 milliGRAM(s) Oral at bedtime      LABS: All Labs Reviewed:                        10.9   6.50  )-----------( 203      ( 20 Jul 2017 06:40 )             35.2     07-20    140  |  99  |  106<H>  ----------------------------<  103<H>  6.0<H>   |  29  |  3.56<H>    Ca    9.5      20 Jul 2017 18:20  Phos  5.2     07-20  Mg     2.2     07-20            Blood Culture:   Urine Culture      RADIOLOGY/EKG:    ASSESSMENT AND PLAN:    DVT PPX:    ADVANCED DIRECTIVE:    DISPOSITION: CHIEF COMPLAINT:weak and lethargic    SUBJECTIVE:     REVIEW OF SYSTEMS:    CONSTITUTIONAL: ( + )  weakness,  ( - ) fevers or chills  EYES/ENT: ( - )visual changes;     NECK: (  ) pain or stiffness  RESPIRATORY:   ( - )cough, wheezing, hemoptysis;  ( + ) shortness of breath  CARDIOVASCULAR:  (  =)chest pain or palpitations  GASTROINTESTINAL:   ( = )abdominal or epigastric pain.  ( = ) nausea, vomiting, or hematemesis;   ( +  ) diarrhea or constipation.   GENITOURINARY:   (   - ) dysuria, frequency or hematuria  NEUROLOGICAL:  (  + ) numbness or weakness   All other review of systems is negative unless indicated above    Vital Signs Last 24 Hrs  T(C): 36.6 (20 Jul 2017 21:40), Max: 36.9 (20 Jul 2017 21:12)  T(F): 97.8 (20 Jul 2017 21:40), Max: 98.5 (20 Jul 2017 21:12)  HR: 87 (20 Jul 2017 21:40) (76 - 91)  BP: 120/77 (20 Jul 2017 21:40) (105/77 - 145/81)  BP(mean): --  RR: 17 (20 Jul 2017 21:40) (16 - 20)  SpO2: 100% (20 Jul 2017 21:12) (95% - 100%)    I&O's Summary    19 Jul 2017 07:01  -  20 Jul 2017 07:00  --------------------------------------------------------  IN: 400 mL / OUT: 3620 mL / NET: -3220 mL    20 Jul 2017 07:01  -  20 Jul 2017 22:32  --------------------------------------------------------  IN: 318 mL / OUT: 600 mL / NET: -282 mL        CAPILLARY BLOOD GLUCOSE  121 (20 Jul 2017 22:21)  100 (20 Jul 2017 17:32)  201 (20 Jul 2017 12:20)  132 (20 Jul 2017 09:00)  105 (19 Jul 2017 23:30)          PHYSICAL EXAM:    Constitutional:  (  no ) NAD,   (   yes )awake and alert  HEENT: PERR, EOMI,    Neck: Soft and supple, No LAD, No JVD  Respiratory:  ( -   Breath sounds are clear bilaterally,    ( no) wheezing, rales or rhonchi  Cardiovascular:     ( +  )S1 and S2, regular rate and rhythm, no Murmurs, gallops or rubs  Gastrointestinal:  (  + )Bowel Sounds present, soft,   ( - )nontender, nondistended,    Extremities:    (-  ) peripheral edema  Vascular: 2+ peripheral pulses  Neurological:    (  +  )A/O x 3,   ( = ) focal deficits  Musculoskeletal:    (   =)  normal strength b/l upper  (   =  ) normal  lower extremities  Skin: No rashes    MEDICATIONS:  MEDICATIONS  (STANDING):  aspirin enteric coated 81 milliGRAM(s) Oral daily  cholecalciferol 1000 Unit(s) Oral daily  pantoprazole    Tablet 40 milliGRAM(s) Oral before breakfast  carvedilol 25 milliGRAM(s) Oral every 12 hours  dextrose 5%. 1000 milliLiter(s) (50 mL/Hr) IV Continuous <Continuous>  dextrose 50% Injectable 12.5 Gram(s) IV Push once  dextrose 50% Injectable 25 Gram(s) IV Push once  dextrose 50% Injectable 25 Gram(s) IV Push once  calcium acetate 667 milliGRAM(s) Oral three times a day with meals  heparin  Injectable 5000 Unit(s) SubCutaneous every 8 hours  gabapentin 100 milliGRAM(s) Oral daily  ALBUTerol/ipratropium for Nebulization 3 milliLiter(s) Nebulizer every 6 hours  senna 1 Tablet(s) Oral daily  docusate sodium 100 milliGRAM(s) Oral daily  insulin lispro Injectable (HumaLOG) 7 Unit(s) SubCutaneous three times a day before meals  insulin glargine Injectable (LANTUS) 50 Unit(s) SubCutaneous at bedtime  insulin lispro (HumaLOG) corrective regimen sliding scale   SubCutaneous three times a day before meals  insulin lispro (HumaLOG) corrective regimen sliding scale   SubCutaneous at bedtime  polyethylene glycol 3350 17 Gram(s) Oral daily  furosemide   Injectable 80 milliGRAM(s) IV Push two times a day  atorvastatin 40 milliGRAM(s) Oral at bedtime      LABS: All Labs Reviewed:                        10.9   6.50  )-----------( 203      ( 20 Jul 2017 06:40 )             35.2     07-20    140  |  99  |  106<H>  ----------------------------<  103<H>  6.0<H>   |  29  |  3.56<H>    Ca    9.5      20 Jul 2017 18:20  Phos  5.2     07-20  Mg     2.2     07-20            Blood Culture:   Urine Culture      RADIOLOGY/EKG:    ASSESSMENT AND PLAN:    1-status post hypoglycemia/diabetes continue Lantus 70 units and NovoLog 6 units before meals  2-ALISA/CRF for hemodialysis in  AM  3-CHF/CAD r left ventricular dysfunction ccontinue current management

## 2017-07-20 NOTE — PROGRESS NOTE ADULT - PROBLEM SELECTOR PLAN 1
FS's generally well controlled, w/ one elvated value of 201 before lunch.  Cont. Lantus  50 u qhs  Cont. Humalog to 7/7/7 with meals.  Continue Humalog moderate scale qac and qhs.  Cont. to check FS's TIDAC/qhs

## 2017-07-20 NOTE — PROGRESS NOTE ADULT - PROBLEM SELECTOR PLAN 2
Cr baseline 1.8, Cr. 7.05 on admission, currently 3.39  Shiley in place for HD in rt IJ, received HD on 7/12 and 7/13.   cxr revealed diffuse pulmonary edema  nephro recs: c/w lasix 80 bid. try not to dialyze if patient can be managed with medication.  f/u renal labs

## 2017-07-20 NOTE — PROGRESS NOTE ADULT - SUBJECTIVE AND OBJECTIVE BOX
Patient is a 56y old  Female who presents with a chief complaint of hypoglycemia (12 Jul 2017 18:07)      SUBJECTIVE / OVERNIGHT EVENTS:    Pt. seen and examined at bedside. Appeared comfortable. Denies overnight events. States she gets a cough when she wakes up in the morning. States she is making urine and has had a bowel movement. denies f/c/n/v/d/cp/sob    MEDICATIONS  (STANDING):  aspirin enteric coated 81 milliGRAM(s) Oral daily  cholecalciferol 1000 Unit(s) Oral daily  pantoprazole    Tablet 40 milliGRAM(s) Oral before breakfast  carvedilol 25 milliGRAM(s) Oral every 12 hours  dextrose 5%. 1000 milliLiter(s) (50 mL/Hr) IV Continuous <Continuous>  dextrose 50% Injectable 12.5 Gram(s) IV Push once  dextrose 50% Injectable 25 Gram(s) IV Push once  dextrose 50% Injectable 25 Gram(s) IV Push once  calcium acetate 667 milliGRAM(s) Oral three times a day with meals  heparin  Injectable 5000 Unit(s) SubCutaneous every 8 hours  gabapentin 100 milliGRAM(s) Oral daily  ALBUTerol/ipratropium for Nebulization 3 milliLiter(s) Nebulizer every 6 hours  senna 1 Tablet(s) Oral daily  docusate sodium 100 milliGRAM(s) Oral daily  insulin lispro Injectable (HumaLOG) 7 Unit(s) SubCutaneous three times a day before meals  insulin glargine Injectable (LANTUS) 50 Unit(s) SubCutaneous at bedtime  insulin lispro (HumaLOG) corrective regimen sliding scale   SubCutaneous three times a day before meals  insulin lispro (HumaLOG) corrective regimen sliding scale   SubCutaneous at bedtime  polyethylene glycol 3350 17 Gram(s) Oral daily  furosemide   Injectable 80 milliGRAM(s) IV Push two times a day  atorvastatin 40 milliGRAM(s) Oral at bedtime    MEDICATIONS  (PRN):  dextrose Gel 1 Dose(s) Oral once PRN Blood Glucose LESS THAN 70 milliGRAM(s)/deciLiter  glucagon  Injectable 1 milliGRAM(s) IntraMuscular once PRN Glucose <70 milliGRAM(s)/deciLiter  acetaminophen   Tablet. 650 milliGRAM(s) Oral every 6 hours PRN Mild Pain (1 - 3)  guaiFENesin   Syrup  (Sugar-Free) 200 milliGRAM(s) Oral every 6 hours PRN Cough  benzonatate 100 milliGRAM(s) Oral every 8 hours PRN Cough  bisacodyl 5 milliGRAM(s) Oral every 12 hours PRN Constipation      Vital Signs Last 24 Hrs  T(C): 36.7 (07-20-17 @ 14:43), Max: 36.7 (07-19-17 @ 21:16)  HR: 76 (07-20-17 @ 15:33) (76 - 92)  BP: 145/81 (07-20-17 @ 14:43) (119/58 - 145/81)  RR: 19 (07-20-17 @ 14:43) (18 - 20)  SpO2: 99% (07-20-17 @ 15:33) (95% - 100%)  CAPILLARY BLOOD GLUCOSE  201 (20 Jul 2017 12:20)  132 (20 Jul 2017 09:00)  105 (19 Jul 2017 23:30)  126 (19 Jul 2017 17:35)        I&O's Summary    19 Jul 2017 07:01  -  20 Jul 2017 07:00  --------------------------------------------------------  IN: 400 mL / OUT: 3620 mL / NET: -3220 mL    20 Jul 2017 07:01  -  20 Jul 2017 16:33  --------------------------------------------------------  IN: 118 mL / OUT: 450 mL / NET: -332 mL        PHYSICAL EXAM:  GENERAL: NAD, well-developed  HEAD:  Atraumatic, Normocephalic  EYES: EOMI, PERRLA, conjunctiva and sclera clear  NECK: Supple, No JVD  CHEST/LUNG: Clear to auscultation bilaterally; No wheeze  HEART: Regular rate and rhythm; No murmurs, rubs, or gallops  ABDOMEN: Soft, Nontender, Nondistended; Bowel sounds present  EXTREMITIES:  2+ Peripheral Pulses, No clubbing, cyanosis, or edema  PSYCH: AAOx3  NEUROLOGY: non-focal  SKIN: No rashes or lesions    LABS:  (07-20 @ 06:40)                        10.9  6.50 )-----------( 203                 35.2    Neutrophils = -- (--%)  Lymphocytes = -- (--%)  Eosinophils = -- (--%)  Basophils = -- (--%)  Monocytes = -- (--%)  Bands = --%    WBC Trend: 6.50<--, 6.46<--, 7.15<--  Hb Trend: 10.9<--, 10.9<--, 11.4<--, 11.6<--, 13.1<--  Plt Trend: 203<--, 187<--, 163<--, 139<--, 151<--  07-20    140  |  99  |  103<H>  ----------------------------<  114<H>  5.5<H>   |  26  |  3.51<H>    Ca    9.5      20 Jul 2017 06:40  Phos  5.3     07-20  Mg     2.2     07-20      Creatinine Trend: 3.51<--, 3.58<--, 3.39<--, 3.60<--, 4.89<--, 4.85<--            Microbiology:  Urine Cx:  Blood Cx:    RADIOLOGY & ADDITIONAL TESTS:  X- Ray:  CT:  Ultrasound:  [ ] imaging personally reviewed and interpreted by me    Consultant(s) Notes Reviewed:      Care Discussed with Consultants/Other Providers:

## 2017-07-21 LAB
BUN SERPL-MCNC: 79 MG/DL — HIGH (ref 7–23)
CALCIUM SERPL-MCNC: 9.3 MG/DL — SIGNIFICANT CHANGE UP (ref 8.4–10.5)
CHLORIDE SERPL-SCNC: 101 MMOL/L — SIGNIFICANT CHANGE UP (ref 98–107)
CO2 SERPL-SCNC: 28 MMOL/L — SIGNIFICANT CHANGE UP (ref 22–31)
CREAT SERPL-MCNC: 2.91 MG/DL — HIGH (ref 0.5–1.3)
GLUCOSE SERPL-MCNC: 71 MG/DL — SIGNIFICANT CHANGE UP (ref 70–99)
HCT VFR BLD CALC: 33.9 % — LOW (ref 34.5–45)
HGB BLD-MCNC: 10.8 G/DL — LOW (ref 11.5–15.5)
MAGNESIUM SERPL-MCNC: 2.1 MG/DL — SIGNIFICANT CHANGE UP (ref 1.6–2.6)
MCHC RBC-ENTMCNC: 28.9 PG — SIGNIFICANT CHANGE UP (ref 27–34)
MCHC RBC-ENTMCNC: 31.9 % — LOW (ref 32–36)
MCV RBC AUTO: 90.6 FL — SIGNIFICANT CHANGE UP (ref 80–100)
NRBC # FLD: 0 — SIGNIFICANT CHANGE UP
PHOSPHATE SERPL-MCNC: 4.6 MG/DL — HIGH (ref 2.5–4.5)
PLATELET # BLD AUTO: 182 K/UL — SIGNIFICANT CHANGE UP (ref 150–400)
PMV BLD: 11.9 FL — SIGNIFICANT CHANGE UP (ref 7–13)
POTASSIUM SERPL-MCNC: 5 MMOL/L — SIGNIFICANT CHANGE UP (ref 3.5–5.3)
POTASSIUM SERPL-SCNC: 5 MMOL/L — SIGNIFICANT CHANGE UP (ref 3.5–5.3)
RBC # BLD: 3.74 M/UL — LOW (ref 3.8–5.2)
RBC # FLD: 16.8 % — HIGH (ref 10.3–14.5)
SODIUM SERPL-SCNC: 143 MMOL/L — SIGNIFICANT CHANGE UP (ref 135–145)
WBC # BLD: 5.42 K/UL — SIGNIFICANT CHANGE UP (ref 3.8–10.5)
WBC # FLD AUTO: 5.42 K/UL — SIGNIFICANT CHANGE UP (ref 3.8–10.5)

## 2017-07-21 PROCEDURE — 99232 SBSQ HOSP IP/OBS MODERATE 35: CPT

## 2017-07-21 RX ORDER — INSULIN GLARGINE 100 [IU]/ML
45 INJECTION, SOLUTION SUBCUTANEOUS AT BEDTIME
Qty: 0 | Refills: 0 | Status: DISCONTINUED | OUTPATIENT
Start: 2017-07-21 | End: 2017-07-24

## 2017-07-21 RX ORDER — INSULIN GLARGINE 100 [IU]/ML
45 INJECTION, SOLUTION SUBCUTANEOUS AT BEDTIME
Qty: 0 | Refills: 0 | Status: DISCONTINUED | OUTPATIENT
Start: 2017-07-21 | End: 2017-07-21

## 2017-07-21 RX ADMIN — Medication 80 MILLIGRAM(S): at 06:13

## 2017-07-21 RX ADMIN — ATORVASTATIN CALCIUM 40 MILLIGRAM(S): 80 TABLET, FILM COATED ORAL at 00:42

## 2017-07-21 RX ADMIN — Medication 80 MILLIGRAM(S): at 20:03

## 2017-07-21 RX ADMIN — Medication 3 MILLILITER(S): at 09:48

## 2017-07-21 RX ADMIN — Medication 650 MILLIGRAM(S): at 07:04

## 2017-07-21 RX ADMIN — GABAPENTIN 100 MILLIGRAM(S): 400 CAPSULE ORAL at 12:11

## 2017-07-21 RX ADMIN — Medication 5 MILLIGRAM(S): at 23:21

## 2017-07-21 RX ADMIN — CARVEDILOL PHOSPHATE 25 MILLIGRAM(S): 80 CAPSULE, EXTENDED RELEASE ORAL at 20:03

## 2017-07-21 RX ADMIN — Medication 650 MILLIGRAM(S): at 01:00

## 2017-07-21 RX ADMIN — Medication 100 MILLIGRAM(S): at 00:43

## 2017-07-21 RX ADMIN — Medication 3 MILLILITER(S): at 21:22

## 2017-07-21 RX ADMIN — POLYETHYLENE GLYCOL 3350 17 GRAM(S): 17 POWDER, FOR SOLUTION ORAL at 12:10

## 2017-07-21 RX ADMIN — ATORVASTATIN CALCIUM 40 MILLIGRAM(S): 80 TABLET, FILM COATED ORAL at 21:54

## 2017-07-21 RX ADMIN — PANTOPRAZOLE SODIUM 40 MILLIGRAM(S): 20 TABLET, DELAYED RELEASE ORAL at 06:15

## 2017-07-21 RX ADMIN — Medication 200 MILLIGRAM(S): at 20:02

## 2017-07-21 RX ADMIN — Medication 650 MILLIGRAM(S): at 22:50

## 2017-07-21 RX ADMIN — HEPARIN SODIUM 5000 UNIT(S): 5000 INJECTION INTRAVENOUS; SUBCUTANEOUS at 21:53

## 2017-07-21 RX ADMIN — Medication 81 MILLIGRAM(S): at 12:12

## 2017-07-21 RX ADMIN — CARVEDILOL PHOSPHATE 25 MILLIGRAM(S): 80 CAPSULE, EXTENDED RELEASE ORAL at 06:15

## 2017-07-21 RX ADMIN — Medication 100 MILLIGRAM(S): at 11:32

## 2017-07-21 RX ADMIN — Medication 7 UNIT(S): at 12:13

## 2017-07-21 RX ADMIN — Medication 650 MILLIGRAM(S): at 06:16

## 2017-07-21 RX ADMIN — Medication 200 MILLIGRAM(S): at 03:06

## 2017-07-21 RX ADMIN — Medication 650 MILLIGRAM(S): at 01:42

## 2017-07-21 RX ADMIN — Medication 667 MILLIGRAM(S): at 08:49

## 2017-07-21 RX ADMIN — Medication 100 MILLIGRAM(S): at 12:11

## 2017-07-21 RX ADMIN — Medication 3 MILLILITER(S): at 03:26

## 2017-07-21 RX ADMIN — Medication 200 MILLIGRAM(S): at 09:33

## 2017-07-21 RX ADMIN — Medication 2: at 12:13

## 2017-07-21 RX ADMIN — SENNA PLUS 1 TABLET(S): 8.6 TABLET ORAL at 12:11

## 2017-07-21 RX ADMIN — Medication 1000 UNIT(S): at 12:10

## 2017-07-21 RX ADMIN — Medication 667 MILLIGRAM(S): at 12:11

## 2017-07-21 RX ADMIN — Medication 100 MILLIGRAM(S): at 21:55

## 2017-07-21 RX ADMIN — Medication 650 MILLIGRAM(S): at 23:30

## 2017-07-21 RX ADMIN — HEPARIN SODIUM 5000 UNIT(S): 5000 INJECTION INTRAVENOUS; SUBCUTANEOUS at 00:42

## 2017-07-21 RX ADMIN — Medication 667 MILLIGRAM(S): at 18:42

## 2017-07-21 RX ADMIN — HEPARIN SODIUM 5000 UNIT(S): 5000 INJECTION INTRAVENOUS; SUBCUTANEOUS at 07:02

## 2017-07-21 RX ADMIN — Medication 7 UNIT(S): at 08:48

## 2017-07-21 RX ADMIN — Medication 5 MILLIGRAM(S): at 12:10

## 2017-07-21 RX ADMIN — INSULIN GLARGINE 45 UNIT(S): 100 INJECTION, SOLUTION SUBCUTANEOUS at 22:11

## 2017-07-21 NOTE — PROGRESS NOTE ADULT - SUBJECTIVE AND OBJECTIVE BOX
Chief Complaint: DM 2    History: Borderline hypoglycemia this am. Pt reports decreased appetite and intake today. Lunch FS stable     MEDICATIONS  (STANDING):  insulin lispro Injectable (HumaLOG) 7 Unit(s) SubCutaneous three times a day before meals  insulin glargine Injectable (LANTUS) 50 Unit(s) SubCutaneous at bedtime  insulin lispro (HumaLOG) corrective regimen sliding scale   SubCutaneous three times a day before meals  insulin lispro (HumaLOG) corrective regimen sliding scale   SubCutaneous at bedtime    Allergies    penicillin (Unknown)    Review of Systems:  Constitutional: No fever  Cardiovascular: No chest pain, palpitations  Respiratory: No SOB, no cough  GI: Reports constipation. No nausea, vomiting, abdominal pain      PHYSICAL EXAM:  VITALS: T(C): 36.8 (07-21-17 @ 13:23)  T(F): 98.3 (07-21-17 @ 13:23), Max: 98.5 (07-20-17 @ 21:12)  HR: 79 (07-21-17 @ 14:08) (71 - 89)  BP: 119/71 (07-21-17 @ 13:23) (105/77 - 145/81)  RR:  (16 - 19)  SpO2:  (96% - 100%)  Wt(kg): --  GENERAL: NAD  EYES: No proptosis, no lid lag, anicteric  RESPIRATORY: Non labored  PSYCH: Alert and oriented x 3, normal affect, normal mood      CAPILLARY BLOOD GLUCOSE  162 (07-21 @ 12:00)  79 (07-21 @ 08:34)  121 (07-20 @ 22:21)  100 (07-20 @ 17:32)  201 (07-20 @ 12:20)  132 (07-20 @ 09:00)  105 (07-19 @ 23:30)  126 (07-19 @ 17:35)  173 (07-19 @ 12:09)  177 (07-19 @ 08:41)  125 (07-18 @ 22:25)  154 (07-18 @ 17:20)      07-21    143  |  101  |  79<H>  ----------------------------<  71  5.0   |  28  |  2.91<H>    EGFR if : 20  EGFR if non : 17    Ca    9.3      07-21  Mg     2.1     07-21  Phos  4.6     07-21            Thyroid Function Tests:  07-12 @ 06:35 TSH 0.19 FreeT4 -- T3 -- Anti TPO -- Anti Thyroglobulin Ab -- TSI --      Hemoglobin A1C, Whole Blood: 9.2 % <H> [4.0 - 5.6] (07-11-17 @ 20:15)

## 2017-07-21 NOTE — PROGRESS NOTE ADULT - PROBLEM SELECTOR PLAN 2
Cr baseline 1.8, Cr. 7.05 on admission, currently 2.91  Shifady in place for HD in rt IJ, receiving HD daily.   cxr revealed diffuse pulmonary edema  nephro recs: c/w lasix 80 bid. pt may be candidate for chronic dialysis dependence, will set up outpatient HD  -contact IR about setting up permacath  f/u renal labs Cr baseline 1.8, Cr. 7.05 on admission, currently 2.91  Jose in place for HD in rt IJ, receiving HD daily.   cxr revealed diffuse pulmonary edema  nephro recs: c/w lasix 80 bid. pt may be candidate for chronic dialysis dependence, will set up outpatient HD  -IR for permacath Monday, NPO sunday @midnight  f/u renal labs

## 2017-07-21 NOTE — PROGRESS NOTE ADULT - SUBJECTIVE AND OBJECTIVE BOX
House Cardiology Progress Note  Consult Spectra [id number]    Interval Events:  Pt wants to leave the hospital. Otherwise reports breathing is better.  Planned for chronic HD for hyperkalemia/fluid management.    Review of Systems:  Constitutional: [ -] Fever [- ] Chills [ ] Fatigue [ ] Weight change   HEENT: [ ] Blurred vision [ ] Eye Pain [x ] Headache [ ] Runny nose [ ] Sore Throat   Respiratory: [ ] Cough [ ] Wheezing [x ] Shortness of breath  Cardiovascular: [- ] Chest Pain [- ] Palpitations [ ] NICHOLSON [ ] PND [ x] Orthopnea  Gastrointestinal: [ ] Abdominal Pain [ ] Diarrhea [ ] Constipation [ ] Hemorrhoids [- ] Nausea [- ] Vomiting  Genitourinary: [ ] Nocturia [ -] Dysuria [ ] Incontinence  Extremities: [x ] Swelling [ ] Joint Pain  Neurologic: [ ] Focal deficit [ ] Paresthesias [- ] Syncope  Skin: [- ] Rash [ ] Ecchymoses [ ] Wounds [ ] Lesions  Psychiatry: [ ] Depression [ ] Suicidal/Homicidal Ideation [ ] Anxiety [ ] Sleep Disturbances  [ x] 10 point review of systems is otherwise negative except as mentioned above            [ ]Unable to obtain      MEDICATIONS:  aspirin enteric coated 81 milliGRAM(s) Oral daily  carvedilol 25 milliGRAM(s) Oral every 12 hours  heparin  Injectable 5000 Unit(s) SubCutaneous every 8 hours  furosemide   Injectable 80 milliGRAM(s) IV Push two times a day  guaiFENesin   Syrup  (Sugar-Free) 200 milliGRAM(s) Oral every 6 hours PRN  ALBUTerol/ipratropium for Nebulization 3 milliLiter(s) Nebulizer every 6 hours  benzonatate 100 milliGRAM(s) Oral every 8 hours PRN  gabapentin 100 milliGRAM(s) Oral daily  acetaminophen   Tablet. 650 milliGRAM(s) Oral every 6 hours PRN  pantoprazole    Tablet 40 milliGRAM(s) Oral before breakfast  senna 1 Tablet(s) Oral daily  docusate sodium 100 milliGRAM(s) Oral daily  polyethylene glycol 3350 17 Gram(s) Oral daily  bisacodyl 5 milliGRAM(s) Oral every 12 hours PRN  insulin lispro Injectable (HumaLOG) 7 Unit(s) SubCutaneous three times a day before meals  insulin glargine Injectable (LANTUS) 50 Unit(s) SubCutaneous at bedtime  insulin lispro (HumaLOG) corrective regimen sliding scale   SubCutaneous three times a day before meals  insulin lispro (HumaLOG) corrective regimen sliding scale   SubCutaneous at bedtime  atorvastatin 40 milliGRAM(s) Oral at bedtime  calcium acetate 667 milliGRAM(s) Oral three times a day with meals      PHYSICAL EXAM:  T(C): 36.7 (17 @ 06:09), Max: 36.9 (17 @ 21:12)  HR: 80 (17 @ 10:05) (71 - 89)  BP: 140/68 (17 @ 06:09) (105/77 - 145/81)  RR: 17 (17 @ 06:09) (16 - 19)  SpO2: 98% (17 @ 10:05) (96% - 100%)  Wt(kg): --  I&O's Summary    2017 07:01  -  2017 07:00  --------------------------------------------------------  IN: 918 mL / OUT: 2500 mL / NET: -1582 mL      Daily     Daily Weight in k (2017 06:09)    Appearance: obese, no distress.  HEENT:   mmm	  Cardiovascular: s1s2 rrr, 1+ LE edema  Respiratory: mildly decreased breath sounds at the R base  Psychiatry: moderately depressed mood  Gastrointestinal:  soft nt nd, normoactive bs  Skin: No rashes	  Neurologic: grossly non-focal    LABS:	 	    CBC Full  -  ( 2017 06:25 )  WBC Count : 5.42 K/uL  Hemoglobin : 10.8 g/dL  Hematocrit : 33.9 %  Platelet Count - Automated : 182 K/uL        143  |  101  |  79<H>  ----------------------------<  71  5.0   |  28  |  2.91<H>      142  |  100  |  100<H>  ----------------------------<  124<H>  5.5<H>   |  30  |  3.18<H>    Ca    9.3      2017 06:25  Ca    9.2      2017 22:00  Phos  4.6     -  Phos  4.6       Mg     2.1       Mg     2.1

## 2017-07-21 NOTE — PROGRESS NOTE ADULT - ASSESSMENT
56F HTN, DM, HFrEF 20-25% s/p [employer] AICD, CKD, ABDIEL, admitted for hypoglycemia with hospital course c/b acute renal failure requiring HD. Pt with persistent fluid overload, hyperkalemia now planned for chronic HD. ICD interrogation with no tachy therapy and episodes of NSVT.  -cont diuretics as per nephrology  -cont carvedilol 25mg q12h  -no further dronedarone at this time  -hold Entresto and spironolactone given the ARF, hyperkalemia.  -cont atorvastatin to 40mg daily.

## 2017-07-21 NOTE — PROGRESS NOTE ADULT - SUBJECTIVE AND OBJECTIVE BOX
Patient is a 56y old  Female who presents with a chief complaint of hypoglycemia (12 Jul 2017 18:07)      SUBJECTIVE / OVERNIGHT EVENTS:    Pt. seen and examined at bedside. Denies overnight events. States the shiley is giving her a headache but acetaminophen is helping. States cough is not improving much. Endorses normal bm and urine. Denies f/c/n/v/d/cp/sob.     MEDICATIONS  (STANDING):  aspirin enteric coated 81 milliGRAM(s) Oral daily  cholecalciferol 1000 Unit(s) Oral daily  pantoprazole    Tablet 40 milliGRAM(s) Oral before breakfast  carvedilol 25 milliGRAM(s) Oral every 12 hours  dextrose 5%. 1000 milliLiter(s) (50 mL/Hr) IV Continuous <Continuous>  dextrose 50% Injectable 12.5 Gram(s) IV Push once  dextrose 50% Injectable 25 Gram(s) IV Push once  dextrose 50% Injectable 25 Gram(s) IV Push once  calcium acetate 667 milliGRAM(s) Oral three times a day with meals  heparin  Injectable 5000 Unit(s) SubCutaneous every 8 hours  gabapentin 100 milliGRAM(s) Oral daily  ALBUTerol/ipratropium for Nebulization 3 milliLiter(s) Nebulizer every 6 hours  senna 1 Tablet(s) Oral daily  docusate sodium 100 milliGRAM(s) Oral daily  insulin lispro Injectable (HumaLOG) 7 Unit(s) SubCutaneous three times a day before meals  insulin glargine Injectable (LANTUS) 50 Unit(s) SubCutaneous at bedtime  insulin lispro (HumaLOG) corrective regimen sliding scale   SubCutaneous three times a day before meals  insulin lispro (HumaLOG) corrective regimen sliding scale   SubCutaneous at bedtime  polyethylene glycol 3350 17 Gram(s) Oral daily  furosemide   Injectable 80 milliGRAM(s) IV Push two times a day  atorvastatin 40 milliGRAM(s) Oral at bedtime    MEDICATIONS  (PRN):  dextrose Gel 1 Dose(s) Oral once PRN Blood Glucose LESS THAN 70 milliGRAM(s)/deciLiter  glucagon  Injectable 1 milliGRAM(s) IntraMuscular once PRN Glucose <70 milliGRAM(s)/deciLiter  acetaminophen   Tablet. 650 milliGRAM(s) Oral every 6 hours PRN Mild Pain (1 - 3)  guaiFENesin   Syrup  (Sugar-Free) 200 milliGRAM(s) Oral every 6 hours PRN Cough  benzonatate 100 milliGRAM(s) Oral every 8 hours PRN Cough  bisacodyl 5 milliGRAM(s) Oral every 12 hours PRN Constipation      Vital Signs Last 24 Hrs  T(C): 36.7 (07-21-17 @ 06:09), Max: 36.9 (07-20-17 @ 21:12)  HR: 85 (07-21-17 @ 06:38) (76 - 89)  BP: 140/68 (07-21-17 @ 06:09) (105/77 - 145/81)  RR: 17 (07-21-17 @ 06:09) (16 - 19)  SpO2: 96% (07-21-17 @ 06:38) (95% - 100%)  CAPILLARY BLOOD GLUCOSE  121 (20 Jul 2017 22:21)  100 (20 Jul 2017 17:32)  201 (20 Jul 2017 12:20)  132 (20 Jul 2017 09:00)        I&O's Summary    20 Jul 2017 07:01  -  21 Jul 2017 07:00  --------------------------------------------------------  IN: 918 mL / OUT: 2500 mL / NET: -1582 mL        PHYSICAL EXAM:  GENERAL: NAD, well-developed  HEAD:  Atraumatic, Normocephalic  EYES: EOMI, PERRLA, conjunctiva and sclera clear  NECK: Supple, No JVD  CHEST/LUNG: Clear to auscultation bilaterally; No wheeze  HEART: Regular rate and rhythm; No murmurs, rubs, or gallops  ABDOMEN: Soft, Nontender, Nondistended; Bowel sounds present  EXTREMITIES:  2+ Peripheral Pulses, No clubbing, cyanosis, or edema  PSYCH: AAOx3  NEUROLOGY: non-focal  SKIN: No rashes or lesions    LABS:  (07-21 @ 06:25)                        10.8  5.42 )-----------( 182                 33.9    Neutrophils = -- (--%)  Lymphocytes = -- (--%)  Eosinophils = -- (--%)  Basophils = -- (--%)  Monocytes = -- (--%)  Bands = --%    WBC Trend: 5.42<--, 6.50<--, 6.46<--  Hb Trend: 10.8<--, 10.9<--, 10.9<--, 11.4<--, 11.6<--  Plt Trend: 182<--, 203<--, 187<--, 163<--, 139<--  07-21    143  |  101  |  79<H>  ----------------------------<  71  5.0   |  28  |  2.91<H>    Ca    9.3      21 Jul 2017 06:25  Phos  4.6     07-21  Mg     2.1     07-21      Creatinine Trend: 2.91<--, 3.18<--, 3.56<--, 3.51<--, 3.58<--, 3.39<--            Microbiology:  Urine Cx:  Blood Cx:    RADIOLOGY & ADDITIONAL TESTS:  X- Ray:  CT:  Ultrasound:  [ ] imaging personally reviewed and interpreted by me    Consultant(s) Notes Reviewed:      Care Discussed with Consultants/Other Providers: Patient is a 56y old  Female who presents with a chief complaint of hypoglycemia (12 Jul 2017 18:07)      SUBJECTIVE / OVERNIGHT EVENTS:    Pt. seen and examined at bedside. Denies overnight events. States the shiley is giving her a headache but acetaminophen is helping. States cough is not improving much. Endorses normal bm and urine. Denies f/c/n/v/d/cp/sob.     MEDICATIONS  (STANDING):  aspirin enteric coated 81 milliGRAM(s) Oral daily  cholecalciferol 1000 Unit(s) Oral daily  pantoprazole    Tablet 40 milliGRAM(s) Oral before breakfast  carvedilol 25 milliGRAM(s) Oral every 12 hours  dextrose 5%. 1000 milliLiter(s) (50 mL/Hr) IV Continuous <Continuous>  dextrose 50% Injectable 12.5 Gram(s) IV Push once  dextrose 50% Injectable 25 Gram(s) IV Push once  dextrose 50% Injectable 25 Gram(s) IV Push once  calcium acetate 667 milliGRAM(s) Oral three times a day with meals  heparin  Injectable 5000 Unit(s) SubCutaneous every 8 hours  gabapentin 100 milliGRAM(s) Oral daily  ALBUTerol/ipratropium for Nebulization 3 milliLiter(s) Nebulizer every 6 hours  senna 1 Tablet(s) Oral daily  docusate sodium 100 milliGRAM(s) Oral daily  insulin lispro Injectable (HumaLOG) 7 Unit(s) SubCutaneous three times a day before meals  insulin glargine Injectable (LANTUS) 50 Unit(s) SubCutaneous at bedtime  insulin lispro (HumaLOG) corrective regimen sliding scale   SubCutaneous three times a day before meals  insulin lispro (HumaLOG) corrective regimen sliding scale   SubCutaneous at bedtime  polyethylene glycol 3350 17 Gram(s) Oral daily  furosemide   Injectable 80 milliGRAM(s) IV Push two times a day  atorvastatin 40 milliGRAM(s) Oral at bedtime    MEDICATIONS  (PRN):  dextrose Gel 1 Dose(s) Oral once PRN Blood Glucose LESS THAN 70 milliGRAM(s)/deciLiter  glucagon  Injectable 1 milliGRAM(s) IntraMuscular once PRN Glucose <70 milliGRAM(s)/deciLiter  acetaminophen   Tablet. 650 milliGRAM(s) Oral every 6 hours PRN Mild Pain (1 - 3)  guaiFENesin   Syrup  (Sugar-Free) 200 milliGRAM(s) Oral every 6 hours PRN Cough  benzonatate 100 milliGRAM(s) Oral every 8 hours PRN Cough  bisacodyl 5 milliGRAM(s) Oral every 12 hours PRN Constipation      Vital Signs Last 24 Hrs  T(C): 36.7 (07-21-17 @ 06:09), Max: 36.9 (07-20-17 @ 21:12)  HR: 85 (07-21-17 @ 06:38) (76 - 89)  BP: 140/68 (07-21-17 @ 06:09) (105/77 - 145/81)  RR: 17 (07-21-17 @ 06:09) (16 - 19)  SpO2: 96% (07-21-17 @ 06:38) (95% - 100%)  CAPILLARY BLOOD GLUCOSE  121 (20 Jul 2017 22:21)  100 (20 Jul 2017 17:32)  201 (20 Jul 2017 12:20)  132 (20 Jul 2017 09:00)        I&O's Summary    20 Jul 2017 07:01  -  21 Jul 2017 07:00  --------------------------------------------------------  IN: 918 mL / OUT: 2500 mL / NET: -1582 mL        PHYSICAL EXAM:  GENERAL: NAD, well-developed  HEAD:  Atraumatic, Normocephalic  EYES: EOMI, PERRLA, conjunctiva and sclera clear  NECK: Supple, No JVD  CHEST/LUNG: Clear to auscultation bilaterally; No wheeze  HEART: Regular rate and rhythm; No murmurs, rubs, or gallops  ABDOMEN: Soft, Nontender, Nondistended; Bowel sounds present  EXTREMITIES:  2+ Peripheral Pulses, No clubbing, cyanosis, or edema  PSYCH: AAOx3  NEUROLOGY: non-focal  SKIN: No rashes or lesions    LABS:  (07-21 @ 06:25)                        10.8  5.42 )-----------( 182                 33.9    Neutrophils = -- (--%)  Lymphocytes = -- (--%)  Eosinophils = -- (--%)  Basophils = -- (--%)  Monocytes = -- (--%)  Bands = --%    WBC Trend: 5.42<--, 6.50<--, 6.46<--  Hb Trend: 10.8<--, 10.9<--, 10.9<--, 11.4<--, 11.6<--  Plt Trend: 182<--, 203<--, 187<--, 163<--, 139<--  07-21    143  |  101  |  79<H>  ----------------------------<  71  5.0   |  28  |  2.91<H>    Ca    9.3      21 Jul 2017 06:25  Phos  4.6     07-21  Mg     2.1     07-21      Creatinine Trend: 2.91<--, 3.18<--, 3.56<--, 3.51<--, 3.58<--, 3.39<--            Microbiology:  Urine Cx:  Blood Cx:    RADIOLOGY & ADDITIONAL TESTS:  X- Ray:  CT:  Ultrasound:  [ ] imaging personally reviewed and interpreted by me    Consultant(s) Notes Reviewed:      Care Discussed with Consultants/Other Providers: Dr. Rae and Dr. Lou

## 2017-07-21 NOTE — PROGRESS NOTE ADULT - SUBJECTIVE AND OBJECTIVE BOX
No pain, no shortness of breath      VITAL:  T(C): , Max: 36.9 (07-20-17 @ 21:12)  T(F): , Max: 98.5 (07-20-17 @ 21:12)  HR: 85 (07-21-17 @ 06:38)  BP: 140/68 (07-21-17 @ 06:09)  BP(mean): --  RR: 17 (07-21-17 @ 06:09)  SpO2: 96% (07-21-17 @ 06:38)        PHYSICAL EXAM:  Constitutional: NAD; obese, increased alertness relative to yesterday  HEENTN: DMM, (+)RIJ shiley  Respiratory: distant b/l  Cardiovascular: Irreg S1S2  Gastrointestinal: soft, NTND  Extremities: 2-3+ b/l LE edema      LABS:                        10.8   5.42  )-----------( 182      ( 21 Jul 2017 06:25 )             33.9     Na(143)/K(5.0)/Cl(101)/HCO3(28)/BUN(79)/Cr(2.91)Glu(71)/Ca(9.3)/Mg(2.1)/PO4(4.6)    07-21 @ 06:25  Na(142)/K(5.5)/Cl(100)/HCO3(30)/BUN(100)/Cr(3.18)Glu(124)/Ca(9.2)/Mg(2.1)/PO4(4.6)    07-20 @ 22:00  Na(140)/K(6.0)/Cl(99)/HCO3(29)/BUN(106)/Cr(3.56)Glu(103)/Ca(9.5)/Mg(2.2)/PO4(5.2)    07-20 @ 18:20  Na(140)/K(5.5)/Cl(99)/HCO3(26)/BUN(103)/Cr(3.51)Glu(114)/Ca(9.5)/Mg(2.2)/PO4(5.3)    07-20 @ 06:40  Na(137)/K(5.2)/Cl(98)/HCO3(27)/BUN(100)/Cr(3.58)Glu(131)/Ca(9.6)/Mg(2.3)/PO4(5.4)    07-19 @ 06:25      IMPRESSION: 56F w/ HTN, DM2, and HFrEF(20%)-AICD, 7/12/17 a/w hypoglycemia, respiratory acidosis, and ALISA    (1)Renal - ALISA and s/p several HD treatment this admission. We have made efforts this week to avoid further HD; she has failed our attempt to forgo further HD. At this point she requires chronic HD.    (2)Hyperkalemia - Indicated for chronic HD at this point in part to allow for management of her hyperkalemia    (3)CV - HD should serve us well to help manage her volume status/prevent further episodes of pulmonary edema      RECOMMEND:  (1)HD today and tomorrow  (2)Tunneled HD cathter next week  (3)Meds for GFR<10/HD  (4)Vascular evaluation for AVF vs AVG  (5) setup of outpatient HD.              Perfecto Rae MD  [facility]  [phone]

## 2017-07-21 NOTE — PROGRESS NOTE ADULT - SUBJECTIVE AND OBJECTIVE BOX
CHIEF COMPLAINT:  patient was seen at 8:30 with renal Dr. Perfecto Coppola her condition improved  is much more awake and al and less short of breath    SUBJECTIVE:     REVIEW OF SYSTEMS:    CONSTITUTIONAL: (-  )  weakness,  (-  ) fevers or chills  EYES/ENT: ( - )visual changes;     NECK: (  -) pain or stiffness  RESPIRATORY:   ( - )cough, wheezing, hemoptysis;  ( - ) shortness of breath  CARDIOVASCULAR:  (-  )chest pain or palpitations  GASTROINTESTINAL:   ( - )abdominal or epigastric pain.  ( - ) nausea, vomiting, or hematemesis;   (  - ) diarrhea or constipation.   GENITOURINARY:   (  -  ) dysuria, frequency or hematuria  NEUROLOGICAL:  (  - ) numbness or weakness   All other review of systems is negative unless indicated above    Vital Signs Last 24 Hrs  T(C): 36.8 (21 Jul 2017 16:20), Max: 36.9 (20 Jul 2017 21:12)  T(F): 98.3 (21 Jul 2017 16:20), Max: 98.5 (20 Jul 2017 21:12)  HR: 80 (21 Jul 2017 16:20) (71 - 89)  BP: 146/83 (21 Jul 2017 16:20) (108/61 - 146/83)  BP(mean): --  RR: 16 (21 Jul 2017 16:20) (16 - 18)  SpO2: 96% (21 Jul 2017 14:08) (96% - 100%)    I&O's Summary    20 Jul 2017 07:01  -  21 Jul 2017 07:00  --------------------------------------------------------  IN: 918 mL / OUT: 2500 mL / NET: -1582 mL        CAPILLARY BLOOD GLUCOSE  110 (21 Jul 2017 18:38)  162 (21 Jul 2017 12:00)  79 (21 Jul 2017 08:34)  121 (20 Jul 2017 22:21)          PHYSICAL EXAM:    Constitutional:  (  - ) NAD,   (  + )awake and alert  HEENT: PERR, EOMI,    Neck: Soft and supple, No LAD, No JVD  Respiratory:  (   + Breath sounds are clear bilaterally,    ( =  ) wheezing, rales or rhonchi  Cardiovascular:     (+   )S1 and S2, regular rate and rhythm, no Murmurs, gallops or rubs  Gastrointestinal:  (  + )Bowel Sounds present, soft,   (-  )nontender, nondistended,    Extremities:    ( - ) peripheral edema  Vascular: 2+ peripheral pulses  Neurological:    (  +  )A/O x 3,   ( + ) focal deficits  Musculoskeletal:    ( +  )  normal strength b/l upper  (   +  ) normal  lower extremities  Skin: No rashes    MEDICATIONS:  MEDICATIONS  (STANDING):  aspirin enteric coated 81 milliGRAM(s) Oral daily  cholecalciferol 1000 Unit(s) Oral daily  pantoprazole    Tablet 40 milliGRAM(s) Oral before breakfast  carvedilol 25 milliGRAM(s) Oral every 12 hours  dextrose 5%. 1000 milliLiter(s) (50 mL/Hr) IV Continuous <Continuous>  dextrose 50% Injectable 12.5 Gram(s) IV Push once  dextrose 50% Injectable 25 Gram(s) IV Push once  dextrose 50% Injectable 25 Gram(s) IV Push once  calcium acetate 667 milliGRAM(s) Oral three times a day with meals  heparin  Injectable 5000 Unit(s) SubCutaneous every 8 hours  gabapentin 100 milliGRAM(s) Oral daily  ALBUTerol/ipratropium for Nebulization 3 milliLiter(s) Nebulizer every 6 hours  senna 1 Tablet(s) Oral daily  docusate sodium 100 milliGRAM(s) Oral daily  insulin lispro Injectable (HumaLOG) 7 Unit(s) SubCutaneous three times a day before meals  insulin lispro (HumaLOG) corrective regimen sliding scale   SubCutaneous three times a day before meals  insulin lispro (HumaLOG) corrective regimen sliding scale   SubCutaneous at bedtime  polyethylene glycol 3350 17 Gram(s) Oral daily  furosemide   Injectable 80 milliGRAM(s) IV Push two times a day  atorvastatin 40 milliGRAM(s) Oral at bedtime  insulin glargine Injectable (LANTUS) 45 Unit(s) SubCutaneous at bedtime      LABS: All Labs Reviewed:                        10.8   5.42  )-----------( 182      ( 21 Jul 2017 06:25 )             33.9     07-21    143  |  101  |  79<H>  ----------------------------<  71  5.0   |  28  |  2.91<H>    Ca    9.3      21 Jul 2017 06:25  Phos  4.6     07-21  Mg     2.1     07-21            Blood Culture:   Urine Culture      RADIOLOGY/EKG:    ASSESSMENT AND PLAN  1-status post ALISA patient need long-term HD  she agree  2diabetes status post  hypoglycemia stable  3-CAD/CHF stable with current meds    DVT PPX:    ADVANCED DIRECTIVE:    DISPOSITION:

## 2017-07-21 NOTE — PROGRESS NOTE ADULT - PROBLEM SELECTOR PLAN 1
Borderline low FS this am  Decreased Lantus to  50 u qhs  Cont. Humalog to 7/7/7 with meals.  Continue Humalog moderate scale qac and qhs.  Cont. to check FS's TIDAC/qhs Borderline low FS this am  Decreased Lantus to 45 u qhs  Cont. Humalog to 7/7/7 with meals.  Continue Humalog moderate scale qac and qhs.  Cont. to check FS's TIDAC/qhs

## 2017-07-22 LAB
BUN SERPL-MCNC: 68 MG/DL — HIGH (ref 7–23)
CALCIUM SERPL-MCNC: 9.1 MG/DL — SIGNIFICANT CHANGE UP (ref 8.4–10.5)
CHLORIDE SERPL-SCNC: 100 MMOL/L — SIGNIFICANT CHANGE UP (ref 98–107)
CO2 SERPL-SCNC: 28 MMOL/L — SIGNIFICANT CHANGE UP (ref 22–31)
CREAT SERPL-MCNC: 2.8 MG/DL — HIGH (ref 0.5–1.3)
GLUCOSE SERPL-MCNC: 105 MG/DL — HIGH (ref 70–99)
HCT VFR BLD CALC: 32.7 % — LOW (ref 34.5–45)
HGB BLD-MCNC: 10.4 G/DL — LOW (ref 11.5–15.5)
MAGNESIUM SERPL-MCNC: 2 MG/DL — SIGNIFICANT CHANGE UP (ref 1.6–2.6)
MCHC RBC-ENTMCNC: 28.9 PG — SIGNIFICANT CHANGE UP (ref 27–34)
MCHC RBC-ENTMCNC: 31.8 % — LOW (ref 32–36)
MCV RBC AUTO: 90.8 FL — SIGNIFICANT CHANGE UP (ref 80–100)
NRBC # FLD: 0 — SIGNIFICANT CHANGE UP
PHOSPHATE SERPL-MCNC: 4.8 MG/DL — HIGH (ref 2.5–4.5)
PLATELET # BLD AUTO: 160 K/UL — SIGNIFICANT CHANGE UP (ref 150–400)
PMV BLD: 12 FL — SIGNIFICANT CHANGE UP (ref 7–13)
POTASSIUM SERPL-MCNC: 5.3 MMOL/L — SIGNIFICANT CHANGE UP (ref 3.5–5.3)
POTASSIUM SERPL-SCNC: 5.3 MMOL/L — SIGNIFICANT CHANGE UP (ref 3.5–5.3)
RBC # BLD: 3.6 M/UL — LOW (ref 3.8–5.2)
RBC # FLD: 16.4 % — HIGH (ref 10.3–14.5)
SODIUM SERPL-SCNC: 140 MMOL/L — SIGNIFICANT CHANGE UP (ref 135–145)
WBC # BLD: 5.08 K/UL — SIGNIFICANT CHANGE UP (ref 3.8–10.5)
WBC # FLD AUTO: 5.08 K/UL — SIGNIFICANT CHANGE UP (ref 3.8–10.5)

## 2017-07-22 RX ADMIN — Medication 3 MILLILITER(S): at 15:40

## 2017-07-22 RX ADMIN — INSULIN GLARGINE 45 UNIT(S): 100 INJECTION, SOLUTION SUBCUTANEOUS at 23:15

## 2017-07-22 RX ADMIN — Medication 100 MILLIGRAM(S): at 13:55

## 2017-07-22 RX ADMIN — CARVEDILOL PHOSPHATE 25 MILLIGRAM(S): 80 CAPSULE, EXTENDED RELEASE ORAL at 18:41

## 2017-07-22 RX ADMIN — Medication 200 MILLIGRAM(S): at 09:03

## 2017-07-22 RX ADMIN — SENNA PLUS 1 TABLET(S): 8.6 TABLET ORAL at 13:56

## 2017-07-22 RX ADMIN — Medication 650 MILLIGRAM(S): at 13:40

## 2017-07-22 RX ADMIN — Medication 2: at 17:32

## 2017-07-22 RX ADMIN — Medication 650 MILLIGRAM(S): at 23:40

## 2017-07-22 RX ADMIN — Medication 667 MILLIGRAM(S): at 17:33

## 2017-07-22 RX ADMIN — Medication 3 MILLILITER(S): at 21:33

## 2017-07-22 RX ADMIN — Medication 200 MILLIGRAM(S): at 18:41

## 2017-07-22 RX ADMIN — Medication 81 MILLIGRAM(S): at 13:55

## 2017-07-22 RX ADMIN — Medication 7 UNIT(S): at 17:33

## 2017-07-22 RX ADMIN — Medication 3 MILLILITER(S): at 09:18

## 2017-07-22 RX ADMIN — HEPARIN SODIUM 5000 UNIT(S): 5000 INJECTION INTRAVENOUS; SUBCUTANEOUS at 22:06

## 2017-07-22 RX ADMIN — HEPARIN SODIUM 5000 UNIT(S): 5000 INJECTION INTRAVENOUS; SUBCUTANEOUS at 13:57

## 2017-07-22 RX ADMIN — CARVEDILOL PHOSPHATE 25 MILLIGRAM(S): 80 CAPSULE, EXTENDED RELEASE ORAL at 06:14

## 2017-07-22 RX ADMIN — Medication 1000 UNIT(S): at 13:55

## 2017-07-22 RX ADMIN — Medication 100 MILLIGRAM(S): at 22:11

## 2017-07-22 RX ADMIN — GABAPENTIN 100 MILLIGRAM(S): 400 CAPSULE ORAL at 13:56

## 2017-07-22 RX ADMIN — Medication 80 MILLIGRAM(S): at 17:33

## 2017-07-22 RX ADMIN — Medication 650 MILLIGRAM(S): at 14:40

## 2017-07-22 RX ADMIN — Medication 80 MILLIGRAM(S): at 06:14

## 2017-07-22 RX ADMIN — POLYETHYLENE GLYCOL 3350 17 GRAM(S): 17 POWDER, FOR SOLUTION ORAL at 13:56

## 2017-07-22 RX ADMIN — Medication 667 MILLIGRAM(S): at 13:56

## 2017-07-22 RX ADMIN — Medication 667 MILLIGRAM(S): at 09:01

## 2017-07-22 RX ADMIN — HEPARIN SODIUM 5000 UNIT(S): 5000 INJECTION INTRAVENOUS; SUBCUTANEOUS at 06:15

## 2017-07-22 RX ADMIN — Medication 200 MILLIGRAM(S): at 02:01

## 2017-07-22 RX ADMIN — PANTOPRAZOLE SODIUM 40 MILLIGRAM(S): 20 TABLET, DELAYED RELEASE ORAL at 06:14

## 2017-07-22 RX ADMIN — ATORVASTATIN CALCIUM 40 MILLIGRAM(S): 80 TABLET, FILM COATED ORAL at 22:06

## 2017-07-22 NOTE — PROGRESS NOTE ADULT - SUBJECTIVE AND OBJECTIVE BOX
NEPHROLOGY-[facility]  [phone]  Jessica Silver NP      Patient seen and examined . No acute events. Denies sob, chest pain, no n/v, no abdominal pain.  s/p HD today.  MEDICATIONS  (STANDING):  aspirin enteric coated 81 milliGRAM(s) Oral daily  cholecalciferol 1000 Unit(s) Oral daily  pantoprazole    Tablet 40 milliGRAM(s) Oral before breakfast  carvedilol 25 milliGRAM(s) Oral every 12 hours  dextrose 5%. 1000 milliLiter(s) (50 mL/Hr) IV Continuous <Continuous>  dextrose 50% Injectable 12.5 Gram(s) IV Push once  dextrose 50% Injectable 25 Gram(s) IV Push once  dextrose 50% Injectable 25 Gram(s) IV Push once  calcium acetate 667 milliGRAM(s) Oral three times a day with meals  heparin  Injectable 5000 Unit(s) SubCutaneous every 8 hours  gabapentin 100 milliGRAM(s) Oral daily  ALBUTerol/ipratropium for Nebulization 3 milliLiter(s) Nebulizer every 6 hours  senna 1 Tablet(s) Oral daily  docusate sodium 100 milliGRAM(s) Oral daily  insulin lispro Injectable (HumaLOG) 7 Unit(s) SubCutaneous three times a day before meals  insulin lispro (HumaLOG) corrective regimen sliding scale   SubCutaneous three times a day before meals  insulin lispro (HumaLOG) corrective regimen sliding scale   SubCutaneous at bedtime  polyethylene glycol 3350 17 Gram(s) Oral daily  furosemide   Injectable 80 milliGRAM(s) IV Push two times a day  atorvastatin 40 milliGRAM(s) Oral at bedtime  insulin glargine Injectable (LANTUS) 45 Unit(s) SubCutaneous at bedtime      VITAL:  T(C): , Max: 36.8 (07-21-17 @ 19:53)  T(F): , Max: 98.2 (07-21-17 @ 19:53)  HR: 76 (07-22-17 @ 15:40)  BP: 137/84 (07-22-17 @ 13:15)  BP(mean): --  RR: 17 (07-22-17 @ 13:15)  SpO2: 97% (07-22-17 @ 15:40)  Wt(kg): --    I and O's:    07-21 @ 07:01  -  07-22 @ 07:00  --------------------------------------------------------  IN: 1020 mL / OUT: 1700 mL / NET: -680 mL    07-22 @ 07:01  -  07-22 @ 16:53  --------------------------------------------------------  IN: 700 mL / OUT: 1700 mL / NET: -1000 mL          PHYSICAL EXAM:    Constitutional: NAD     Neck:  No JVD,     Respiratory: CTAB/L    Cardiovascular: S1 and S2    Gastrointestinal: BS+, soft, NT/ND    Extremities: No peripheral edema    : No Light    Skin: No rashes    Access: [facility]    LABS:                        10.4   5.08  )-----------( 160      ( 22 Jul 2017 06:00 )             32.7     22 Jul 2017 06:00    140    |  100    |  68<H>  ----------------------------<  105<H>  5.3     |  28     |  2.80<H>  21 Jul 2017 06:25    143    |  101    |  79<H>  ----------------------------<  71     5.0     |  28     |  2.91<H>    Ca    9.1        22 Jul 2017 06:00  Ca    9.3        21 Jul 2017 06:25  Phos  4.8<H>     22 Jul 2017 06:00  Phos  4.6<H>     21 Jul 2017 06:25  Mg     2.0       22 Jul 2017 06:00  Mg     2.1       21 Jul 2017 06:25          Urine Studies:      RADIOLOGY & ADDITIONAL STUDIES:      MPRESSION: 56 year old female with hx of HTN, DM2, and HFrEF(20%)-AICD, admitted with hypoglycemia, respiratory acidosis, and ALISA    (1)Renal - ALISA and s/p several HD treatment this admission.  At this point she requires chronic HD.    (2)Hyperkalemia - Indicated for chronic HD at this point in part to allow for management of her hyperkalemia    (3)CV - HD should serves us well to help manage her volume status/prevent further episodes of pulmonary edema      RECOMMEND:  (1)Next HD to be determined  (2)Tunneled HD catheter by IR likely on Monday  (3)Meds for GFR<10/HD  (4)Vascular evaluation for AVF vs AVG  (5)Social work setup of outpatient HD.

## 2017-07-22 NOTE — PROGRESS NOTE ADULT - SUBJECTIVE AND OBJECTIVE BOX
CHIEF COMPLAINT: feel  better  going for  HD    SUBJECTIVE:     REVIEW OF SYSTEMS:    CONSTITUTIONAL: ( - )  weakness,  (  -) fevers or chills  EYES/ENT: ( - )visual changes;     NECK: ( - ) pain or stiffness  RESPIRATORY:   ( - )cough, wheezing, hemoptysis;  ( -) shortness of breath  CARDIOVASCULAR:  ( - )chest pain or palpitations  GASTROINTESTINAL:   ( - )abdominal or epigastric pain.  ( - ) nausea, vomiting, or hematemesis;   (  - ) diarrhea or constipation.   GENITOURINARY:   (   - ) dysuria, frequency or hematuria  NEUROLOGICAL:  (  - ) numbness or weakness   All other review of systems is negative unless indicated above    Vital Signs Last 24 Hrs  T(C): 36.4 (22 Jul 2017 05:34), Max: 36.8 (21 Jul 2017 13:23)  T(F): 97.6 (22 Jul 2017 05:34), Max: 98.3 (21 Jul 2017 13:23)  HR: 86 (22 Jul 2017 06:43) (71 - 86)  BP: 118/75 (22 Jul 2017 05:34) (118/75 - 146/83)  BP(mean): --  RR: 20 (22 Jul 2017 05:34) (16 - 20)  SpO2: 98% (22 Jul 2017 06:43) (96% - 100%)    I&O's Summary    21 Jul 2017 07:01  -  22 Jul 2017 07:00  --------------------------------------------------------  IN: 1020 mL / OUT: 1700 mL / NET: -680 mL        CAPILLARY BLOOD GLUCOSE  103 (22 Jul 2017 08:45)  235 (21 Jul 2017 22:10)  110 (21 Jul 2017 18:38)  162 (21 Jul 2017 12:00)          PHYSICAL EXAM:    Constitutional:  (  - ) NAD,   (   +)awake and alert  HEENT: PERR, EOMI,    Neck: Soft and supple, No LAD, No JVD  Respiratory:  (  +  Breath sounds are clear bilaterally,    ( -  ) wheezing, rales or rhonchi  Cardiovascular:     ( +  )S1 and S2, regular rate and rhythm, no Murmurs, gallops or rubs  Gastrointestinal:  ( +  )Bowel Sounds present, soft,   ( - )nontender, nondistended,    Extremities:    ( - ) peripheral edema  Vascular: 2+ peripheral pulses  Neurological:    (  +  )A/O x 3,   (  -) focal deficits  Musculoskeletal:    ( +  )  normal strength b/l upper  (   +  ) normal  lower extremities  Skin: No rashes    MEDICATIONS:  MEDICATIONS  (STANDING):  aspirin enteric coated 81 milliGRAM(s) Oral daily  cholecalciferol 1000 Unit(s) Oral daily  pantoprazole    Tablet 40 milliGRAM(s) Oral before breakfast  carvedilol 25 milliGRAM(s) Oral every 12 hours  dextrose 5%. 1000 milliLiter(s) (50 mL/Hr) IV Continuous <Continuous>  dextrose 50% Injectable 12.5 Gram(s) IV Push once  dextrose 50% Injectable 25 Gram(s) IV Push once  dextrose 50% Injectable 25 Gram(s) IV Push once  calcium acetate 667 milliGRAM(s) Oral three times a day with meals  heparin  Injectable 5000 Unit(s) SubCutaneous every 8 hours  gabapentin 100 milliGRAM(s) Oral daily  ALBUTerol/ipratropium for Nebulization 3 milliLiter(s) Nebulizer every 6 hours  senna 1 Tablet(s) Oral daily  docusate sodium 100 milliGRAM(s) Oral daily  insulin lispro Injectable (HumaLOG) 7 Unit(s) SubCutaneous three times a day before meals  insulin lispro (HumaLOG) corrective regimen sliding scale   SubCutaneous three times a day before meals  insulin lispro (HumaLOG) corrective regimen sliding scale   SubCutaneous at bedtime  polyethylene glycol 3350 17 Gram(s) Oral daily  furosemide   Injectable 80 milliGRAM(s) IV Push two times a day  atorvastatin 40 milliGRAM(s) Oral at bedtime  insulin glargine Injectable (LANTUS) 45 Unit(s) SubCutaneous at bedtime      LABS: All Labs Reviewed:                        10.4   5.08  )-----------( 160      ( 22 Jul 2017 06:00 )             32.7     07-22    140  |  100  |  68<H>  ----------------------------<  105<H>  5.3   |  28  |  2.80<H>    Ca    9.1      22 Jul 2017 06:00  Phos  4.8     07-22  Mg     2.0     07-22            Blood Culture:   Urine Culture      RADIOLOGY/EKG:    ASSESSMENT AND PLAN:  1- s/p ALISA   for  HD  at  11 am  2-  dm  stable  on insulin glargine Injectable (LANTUS) 45 Unit(s) SubCutaneous at bedtime  3-  cad  /chf on furosemide   Injectable 80 milliGRAM(s) IV Push two times a day      DVT PPX:    ADVANCED DIRECTIVE:    DISPOSITION:

## 2017-07-23 LAB
BUN SERPL-MCNC: 60 MG/DL — HIGH (ref 7–23)
CALCIUM SERPL-MCNC: 9.3 MG/DL — SIGNIFICANT CHANGE UP (ref 8.4–10.5)
CHLORIDE SERPL-SCNC: 97 MMOL/L — LOW (ref 98–107)
CO2 SERPL-SCNC: 27 MMOL/L — SIGNIFICANT CHANGE UP (ref 22–31)
CREAT SERPL-MCNC: 2.31 MG/DL — HIGH (ref 0.5–1.3)
GLUCOSE SERPL-MCNC: 136 MG/DL — HIGH (ref 70–99)
HCT VFR BLD CALC: 33.1 % — LOW (ref 34.5–45)
HGB BLD-MCNC: 10.2 G/DL — LOW (ref 11.5–15.5)
MAGNESIUM SERPL-MCNC: 2 MG/DL — SIGNIFICANT CHANGE UP (ref 1.6–2.6)
MCHC RBC-ENTMCNC: 27.6 PG — SIGNIFICANT CHANGE UP (ref 27–34)
MCHC RBC-ENTMCNC: 30.8 % — LOW (ref 32–36)
MCV RBC AUTO: 89.7 FL — SIGNIFICANT CHANGE UP (ref 80–100)
NRBC # FLD: 0 — SIGNIFICANT CHANGE UP
PHOSPHATE SERPL-MCNC: 4.4 MG/DL — SIGNIFICANT CHANGE UP (ref 2.5–4.5)
PLATELET # BLD AUTO: 179 K/UL — SIGNIFICANT CHANGE UP (ref 150–400)
PMV BLD: 12.2 FL — SIGNIFICANT CHANGE UP (ref 7–13)
POTASSIUM SERPL-MCNC: 5.3 MMOL/L — SIGNIFICANT CHANGE UP (ref 3.5–5.3)
POTASSIUM SERPL-SCNC: 5.3 MMOL/L — SIGNIFICANT CHANGE UP (ref 3.5–5.3)
RBC # BLD: 3.69 M/UL — LOW (ref 3.8–5.2)
RBC # FLD: 16.2 % — HIGH (ref 10.3–14.5)
SODIUM SERPL-SCNC: 138 MMOL/L — SIGNIFICANT CHANGE UP (ref 135–145)
WBC # BLD: 4.9 K/UL — SIGNIFICANT CHANGE UP (ref 3.8–10.5)
WBC # FLD AUTO: 4.9 K/UL — SIGNIFICANT CHANGE UP (ref 3.8–10.5)

## 2017-07-23 RX ADMIN — Medication 200 MILLIGRAM(S): at 01:49

## 2017-07-23 RX ADMIN — Medication 3 MILLILITER(S): at 22:04

## 2017-07-23 RX ADMIN — ATORVASTATIN CALCIUM 40 MILLIGRAM(S): 80 TABLET, FILM COATED ORAL at 21:10

## 2017-07-23 RX ADMIN — Medication 667 MILLIGRAM(S): at 12:58

## 2017-07-23 RX ADMIN — SENNA PLUS 1 TABLET(S): 8.6 TABLET ORAL at 12:58

## 2017-07-23 RX ADMIN — INSULIN GLARGINE 45 UNIT(S): 100 INJECTION, SOLUTION SUBCUTANEOUS at 23:27

## 2017-07-23 RX ADMIN — CARVEDILOL PHOSPHATE 25 MILLIGRAM(S): 80 CAPSULE, EXTENDED RELEASE ORAL at 17:26

## 2017-07-23 RX ADMIN — Medication 3 MILLILITER(S): at 14:46

## 2017-07-23 RX ADMIN — Medication 2: at 12:58

## 2017-07-23 RX ADMIN — Medication 7 UNIT(S): at 08:45

## 2017-07-23 RX ADMIN — Medication 7 UNIT(S): at 17:58

## 2017-07-23 RX ADMIN — PANTOPRAZOLE SODIUM 40 MILLIGRAM(S): 20 TABLET, DELAYED RELEASE ORAL at 06:10

## 2017-07-23 RX ADMIN — Medication 1: at 23:28

## 2017-07-23 RX ADMIN — Medication 81 MILLIGRAM(S): at 12:58

## 2017-07-23 RX ADMIN — Medication 100 MILLIGRAM(S): at 17:25

## 2017-07-23 RX ADMIN — Medication 7 UNIT(S): at 12:58

## 2017-07-23 RX ADMIN — Medication 1000 UNIT(S): at 12:58

## 2017-07-23 RX ADMIN — Medication 650 MILLIGRAM(S): at 00:30

## 2017-07-23 RX ADMIN — CARVEDILOL PHOSPHATE 25 MILLIGRAM(S): 80 CAPSULE, EXTENDED RELEASE ORAL at 05:11

## 2017-07-23 RX ADMIN — Medication 200 MILLIGRAM(S): at 12:58

## 2017-07-23 RX ADMIN — Medication 100 MILLIGRAM(S): at 12:57

## 2017-07-23 RX ADMIN — HEPARIN SODIUM 5000 UNIT(S): 5000 INJECTION INTRAVENOUS; SUBCUTANEOUS at 05:11

## 2017-07-23 RX ADMIN — Medication 80 MILLIGRAM(S): at 05:24

## 2017-07-23 RX ADMIN — HEPARIN SODIUM 5000 UNIT(S): 5000 INJECTION INTRAVENOUS; SUBCUTANEOUS at 21:10

## 2017-07-23 RX ADMIN — GABAPENTIN 100 MILLIGRAM(S): 400 CAPSULE ORAL at 12:58

## 2017-07-23 RX ADMIN — Medication 3 MILLILITER(S): at 03:01

## 2017-07-23 RX ADMIN — Medication 200 MILLIGRAM(S): at 18:48

## 2017-07-23 RX ADMIN — Medication 650 MILLIGRAM(S): at 23:27

## 2017-07-23 RX ADMIN — Medication 667 MILLIGRAM(S): at 08:45

## 2017-07-23 RX ADMIN — POLYETHYLENE GLYCOL 3350 17 GRAM(S): 17 POWDER, FOR SOLUTION ORAL at 12:58

## 2017-07-23 RX ADMIN — HEPARIN SODIUM 5000 UNIT(S): 5000 INJECTION INTRAVENOUS; SUBCUTANEOUS at 13:50

## 2017-07-23 RX ADMIN — Medication 80 MILLIGRAM(S): at 17:25

## 2017-07-23 RX ADMIN — Medication 667 MILLIGRAM(S): at 17:26

## 2017-07-23 RX ADMIN — Medication 100 MILLIGRAM(S): at 08:45

## 2017-07-23 NOTE — PROGRESS NOTE ADULT - SUBJECTIVE AND OBJECTIVE BOX
Patient is a 56y old  Female who presents with a chief complaint of hypoglycemia (12 Jul 2017 18:07)      SUBJECTIVE / OVERNIGHT EVENTS:    Pt. seen and examined at bedside. Denies overnight events. Pt is aware of her IR procedure tomorrow and that she will be NPO tonight at midnight. Endorses normal bowel and bladder. Denies f/c/n/v/d/cp/sob abdominal pain, dysuria.    MEDICATIONS  (STANDING):  aspirin enteric coated 81 milliGRAM(s) Oral daily  cholecalciferol 1000 Unit(s) Oral daily  pantoprazole    Tablet 40 milliGRAM(s) Oral before breakfast  carvedilol 25 milliGRAM(s) Oral every 12 hours  dextrose 5%. 1000 milliLiter(s) (50 mL/Hr) IV Continuous <Continuous>  dextrose 50% Injectable 12.5 Gram(s) IV Push once  dextrose 50% Injectable 25 Gram(s) IV Push once  dextrose 50% Injectable 25 Gram(s) IV Push once  calcium acetate 667 milliGRAM(s) Oral three times a day with meals  heparin  Injectable 5000 Unit(s) SubCutaneous every 8 hours  gabapentin 100 milliGRAM(s) Oral daily  ALBUTerol/ipratropium for Nebulization 3 milliLiter(s) Nebulizer every 6 hours  senna 1 Tablet(s) Oral daily  docusate sodium 100 milliGRAM(s) Oral daily  insulin lispro Injectable (HumaLOG) 7 Unit(s) SubCutaneous three times a day before meals  insulin lispro (HumaLOG) corrective regimen sliding scale   SubCutaneous three times a day before meals  insulin lispro (HumaLOG) corrective regimen sliding scale   SubCutaneous at bedtime  polyethylene glycol 3350 17 Gram(s) Oral daily  furosemide   Injectable 80 milliGRAM(s) IV Push two times a day  atorvastatin 40 milliGRAM(s) Oral at bedtime  insulin glargine Injectable (LANTUS) 45 Unit(s) SubCutaneous at bedtime    MEDICATIONS  (PRN):  dextrose Gel 1 Dose(s) Oral once PRN Blood Glucose LESS THAN 70 milliGRAM(s)/deciLiter  glucagon  Injectable 1 milliGRAM(s) IntraMuscular once PRN Glucose <70 milliGRAM(s)/deciLiter  acetaminophen   Tablet. 650 milliGRAM(s) Oral every 6 hours PRN Mild Pain (1 - 3)  guaiFENesin   Syrup  (Sugar-Free) 200 milliGRAM(s) Oral every 6 hours PRN Cough  benzonatate 100 milliGRAM(s) Oral every 8 hours PRN Cough  bisacodyl 5 milliGRAM(s) Oral every 12 hours PRN Constipation        CAPILLARY BLOOD GLUCOSE  102 (22 Jul 2017 23:10)  142 (22 Jul 2017 12:26)  103 (22 Jul 2017 08:45)        I&O's Summary    22 Jul 2017 07:01  -  23 Jul 2017 07:00  --------------------------------------------------------  IN: 1050 mL / OUT: 1855 mL / NET: -805 mL        PHYSICAL EXAM:  GENERAL: NAD, well-developed  HEAD:  Atraumatic, Normocephalic  EYES: EOMI, PERRLA, conjunctiva and sclera clear  NECK: Supple, No JVD  CHEST/LUNG: Clear to auscultation bilaterally; No wheeze  HEART: Regular rate and rhythm; No murmurs, rubs, or gallops  ABDOMEN: Soft, Nontender, Nondistended; Bowel sounds present  EXTREMITIES:  2+ Peripheral Pulses, No clubbing, cyanosis, or edema  PSYCH: AAOx3  NEUROLOGY: non-focal  SKIN: No rashes or lesions    LABS:    WBC Trend: 5.08<--, 5.42<--, 6.50<--  Hb Trend: 10.4<--, 10.8<--, 10.9<--, 10.9<--, 11.4<--  Plt Trend: 160<--, 182<--, 203<--, 187<--, 163<--  07-22    140  |  100  |  68<H>  ----------------------------<  105<H>  5.3   |  28  |  2.80<H>    Ca    9.1      22 Jul 2017 06:00  Phos  4.8     07-22  Mg     2.0     07-22      Creatinine Trend: 2.80<--, 2.91<--, 3.18<--, 3.56<--, 3.51<--, 3.58<--            Microbiology:  Urine Cx:  Blood Cx:    RADIOLOGY & ADDITIONAL TESTS:  X- Ray:  CT:  Ultrasound:  [ ] imaging personally reviewed and interpreted by me    Consultant(s) Notes Reviewed:      Care Discussed with Consultants/Other Providers:

## 2017-07-23 NOTE — PROGRESS NOTE ADULT - SUBJECTIVE AND OBJECTIVE BOX
CHIEF COMPLAINT: patient condition improv no shortness of breath  other symptoms reported    SUBJECTIVE:     REVIEW OF SYSTEMS:    CONSTITUTIONAL: ( - )  weakness,  ( - ) fevers or chills  EYES/ENT: (-  )visual changes;     NECK: ( - ) pain or stiffness  RESPIRATORY:   (-  )cough, wheezing, hemoptysis;  ( - ) shortness of breath  CARDIOVASCULAR:  -(-  )chest pain or palpitations  GASTROINTESTINAL:   ( - )abdominal or epigastric pain.  ( - ) nausea, vomiting, or hematemesis;   ( -  ) diarrhea or constipation.   GENITOURINARY:   (   - ) dysuria, frequency or hematuria  NEUROLOGICAL:  (  - ) numbness or weakness   All other review of systems is negative unless indicated above    Vital Signs Last 24 Hrs  T(C): 37.1 (23 Jul 2017 14:27), Max: 37.1 (23 Jul 2017 14:27)  T(F): 98.7 (23 Jul 2017 14:27), Max: 98.7 (23 Jul 2017 14:27)  HR: 76 (23 Jul 2017 19:30) (68 - 81)  BP: 109/62 (23 Jul 2017 17:23) (109/62 - 138/82)  BP(mean): --  RR: 16 (23 Jul 2017 17:23) (16 - 20)  SpO2: 96% (23 Jul 2017 19:30) (95% - 100%)    I&O's Summary    22 Jul 2017 07:01  -  23 Jul 2017 07:00  --------------------------------------------------------  IN: 1050 mL / OUT: 1855 mL / NET: -805 mL    23 Jul 2017 07:01  -  23 Jul 2017 20:59  --------------------------------------------------------  IN: 668 mL / OUT: 600 mL / NET: 68 mL        CAPILLARY BLOOD GLUCOSE  122 (23 Jul 2017 17:56)  151 (23 Jul 2017 12:48)  109 (23 Jul 2017 08:43)  102 (22 Jul 2017 23:10)          PHYSICAL EXAM:    Constitutional:  ( -  ) NAD,   ( +  )awake and alert  HEENT: PERR, EOMI,    Neck: Soft and supple, No LAD, No JVD  Respiratory:  (  +  Breath sounds are clear bilaterally,    ( -  ) wheezing, rales or rhonchi  Cardiovascular:     (  + )S1 and S2, regular rate and rhythm, no Murmurs, gallops or rubs  Gastrointestinal:  ( +  )Bowel Sounds present, soft,   ( - )nontender, nondistended,    Extremities:    ( - ) peripheral edema  Vascular: 2+ peripheral pulses  Neurological:    (   + )A/O x 3,   (-  ) focal deficits  Musculoskeletal:    (  + )  normal strength b/l upper  (   +  ) normal  lower extremities  Skin: No rashes    MEDICATIONS:  MEDICATIONS  (STANDING):  aspirin enteric coated 81 milliGRAM(s) Oral daily  cholecalciferol 1000 Unit(s) Oral daily  pantoprazole    Tablet 40 milliGRAM(s) Oral before breakfast  carvedilol 25 milliGRAM(s) Oral every 12 hours  dextrose 5%. 1000 milliLiter(s) (50 mL/Hr) IV Continuous <Continuous>  dextrose 50% Injectable 12.5 Gram(s) IV Push once  dextrose 50% Injectable 25 Gram(s) IV Push once  dextrose 50% Injectable 25 Gram(s) IV Push once  calcium acetate 667 milliGRAM(s) Oral three times a day with meals  heparin  Injectable 5000 Unit(s) SubCutaneous every 8 hours  gabapentin 100 milliGRAM(s) Oral daily  ALBUTerol/ipratropium for Nebulization 3 milliLiter(s) Nebulizer every 6 hours  senna 1 Tablet(s) Oral daily  docusate sodium 100 milliGRAM(s) Oral daily  insulin lispro Injectable (HumaLOG) 7 Unit(s) SubCutaneous three times a day before meals  insulin lispro (HumaLOG) corrective regimen sliding scale   SubCutaneous three times a day before meals  insulin lispro (HumaLOG) corrective regimen sliding scale   SubCutaneous at bedtime  polyethylene glycol 3350 17 Gram(s) Oral daily  furosemide   Injectable 80 milliGRAM(s) IV Push two times a day  atorvastatin 40 milliGRAM(s) Oral at bedtime  insulin glargine Injectable (LANTUS) 45 Unit(s) SubCutaneous at bedtime      LABS: All Labs Reviewed:                        10.2   4.90  )-----------( 179      ( 23 Jul 2017 12:18 )             33.1     07-23    138  |  97<L>  |  60<H>  ----------------------------<  136<H>  5.3   |  27  |  2.31<H>    Ca    9.3      23 Jul 2017 04:00  Phos  4.4     07-23  Mg     2.0     07-23            Blood Culture:   Urine Culture      RADIOLOGY/EKG:    ASSESSMENT AND PLAN:  1 ALISA  need long-term dialysis she will get access in in interventional rad in the morning  2 diabetes status post hyp stable on current meds  3 CHF CAD stable    DVT PPX:    ADVANCED DIRECTIVE:    DISPOSITION: discussed with the patie and her sister  she wishes to go home o iin the next 2-3 days

## 2017-07-23 NOTE — PROGRESS NOTE ADULT - PROBLEM SELECTOR PLAN 2
Cr baseline 1.8, Cr. 7.05 on admission, currently 2.80  Shiley in place for HD in rt IJ, receiving HD daily.   cxr revealed diffuse pulmonary edema  nephro recs: c/w lasix 80 bid. pt may be candidate for chronic dialysis dependence, will set up outpatient HD  -IR for permacath Monday, NPO sunday @midnight  f/u renal labs

## 2017-07-23 NOTE — PROGRESS NOTE ADULT - SUBJECTIVE AND OBJECTIVE BOX
NEPHROLOGY-[facility]  [phone]  Jessica Silver NP      Patient seen and examined. Awake, alert, no acute events noted. No complaints      MEDICATIONS  (STANDING):  aspirin enteric coated 81 milliGRAM(s) Oral daily  cholecalciferol 1000 Unit(s) Oral daily  pantoprazole    Tablet 40 milliGRAM(s) Oral before breakfast  carvedilol 25 milliGRAM(s) Oral every 12 hours  dextrose 5%. 1000 milliLiter(s) (50 mL/Hr) IV Continuous <Continuous>  dextrose 50% Injectable 12.5 Gram(s) IV Push once  dextrose 50% Injectable 25 Gram(s) IV Push once  dextrose 50% Injectable 25 Gram(s) IV Push once  calcium acetate 667 milliGRAM(s) Oral three times a day with meals  heparin  Injectable 5000 Unit(s) SubCutaneous every 8 hours  gabapentin 100 milliGRAM(s) Oral daily  ALBUTerol/ipratropium for Nebulization 3 milliLiter(s) Nebulizer every 6 hours  senna 1 Tablet(s) Oral daily  docusate sodium 100 milliGRAM(s) Oral daily  insulin lispro Injectable (HumaLOG) 7 Unit(s) SubCutaneous three times a day before meals  insulin lispro (HumaLOG) corrective regimen sliding scale   SubCutaneous three times a day before meals  insulin lispro (HumaLOG) corrective regimen sliding scale   SubCutaneous at bedtime  polyethylene glycol 3350 17 Gram(s) Oral daily  furosemide   Injectable 80 milliGRAM(s) IV Push two times a day  atorvastatin 40 milliGRAM(s) Oral at bedtime  insulin glargine Injectable (LANTUS) 45 Unit(s) SubCutaneous at bedtime      VITAL:  T(C): , Max: 37.1 (07-23-17 @ 14:27)  T(F): , Max: 98.7 (07-23-17 @ 14:27)  HR: 78 (07-23-17 @ 14:46)  BP: 128/72 (07-23-17 @ 14:27)  BP(mean): --  RR: 18 (07-23-17 @ 14:27)  SpO2: 95% (07-23-17 @ 14:46)  Wt(kg): --    I and O's:    07-22 @ 07:01  -  07-23 @ 07:00  --------------------------------------------------------  IN: 1050 mL / OUT: 1855 mL / NET: -805 mL    07-23 @ 07:01 - 07-23 @ 16:27  --------------------------------------------------------  IN: 150 mL / OUT: 400 mL / NET: -250 mL          PHYSICAL EXAM:    Constitutional: NAD    HEENT: PERRLA, EOMI       Neck:  No JVD    Respiratory: CTAB/L    Cardiovascular: S1 and S2    Gastrointestinal: BS+, soft, NT/ND    Extremities: No peripheral edema    : No Light    Skin: No rashes    Access: [facility]     LABS:                        10.2   4.90  )-----------( 179      ( 23 Jul 2017 12:18 )             33.1     23 Jul 2017 04:00    138    |  97<L>  |  60<H>  ----------------------------<  136<H>  5.3     |  27     |  2.31<H>  22 Jul 2017 06:00    140    |  100    |  68<H>  ----------------------------<  105<H>  5.3     |  28     |  2.80<H>    Ca    9.3        23 Jul 2017 04:00  Ca    9.1        22 Jul 2017 06:00  Phos  4.4       23 Jul 2017 04:00  Phos  4.8<H>     22 Jul 2017 06:00  Mg     2.0       23 Jul 2017 04:00  Mg     2.0       22 Jul 2017 06:00          Urine Studies:      RADIOLOGY & ADDITIONAL STUDIES:      ASSESSMENT:   56 year old female with hx of HTN, DM2, and HFrEF(20%)-AICD, admitted with hypoglycemia, respiratory acidosis, and ALISA    (1)Renal - ALISA and s/p several HD treatment this admission.  At this point she requires chronic HD.    (2)Hyperkalemia - Maintenance HD in part will allow for management of hyperkalemia    (3)CV - HD should serves us well to help manage her volume status/prevent further episodes of pulmonary edema      RECOMMEND:  (1)Next HD to be determined  (2)Tunneled HD catheter by IR likely tomorrow  (3)Meds for GFR<10ml/min/HD  (4)Vascular evaluation for AVF vs AVG  (5)Social work setup of outpatient HD. NEPHROLOGY-[facility]  [phone]  Jessica Silver NP      Patient seen and examined. OOB to chair. Awake, alert, responsive. no acute events noted. No complaints  Denies sob, no pain    MEDICATIONS  (STANDING):  aspirin enteric coated 81 milliGRAM(s) Oral daily  cholecalciferol 1000 Unit(s) Oral daily  pantoprazole    Tablet 40 milliGRAM(s) Oral before breakfast  carvedilol 25 milliGRAM(s) Oral every 12 hours  dextrose 5%. 1000 milliLiter(s) (50 mL/Hr) IV Continuous <Continuous>  dextrose 50% Injectable 12.5 Gram(s) IV Push once  dextrose 50% Injectable 25 Gram(s) IV Push once  dextrose 50% Injectable 25 Gram(s) IV Push once  calcium acetate 667 milliGRAM(s) Oral three times a day with meals  heparin  Injectable 5000 Unit(s) SubCutaneous every 8 hours  gabapentin 100 milliGRAM(s) Oral daily  ALBUTerol/ipratropium for Nebulization 3 milliLiter(s) Nebulizer every 6 hours  senna 1 Tablet(s) Oral daily  docusate sodium 100 milliGRAM(s) Oral daily  insulin lispro Injectable (HumaLOG) 7 Unit(s) SubCutaneous three times a day before meals  insulin lispro (HumaLOG) corrective regimen sliding scale   SubCutaneous three times a day before meals  insulin lispro (HumaLOG) corrective regimen sliding scale   SubCutaneous at bedtime  polyethylene glycol 3350 17 Gram(s) Oral daily  furosemide   Injectable 80 milliGRAM(s) IV Push two times a day  atorvastatin 40 milliGRAM(s) Oral at bedtime  insulin glargine Injectable (LANTUS) 45 Unit(s) SubCutaneous at bedtime      VITAL:  T(C): , Max: 37.1 (07-23-17 @ 14:27)  T(F): , Max: 98.7 (07-23-17 @ 14:27)  HR: 78 (07-23-17 @ 14:46)  BP: 128/72 (07-23-17 @ 14:27)  BP(mean): --  RR: 18 (07-23-17 @ 14:27)  SpO2: 95% (07-23-17 @ 14:46)  Wt(kg): --    I and O's:    07-22 @ 07:01  -  07-23 @ 07:00  --------------------------------------------------------  IN: 1050 mL / OUT: 1855 mL / NET: -805 mL    07-23 @ 07:01  -  07-23 @ 16:27  --------------------------------------------------------  IN: 150 mL / OUT: 400 mL / NET: -250 mL          PHYSICAL EXAM:    Constitutional: NAD    HEENT: PERRLA, EOMI       Neck:  No JVD    Respiratory: CTAB/L    Cardiovascular: S1 and S2    Gastrointestinal: BS+, soft, NT/ND    Extremities: No peripheral edema    : No Light    Skin: No rashes    Access: [facility]     LABS:                        10.2   4.90  )-----------( 179      ( 23 Jul 2017 12:18 )             33.1     23 Jul 2017 04:00    138    |  97<L>  |  60<H>  ----------------------------<  136<H>  5.3     |  27     |  2.31<H>  22 Jul 2017 06:00    140    |  100    |  68<H>  ----------------------------<  105<H>  5.3     |  28     |  2.80<H>    Ca    9.3        23 Jul 2017 04:00  Ca    9.1        22 Jul 2017 06:00  Phos  4.4       23 Jul 2017 04:00  Phos  4.8<H>     22 Jul 2017 06:00  Mg     2.0       23 Jul 2017 04:00  Mg     2.0       22 Jul 2017 06:00          Urine Studies:      RADIOLOGY & ADDITIONAL STUDIES:      ASSESSMENT:   56 year old female with hx of HTN, DM2, and HFrEF(20%)-AICD, admitted with hypoglycemia, respiratory acidosis, and ALISA    (1)Renal - ALISA and s/p several HD treatment this admission.  At this point she requires chronic HD.    (2)Hyperkalemia - Maintenance HD in part will allow for management of hyperkalemia    (3)CV - HD should serves us well to help manage her volume status/prevent further episodes of pulmonary edema      RECOMMEND:  (1)Next HD Tuesday  (2)Tunneled HD catheter by IR likely tomorrow  (3)Meds for GFR<10ml/min/HD  (4)Vascular evaluation for AVF vs AVG  (5)Social work setup of outpatient HD.

## 2017-07-24 DIAGNOSIS — N18.6 END STAGE RENAL DISEASE: ICD-10-CM

## 2017-07-24 DIAGNOSIS — I50.9 HEART FAILURE, UNSPECIFIED: ICD-10-CM

## 2017-07-24 LAB
ALBUMIN SERPL ELPH-MCNC: 3.5 G/DL — SIGNIFICANT CHANGE UP (ref 3.3–5)
ALP SERPL-CCNC: 63 U/L — SIGNIFICANT CHANGE UP (ref 40–120)
ALT FLD-CCNC: 17 U/L — SIGNIFICANT CHANGE UP (ref 4–33)
APTT BLD: 29.5 SEC — SIGNIFICANT CHANGE UP (ref 27.5–37.4)
AST SERPL-CCNC: 22 U/L — SIGNIFICANT CHANGE UP (ref 4–32)
BILIRUB SERPL-MCNC: 0.5 MG/DL — SIGNIFICANT CHANGE UP (ref 0.2–1.2)
BLD GP AB SCN SERPL QL: NEGATIVE — SIGNIFICANT CHANGE UP
BUN SERPL-MCNC: 69 MG/DL — HIGH (ref 7–23)
BUN SERPL-MCNC: 74 MG/DL — HIGH (ref 7–23)
CALCIUM SERPL-MCNC: 9.4 MG/DL — SIGNIFICANT CHANGE UP (ref 8.4–10.5)
CALCIUM SERPL-MCNC: 9.5 MG/DL — SIGNIFICANT CHANGE UP (ref 8.4–10.5)
CHLORIDE SERPL-SCNC: 102 MMOL/L — SIGNIFICANT CHANGE UP (ref 98–107)
CHLORIDE SERPL-SCNC: 97 MMOL/L — LOW (ref 98–107)
CO2 SERPL-SCNC: 27 MMOL/L — SIGNIFICANT CHANGE UP (ref 22–31)
CO2 SERPL-SCNC: 29 MMOL/L — SIGNIFICANT CHANGE UP (ref 22–31)
CREAT SERPL-MCNC: 2.48 MG/DL — HIGH (ref 0.5–1.3)
CREAT SERPL-MCNC: 2.58 MG/DL — HIGH (ref 0.5–1.3)
GLUCOSE SERPL-MCNC: 130 MG/DL — HIGH (ref 70–99)
GLUCOSE SERPL-MCNC: 60 MG/DL — LOW (ref 70–99)
HCT VFR BLD CALC: 33.5 % — LOW (ref 34.5–45)
HGB BLD-MCNC: 10.3 G/DL — LOW (ref 11.5–15.5)
INR BLD: 1.03 — SIGNIFICANT CHANGE UP (ref 0.88–1.17)
MAGNESIUM SERPL-MCNC: 2 MG/DL — SIGNIFICANT CHANGE UP (ref 1.6–2.6)
MAGNESIUM SERPL-MCNC: 2 MG/DL — SIGNIFICANT CHANGE UP (ref 1.6–2.6)
MCHC RBC-ENTMCNC: 27.8 PG — SIGNIFICANT CHANGE UP (ref 27–34)
MCHC RBC-ENTMCNC: 30.7 % — LOW (ref 32–36)
MCV RBC AUTO: 90.5 FL — SIGNIFICANT CHANGE UP (ref 80–100)
NRBC # FLD: 0 — SIGNIFICANT CHANGE UP
PHOSPHATE SERPL-MCNC: 4.3 MG/DL — SIGNIFICANT CHANGE UP (ref 2.5–4.5)
PHOSPHATE SERPL-MCNC: 4.7 MG/DL — HIGH (ref 2.5–4.5)
PLATELET # BLD AUTO: 182 K/UL — SIGNIFICANT CHANGE UP (ref 150–400)
PMV BLD: 12.2 FL — SIGNIFICANT CHANGE UP (ref 7–13)
POTASSIUM SERPL-MCNC: 5.5 MMOL/L — HIGH (ref 3.5–5.3)
POTASSIUM SERPL-MCNC: 5.5 MMOL/L — HIGH (ref 3.5–5.3)
POTASSIUM SERPL-SCNC: 5.5 MMOL/L — HIGH (ref 3.5–5.3)
POTASSIUM SERPL-SCNC: 5.5 MMOL/L — HIGH (ref 3.5–5.3)
PROT SERPL-MCNC: 7.3 G/DL — SIGNIFICANT CHANGE UP (ref 6–8.3)
PROTHROM AB SERPL-ACNC: 11.6 SEC — SIGNIFICANT CHANGE UP (ref 9.8–13.1)
RBC # BLD: 3.7 M/UL — LOW (ref 3.8–5.2)
RBC # FLD: 16.2 % — HIGH (ref 10.3–14.5)
RH IG SCN BLD-IMP: POSITIVE — SIGNIFICANT CHANGE UP
SODIUM SERPL-SCNC: 137 MMOL/L — SIGNIFICANT CHANGE UP (ref 135–145)
SODIUM SERPL-SCNC: 142 MMOL/L — SIGNIFICANT CHANGE UP (ref 135–145)
VIT B12 SERPL-MCNC: 899 PG/ML — SIGNIFICANT CHANGE UP (ref 200–900)
WBC # BLD: 4.68 K/UL — SIGNIFICANT CHANGE UP (ref 3.8–10.5)
WBC # FLD AUTO: 4.68 K/UL — SIGNIFICANT CHANGE UP (ref 3.8–10.5)

## 2017-07-24 PROCEDURE — 99221 1ST HOSP IP/OBS SF/LOW 40: CPT

## 2017-07-24 PROCEDURE — 99232 SBSQ HOSP IP/OBS MODERATE 35: CPT

## 2017-07-24 RX ORDER — INSULIN GLARGINE 100 [IU]/ML
40 INJECTION, SOLUTION SUBCUTANEOUS AT BEDTIME
Qty: 0 | Refills: 0 | Status: DISCONTINUED | OUTPATIENT
Start: 2017-07-24 | End: 2017-07-25

## 2017-07-24 RX ADMIN — HEPARIN SODIUM 5000 UNIT(S): 5000 INJECTION INTRAVENOUS; SUBCUTANEOUS at 06:06

## 2017-07-24 RX ADMIN — Medication 667 MILLIGRAM(S): at 09:48

## 2017-07-24 RX ADMIN — Medication 650 MILLIGRAM(S): at 22:07

## 2017-07-24 RX ADMIN — Medication 3 MILLILITER(S): at 03:48

## 2017-07-24 RX ADMIN — Medication 3 MILLILITER(S): at 09:39

## 2017-07-24 RX ADMIN — Medication 200 MILLIGRAM(S): at 23:57

## 2017-07-24 RX ADMIN — SENNA PLUS 1 TABLET(S): 8.6 TABLET ORAL at 13:25

## 2017-07-24 RX ADMIN — Medication 100 MILLIGRAM(S): at 22:12

## 2017-07-24 RX ADMIN — Medication 650 MILLIGRAM(S): at 21:07

## 2017-07-24 RX ADMIN — HEPARIN SODIUM 5000 UNIT(S): 5000 INJECTION INTRAVENOUS; SUBCUTANEOUS at 21:08

## 2017-07-24 RX ADMIN — GABAPENTIN 100 MILLIGRAM(S): 400 CAPSULE ORAL at 13:25

## 2017-07-24 RX ADMIN — Medication 200 MILLIGRAM(S): at 17:44

## 2017-07-24 RX ADMIN — HEPARIN SODIUM 5000 UNIT(S): 5000 INJECTION INTRAVENOUS; SUBCUTANEOUS at 13:25

## 2017-07-24 RX ADMIN — Medication 7 UNIT(S): at 13:25

## 2017-07-24 RX ADMIN — Medication 650 MILLIGRAM(S): at 00:27

## 2017-07-24 RX ADMIN — PANTOPRAZOLE SODIUM 40 MILLIGRAM(S): 20 TABLET, DELAYED RELEASE ORAL at 06:06

## 2017-07-24 RX ADMIN — INSULIN GLARGINE 40 UNIT(S): 100 INJECTION, SOLUTION SUBCUTANEOUS at 22:12

## 2017-07-24 RX ADMIN — Medication 80 MILLIGRAM(S): at 06:06

## 2017-07-24 RX ADMIN — Medication 3 MILLILITER(S): at 16:10

## 2017-07-24 RX ADMIN — Medication 650 MILLIGRAM(S): at 14:26

## 2017-07-24 RX ADMIN — ATORVASTATIN CALCIUM 40 MILLIGRAM(S): 80 TABLET, FILM COATED ORAL at 21:07

## 2017-07-24 RX ADMIN — Medication 81 MILLIGRAM(S): at 13:24

## 2017-07-24 RX ADMIN — Medication 650 MILLIGRAM(S): at 15:20

## 2017-07-24 RX ADMIN — CARVEDILOL PHOSPHATE 25 MILLIGRAM(S): 80 CAPSULE, EXTENDED RELEASE ORAL at 06:06

## 2017-07-24 RX ADMIN — Medication 667 MILLIGRAM(S): at 13:26

## 2017-07-24 RX ADMIN — Medication 1000 UNIT(S): at 13:24

## 2017-07-24 RX ADMIN — Medication 100 MILLIGRAM(S): at 13:24

## 2017-07-24 RX ADMIN — Medication 667 MILLIGRAM(S): at 18:59

## 2017-07-24 RX ADMIN — Medication 7 UNIT(S): at 18:58

## 2017-07-24 RX ADMIN — POLYETHYLENE GLYCOL 3350 17 GRAM(S): 17 POWDER, FOR SOLUTION ORAL at 13:24

## 2017-07-24 RX ADMIN — Medication 3 MILLILITER(S): at 22:16

## 2017-07-24 NOTE — PROGRESS NOTE ADULT - PROBLEM SELECTOR PLAN 1
target fs 100-180 mg/dl  decrease Lantus to 40 units sq qhs  c/w humalog premeal and correction as ordered    will trend and f/u

## 2017-07-24 NOTE — PROGRESS NOTE ADULT - SUBJECTIVE AND OBJECTIVE BOX
CHIEF COMPLAINT: no event overnight    SUBJECTIVE:     REVIEW OF SYSTEMS:    CONSTITUTIONAL: (-  )  weakness,  ( - ) fevers or chills  EYES/ENT: (  -)visual changes;     NECK: ( - ) pain or stiffness  RESPIRATORY:   ( - )cough, wheezing, hemoptysis;  ( - ) shortness of breath  CARDIOVASCULAR:  ( - )chest pain or palpitations  GASTROINTESTINAL:   (  -)abdominal or epigastric pain.  ( - ) nausea, vomiting, or hematemesis;   (  - ) diarrhea or constipation.   GENITOURINARY:   ( -   ) dysuria, frequency or hematuria  NEUROLOGICAL:  ( -  ) numbness or weakness   All other review of systems is negative unless indicated above    Vital Signs Last 24 Hrs  T(C): 36.8 (24 Jul 2017 05:33), Max: 37.1 (23 Jul 2017 14:27)  T(F): 98.3 (24 Jul 2017 05:33), Max: 98.7 (23 Jul 2017 14:27)  HR: 78 (24 Jul 2017 06:00) (65 - 86)  BP: 109/63 (24 Jul 2017 06:00) (105/55 - 133/62)  BP(mean): --  RR: 18 (24 Jul 2017 05:33) (16 - 18)  SpO2: 96% (24 Jul 2017 05:33) (95% - 100%)    I&O's Summary    23 Jul 2017 07:01  -  24 Jul 2017 07:00  --------------------------------------------------------  IN: 1058 mL / OUT: 1350 mL / NET: -292 mL        CAPILLARY BLOOD GLUCOSE  251 (23 Jul 2017 23:19)  122 (23 Jul 2017 17:56)  151 (23 Jul 2017 12:48)  109 (23 Jul 2017 08:43)          PHYSICAL EXAM:    Constitutional:  (  - ) NAD,   (  + )awake and alert  HEENT: PERR, EOMI,    Neck: Soft and supple, No LAD, No JVD  Respiratory:  (  +  Breath sounds are clear bilaterally,    (  - ) wheezing, rales or rhonchi  Cardiovascular:     ( +  )S1 and S2, regular rate and rhythm, no Murmurs, gallops or rubs  Gastrointestinal:  ( +  )Bowel Sounds present, soft,   ( - )nontender, nondistended,    Extremities:    ( - ) peripheral edema  Vascular: 2+ peripheral pulses  Neurological:    (   + )A/O x 3,   ( - ) focal deficits  Musculoskeletal:    (+   )  normal strength b/l upper  (  +   ) normal  lower extremities  Skin: No rashes    MEDICATIONS:  MEDICATIONS  (STANDING):  aspirin enteric coated 81 milliGRAM(s) Oral daily  cholecalciferol 1000 Unit(s) Oral daily  pantoprazole    Tablet 40 milliGRAM(s) Oral before breakfast  carvedilol 25 milliGRAM(s) Oral every 12 hours  dextrose 5%. 1000 milliLiter(s) (50 mL/Hr) IV Continuous <Continuous>  dextrose 50% Injectable 12.5 Gram(s) IV Push once  dextrose 50% Injectable 25 Gram(s) IV Push once  dextrose 50% Injectable 25 Gram(s) IV Push once  calcium acetate 667 milliGRAM(s) Oral three times a day with meals  heparin  Injectable 5000 Unit(s) SubCutaneous every 8 hours  gabapentin 100 milliGRAM(s) Oral daily  ALBUTerol/ipratropium for Nebulization 3 milliLiter(s) Nebulizer every 6 hours  senna 1 Tablet(s) Oral daily  docusate sodium 100 milliGRAM(s) Oral daily  insulin lispro Injectable (HumaLOG) 7 Unit(s) SubCutaneous three times a day before meals  insulin lispro (HumaLOG) corrective regimen sliding scale   SubCutaneous three times a day before meals  insulin lispro (HumaLOG) corrective regimen sliding scale   SubCutaneous at bedtime  polyethylene glycol 3350 17 Gram(s) Oral daily  furosemide   Injectable 80 milliGRAM(s) IV Push two times a day  atorvastatin 40 milliGRAM(s) Oral at bedtime  insulin glargine Injectable (LANTUS) 45 Unit(s) SubCutaneous at bedtime      LABS: All Labs Reviewed:                        10.3   4.68  )-----------( 182      ( 24 Jul 2017 07:15 )             33.5     07-24    137  |  97<L>  |  69<H>  ----------------------------<  130<H>  5.5<H>   |  29  |  2.58<H>    Ca    9.5      24 Jul 2017 01:00  Phos  4.3     07-24  Mg     2.0     07-24            Blood Culture:   Urine Culture      RADIOLOGY/EKG:    ASSESSMENT AND PLAN:  1- ALISA NEEDS ACESSE TODAY  AT IR  2-DM STABLE   3- CHF/CAD STABLE     DVT PPX:    ADVANCED DIRECTIVE:    DISPOSITION:

## 2017-07-24 NOTE — PROGRESS NOTE ADULT - SUBJECTIVE AND OBJECTIVE BOX
Chief Complaint:     History:    MEDICATIONS  (STANDING):  aspirin enteric coated 81 milliGRAM(s) Oral daily  cholecalciferol 1000 Unit(s) Oral daily  pantoprazole    Tablet 40 milliGRAM(s) Oral before breakfast  carvedilol 25 milliGRAM(s) Oral every 12 hours  dextrose 5%. 1000 milliLiter(s) (50 mL/Hr) IV Continuous <Continuous>  dextrose 50% Injectable 12.5 Gram(s) IV Push once  dextrose 50% Injectable 25 Gram(s) IV Push once  dextrose 50% Injectable 25 Gram(s) IV Push once  calcium acetate 667 milliGRAM(s) Oral three times a day with meals  heparin  Injectable 5000 Unit(s) SubCutaneous every 8 hours  gabapentin 100 milliGRAM(s) Oral daily  ALBUTerol/ipratropium for Nebulization 3 milliLiter(s) Nebulizer every 6 hours  senna 1 Tablet(s) Oral daily  docusate sodium 100 milliGRAM(s) Oral daily  insulin lispro Injectable (HumaLOG) 7 Unit(s) SubCutaneous three times a day before meals  insulin lispro (HumaLOG) corrective regimen sliding scale   SubCutaneous three times a day before meals  insulin lispro (HumaLOG) corrective regimen sliding scale   SubCutaneous at bedtime  polyethylene glycol 3350 17 Gram(s) Oral daily  atorvastatin 40 milliGRAM(s) Oral at bedtime  insulin glargine Injectable (LANTUS) 45 Unit(s) SubCutaneous at bedtime    MEDICATIONS  (PRN):  dextrose Gel 1 Dose(s) Oral once PRN Blood Glucose LESS THAN 70 milliGRAM(s)/deciLiter  glucagon  Injectable 1 milliGRAM(s) IntraMuscular once PRN Glucose <70 milliGRAM(s)/deciLiter  acetaminophen   Tablet. 650 milliGRAM(s) Oral every 6 hours PRN Mild Pain (1 - 3)  guaiFENesin   Syrup  (Sugar-Free) 200 milliGRAM(s) Oral every 6 hours PRN Cough  benzonatate 100 milliGRAM(s) Oral every 8 hours PRN Cough  bisacodyl 5 milliGRAM(s) Oral every 12 hours PRN Constipation      Allergies    penicillin (Unknown)    Intolerances      Review of Systems:  Constitutional: No fever  Eyes: No blurry vision  Neuro: No tremors  HEENT: No pain  Cardiovascular: No chest pain, palpitations  Respiratory: No SOB, no cough  GI: No nausea, vomiting, abdominal pain  : No dysuria  Skin: no rash  Psych: no depression  Endocrine: no polyuria, polydipsia  Hem/lymph: no swelling  Osteoporosis: no fractures    ALL OTHER SYSTEMS REVIEWED AND NEGATIVE    UNABLE TO OBTAIN    PHYSICAL EXAM:  VITALS: T(C): 36.8 (07-24-17 @ 05:33)  T(F): 98.3 (07-24-17 @ 05:33), Max: 98.7 (07-23-17 @ 14:27)  HR: 78 (07-24-17 @ 09:39) (65 - 86)  BP: 109/63 (07-24-17 @ 06:00) (105/55 - 133/62)  RR:  (16 - 18)  SpO2:  (95% - 100%)  Wt(kg): --  GENERAL: NAD, well-groomed, well-developed  EYES: No proptosis, no lid lag, anicteric  HEENT:  Atraumatic, Normocephalic, moist mucous membranes  THYROID: Normal size, no palpable nodules  RESPIRATORY: Clear to auscultation bilaterally; No rales, rhonchi, wheezing, or rubs  CARDIOVASCULAR: Regular rate and rhythm; No murmurs; no peripheral edema  GI: Soft, nontender, non distended, normal bowel sounds  SKIN: Dry, intact, No rashes or lesions  MUSCULOSKELETAL: Full range of motion, normal strength  NEURO: sensation intact, extraocular movements intact, no tremor, normal reflexes  PSYCH: Alert and oriented x 3, normal affect, normal mood  CUSHING'S SIGNS: no striae    CAPILLARY BLOOD GLUCOSE  127 (07-24 @ 12:01)  72 (07-24 @ 08:42)  251 (07-23 @ 23:19)  122 (07-23 @ 17:56)  151 (07-23 @ 12:48)  109 (07-23 @ 08:43)  102 (07-22 @ 23:10)  142 (07-22 @ 12:26)  103 (07-22 @ 08:45)  235 (07-21 @ 22:10)  110 (07-21 @ 18:38)      07-24    142  |  102  |  74<H>  ----------------------------<  60<L>  5.5<H>   |  27  |  2.48<H>    EGFR if : 24  EGFR if non : 21    Ca    9.4      07-24  Mg     2.0     07-24  Phos  4.7     07-24    TPro  7.3  /  Alb  3.5  /  TBili  0.5  /  DBili  x   /  AST  22  /  ALT  17  /  AlkPhos  63  07-24          Thyroid Function Tests:  07-12 @ 06:35 TSH 0.19 FreeT4 -- T3 -- Anti TPO -- Anti Thyroglobulin Ab -- TSI --      Hemoglobin A1C, Whole Blood: 9.2 % <H> [4.0 - 5.6] (07-11-17 @ 20:15)      Case discussed with: Chief Complaint: uncontrolled dm2    History: denies n/v, reports lightheaded ness this am when NPO. tolerates po,     MEDICATIONS  (STANDING):  aspirin enteric coated 81 milliGRAM(s) Oral daily  cholecalciferol 1000 Unit(s) Oral daily  pantoprazole    Tablet 40 milliGRAM(s) Oral before breakfast  carvedilol 25 milliGRAM(s) Oral every 12 hours  dextrose 5%. 1000 milliLiter(s) (50 mL/Hr) IV Continuous <Continuous>  dextrose 50% Injectable 12.5 Gram(s) IV Push once  dextrose 50% Injectable 25 Gram(s) IV Push once  dextrose 50% Injectable 25 Gram(s) IV Push once  calcium acetate 667 milliGRAM(s) Oral three times a day with meals  heparin  Injectable 5000 Unit(s) SubCutaneous every 8 hours  gabapentin 100 milliGRAM(s) Oral daily  ALBUTerol/ipratropium for Nebulization 3 milliLiter(s) Nebulizer every 6 hours  senna 1 Tablet(s) Oral daily  docusate sodium 100 milliGRAM(s) Oral daily  insulin lispro Injectable (HumaLOG) 7 Unit(s) SubCutaneous three times a day before meals  insulin lispro (HumaLOG) corrective regimen sliding scale   SubCutaneous three times a day before meals  insulin lispro (HumaLOG) corrective regimen sliding scale   SubCutaneous at bedtime  polyethylene glycol 3350 17 Gram(s) Oral daily  atorvastatin 40 milliGRAM(s) Oral at bedtime  insulin glargine Injectable (LANTUS) 45 Unit(s) SubCutaneous at bedtime    MEDICATIONS  (PRN):  dextrose Gel 1 Dose(s) Oral once PRN Blood Glucose LESS THAN 70 milliGRAM(s)/deciLiter  glucagon  Injectable 1 milliGRAM(s) IntraMuscular once PRN Glucose <70 milliGRAM(s)/deciLiter  acetaminophen   Tablet. 650 milliGRAM(s) Oral every 6 hours PRN Mild Pain (1 - 3)  guaiFENesin   Syrup  (Sugar-Free) 200 milliGRAM(s) Oral every 6 hours PRN Cough  benzonatate 100 milliGRAM(s) Oral every 8 hours PRN Cough  bisacodyl 5 milliGRAM(s) Oral every 12 hours PRN Constipation      Allergies    penicillin (Unknown)    Intolerances      Review of Systems:  ALL OTHER SYSTEMS REVIEWED AND NEGATIVE        PHYSICAL EXAM:  VITALS: T(C): 36.8 (07-24-17 @ 05:33)  T(F): 98.3 (07-24-17 @ 05:33), Max: 98.7 (07-23-17 @ 14:27)  HR: 78 (07-24-17 @ 09:39) (65 - 86)  BP: 109/63 (07-24-17 @ 06:00) (105/55 - 133/62)  RR:  (16 - 18)  SpO2:  (95% - 100%)  Wt(kg): --  GENERAL: NAD, well-groomed, well-developed  GI: Soft, nontender, non distended, normal bowel sounds  MUSCULOSKELETAL: Full range of motion, normal strength  NEURO: sensation intact, extraocular movements intact, no tremor, normal reflexes  PSYCH: Alert and oriented x 3, normal affect, normal mood      CAPILLARY BLOOD GLUCOSE  127 (07-24 @ 12:01)  72 (07-24 @ 08:42)  251 (07-23 @ 23:19)  122 (07-23 @ 17:56)  151 (07-23 @ 12:48)  109 (07-23 @ 08:43)  102 (07-22 @ 23:10)  142 (07-22 @ 12:26)  103 (07-22 @ 08:45)  235 (07-21 @ 22:10)  110 (07-21 @ 18:38)      07-24    142  |  102  |  74<H>  ----------------------------<  60<L>  5.5<H>   |  27  |  2.48<H>    EGFR if : 24  EGFR if non : 21    Ca    9.4      07-24  Mg     2.0     07-24  Phos  4.7     07-24    TPro  7.3  /  Alb  3.5  /  TBili  0.5  /  DBili  x   /  AST  22  /  ALT  17  /  AlkPhos  63  07-24          Thyroid Function Tests:  07-12 @ 06:35 TSH 0.19 FreeT4 -- T3 -- Anti TPO -- Anti Thyroglobulin Ab -- TSI --      Hemoglobin A1C, Whole Blood: 9.2 % <H> [4.0 - 5.6] (07-11-17 @ 20:15)

## 2017-07-24 NOTE — CONSULT NOTE ADULT - ATTENDING COMMENTS
Pt seen and evaluated. Agree with above. Likely not NSTEMI/plaque rupture. No need for antithrombotic therapy.
as above pt has left sided ppm and is rt handed given the lt ppm placement will need rue avf/g placement

## 2017-07-24 NOTE — CHART NOTE - NSCHARTNOTEFT_GEN_A_CORE
Called by RN to evaluate numbness on LLE.  Patient states she is feeling numbness on the outer aspect of the left lower extremity.  This started earlier today.  Patient states she was given gabapentin with no relief.  She states the numbness has not gotten worse or better, but remains the same.    Vital Signs Last 24 Hrs  T(C): 37 (23 Jul 2017 22:39), Max: 37.1 (23 Jul 2017 14:27)  T(F): 98.6 (23 Jul 2017 22:39), Max: 98.7 (23 Jul 2017 14:27)  HR: 78 (24 Jul 2017 00:19) (72 - 86)  BP: 133/62 (23 Jul 2017 22:39) (109/62 - 138/82)  BP(mean): --  RR: 18 (23 Jul 2017 22:39) (16 - 20)  SpO2: 98% (24 Jul 2017 00:19) (95% - 100%)    General:  Sitting on side of bed, NAD  HEENT:  NC in place  Neuro:  AOx3  numbness noted on left leg, throughout leg to light touch compared to left  No motor weakness noted in LLE  LUE with weakness in hand  compared to RUE, extension and flexion equal  CNII-XII grossly intact    56F PMH HTN, HLD, DM, COPD who was admitted with ARF, now ESRD on HD.  Patient now with new complaints of numbness.  Because patient is mentating, no facial weakness, less concerns for stroke at this time.  Patient does have history of HD, could lead to electrolyte deficiencies.    - Will check BMP to eval  - Will check B12 level in AM  - Will discuss with primary team in AM.

## 2017-07-24 NOTE — PROGRESS NOTE ADULT - SUBJECTIVE AND OBJECTIVE BOX
No pain, no shortness of breath      VITAL:  T(C): , Max: 37.1 (07-23-17 @ 14:27)  T(F): , Max: 98.7 (07-23-17 @ 14:27)  HR: 78 (07-24-17 @ 06:00)  BP: 109/63 (07-24-17 @ 06:00)  BP(mean): --  RR: 18 (07-24-17 @ 05:33)  SpO2: 96% (07-24-17 @ 05:33)      PHYSICAL EXAM:  Constitutional: NAD; obese, increased alertness relative to yesterday  HEENTN: DMM, (+)RIJ shiley  Respiratory: distant b/l  Cardiovascular: Irreg S1S2  Gastrointestinal: soft, NTND  Extremities: 2-3+ b/l LE edema      LABS:                        10.3   4.68  )-----------( 182      ( 24 Jul 2017 07:15 )             33.5     Na(137)/K(5.5)/Cl(97)/HCO3(29)/BUN(69)/Cr(2.58)Glu(130)/Ca(9.5)/Mg(2.0)/PO4(4.3)    07-24 @ 01:00  Na(138)/K(5.3)/Cl(97)/HCO3(27)/BUN(60)/Cr(2.31)Glu(136)/Ca(9.3)/Mg(2.0)/PO4(4.4)    07-23 @ 04:00  Na(140)/K(5.3)/Cl(100)/HCO3(28)/BUN(68)/Cr(2.80)Glu(105)/Ca(9.1)/Mg(2.0)/PO4(4.8)    07-22 @ 06:00      IMPRESSION: 56F w/ HTN, DM2, and HFrEF(20%)-AICD, 7/12/17 a/w hypoglycemia, respiratory acidosis, and ALISA    (1)Renal - Now newly ESRD-HD, s/p failed attempt to discontinue HD last week. Last dialyzed Saturday 7/22/17. In light of her (mildly) elevated K+, it seems best to dialyze her today    (2)Hyperkalemia - Should improve with chronic HD; must still encourage limitation of dietary K+ intake.    (3)CV - pulmonary edema - to be managed with HD    (4)Vascular - presently with non-tunneled dialysis cathteter - needs tunneled catheter, and may need AVF/AVG as well this admission to facilitate acceptance into an outpatient HD unit.    (5)SW - needs acceptance into an outpatient HD unit prior to discharge      RECOMMEND:  (1)HD today; 2k bath, 2kg UF as able  (2)Tunneled HD cathter today by IR  (3)Meds for GFR<10/HD  (4)AVF vs AVG creation per Vascular  (5)SW setup of outpatient HD.            Perfecto Rae MD  [facility]  [phone] No pain, no shortness of breath      VITAL:  T(C): , Max: 37.1 (07-23-17 @ 14:27)  T(F): , Max: 98.7 (07-23-17 @ 14:27)  HR: 78 (07-24-17 @ 06:00)  BP: 109/63 (07-24-17 @ 06:00)  BP(mean): --  RR: 18 (07-24-17 @ 05:33)  SpO2: 96% (07-24-17 @ 05:33)      PHYSICAL EXAM:  Constitutional: NAD; obese, increased alertness relative to yesterday  HEENTN: DMM, (+)RIJ shiley  Respiratory: distant b/l  Cardiovascular: Irreg S1S2  Gastrointestinal: soft, NTND  Extremities: 2-3+ b/l LE edema      LABS:                        10.3   4.68  )-----------( 182      ( 24 Jul 2017 07:15 )             33.5     Na(137)/K(5.5)/Cl(97)/HCO3(29)/BUN(69)/Cr(2.58)Glu(130)/Ca(9.5)/Mg(2.0)/PO4(4.3)    07-24 @ 01:00  Na(138)/K(5.3)/Cl(97)/HCO3(27)/BUN(60)/Cr(2.31)Glu(136)/Ca(9.3)/Mg(2.0)/PO4(4.4)    07-23 @ 04:00  Na(140)/K(5.3)/Cl(100)/HCO3(28)/BUN(68)/Cr(2.80)Glu(105)/Ca(9.1)/Mg(2.0)/PO4(4.8)    07-22 @ 06:00      IMPRESSION: 56F w/ HTN, DM2, and HFrEF(20%)-AICD, 7/12/17 a/w hypoglycemia, respiratory acidosis, and ALISA    (1)Renal - Now newly ESRD-HD, s/p failed attempt to discontinue HD last week. Last dialyzed Saturday 7/22/17. In light of her (mildly) elevated K+, it seems best to dialyze her today    (2)Hyperkalemia - Should improve with chronic HD; must still encourage limitation of dietary K+ intake.    (3)CV - pulmonary edema - to be managed with HD    (4)Vascular - presently with non-tunneled dialysis cathteter - needs tunneled catheter, and may need AVF/AVG as well this admission to facilitate acceptance into an outpatient HD unit.    (5)SW - needs acceptance into an outpatient HD unit prior to discharge      RECOMMEND:  (1)HD today; 2k bath, 2kg UF as able  (2)Tunneled HD cathter today by IR  (3)Meds for GFR<10/HD  (4)AVF vs AVG creation per Vascular  (5)SW setup of outpatient HD.  (6)Can attempt discontinuation of Lasix for now (hopefully volume status and electrolytes can be managed with HD and without the need for Lasix as well).          Perfecto Rae MD  [facility]  [phone] No pain, no shortness of breath      VITAL:  T(C): , Max: 37.1 (07-23-17 @ 14:27)  T(F): , Max: 98.7 (07-23-17 @ 14:27)  HR: 78 (07-24-17 @ 06:00)  BP: 109/63 (07-24-17 @ 06:00)  BP(mean): --  RR: 18 (07-24-17 @ 05:33)  SpO2: 96% (07-24-17 @ 05:33)      PHYSICAL EXAM:  Constitutional: NAD; obese  HEENTN: DMM, (+)MATHEW diop  Respiratory: distant b/l  Cardiovascular: Irreg S1S2  Gastrointestinal: soft, NTND  Extremities: 2+ b/l LE edema      LABS:                        10.3   4.68  )-----------( 182      ( 24 Jul 2017 07:15 )             33.5     Na(137)/K(5.5)/Cl(97)/HCO3(29)/BUN(69)/Cr(2.58)Glu(130)/Ca(9.5)/Mg(2.0)/PO4(4.3)    07-24 @ 01:00  Na(138)/K(5.3)/Cl(97)/HCO3(27)/BUN(60)/Cr(2.31)Glu(136)/Ca(9.3)/Mg(2.0)/PO4(4.4)    07-23 @ 04:00  Na(140)/K(5.3)/Cl(100)/HCO3(28)/BUN(68)/Cr(2.80)Glu(105)/Ca(9.1)/Mg(2.0)/PO4(4.8)    07-22 @ 06:00      IMPRESSION: 56F w/ HTN, DM2, and HFrEF(20%)-AICD, 7/12/17 a/w hypoglycemia, respiratory acidosis, and ALISA    (1)Renal - Now newly ESRD-HD, s/p failed attempt to discontinue HD last week. Last dialyzed Saturday 7/22/17. In light of her (mildly) elevated K+, it seems best to dialyze her today    (2)Hyperkalemia - Should improve with chronic HD; must still encourage limitation of dietary K+ intake.    (3)CV - pulmonary edema - to be managed with HD    (4)Vascular - presently with non-tunneled dialysis cathteter - needs tunneled catheter, and may need AVF/AVG as well this admission to facilitate acceptance into an outpatient HD unit.    (5)SW - needs acceptance into an outpatient HD unit prior to discharge      RECOMMEND:  (1)HD today; 2k bath, 2kg UF as able  (2)Tunneled HD cathter today by IR  (3)Meds for GFR<10/HD  (4)AVF vs AVG creation per Vascular  (5)SW setup of outpatient HD.  (6)Can attempt discontinuation of Lasix for now (hopefully volume status and electrolytes can be managed with HD and without the need for Lasix as well).          Perfecto Rae MD  [facility]  [phone]

## 2017-07-24 NOTE — PROGRESS NOTE ADULT - ASSESSMENT
56F uncontrolled DM2 c/b CKD new ESRD a/w hyperglycemia and hypoglycemia after over treating self with insulin.

## 2017-07-24 NOTE — PROGRESS NOTE ADULT - PROBLEM SELECTOR PLAN 2
Cr baseline 1.8, Cr. 7.05 on admission, currently 2.80  Shiley in place for HD in rt IJ, receiving HD daily.   cxr revealed diffuse pulmonary edema  nephro recs: c/w lasix 80 bid. pt may be candidate for chronic dialysis dependence, will set up outpatient HD  -IR for permacath today  f/u renal labs

## 2017-07-24 NOTE — CONSULT NOTE ADULT - SUBJECTIVE AND OBJECTIVE BOX
Vascular Surgery Consult Note  Pager [phone]    HPI:  55 y/o F with DM II, HTN, systolic CHF s/p AICD (left side) presented with hypoglycemia secondary to insulin overdose. Vascular consulted for AVF placement as patient has ESRD now. Has R IJ Shiley for HD access currently. Patient is right-handed.       PAST MEDICAL & SURGICAL HISTORY:  CKD (chronic kidney disease), stage 3 (moderate)  Nonischemic cardiomyopathy: EF 20-25%  Sleep apnea: noncompliant with CPAP  Diabetic neuropathy  HTN (hypertension)  AICD (automatic cardioverter/defibrillator) present: ICD (Medtronic generator with Medtronic atrial (4076)and ventricular (6947) leads) 07  Cardiac Pacemaker  CHF (Congestive Heart Failure)  HLD (Hyperlipidemia)  Diabetes Mellitus: x 10 years, denies nephropathy or retinopathy  H/O:   H/O: hysterectomy  AICD (automatic cardioverter/defibrillator) present: Medtronic generator with Medtronic atrial (4076)and ventricular (6947) leads) 07      MEDICATIONS  (STANDING):  aspirin enteric coated 81 milliGRAM(s) Oral daily  cholecalciferol 1000 Unit(s) Oral daily  pantoprazole    Tablet 40 milliGRAM(s) Oral before breakfast  carvedilol 25 milliGRAM(s) Oral every 12 hours  dextrose 5%. 1000 milliLiter(s) (50 mL/Hr) IV Continuous <Continuous>  dextrose 50% Injectable 12.5 Gram(s) IV Push once  dextrose 50% Injectable 25 Gram(s) IV Push once  dextrose 50% Injectable 25 Gram(s) IV Push once  calcium acetate 667 milliGRAM(s) Oral three times a day with meals  heparin  Injectable 5000 Unit(s) SubCutaneous every 8 hours  gabapentin 100 milliGRAM(s) Oral daily  ALBUTerol/ipratropium for Nebulization 3 milliLiter(s) Nebulizer every 6 hours  senna 1 Tablet(s) Oral daily  docusate sodium 100 milliGRAM(s) Oral daily  insulin lispro Injectable (HumaLOG) 7 Unit(s) SubCutaneous three times a day before meals  insulin lispro (HumaLOG) corrective regimen sliding scale   SubCutaneous three times a day before meals  insulin lispro (HumaLOG) corrective regimen sliding scale   SubCutaneous at bedtime  polyethylene glycol 3350 17 Gram(s) Oral daily  atorvastatin 40 milliGRAM(s) Oral at bedtime  insulin glargine Injectable (LANTUS) 40 Unit(s) SubCutaneous at bedtime    ___________________________________________  Vital Signs Last 24 Hrs  T(C): 36.4 (2017 14:20), Max: 37 (2017 22:39)  T(F): 97.6 (2017 14:20), Max: 98.6 (2017 22:39)  HR: 78 (2017 16:11) (65 - 86)  BP: 115/56 (2017 14:20) (105/55 - 133/62)  BP(mean): --  RR: 18 (2017 14:20) (18 - 18)  SpO2: 97% (2017 16:11) (96% - 100%)  CAPILLARY BLOOD GLUCOSE  127 (2017 12:01)      I&O's Detail    2017 07:01  -  2017 07:00  --------------------------------------------------------  IN:    Oral Fluid: 1058 mL  Total IN: 1058 mL    OUT:    Voided: 1350 mL  Total OUT: 1350 mL    Total NET: -292 mL      Physical Exam:  General: No acute distress  Bilateral UE warm with radial and ulnar pulses  R IJ in place  AICD on left chest  Chest: Breathing non-labored  Cardiovascular: RRR  Extremities: Non-edematous  ____________________________________________  LABS:  CBC Full  -  ( 2017 07:15 )  WBC Count : 4.68 K/uL  Hemoglobin : 10.3 g/dL  Hematocrit : 33.5 %  Platelet Count - Automated : 182 K/uL  Mean Cell Volume : 90.5 fL  Mean Cell Hemoglobin : 27.8 pg  Mean Cell Hemoglobin Concentration : 30.7 %        142  |  102  |  74<H>  ----------------------------<  60<L>  5.5<H>   |  27  |  2.48<H>    Ca    9.4      2017 07:15  Phos  4.7       Mg     2.0         TPro  7.3  /  Alb  3.5  /  TBili  0.5  /  DBili  x   /  AST  22  /  ALT  17  /  AlkPhos  63      LIVER FUNCTIONS - ( 2017 07:15 )  Alb: 3.5 g/dL / Pro: 7.3 g/dL / ALK PHOS: 63 u/L / ALT: 17 u/L / AST: 22 u/L / GGT: x           PT/INR - ( 2017 07:15 )   PT: 11.6 SEC;   INR: 1.03          PTT - ( 2017 07:15 )  PTT:29.5 SEC    ____________________________________________

## 2017-07-24 NOTE — PROGRESS NOTE ADULT - SUBJECTIVE AND OBJECTIVE BOX
Patient is a 56y old  Female who presents with a chief complaint of hypoglycemia (12 Jul 2017 18:07)      SUBJECTIVE / OVERNIGHT EVENTS:    Pt. seen and examined at bedside. Endorses left thigh and left hand numbness that did not respond to gabapentin. Denies weakness and tingling. Denies f/c/n/v/cp/sob/abdominal pain.    MEDICATIONS  (STANDING):  aspirin enteric coated 81 milliGRAM(s) Oral daily  cholecalciferol 1000 Unit(s) Oral daily  pantoprazole    Tablet 40 milliGRAM(s) Oral before breakfast  carvedilol 25 milliGRAM(s) Oral every 12 hours  dextrose 5%. 1000 milliLiter(s) (50 mL/Hr) IV Continuous <Continuous>  dextrose 50% Injectable 12.5 Gram(s) IV Push once  dextrose 50% Injectable 25 Gram(s) IV Push once  dextrose 50% Injectable 25 Gram(s) IV Push once  calcium acetate 667 milliGRAM(s) Oral three times a day with meals  heparin  Injectable 5000 Unit(s) SubCutaneous every 8 hours  gabapentin 100 milliGRAM(s) Oral daily  ALBUTerol/ipratropium for Nebulization 3 milliLiter(s) Nebulizer every 6 hours  senna 1 Tablet(s) Oral daily  docusate sodium 100 milliGRAM(s) Oral daily  insulin lispro Injectable (HumaLOG) 7 Unit(s) SubCutaneous three times a day before meals  insulin lispro (HumaLOG) corrective regimen sliding scale   SubCutaneous three times a day before meals  insulin lispro (HumaLOG) corrective regimen sliding scale   SubCutaneous at bedtime  polyethylene glycol 3350 17 Gram(s) Oral daily  furosemide   Injectable 80 milliGRAM(s) IV Push two times a day  atorvastatin 40 milliGRAM(s) Oral at bedtime  insulin glargine Injectable (LANTUS) 45 Unit(s) SubCutaneous at bedtime    MEDICATIONS  (PRN):  dextrose Gel 1 Dose(s) Oral once PRN Blood Glucose LESS THAN 70 milliGRAM(s)/deciLiter  glucagon  Injectable 1 milliGRAM(s) IntraMuscular once PRN Glucose <70 milliGRAM(s)/deciLiter  acetaminophen   Tablet. 650 milliGRAM(s) Oral every 6 hours PRN Mild Pain (1 - 3)  guaiFENesin   Syrup  (Sugar-Free) 200 milliGRAM(s) Oral every 6 hours PRN Cough  benzonatate 100 milliGRAM(s) Oral every 8 hours PRN Cough  bisacodyl 5 milliGRAM(s) Oral every 12 hours PRN Constipation    Vital Signs Last 24 Hrs  T(C): 36.8 (24 Jul 2017 05:33), Max: 37.1 (23 Jul 2017 14:27)  T(F): 98.3 (24 Jul 2017 05:33), Max: 98.7 (23 Jul 2017 14:27)  HR: 78 (24 Jul 2017 06:00) (65 - 86)  BP: 109/63 (24 Jul 2017 06:00) (105/55 - 133/62)  BP(mean): --  RR: 18 (24 Jul 2017 05:33) (16 - 18)  SpO2: 96% (24 Jul 2017 05:33) (95% - 100%)    CAPILLARY BLOOD GLUCOSE  251 (23 Jul 2017 23:19)  122 (23 Jul 2017 17:56)  151 (23 Jul 2017 12:48)  109 (23 Jul 2017 08:43)        I&O's Summary    23 Jul 2017 07:01  -  24 Jul 2017 07:00  --------------------------------------------------------  IN: 1058 mL / OUT: 1350 mL / NET: -292 mL        PHYSICAL EXAM:  GENERAL: NAD, well-developed  HEAD:  Atraumatic, Normocephalic  EYES: EOMI, PERRLA, conjunctiva and sclera clear  NECK: Supple, No JVD  CHEST/LUNG: Clear to auscultation bilaterally; No wheeze  HEART: Regular rate and rhythm; No murmurs, rubs, or gallops  ABDOMEN: Soft, Nontender, Nondistended; Bowel sounds present  EXTREMITIES:  2+ Peripheral Pulses, No clubbing, cyanosis, or edema  PSYCH: AAOx3  NEUROLOGY: non-focal  SKIN: No rashes or lesions    LABS:  (07-24 @ 07:15)                        10.3  4.68 )-----------( 182                 33.5    Neutrophils = -- (--%)  Lymphocytes = -- (--%)  Eosinophils = -- (--%)  Basophils = -- (--%)  Monocytes = -- (--%)  Bands = --%    WBC Trend: 4.68<--, 4.90<--, 5.08<--  Hb Trend: 10.3<--, 10.2<--, 10.4<--, 10.8<--, 10.9<--  Plt Trend: 182<--, 179<--, 160<--, 182<--, 203<--  07-24    137  |  97<L>  |  69<H>  ----------------------------<  130<H>  5.5<H>   |  29  |  2.58<H>    Ca    9.5      24 Jul 2017 01:00  Phos  4.3     07-24  Mg     2.0     07-24      Creatinine Trend: 2.58<--, 2.31<--, 2.80<--, 2.91<--, 3.18<--, 3.56<--  ( 24 Jul 2017 07:15 )   PT: 11.6 SEC;   INR: 1.03 ;       PTT:29.5 SEC          Microbiology:  Urine Cx:  Blood Cx:    RADIOLOGY & ADDITIONAL TESTS:  X- Ray:  CT:  Ultrasound:  [ ] imaging personally reviewed and interpreted by me    Consultant(s) Notes Reviewed:      Care Discussed with Consultants/Other Providers:

## 2017-07-24 NOTE — CHART NOTE - NSCHARTNOTEFT_GEN_A_CORE
NUTRITION FOLLOW-UP:  Requested by nephrology attending to follow up w Pt. re: diet education, specifically as it relates to hyperkalemia.  Pt. new HD. Pt. sleeping @ time of attempted RDN visits.  Provided printed education material at bedside and informed RN that RDN to f/u as able.  No reported/noted nausea/vomiting/diarrhea/constipation or difficulty chewing/swallowing.  Observed 100% consumption of breakfast meal.  Weight decrease (~7kg) likely fluid related change.     Weight: [dry on 7.22.17 per HD flowsheets] 108.4kg     Edema: B/L LE.    Skin: No noted pressure ulcers.      Pertinent Medications: MEDICATIONS  (STANDING):  aspirin enteric coated 81 milliGRAM(s) Oral daily  cholecalciferol 1000 Unit(s) Oral daily  pantoprazole    Tablet 40 milliGRAM(s) Oral before breakfast  carvedilol 25 milliGRAM(s) Oral every 12 hours  dextrose 5%. 1000 milliLiter(s) (50 mL/Hr) IV Continuous <Continuous>  dextrose 50% Injectable 12.5 Gram(s) IV Push once  dextrose 50% Injectable 25 Gram(s) IV Push once  dextrose 50% Injectable 25 Gram(s) IV Push once  calcium acetate 667 milliGRAM(s) Oral three times a day with meals  heparin  Injectable 5000 Unit(s) SubCutaneous every 8 hours  gabapentin 100 milliGRAM(s) Oral daily  ALBUTerol/ipratropium for Nebulization 3 milliLiter(s) Nebulizer every 6 hours  senna 1 Tablet(s) Oral daily  docusate sodium 100 milliGRAM(s) Oral daily  insulin lispro Injectable (HumaLOG) 7 Unit(s) SubCutaneous three times a day before meals  insulin lispro (HumaLOG) corrective regimen sliding scale   SubCutaneous three times a day before meals  insulin lispro (HumaLOG) corrective regimen sliding scale   SubCutaneous at bedtime  polyethylene glycol 3350 17 Gram(s) Oral daily  atorvastatin 40 milliGRAM(s) Oral at bedtime  insulin glargine Injectable (LANTUS) 45 Unit(s) SubCutaneous at bedtime    MEDICATIONS  (PRN):  dextrose Gel 1 Dose(s) Oral once PRN Blood Glucose LESS THAN 70 milliGRAM(s)/deciLiter  glucagon  Injectable 1 milliGRAM(s) IntraMuscular once PRN Glucose <70 milliGRAM(s)/deciLiter  acetaminophen   Tablet. 650 milliGRAM(s) Oral every 6 hours PRN Mild Pain (1 - 3)  guaiFENesin   Syrup  (Sugar-Free) 200 milliGRAM(s) Oral every 6 hours PRN Cough  benzonatate 100 milliGRAM(s) Oral every 8 hours PRN Cough  bisacodyl 5 milliGRAM(s) Oral every 12 hours PRN Constipation    Pertinent Labs:  07-24 Na142 mmol/L Glu 60 mg/dL<L> K+ 5.5 mmol/L<H> Cr  2.48 mg/dL<H> BUN 74 mg/dL<H> Phos 4.7 mg/dL<H> Alb 3.5 g/dL PAB n/a                            Fingersticks: 72, 251, 222.      Current Diet: Consistent Carbohydrate Renal w evening snack.            PLAN/RECOMMENDATIONS:    1) Continue current diet Rx.    2) Obtain daily & pre/post HD weights.	  3) RDN remains available and will f/u PRN.          Marcia Graff RDN, CDN pager [phone]

## 2017-07-25 LAB
ALBUMIN SERPL ELPH-MCNC: 3.7 G/DL — SIGNIFICANT CHANGE UP (ref 3.3–5)
ALP SERPL-CCNC: 64 U/L — SIGNIFICANT CHANGE UP (ref 40–120)
ALT FLD-CCNC: 26 U/L — SIGNIFICANT CHANGE UP (ref 4–33)
APTT BLD: 28 SEC — SIGNIFICANT CHANGE UP (ref 27.5–37.4)
AST SERPL-CCNC: 30 U/L — SIGNIFICANT CHANGE UP (ref 4–32)
BILIRUB SERPL-MCNC: 0.5 MG/DL — SIGNIFICANT CHANGE UP (ref 0.2–1.2)
BUN SERPL-MCNC: 46 MG/DL — HIGH (ref 7–23)
CALCIUM SERPL-MCNC: 9.3 MG/DL — SIGNIFICANT CHANGE UP (ref 8.4–10.5)
CHLORIDE SERPL-SCNC: 101 MMOL/L — SIGNIFICANT CHANGE UP (ref 98–107)
CO2 SERPL-SCNC: 31 MMOL/L — SIGNIFICANT CHANGE UP (ref 22–31)
CREAT SERPL-MCNC: 1.85 MG/DL — HIGH (ref 0.5–1.3)
GLUCOSE SERPL-MCNC: 103 MG/DL — HIGH (ref 70–99)
HCT VFR BLD CALC: 31.5 % — LOW (ref 34.5–45)
HGB BLD-MCNC: 10 G/DL — LOW (ref 11.5–15.5)
INR BLD: 1.06 — SIGNIFICANT CHANGE UP (ref 0.88–1.17)
MAGNESIUM SERPL-MCNC: 2 MG/DL — SIGNIFICANT CHANGE UP (ref 1.6–2.6)
MCHC RBC-ENTMCNC: 29.3 PG — SIGNIFICANT CHANGE UP (ref 27–34)
MCHC RBC-ENTMCNC: 31.7 % — LOW (ref 32–36)
MCV RBC AUTO: 92.4 FL — SIGNIFICANT CHANGE UP (ref 80–100)
NRBC # FLD: 0 — SIGNIFICANT CHANGE UP
PHOSPHATE SERPL-MCNC: 3.6 MG/DL — SIGNIFICANT CHANGE UP (ref 2.5–4.5)
PLATELET # BLD AUTO: 176 K/UL — SIGNIFICANT CHANGE UP (ref 150–400)
PMV BLD: 12.7 FL — SIGNIFICANT CHANGE UP (ref 7–13)
POTASSIUM SERPL-MCNC: 5.1 MMOL/L — SIGNIFICANT CHANGE UP (ref 3.5–5.3)
POTASSIUM SERPL-SCNC: 5.1 MMOL/L — SIGNIFICANT CHANGE UP (ref 3.5–5.3)
PROT SERPL-MCNC: 7.5 G/DL — SIGNIFICANT CHANGE UP (ref 6–8.3)
PROTHROM AB SERPL-ACNC: 11.9 SEC — SIGNIFICANT CHANGE UP (ref 9.8–13.1)
RBC # BLD: 3.41 M/UL — LOW (ref 3.8–5.2)
RBC # FLD: 16.2 % — HIGH (ref 10.3–14.5)
SODIUM SERPL-SCNC: 142 MMOL/L — SIGNIFICANT CHANGE UP (ref 135–145)
WBC # BLD: 6.42 K/UL — SIGNIFICANT CHANGE UP (ref 3.8–10.5)
WBC # FLD AUTO: 6.42 K/UL — SIGNIFICANT CHANGE UP (ref 3.8–10.5)

## 2017-07-25 PROCEDURE — 99232 SBSQ HOSP IP/OBS MODERATE 35: CPT

## 2017-07-25 PROCEDURE — G0365: CPT | Mod: 26

## 2017-07-25 RX ORDER — INSULIN GLARGINE 100 [IU]/ML
35 INJECTION, SOLUTION SUBCUTANEOUS AT BEDTIME
Qty: 0 | Refills: 0 | Status: DISCONTINUED | OUTPATIENT
Start: 2017-07-25 | End: 2017-07-28

## 2017-07-25 RX ORDER — INSULIN LISPRO 100/ML
6 VIAL (ML) SUBCUTANEOUS
Qty: 0 | Refills: 0 | Status: DISCONTINUED | OUTPATIENT
Start: 2017-07-25 | End: 2017-07-28

## 2017-07-25 RX ADMIN — CARVEDILOL PHOSPHATE 25 MILLIGRAM(S): 80 CAPSULE, EXTENDED RELEASE ORAL at 18:11

## 2017-07-25 RX ADMIN — Medication 1000 UNIT(S): at 15:20

## 2017-07-25 RX ADMIN — ATORVASTATIN CALCIUM 40 MILLIGRAM(S): 80 TABLET, FILM COATED ORAL at 22:36

## 2017-07-25 RX ADMIN — Medication 200 MILLIGRAM(S): at 18:12

## 2017-07-25 RX ADMIN — SENNA PLUS 1 TABLET(S): 8.6 TABLET ORAL at 15:20

## 2017-07-25 RX ADMIN — Medication 3 MILLILITER(S): at 21:12

## 2017-07-25 RX ADMIN — Medication 667 MILLIGRAM(S): at 18:11

## 2017-07-25 RX ADMIN — Medication 3 MILLILITER(S): at 03:26

## 2017-07-25 RX ADMIN — INSULIN GLARGINE 35 UNIT(S): 100 INJECTION, SOLUTION SUBCUTANEOUS at 23:07

## 2017-07-25 RX ADMIN — Medication 100 MILLIGRAM(S): at 22:52

## 2017-07-25 RX ADMIN — HEPARIN SODIUM 5000 UNIT(S): 5000 INJECTION INTRAVENOUS; SUBCUTANEOUS at 22:36

## 2017-07-25 RX ADMIN — GABAPENTIN 100 MILLIGRAM(S): 400 CAPSULE ORAL at 15:21

## 2017-07-25 RX ADMIN — HEPARIN SODIUM 5000 UNIT(S): 5000 INJECTION INTRAVENOUS; SUBCUTANEOUS at 15:21

## 2017-07-25 RX ADMIN — Medication 3 MILLILITER(S): at 15:57

## 2017-07-25 RX ADMIN — Medication 3 MILLILITER(S): at 09:22

## 2017-07-25 RX ADMIN — PANTOPRAZOLE SODIUM 40 MILLIGRAM(S): 20 TABLET, DELAYED RELEASE ORAL at 06:33

## 2017-07-25 RX ADMIN — Medication 200 MILLIGRAM(S): at 12:06

## 2017-07-25 RX ADMIN — Medication 100 MILLIGRAM(S): at 15:22

## 2017-07-25 RX ADMIN — Medication 6 UNIT(S): at 18:09

## 2017-07-25 RX ADMIN — HEPARIN SODIUM 5000 UNIT(S): 5000 INJECTION INTRAVENOUS; SUBCUTANEOUS at 06:33

## 2017-07-25 RX ADMIN — Medication 667 MILLIGRAM(S): at 15:20

## 2017-07-25 RX ADMIN — POLYETHYLENE GLYCOL 3350 17 GRAM(S): 17 POWDER, FOR SOLUTION ORAL at 15:21

## 2017-07-25 RX ADMIN — Medication 2: at 18:10

## 2017-07-25 RX ADMIN — CARVEDILOL PHOSPHATE 25 MILLIGRAM(S): 80 CAPSULE, EXTENDED RELEASE ORAL at 06:33

## 2017-07-25 RX ADMIN — Medication 200 MILLIGRAM(S): at 07:34

## 2017-07-25 RX ADMIN — Medication 81 MILLIGRAM(S): at 15:21

## 2017-07-25 NOTE — PROGRESS NOTE ADULT - SUBJECTIVE AND OBJECTIVE BOX
Patient is a 56y old  Female who presents with a chief complaint of hypoglycemia (12 Jul 2017 18:07)      SUBJECTIVE / OVERNIGHT EVENTS:    Pt. seen and examined at bedside. Denies overnight events. States continued numbness on the left hand and thigh and denies improvement or worsening. Endorses normal bowel and bladder. Denies f/c/n/v/d/cp/sob.     MEDICATIONS  (STANDING):  aspirin enteric coated 81 milliGRAM(s) Oral daily  cholecalciferol 1000 Unit(s) Oral daily  pantoprazole    Tablet 40 milliGRAM(s) Oral before breakfast  carvedilol 25 milliGRAM(s) Oral every 12 hours  dextrose 5%. 1000 milliLiter(s) (50 mL/Hr) IV Continuous <Continuous>  dextrose 50% Injectable 12.5 Gram(s) IV Push once  dextrose 50% Injectable 25 Gram(s) IV Push once  dextrose 50% Injectable 25 Gram(s) IV Push once  calcium acetate 667 milliGRAM(s) Oral three times a day with meals  heparin  Injectable 5000 Unit(s) SubCutaneous every 8 hours  gabapentin 100 milliGRAM(s) Oral daily  ALBUTerol/ipratropium for Nebulization 3 milliLiter(s) Nebulizer every 6 hours  senna 1 Tablet(s) Oral daily  docusate sodium 100 milliGRAM(s) Oral daily  insulin lispro Injectable (HumaLOG) 7 Unit(s) SubCutaneous three times a day before meals  insulin lispro (HumaLOG) corrective regimen sliding scale   SubCutaneous three times a day before meals  insulin lispro (HumaLOG) corrective regimen sliding scale   SubCutaneous at bedtime  polyethylene glycol 3350 17 Gram(s) Oral daily  atorvastatin 40 milliGRAM(s) Oral at bedtime  insulin glargine Injectable (LANTUS) 40 Unit(s) SubCutaneous at bedtime    MEDICATIONS  (PRN):  dextrose Gel 1 Dose(s) Oral once PRN Blood Glucose LESS THAN 70 milliGRAM(s)/deciLiter  glucagon  Injectable 1 milliGRAM(s) IntraMuscular once PRN Glucose <70 milliGRAM(s)/deciLiter  acetaminophen   Tablet. 650 milliGRAM(s) Oral every 6 hours PRN Mild Pain (1 - 3)  guaiFENesin   Syrup  (Sugar-Free) 200 milliGRAM(s) Oral every 6 hours PRN Cough  benzonatate 100 milliGRAM(s) Oral every 8 hours PRN Cough  bisacodyl 5 milliGRAM(s) Oral every 12 hours PRN Constipation      T(C): 36.4 (07-25-17 @ 05:54), Max: 37.2 (07-24-17 @ 21:06)  HR: 81 (07-25-17 @ 07:43) (76 - 88)  BP: 107/61 (07-25-17 @ 06:00) (104/53 - 145/82)  RR: 20 (07-25-17 @ 05:54) (18 - 20)  SpO2: 90% (07-25-17 @ 07:43) (90% - 100%)  CAPILLARY BLOOD GLUCOSE  101 (25 Jul 2017 08:32)  156 (24 Jul 2017 22:09)  109 (24 Jul 2017 18:19)  127 (24 Jul 2017 12:01)        I&O's Summary    24 Jul 2017 07:01  -  25 Jul 2017 07:00  --------------------------------------------------------  IN: 640 mL / OUT: 2700 mL / NET: -2060 mL        PHYSICAL EXAM:  GENERAL: NAD, well-developed  HEAD:  Atraumatic, Normocephalic  EYES: EOMI, PERRLA, conjunctiva and sclera clear  NECK: Supple, No JVD  CHEST/LUNG: Clear to auscultation bilaterally; No wheeze  HEART: Regular rate and rhythm; No murmurs, rubs, or gallops  ABDOMEN: Soft, Nontender, Nondistended; Bowel sounds present  EXTREMITIES:  2+ Peripheral Pulses, No clubbing, cyanosis, or edema  PSYCH: AAOx3  NEUROLOGY: non-focal  SKIN: No rashes or lesions    LABS:    WBC Trend: 4.68<--, 4.90<--, 5.08<--  Hb Trend: 10.3<--, 10.2<--, 10.4<--, 10.8<--, 10.9<--  Plt Trend: 182<--, 179<--, 160<--, 182<--, 203<--  07-24    142  |  102  |  74<H>  ----------------------------<  60<L>  5.5<H>   |  27  |  2.48<H>    Ca    9.4      24 Jul 2017 07:15  Phos  4.7     07-24  Mg     2.0     07-24    TPro  7.3  /  Alb  3.5  /  TBili  0.5  /  DBili  x   /  AST  22  /  ALT  17  /  AlkPhos  63  07-24    Creatinine Trend: 2.48<--, 2.58<--, 2.31<--, 2.80<--, 2.91<--, 3.18<--  ( 24 Jul 2017 07:15 )   PT: 11.6 SEC;   INR: 1.03 ;       PTT:29.5 SEC          Microbiology:  Urine Cx:  Blood Cx:    RADIOLOGY & ADDITIONAL TESTS:  X- Ray:  CT:  Ultrasound:  [ ] imaging personally reviewed and interpreted by me    Consultant(s) Notes Reviewed:      Care Discussed with Consultants/Other Providers:

## 2017-07-25 NOTE — PROGRESS NOTE ADULT - PROBLEM SELECTOR PLAN 1
target fs 100-180 mg/dl  decrease Lantus to 35 units sq qhs  decrease humalog premeal to 6 units SQ tid ac and c/w correction scale as ordered    will trend and f/u

## 2017-07-25 NOTE — PROGRESS NOTE ADULT - PROBLEM SELECTOR PLAN 2
Cr baseline 1.8, Cr. 7.05 on admission, currently 2.80  Shiley in place for HD in rt IJ, receiving HD daily.   cxr revealed diffuse pulmonary edema  nephro recs: c/w lasix 80 bid. pt may be candidate for chronic dialysis dependence, will set up outpatient HD  -IR for permacath tomorrow  f/u renal labs

## 2017-07-25 NOTE — PROGRESS NOTE ADULT - SUBJECTIVE AND OBJECTIVE BOX
Chief Complaint: uncontrolled dm2    History: denies n/v, good appetite, denies s/s of hypoglycemia    MEDICATIONS  (STANDING):  aspirin enteric coated 81 milliGRAM(s) Oral daily  cholecalciferol 1000 Unit(s) Oral daily  pantoprazole    Tablet 40 milliGRAM(s) Oral before breakfast  carvedilol 25 milliGRAM(s) Oral every 12 hours  dextrose 5%. 1000 milliLiter(s) (50 mL/Hr) IV Continuous <Continuous>  dextrose 50% Injectable 12.5 Gram(s) IV Push once  dextrose 50% Injectable 25 Gram(s) IV Push once  dextrose 50% Injectable 25 Gram(s) IV Push once  calcium acetate 667 milliGRAM(s) Oral three times a day with meals  heparin  Injectable 5000 Unit(s) SubCutaneous every 8 hours  gabapentin 100 milliGRAM(s) Oral daily  ALBUTerol/ipratropium for Nebulization 3 milliLiter(s) Nebulizer every 6 hours  senna 1 Tablet(s) Oral daily  docusate sodium 100 milliGRAM(s) Oral daily  insulin lispro Injectable (HumaLOG) 7 Unit(s) SubCutaneous three times a day before meals  insulin lispro (HumaLOG) corrective regimen sliding scale   SubCutaneous three times a day before meals  insulin lispro (HumaLOG) corrective regimen sliding scale   SubCutaneous at bedtime  polyethylene glycol 3350 17 Gram(s) Oral daily  atorvastatin 40 milliGRAM(s) Oral at bedtime  insulin glargine Injectable (LANTUS) 40 Unit(s) SubCutaneous at bedtime      MEDICATIONS  (PRN):  dextrose Gel 1 Dose(s) Oral once PRN Blood Glucose LESS THAN 70 milliGRAM(s)/deciLiter  glucagon  Injectable 1 milliGRAM(s) IntraMuscular once PRN Glucose <70 milliGRAM(s)/deciLiter  acetaminophen   Tablet. 650 milliGRAM(s) Oral every 6 hours PRN Mild Pain (1 - 3)  guaiFENesin   Syrup  (Sugar-Free) 200 milliGRAM(s) Oral every 6 hours PRN Cough  benzonatate 100 milliGRAM(s) Oral every 8 hours PRN Cough  bisacodyl 5 milliGRAM(s) Oral every 12 hours PRN Constipation      Allergies    penicillin (Unknown)    Intolerances      Review of Systems:  ALL OTHER SYSTEMS REVIEWED AND NEGATIVE        PHYSICAL EXAM:  Vital Signs Last 24 Hrs  T(C): 36.9 (25 Jul 2017 13:00), Max: 37.2 (24 Jul 2017 21:06)  T(F): 98.4 (25 Jul 2017 13:00), Max: 98.9 (24 Jul 2017 21:06)  HR: 85 (25 Jul 2017 15:57) (76 - 92)  BP: 151/90 (25 Jul 2017 13:00) (104/53 - 151/90)  BP(mean): --  RR: 18 (25 Jul 2017 13:00) (18 - 20)  SpO2: 91% (25 Jul 2017 15:57) (90% - 100%)  GENERAL: NAD, well-groomed, well-developed  GI: Soft, nontender, non distended, normal bowel sounds  MUSCULOSKELETAL: Full range of motion, normal strength  NEURO: sensation intact, extraocular movements intact, no tremor, normal reflexes  PSYCH: Alert and oriented x 3, normal affect, normal mood    CAPILLARY BLOOD GLUCOSE  90 (25 Jul 2017 12:50)  101 (25 Jul 2017 08:32)  156 (24 Jul 2017 22:09)  109 (24 Jul 2017 18:19)  127 (07-24 @ 12:01)  72 (07-24 @ 08:42)  251 (07-23 @ 23:19)  122 (07-23 @ 17:56)  151 (07-23 @ 12:48)  109 (07-23 @ 08:43)  102 (07-22 @ 23:10)  142 (07-22 @ 12:26)  103 (07-22 @ 08:45)  235 (07-21 @ 22:10)  110 (07-21 @ 18:38)      07-24    142  |  102  |  74<H>  ----------------------------<  60<L>  5.5<H>   |  27  |  2.48<H>    EGFR if : 24  EGFR if non : 21    Ca    9.4      07-24  Mg     2.0     07-24  Phos  4.7     07-24    TPro  7.3  /  Alb  3.5  /  TBili  0.5  /  DBili  x   /  AST  22  /  ALT  17  /  AlkPhos  63  07-24          Thyroid Function Tests:  07-12 @ 06:35 TSH 0.19 FreeT4 -- T3 -- Anti TPO -- Anti Thyroglobulin Ab -- TSI --      Hemoglobin A1C, Whole Blood: 9.2 % <H> [4.0 - 5.6] (07-11-17 @ 20:15)

## 2017-07-25 NOTE — PROGRESS NOTE ADULT - SUBJECTIVE AND OBJECTIVE BOX
No pain, no shortness of breath      VITAL:  T(C): , Max: 37.2 (07-24-17 @ 21:06)  T(F): , Max: 98.9 (07-24-17 @ 21:06)  HR: 90 (07-25-17 @ 10:45)  BP: 144/72 (07-25-17 @ 10:45)  BP(mean): --  RR: 18 (07-25-17 @ 10:45)  SpO2: 92% (07-25-17 @ 10:29)      PHYSICAL EXAM:  Constitutional: NAD; obese  HEENTN: DMM, (+)RIJ shiley  Respiratory: distant b/l  Cardiovascular: Irreg S1S2  Gastrointestinal: soft, NTND  Extremities: 2+ b/l LE edema      LABS:                        10.0   6.42  )-----------( 176      ( 25 Jul 2017 11:00 )             31.5     Na(142)/K(5.5)/Cl(102)/HCO3(27)/BUN(74)/Cr(2.48)Glu(60)/Ca(9.4)/Mg(2.0)/PO4(4.7)    07-24 @ 07:15  Na(137)/K(5.5)/Cl(97)/HCO3(29)/BUN(69)/Cr(2.58)Glu(130)/Ca(9.5)/Mg(2.0)/PO4(4.3)    07-24 @ 01:00  Na(138)/K(5.3)/Cl(97)/HCO3(27)/BUN(60)/Cr(2.31)Glu(136)/Ca(9.3)/Mg(2.0)/PO4(4.4)    07-23 @ 04:00      IMPRESSION: 56F w/ HTN, DM2, and HFrEF(20%)-AICD, 7/12/17 a/w hypoglycemia, respiratory acidosis, and ALISA    (1)Renal - Now newly ESRD-HD, s/p failed attempt to discontinue HD last week. Last dialyzed yesterday. Given the overabundance of MWF patients receiving HD at [facility] at present, it would be best to have her receive HD TTS while at [facility] for now. Can look to perform a short HD again today, and then plan for HD on Thursday.    (2)Hyperkalemia - Should improve with chronic HD; must still encourage limitation of dietary K+ intake.    (3)CV - pulmonary edema - to be managed with HD    (4)Vascular - presently with non-tunneled dialysis cathteter - hopefully able to undergo tunneled catheter placement today with IR. Could benefit from AVF/AVG as well this admission.    (5)SW - needs acceptance into an outpatient HD unit prior to discharge      RECOMMEND:  (1)HD today x 2 hours; then HD Thursday 7/27/17  (2)Tunneled HD cathter today by IR  (3)Meds for GFR<10/HD  (4)AVF vs AVG creation per Vascular  (5)SW setup of outpatient HD.            Perfecto Rae MD  [facility]  [phone] No pain, no shortness of breath. Seen on HD.       VITAL:  T(C): , Max: 37.2 (07-24-17 @ 21:06)  T(F): , Max: 98.9 (07-24-17 @ 21:06)  HR: 90 (07-25-17 @ 10:45)  BP: 144/72 (07-25-17 @ 10:45)  BP(mean): --  RR: 18 (07-25-17 @ 10:45)  SpO2: 92% (07-25-17 @ 10:29)      PHYSICAL EXAM:  Constitutional: NAD; obese  HEENTN: DMM, (+)RIJ shiley  Respiratory: distant b/l anteriorly  Cardiovascular: Irreg S1S2  Gastrointestinal: soft, NTND  Extremities: 1-2+ b/l LE edema      LABS:                        10.0   6.42  )-----------( 176      ( 25 Jul 2017 11:00 )             31.5     Na(142)/K(5.5)/Cl(102)/HCO3(27)/BUN(74)/Cr(2.48)Glu(60)/Ca(9.4)/Mg(2.0)/PO4(4.7)    07-24 @ 07:15  Na(137)/K(5.5)/Cl(97)/HCO3(29)/BUN(69)/Cr(2.58)Glu(130)/Ca(9.5)/Mg(2.0)/PO4(4.3)    07-24 @ 01:00  Na(138)/K(5.3)/Cl(97)/HCO3(27)/BUN(60)/Cr(2.31)Glu(136)/Ca(9.3)/Mg(2.0)/PO4(4.4)    07-23 @ 04:00      IMPRESSION: 56F w/ HTN, DM2, and HFrEF(20%)-AICD, 7/12/17 a/w hypoglycemia, respiratory acidosis, and ALISA    (1)Renal - Now newly ESRD-HD, s/p failed attempt to discontinue HD last week. Last dialyzed yesterday. Given the overabundance of MWF patients receiving HD at [facility] at present, it would be best to have her receive HD TTS while at [facility] for now. Can look to perform a short HD again today, and then plan for HD on Thursday.    (2)Hyperkalemia - Should improve with chronic HD; must still encourage limitation of dietary K+ intake.    (3)CV - pulmonary edema - to be managed with HD    (4)Vascular - presently with non-tunneled dialysis cathteter - hopefully able to undergo tunneled catheter placement today with IR. Could benefit from AVF/AVG as well this admission.    (5)SW - needs acceptance into an outpatient HD unit prior to discharge      RECOMMEND:  (1)HD today x 2 hours; then HD Thursday 7/27/17  (2)Tunneled HD cathter today by IR  (3)Meds for GFR<10/HD  (4)AVF vs AVG creation per Vascular  (5)SW setup of outpatient HD.            Perfecto Rae MD  [facility]  [phone]

## 2017-07-25 NOTE — PROGRESS NOTE ADULT - SUBJECTIVE AND OBJECTIVE BOX
CHIEF COMPLAINT: the patient was seen at 8 am aawaken to verbal  schedule for interventional radiolog I also informed her that her father  passed away this a.m. at 6 AM emotional support given discussed with the nurse kristi to ask supporting her    SUBJECTIVE:     REVIEW OF SYSTEMS:    CONSTITUTIONAL: ( - )  weakness,  (-  ) fevers or chills  EYES/ENT: ( - )visual changes;     NECK: (  -) pain or stiffness  RESPIRATORY:   ( - )cough, wheezing, hemoptysis;  ( - ) shortness of breath  CARDIOVASCULAR:  ( - )chest pain or palpitations  GASTROINTESTINAL:   (-  )abdominal or epigastric pain.  ( - ) nausea, vomiting, or hematemesis;   (  - ) diarrhea or constipation.   GENITOURINARY:   (   - ) dysuria, frequency or hematuria  NEUROLOGICAL:  (  - ) numbness or weakness   All other review of systems is negative unless indicated above    Vital Signs Last 24 Hrs  T(C): 37 (25 Jul 2017 21:19), Max: 37.1 (25 Jul 2017 10:45)  T(F): 98.6 (25 Jul 2017 21:19), Max: 98.8 (25 Jul 2017 10:45)  HR: 85 (25 Jul 2017 21:19) (76 - 92)  BP: 138/81 (25 Jul 2017 21:19) (104/53 - 151/90)  BP(mean): --  RR: 18 (25 Jul 2017 21:19) (18 - 20)  SpO2: 100% (25 Jul 2017 21:19) (90% - 100%)    I&O's Summary    24 Jul 2017 07:01  -  25 Jul 2017 07:00  --------------------------------------------------------  IN: 640 mL / OUT: 2700 mL / NET: -2060 mL    25 Jul 2017 07:01  -  25 Jul 2017 22:34  --------------------------------------------------------  IN: 400 mL / OUT: 1561 mL / NET: -1161 mL        CAPILLARY BLOOD GLUCOSE  178 (25 Jul 2017 17:58)  90 (25 Jul 2017 12:50)  101 (25 Jul 2017 08:32)          PHYSICAL EXAM:    Constitutional:  (  - ) NAD,   (   )awake and alert  HEENT: PERR, EOMI,    Neck: Soft and supple, No LAD, No JVD  Respiratory:  (  +  Breath sounds are clear bilaterally,    ( - ) wheezing, rales or rhonchi  Cardiovascular:     (  + )S1 and S2, regular rate and rhythm, no Murmurs, gallops or rubs  Gastrointestinal:  (  + )Bowel Sounds present, soft,   ( - )nontender, nondistended,    Extremities:    (  -) peripheral edema  Vascular: 2+ peripheral pulses  Neurological:    (  =+  )A/O x 3,   (-  ) focal deficits  Musculoskeletal:    ( +  )  normal strength b/l upper  (   +  ) normal  lower extremities  Skin: No rashes    MEDICATIONS:  MEDICATIONS  (STANDING):  aspirin enteric coated 81 milliGRAM(s) Oral daily  cholecalciferol 1000 Unit(s) Oral daily  pantoprazole    Tablet 40 milliGRAM(s) Oral before breakfast  carvedilol 25 milliGRAM(s) Oral every 12 hours  dextrose 5%. 1000 milliLiter(s) (50 mL/Hr) IV Continuous <Continuous>  dextrose 50% Injectable 12.5 Gram(s) IV Push once  dextrose 50% Injectable 25 Gram(s) IV Push once  dextrose 50% Injectable 25 Gram(s) IV Push once  calcium acetate 667 milliGRAM(s) Oral three times a day with meals  heparin  Injectable 5000 Unit(s) SubCutaneous every 8 hours  gabapentin 100 milliGRAM(s) Oral daily  ALBUTerol/ipratropium for Nebulization 3 milliLiter(s) Nebulizer every 6 hours  senna 1 Tablet(s) Oral daily  docusate sodium 100 milliGRAM(s) Oral daily  insulin lispro (HumaLOG) corrective regimen sliding scale   SubCutaneous three times a day before meals  insulin lispro (HumaLOG) corrective regimen sliding scale   SubCutaneous at bedtime  polyethylene glycol 3350 17 Gram(s) Oral daily  atorvastatin 40 milliGRAM(s) Oral at bedtime  insulin lispro Injectable (HumaLOG) 6 Unit(s) SubCutaneous three times a day before meals  insulin glargine Injectable (LANTUS) 35 Unit(s) SubCutaneous at bedtime      LABS: All Labs Reviewed:                        10.0   6.42  )-----------( 176      ( 25 Jul 2017 11:00 )             31.5     07-25    142  |  101  |  46<H>  ----------------------------<  103<H>  5.1   |  31  |  1.85<H>    Ca    9.3      25 Jul 2017 11:00  Phos  3.6     07-25  Mg     2.0     07-25    TPro  7.5  /  Alb  3.7  /  TBili  0.5  /  DBili  x   /  AST  30  /  ALT  26  /  AlkPhos  64  07-25    PT/INR - ( 25 Jul 2017 11:00 )   PT: 11.9 SEC;   INR: 1.06          PTT - ( 25 Jul 2017 11:00 )  PTT:28.0 SEC      Blood Culture:   Urine Culture      RADIOLOGY/EKG:    ASSESSMENT AND PLAN:  1 ALISA ask her renal patient most probably need long-term dialysis for access  today at IR  2diabetes type 2  stable  3 CHF CAD continue     DVT PPX:    ADVANCED DIRECTIVE:    DISPOSITION:

## 2017-07-25 NOTE — PROGRESS NOTE ADULT - SUBJECTIVE AND OBJECTIVE BOX
Patient is a 56y old  Female who presents with a chief complaint of hypoglycemia (12 Jul 2017 18:07)      Vascular Surgery Attending Progress Note    Interval HPI: pt w/o c/o    Medications:  aspirin enteric coated 81 milliGRAM(s) Oral daily  cholecalciferol 1000 Unit(s) Oral daily  pantoprazole    Tablet 40 milliGRAM(s) Oral before breakfast  carvedilol 25 milliGRAM(s) Oral every 12 hours  dextrose 5%. 1000 milliLiter(s) IV Continuous <Continuous>  dextrose Gel 1 Dose(s) Oral once PRN  dextrose 50% Injectable 12.5 Gram(s) IV Push once  dextrose 50% Injectable 25 Gram(s) IV Push once  dextrose 50% Injectable 25 Gram(s) IV Push once  glucagon  Injectable 1 milliGRAM(s) IntraMuscular once PRN  calcium acetate 667 milliGRAM(s) Oral three times a day with meals  heparin  Injectable 5000 Unit(s) SubCutaneous every 8 hours  gabapentin 100 milliGRAM(s) Oral daily  acetaminophen   Tablet. 650 milliGRAM(s) Oral every 6 hours PRN  guaiFENesin   Syrup  (Sugar-Free) 200 milliGRAM(s) Oral every 6 hours PRN  ALBUTerol/ipratropium for Nebulization 3 milliLiter(s) Nebulizer every 6 hours  benzonatate 100 milliGRAM(s) Oral every 8 hours PRN  senna 1 Tablet(s) Oral daily  docusate sodium 100 milliGRAM(s) Oral daily  insulin lispro (HumaLOG) corrective regimen sliding scale   SubCutaneous three times a day before meals  insulin lispro (HumaLOG) corrective regimen sliding scale   SubCutaneous at bedtime  polyethylene glycol 3350 17 Gram(s) Oral daily  bisacodyl 5 milliGRAM(s) Oral every 12 hours PRN  atorvastatin 40 milliGRAM(s) Oral at bedtime  insulin lispro Injectable (HumaLOG) 6 Unit(s) SubCutaneous three times a day before meals  insulin glargine Injectable (LANTUS) 35 Unit(s) SubCutaneous at bedtime      Vital Signs Last 24 Hrs  T(C): 36.9 (25 Jul 2017 13:00), Max: 37.2 (24 Jul 2017 21:06)  T(F): 98.4 (25 Jul 2017 13:00), Max: 98.9 (24 Jul 2017 21:06)  HR: 80 (25 Jul 2017 18:12) (76 - 92)  BP: 148/80 (25 Jul 2017 18:12) (104/53 - 151/90)  BP(mean): --  RR: 18 (25 Jul 2017 13:00) (18 - 20)  SpO2: 91% (25 Jul 2017 15:57) (90% - 100%)  I&O's Summary    24 Jul 2017 07:01  -  25 Jul 2017 07:00  --------------------------------------------------------  IN: 640 mL / OUT: 2700 mL / NET: -2060 mL    25 Jul 2017 07:01  -  25 Jul 2017 19:30  --------------------------------------------------------  IN: 400 mL / OUT: 1561 mL / NET: -1161 mL        Physical Exam:  Neuro  A&Ox3 VSS  Vascular:  shiley cath in place  Musculoskeletal:wnl    LABS:                        10.0   6.42  )-----------( 176      ( 25 Jul 2017 11:00 )             31.5     07-25    142  |  101  |  46<H>  ----------------------------<  103<H>  5.1   |  31  |  1.85<H>    Ca    9.3      25 Jul 2017 11:00  Phos  3.6     07-25  Mg     2.0     07-25    TPro  7.5  /  Alb  3.7  /  TBili  0.5  /  DBili  x   /  AST  30  /  ALT  26  /  AlkPhos  64  07-25    PT/INR - ( 25 Jul 2017 11:00 )   PT: 11.9 SEC;   INR: 1.06          PTT - ( 25 Jul 2017 11:00 )  PTT:28.0 SEC    sarbjit us vein mapping reviewed      SERG ARGUELLO MD  [phone]

## 2017-07-26 ENCOUNTER — TRANSCRIPTION ENCOUNTER (OUTPATIENT)
Age: 57
End: 2017-07-26

## 2017-07-26 LAB
ALBUMIN SERPL ELPH-MCNC: 3.6 G/DL — SIGNIFICANT CHANGE UP (ref 3.3–5)
ALP SERPL-CCNC: 69 U/L — SIGNIFICANT CHANGE UP (ref 40–120)
ALT FLD-CCNC: 32 U/L — SIGNIFICANT CHANGE UP (ref 4–33)
APTT BLD: 26.6 SEC — LOW (ref 27.5–37.4)
AST SERPL-CCNC: 30 U/L — SIGNIFICANT CHANGE UP (ref 4–32)
BILIRUB SERPL-MCNC: 0.4 MG/DL — SIGNIFICANT CHANGE UP (ref 0.2–1.2)
BLD GP AB SCN SERPL QL: NEGATIVE — SIGNIFICANT CHANGE UP
BUN SERPL-MCNC: 52 MG/DL — HIGH (ref 7–23)
CALCIUM SERPL-MCNC: 9.5 MG/DL — SIGNIFICANT CHANGE UP (ref 8.4–10.5)
CHLORIDE SERPL-SCNC: 100 MMOL/L — SIGNIFICANT CHANGE UP (ref 98–107)
CO2 SERPL-SCNC: 26 MMOL/L — SIGNIFICANT CHANGE UP (ref 22–31)
CREAT SERPL-MCNC: 2.5 MG/DL — HIGH (ref 0.5–1.3)
GLUCOSE SERPL-MCNC: 90 MG/DL — SIGNIFICANT CHANGE UP (ref 70–99)
HCT VFR BLD CALC: 32.5 % — LOW (ref 34.5–45)
HGB BLD-MCNC: 10.1 G/DL — LOW (ref 11.5–15.5)
INR BLD: 1.03 — SIGNIFICANT CHANGE UP (ref 0.88–1.17)
MCHC RBC-ENTMCNC: 28 PG — SIGNIFICANT CHANGE UP (ref 27–34)
MCHC RBC-ENTMCNC: 31.1 % — LOW (ref 32–36)
MCV RBC AUTO: 90 FL — SIGNIFICANT CHANGE UP (ref 80–100)
NRBC # FLD: 0 — SIGNIFICANT CHANGE UP
PLATELET # BLD AUTO: 150 K/UL — SIGNIFICANT CHANGE UP (ref 150–400)
PMV BLD: 12.4 FL — SIGNIFICANT CHANGE UP (ref 7–13)
POTASSIUM SERPL-MCNC: 5.4 MMOL/L — HIGH (ref 3.5–5.3)
POTASSIUM SERPL-SCNC: 5.4 MMOL/L — HIGH (ref 3.5–5.3)
PROT SERPL-MCNC: 7.5 G/DL — SIGNIFICANT CHANGE UP (ref 6–8.3)
PROTHROM AB SERPL-ACNC: 11.6 SEC — SIGNIFICANT CHANGE UP (ref 9.8–13.1)
RBC # BLD: 3.61 M/UL — LOW (ref 3.8–5.2)
RBC # FLD: 16.4 % — HIGH (ref 10.3–14.5)
RH IG SCN BLD-IMP: POSITIVE — SIGNIFICANT CHANGE UP
SODIUM SERPL-SCNC: 141 MMOL/L — SIGNIFICANT CHANGE UP (ref 135–145)
WBC # BLD: 6.13 K/UL — SIGNIFICANT CHANGE UP (ref 3.8–10.5)
WBC # FLD AUTO: 6.13 K/UL — SIGNIFICANT CHANGE UP (ref 3.8–10.5)

## 2017-07-26 PROCEDURE — 76937 US GUIDE VASCULAR ACCESS: CPT | Mod: 26

## 2017-07-26 PROCEDURE — 77001 FLUOROGUIDE FOR VEIN DEVICE: CPT | Mod: 26,GC

## 2017-07-26 PROCEDURE — 99232 SBSQ HOSP IP/OBS MODERATE 35: CPT

## 2017-07-26 PROCEDURE — 36558 INSERT TUNNELED CV CATH: CPT

## 2017-07-26 RX ADMIN — Medication 1000 UNIT(S): at 19:11

## 2017-07-26 RX ADMIN — Medication 650 MILLIGRAM(S): at 23:30

## 2017-07-26 RX ADMIN — Medication 200 MILLIGRAM(S): at 22:08

## 2017-07-26 RX ADMIN — Medication 667 MILLIGRAM(S): at 19:13

## 2017-07-26 RX ADMIN — Medication 81 MILLIGRAM(S): at 19:12

## 2017-07-26 RX ADMIN — Medication 3 MILLILITER(S): at 03:32

## 2017-07-26 RX ADMIN — PANTOPRAZOLE SODIUM 40 MILLIGRAM(S): 20 TABLET, DELAYED RELEASE ORAL at 06:45

## 2017-07-26 RX ADMIN — CARVEDILOL PHOSPHATE 25 MILLIGRAM(S): 80 CAPSULE, EXTENDED RELEASE ORAL at 06:48

## 2017-07-26 RX ADMIN — Medication 6 UNIT(S): at 19:13

## 2017-07-26 RX ADMIN — Medication 200 MILLIGRAM(S): at 06:49

## 2017-07-26 RX ADMIN — Medication 100 MILLIGRAM(S): at 19:12

## 2017-07-26 RX ADMIN — HEPARIN SODIUM 5000 UNIT(S): 5000 INJECTION INTRAVENOUS; SUBCUTANEOUS at 06:45

## 2017-07-26 RX ADMIN — GABAPENTIN 100 MILLIGRAM(S): 400 CAPSULE ORAL at 19:12

## 2017-07-26 RX ADMIN — ATORVASTATIN CALCIUM 40 MILLIGRAM(S): 80 TABLET, FILM COATED ORAL at 22:08

## 2017-07-26 RX ADMIN — INSULIN GLARGINE 35 UNIT(S): 100 INJECTION, SOLUTION SUBCUTANEOUS at 23:18

## 2017-07-26 RX ADMIN — SENNA PLUS 1 TABLET(S): 8.6 TABLET ORAL at 19:14

## 2017-07-26 RX ADMIN — HEPARIN SODIUM 5000 UNIT(S): 5000 INJECTION INTRAVENOUS; SUBCUTANEOUS at 22:08

## 2017-07-26 RX ADMIN — POLYETHYLENE GLYCOL 3350 17 GRAM(S): 17 POWDER, FOR SOLUTION ORAL at 19:14

## 2017-07-26 RX ADMIN — Medication 3 MILLILITER(S): at 21:54

## 2017-07-26 RX ADMIN — Medication 3 MILLILITER(S): at 10:01

## 2017-07-26 NOTE — DISCHARGE NOTE ADULT - HOSPITAL COURSE
56yoF w/ PMHx of HTN, HLD, T2DM, COPD presented to the ER with hypoglycemia in setting of taking double dose of trujeo (60 U at bedtime and another 60 U in am) with AMS and subsequently developed SOB requiring bipap and MICU admission.  She was found to be in ARF and was placed transiently on dialysis via rt IVY diop ( 3 days) with significant improvement. Pt also required transient pressors while in the MICU, now off all pressors and medically stable. Pt's BUN/Cr remained elevated and pt required continued daily dialysis on floors. 56yoF w/ PMHx of HTN, HLD, T2DM, COPD presented to the ER with hypoglycemia in setting of taking double dose of trujeo (60 U at bedtime and another 60 U in am) with AMS and subsequently developed SOB requiring bipap and MICU admission.  She was found to be in ARF and was placed transiently on dialysis via rt IVY diop ( 3 days) with significant improvement. Pt also required transient pressors while in the MICU, now off all pressors and medically stable. Pt's BUN/Cr remained elevated and pt required continued daily dialysis on floors due to recurrent shortness of breath due to fluid overload as seen on cxr and physical exam. on 7/26 patient had IR placement of a xxxxxxxxpermacath 56yoF w/ PMHx of HTN, HLD, T2DM, COPD presented to the ER with hypoglycemia in setting of taking double dose of trujeo (60 U at bedtime and another 60 U in am) with AMS and subsequently developed SOB requiring bipap and MICU admission.  She was found to be in ARF and was placed transiently on dialysis via rt IJ adelaley ( 3 days) with significant improvement. Pt also required transient pressors while in the MICU, now off all pressors and medically stable. Pt's BUN/Cr remained elevated and pt required continued daily dialysis on floors due to recurrent shortness of breath due to fluid overload as seen on cxr and physical exam. on 7/26 patient had IR placement of a tunneled HD catheter and on 7/27 patient went for AVF placement 56yoF w/ PMHx of HTN, HLD, T2DM, COPD presented to the ER with hypoglycemia in setting of taking double dose of trujeo (60 U at bedtime and another 60 U in am) with AMS and subsequently developed SOB requiring bipap and MICU admission.  She was found to be in ARF and was placed transiently on dialysis via rt IJ adelaley ( 3 days) with significant improvement. Pt also required transient pressors while in the MICU, now off all pressors and medically stable. Pt's BUN/Cr remained elevated and pt required continued daily dialysis on floors due to recurrent shortness of breath due to fluid overload as seen on cxr and physical exam. on 7/26 patient had IR placement of a tunneled HD catheter and on 7/27 patient went for AVF placement which was placed in the right antecubital region. Bruit is inaudible but vascular surgery is aware and approve.

## 2017-07-26 NOTE — PROGRESS NOTE ADULT - SUBJECTIVE AND OBJECTIVE BOX
CHIEF COMPLAINT:    SUBJECTIVE:     REVIEW OF SYSTEMS:    CONSTITUTIONAL: (  )  weakness,  (  ) fevers or chills  EYES/ENT: (  )visual changes;     NECK: (  ) pain or stiffness  RESPIRATORY:   (  )cough, wheezing, hemoptysis;  (  ) shortness of breath  CARDIOVASCULAR:  (  )chest pain or palpitations  GASTROINTESTINAL:   (  )abdominal or epigastric pain.  (  ) nausea, vomiting, or hematemesis;   (   ) diarrhea or constipation.   GENITOURINARY:   (    ) dysuria, frequency or hematuria  NEUROLOGICAL:  (   ) numbness or weakness   All other review of systems is negative unless indicated above    Vital Signs Last 24 Hrs  T(C): 37 (26 Jul 2017 06:44), Max: 37.1 (25 Jul 2017 10:45)  T(F): 98.6 (26 Jul 2017 06:44), Max: 98.8 (25 Jul 2017 10:45)  HR: 87 (26 Jul 2017 07:11) (79 - 92)  BP: 138/90 (26 Jul 2017 06:44) (138/81 - 151/90)  BP(mean): --  RR: 18 (26 Jul 2017 06:44) (18 - 18)  SpO2: 91% (26 Jul 2017 07:11) (90% - 100%)    I&O's Summary    25 Jul 2017 07:01  -  26 Jul 2017 07:00  --------------------------------------------------------  IN: 400 mL / OUT: 1861 mL / NET: -1461 mL        CAPILLARY BLOOD GLUCOSE  100 (26 Jul 2017 08:42)  195 (25 Jul 2017 22:39)  178 (25 Jul 2017 17:58)  90 (25 Jul 2017 12:50)          PHYSICAL EXAM:    Constitutional:  (   ) NAD,   (   )awake and alert  HEENT: PERR, EOMI,    Neck: Soft and supple, No LAD, No JVD  Respiratory:  (    Breath sounds are clear bilaterally,    (   ) wheezing, rales or rhonchi  Cardiovascular:     (   )S1 and S2, regular rate and rhythm, no Murmurs, gallops or rubs  Gastrointestinal:  (   )Bowel Sounds present, soft,   (  )nontender, nondistended,    Extremities:    (  ) peripheral edema  Vascular: 2+ peripheral pulses  Neurological:    (    )A/O x 3,   (  ) focal deficits  Musculoskeletal:    (   )  normal strength b/l upper  (     ) normal  lower extremities  Skin: No rashes    MEDICATIONS:  MEDICATIONS  (STANDING):  aspirin enteric coated 81 milliGRAM(s) Oral daily  cholecalciferol 1000 Unit(s) Oral daily  pantoprazole    Tablet 40 milliGRAM(s) Oral before breakfast  carvedilol 25 milliGRAM(s) Oral every 12 hours  dextrose 5%. 1000 milliLiter(s) (50 mL/Hr) IV Continuous <Continuous>  dextrose 50% Injectable 12.5 Gram(s) IV Push once  dextrose 50% Injectable 25 Gram(s) IV Push once  dextrose 50% Injectable 25 Gram(s) IV Push once  calcium acetate 667 milliGRAM(s) Oral three times a day with meals  heparin  Injectable 5000 Unit(s) SubCutaneous every 8 hours  gabapentin 100 milliGRAM(s) Oral daily  ALBUTerol/ipratropium for Nebulization 3 milliLiter(s) Nebulizer every 6 hours  senna 1 Tablet(s) Oral daily  docusate sodium 100 milliGRAM(s) Oral daily  insulin lispro (HumaLOG) corrective regimen sliding scale   SubCutaneous three times a day before meals  insulin lispro (HumaLOG) corrective regimen sliding scale   SubCutaneous at bedtime  polyethylene glycol 3350 17 Gram(s) Oral daily  atorvastatin 40 milliGRAM(s) Oral at bedtime  insulin lispro Injectable (HumaLOG) 6 Unit(s) SubCutaneous three times a day before meals  insulin glargine Injectable (LANTUS) 35 Unit(s) SubCutaneous at bedtime      LABS: All Labs Reviewed:                        10.1   6.13  )-----------( 150      ( 26 Jul 2017 06:45 )             32.5     07-26    141  |  100  |  52<H>  ----------------------------<  90  5.4<H>   |  26  |  2.50<H>    Ca    9.5      26 Jul 2017 06:45  Phos  3.6     07-25  Mg     2.0     07-25    TPro  7.5  /  Alb  3.6  /  TBili  0.4  /  DBili  x   /  AST  30  /  ALT  32  /  AlkPhos  69  07-26    PT/INR - ( 26 Jul 2017 06:45 )   PT: 11.6 SEC;   INR: 1.03          PTT - ( 26 Jul 2017 06:45 )  PTT:26.6 SEC      Blood Culture:   Urine Culture      RADIOLOGY/EKG:    ASSESSMENT AND PLAN:   wating  for  IR   FOR ACSSES    DVT PPX:    ADVANCED DIRECTIVE:    DISPOSITION: CHIEF COMPLAINT:  no events overnight  SUBJECTIVE:     REVIEW OF SYSTEMS:    CONSTITUTIONAL: ( - )  weakness,  ( - ) fevers or chills  EYES/ENT: ( - )visual changes;     NECK: ( - ) pain or stiffness  RESPIRATORY:   ( - )cough, wheezing, hemoptysis;  ( - ) shortness of breath  CARDIOVASCULAR:  ( - )chest pain or palpitations  GASTROINTESTINAL:   ( - )abdominal or epigastric pain.  (-  ) nausea, vomiting, or hematemesis;   ( -  ) diarrhea or constipation.   GENITOURINARY:   ( -   ) dysuria, frequency or hematuria  NEUROLOGICAL:  (  - ) numbness or weakness   All other review of systems is negative unless indicated above    Vital Signs Last 24 Hrs  T(C): 37 (26 Jul 2017 06:44), Max: 37.1 (25 Jul 2017 10:45)  T(F): 98.6 (26 Jul 2017 06:44), Max: 98.8 (25 Jul 2017 10:45)  HR: 87 (26 Jul 2017 07:11) (79 - 92)  BP: 138/90 (26 Jul 2017 06:44) (138/81 - 151/90)  BP(mean): --  RR: 18 (26 Jul 2017 06:44) (18 - 18)  SpO2: 91% (26 Jul 2017 07:11) (90% - 100%)    I&O's Summary    25 Jul 2017 07:01  -  26 Jul 2017 07:00  --------------------------------------------------------  IN: 400 mL / OUT: 1861 mL / NET: -1461 mL        CAPILLARY BLOOD GLUCOSE  100 (26 Jul 2017 08:42)  195 (25 Jul 2017 22:39)  178 (25 Jul 2017 17:58)  90 (25 Jul 2017 12:50)          PHYSICAL EXAM:    Constitutional:  (  - ) NAD,   ( + ) focal deficits  Musculoskeletal:    (  + )  normal strength b/l upper  ( +    ) normal  lower extremities  Skin: No rashes    MEDICATIONS:  MEDICATIONS  (STANDING):  aspirin enteric coated 81 milliGRAM(s) Oral daily  cholecalciferol 1000 Unit(s) Oral daily  pantoprazole    Tablet 40 milliGRAM(s) Oral before breakfast  carvedilol 25 milliGRAM(s) Oral every 12 hours  dextrose 5%. 1000 milliLiter(s) (50 mL/Hr) IV Continuous <Continuous>  dextrose 50% Injectable 12.5 Gram(s) IV Push once  dextrose 50% Injectable 25 Gram(s) IV Push once  dextrose 50% Injectable 25 Gram(s) IV Push once  calcium acetate 667 milliGRAM(s) Oral three times a day with meals  heparin  Injectable 5000 Unit(s) SubCutaneous every 8 hours  gabapentin 100 milliGRAM(s) Oral daily  ALBUTerol/ipratropium for Nebulization 3 milliLiter(s) Nebulizer every 6 hours  senna 1 Tablet(s) Oral daily  docusate sodium 100 milliGRAM(s) Oral daily  insulin lispro (HumaLOG) corrective regimen sliding scale   SubCutaneous three times a day before meals  insulin lispro (HumaLOG) corrective regimen sliding scale   SubCutaneous at bedtime  polyethylene glycol 3350 17 Gram(s) Oral daily  atorvastatin 40 milliGRAM(s) Oral at bedtime  insulin lispro Injectable (HumaLOG) 6 Unit(s) SubCutaneous three times a day before meals  insulin glargine Injectable (LANTUS) 35 Unit(s) SubCutaneous at bedtime      LABS: All Labs Reviewed:                        10.1   6.13  )-----------( 150      ( 26 Jul 2017 06:45 )             32.5     07-26    141  |  100  |  52<H>  ----------------------------<  90  5.4<H>   |  26  |  2.50<H>    Ca    9.5      26 Jul 2017 06:45  Phos  3.6     07-25  Mg     2.0     07-25    TPro  7.5  /  Alb  3.6  /  TBili  0.4  /  DBili  x   /  AST  30  /  ALT  32  /  AlkPhos  69  07-26    PT/INR - ( 26 Jul 2017 06:45 )   PT: 11.6 SEC;   INR: 1.03          PTT - ( 26 Jul 2017 06:45 )  PTT:26.6 SEC      Blood Culture:   Urine Culture      RADIOLOGY/EKG:    ASSESSMENT AND PLAN:  1 l kidney injury wating  for  IR   FOR ACSSES first possible long-term hemodialysis  2 CHF/CAD stable wi  3 diabetes type 2  s/p hypoglycemia  stable    DVT PPX:    ADVANCED DIRECTIVE:    DISPOSITION:home

## 2017-07-26 NOTE — DISCHARGE NOTE ADULT - PATIENT PORTAL LINK FT
“You can access the [facility] Patient Portal, offered by [facility], by registering with the following website: [email or web address]”

## 2017-07-26 NOTE — PROGRESS NOTE ADULT - SUBJECTIVE AND OBJECTIVE BOX
No pain, no shortness of breath        VITAL:  T(C): , Max: 37.1 (07-25-17 @ 10:45)  T(F): , Max: 98.8 (07-25-17 @ 10:45)  HR: 87 (07-26-17 @ 07:11)  BP: 138/90 (07-26-17 @ 06:44)  BP(mean): --  RR: 18 (07-26-17 @ 06:44)  SpO2: 91% (07-26-17 @ 07:11)      PHYSICAL EXAM:  Constitutional: NAD; obese  HEENTN: DMM, (+)RIJ shiley  Respiratory: distant b/l anteriorly  Cardiovascular: Irreg S1S2  Gastrointestinal: soft, NTND  Extremities: 1-2+ b/l LE edema      LABS:                        10.1   6.13  )-----------( 150      ( 26 Jul 2017 06:45 )             32.5     Na(142)/K(5.1)/Cl(101)/HCO3(31)/BUN(46)/Cr(1.85)Glu(103)/Ca(9.3)/Mg(2.0)/PO4(3.6)    07-25 @ 11:00  Na(142)/K(5.5)/Cl(102)/HCO3(27)/BUN(74)/Cr(2.48)Glu(60)/Ca(9.4)/Mg(2.0)/PO4(4.7)    07-24 @ 07:15  Na(137)/K(5.5)/Cl(97)/HCO3(29)/BUN(69)/Cr(2.58)Glu(130)/Ca(9.5)/Mg(2.0)/PO4(4.3)    07-24 @ 01:00        IMPRESSION: 56F w/ HTN, DM2, and HFrEF(20%)-AICD, 7/12/17 a/w hypoglycemia, respiratory acidosis, and ALISA; now newly ESRD-HD.    (1)Renal - Newly ESRD-HD as of this admission. Last dialyzed yesterday and due for next HD tomorrow.    (2)CV - pulmonary edema - to be managed with HD    (3)Vascular - Awaiting tunneled cath placement by IR. For AVF/AVG once cardiac-cleared.    (4)SW - needs acceptance into an outpatient HD unit prior to discharge. I have no objection to postponing AVF/AVG until after discharge if she can get accepted into an outpatient HD unit without an AV access.      RECOMMEND:  (1)Next HD tomorrow  (2)Tunneled HD cathter today by IR  (3)Meds for GFR<10/HD  (4)AVF vs AVG creation per Vascular  (5)SW setup of outpatient HD.      Discussed with house staff Dr. Eric Rae MD  [facility]  [phone] No pain, no shortness of breath      VITAL:  T(C): , Max: 37.1 (07-25-17 @ 10:45)  T(F): , Max: 98.8 (07-25-17 @ 10:45)  HR: 87 (07-26-17 @ 07:11)  BP: 138/90 (07-26-17 @ 06:44)  BP(mean): --  RR: 18 (07-26-17 @ 06:44)  SpO2: 91% (07-26-17 @ 07:11)      PHYSICAL EXAM:  Constitutional: NAD; obese  HEENTN: DMM, (+)RIJ shiley  Respiratory: distant b/l anteriorly  Cardiovascular: Irreg S1S2  Gastrointestinal: soft, NTND  Extremities: 1-2+ b/l LE edema      LABS:                        10.1   6.13  )-----------( 150      ( 26 Jul 2017 06:45 )             32.5     Na(142)/K(5.1)/Cl(101)/HCO3(31)/BUN(46)/Cr(1.85)Glu(103)/Ca(9.3)/Mg(2.0)/PO4(3.6)    07-25 @ 11:00  Na(142)/K(5.5)/Cl(102)/HCO3(27)/BUN(74)/Cr(2.48)Glu(60)/Ca(9.4)/Mg(2.0)/PO4(4.7)    07-24 @ 07:15  Na(137)/K(5.5)/Cl(97)/HCO3(29)/BUN(69)/Cr(2.58)Glu(130)/Ca(9.5)/Mg(2.0)/PO4(4.3)    07-24 @ 01:00        IMPRESSION: 56F w/ HTN, DM2, and HFrEF(20%)-AICD, 7/12/17 a/w hypoglycemia, respiratory acidosis, and ALISA; now newly ESRD-HD.    (1)Renal - Newly ESRD-HD as of this admission. Last dialyzed yesterday and due for next HD tomorrow.    (2)CV - pulmonary edema - to be managed with HD    (3)Vascular - Awaiting tunneled cath placement by IR. For AVF/AVG once cardiac-cleared.    (4)SW - needs acceptance into an outpatient HD unit prior to discharge. I have no objection to postponing AVF/AVG until after discharge if she can get accepted into an outpatient HD unit without an AV access.      RECOMMEND:  (1)Next HD tomorrow  (2)Tunneled HD cathter today by IR  (3)Meds for GFR<10/HD  (4)AVF vs AVG creation per Vascular  (5)SW setup of outpatient HD.      Discussed with house staff Dr. Eric Rae MD  [facility]  [phone]

## 2017-07-26 NOTE — DISCHARGE NOTE ADULT - MEDICATION SUMMARY - MEDICATIONS TO CHANGE
I will SWITCH the dose or number of times a day I take the medications listed below when I get home from the hospital:  None I will SWITCH the dose or number of times a day I take the medications listed below when I get home from the hospital:    NovoLOG 100 units/mL subcutaneous solution  -- 30 unit(s) subcutaneous 3 times a day    Toujeo SoloStar 300 units/mL subcutaneous solution  -- 70 unit(s) subcutaneous once a day (at bedtime)

## 2017-07-26 NOTE — DISCHARGE NOTE ADULT - SECONDARY DIAGNOSIS.
Type 2 diabetes mellitus with hypoglycemia without coma, unspecified long term insulin use status Systolic congestive heart failure, unspecified congestive heart failure chronicity Chronic obstructive pulmonary disease with acute exacerbation Coronary artery disease involving native coronary artery of native heart without angina pectoris Need for prophylactic measure

## 2017-07-26 NOTE — DISCHARGE NOTE ADULT - MEDICATION SUMMARY - MEDICATIONS TO TAKE
I will START or STAY ON the medications listed below when I get home from the hospital:    spironolactone 25 mg oral tablet  -- 1 tab(s) by mouth 2 times a day  -- Indication: For CHF (Congestive Heart Failure)    oxycodone-acetaminophen 5mg-325mg oral tablet  -- 1 tab(s) by mouth every 8 hours MDD:3 tabs  -- Caution federal law prohibits the transfer of this drug to any person other  than the person for whom it was prescribed.  May cause drowsiness.  Alcohol may intensify this effect.  Use care when operating dangerous machinery.  This prescription cannot be refilled.  This product contains acetaminophen.  Do not use  with any other product containing acetaminophen to prevent possible liver damage.  Using more of this medication than prescribed may cause serious breathing problems.    -- Indication: For Pain    aspirin 81 mg oral delayed release tablet  -- 1 tab(s) by mouth once a day  -- Indication: For Coronary artery disease involving native coronary artery of native heart without angina pectoris    losartan 25 mg oral tablet  -- 1 tab(s) by mouth once a day  -- Do not take this drug if you are pregnant.  It is very important that you take or use this exactly as directed.  Do not skip doses or discontinue unless directed by your doctor.  Some non-prescription drugs may aggravate your condition.  Read all labels carefully.  If a warning appears, check with your doctor before taking.    -- Indication: For HTN (hypertension)    Entresto 49 mg-51 mg oral tablet  -- 1 tab(s) by mouth 2 times a day  -- Indication: For CHF (Congestive Heart Failure)    gabapentin 600 mg oral tablet  -- 1 tab(s) by mouth 3 times a day  -- Indication: For Pain    NovoLOG 100 units/mL subcutaneous solution  -- 30 unit(s) subcutaneous 3 times a day  -- Indication: For Diabetes mellitus, type 2    Toujeo SoloStar 300 units/mL subcutaneous solution  -- 70 unit(s) subcutaneous once a day (at bedtime)  -- Indication: For Diabetes mellitus, type 2    Zetia 10 mg oral tablet  -- 1 tab(s) by mouth once a day  -- Indication: For Coronary artery disease involving native coronary artery of native heart without angina pectoris    Crestor 20 mg oral tablet  -- 1 tab(s) by mouth once a day (at bedtime)  -- Indication: For Hyperlipidemia, unspecified hyperlipidemia type    Uloric 40 mg oral tablet  -- 1 tab(s) by mouth once a day  -- Indication: For Gout    Coreg 25 mg oral tablet  -- 1 tab(s) by mouth 2 times a day  -- Indication: For Coronary artery disease involving native coronary artery of native heart without angina pectoris    Metoprolol Succinate  mg oral tablet, extended release  -- 1 tab(s) by mouth once a day  -- It is very important that you take or use this exactly as directed.  Do not skip doses or discontinue unless directed by your doctor.  May cause drowsiness.  Alcohol may intensify this effect.  Use care when operating dangerous machinery.  Some non-prescription drugs may aggravate your condition.  Read all labels carefully.  If a warning appears, check with your doctor before taking.  Swallow whole.  Do not crush.  Take with food or milk.  This drug may impair the ability to drive or operate machinery.  Use care until you become familiar with its effects.    -- Indication: For Coronary artery disease involving native coronary artery of native heart without angina pectoris    Proventil HFA 90 mcg/inh inhalation aerosol  -- 2 puff(s) inhaled every 6 hours, As Needed -for shortness of breath and/or wheezing  -- For inhalation only.  It is very important that you take or use this exactly as directed.  Do not skip doses or discontinue unless directed by your doctor.  Obtain medical advice before taking any non-prescription drugs as some may affect the action of this medication.  Shake well before use.    -- Indication: For Shortness of breath    Bumex  -- 2 milligram(s)  2 times a day  -- Indication: For ESRD (end stage renal disease)    omeprazole 40 mg oral delayed release capsule  -- 1 cap(s) by mouth once a day  -- It is very important that you take or use this exactly as directed.  Do not skip doses or discontinue unless directed by your doctor.  Obtain medical advice before taking any non-prescription drugs as some may affect the action of this medication.  Swallow whole.  Do not crush.    -- Indication: For GERD    Vitamin D3 2000 intl units oral capsule  -- 1 cap(s) by mouth once a day  -- Indication: For Need for prophylactic measure I will START or STAY ON the medications listed below when I get home from the hospital:    aspirin 81 mg oral delayed release tablet  -- 1 tab(s) by mouth once a day  -- Indication: For Coronary artery disease involving native coronary artery of native heart without angina pectoris    oxycodone-acetaminophen 5mg-325mg oral tablet  -- 1 tab(s) by mouth every 8 hours MDD:3 tabs  -- Caution federal law prohibits the transfer of this drug to any person other  than the person for whom it was prescribed.  May cause drowsiness.  Alcohol may intensify this effect.  Use care when operating dangerous machinery.  This prescription cannot be refilled.  This product contains acetaminophen.  Do not use  with any other product containing acetaminophen to prevent possible liver damage.  Using more of this medication than prescribed may cause serious breathing problems.    -- Indication: For Pain    gabapentin 600 mg oral tablet  -- 1 tab(s) by mouth 3 times a day  -- Indication: For Pain    NovoLOG 100 units/mL subcutaneous solution  -- 6 unit(s) subcutaneous 3 times a day  -- Indication: For Diabetes mellitus, type 2    Toujeo SoloStar 300 units/mL subcutaneous solution  -- 35 unit(s) subcutaneous once a day (at bedtime)  -- Indication: For Diabetes mellitus, type 2    Zetia 10 mg oral tablet  -- 1 tab(s) by mouth once a day  -- Indication: For Coronary artery disease involving native coronary artery of native heart without angina pectoris    Crestor 20 mg oral tablet  -- 1 tab(s) by mouth once a day (at bedtime)  -- Indication: For Hyperlipidemia, unspecified hyperlipidemia type    Uloric 40 mg oral tablet  -- 1 tab(s) by mouth once a day  -- Indication: For Gout    Coreg 25 mg oral tablet  -- 1 tab(s) by mouth 2 times a day  -- Indication: For Coronary artery disease involving native coronary artery of native heart without angina pectoris    Proventil HFA 90 mcg/inh inhalation aerosol  -- 2 puff(s) inhaled every 6 hours, As Needed -for shortness of breath and/or wheezing  -- For inhalation only.  It is very important that you take or use this exactly as directed.  Do not skip doses or discontinue unless directed by your doctor.  Obtain medical advice before taking any non-prescription drugs as some may affect the action of this medication.  Shake well before use.    -- Indication: For Shortness of breath    omeprazole 40 mg oral delayed release capsule  -- 1 cap(s) by mouth once a day  -- It is very important that you take or use this exactly as directed.  Do not skip doses or discontinue unless directed by your doctor.  Obtain medical advice before taking any non-prescription drugs as some may affect the action of this medication.  Swallow whole.  Do not crush.    -- Indication: For GERD    Vitamin D3 2000 intl units oral capsule  -- 1 cap(s) by mouth once a day  -- Indication: For Need for prophylactic measure I will START or STAY ON the medications listed below when I get home from the hospital:    aspirin 81 mg oral delayed release tablet  -- 1 tab(s) by mouth once a day  -- Indication: For Coronary artery disease involving native coronary artery of native heart without angina pectoris    oxycodone-acetaminophen 5mg-325mg oral tablet  -- 1 tab(s) by mouth every 8 hours MDD:3 tabs  -- Caution federal law prohibits the transfer of this drug to any person other  than the person for whom it was prescribed.  May cause drowsiness.  Alcohol may intensify this effect.  Use care when operating dangerous machinery.  This prescription cannot be refilled.  This product contains acetaminophen.  Do not use  with any other product containing acetaminophen to prevent possible liver damage.  Using more of this medication than prescribed may cause serious breathing problems.    -- Indication: For Pain    NovoLOG 100 units/mL subcutaneous solution  -- 6 unit(s) subcutaneous 3 times a day  -- Indication: For Diabetes mellitus, type 2    Toujeo SoloStar 300 units/mL subcutaneous solution  -- 35 unit(s) subcutaneous once a day (at bedtime)  -- Indication: For Diabetes mellitus, type 2    Zetia 10 mg oral tablet  -- 1 tab(s) by mouth once a day  -- Indication: For Coronary artery disease involving native coronary artery of native heart without angina pectoris    Crestor 20 mg oral tablet  -- 1 tab(s) by mouth once a day (at bedtime)  -- Indication: For Hyperlipidemia, unspecified hyperlipidemia type    Uloric 40 mg oral tablet  -- 1 tab(s) by mouth once a day  -- Indication: For Gout    Coreg 25 mg oral tablet  -- 1 tab(s) by mouth 2 times a day  -- Indication: For Coronary artery disease involving native coronary artery of native heart without angina pectoris    Proventil HFA 90 mcg/inh inhalation aerosol  -- 2 puff(s) inhaled every 6 hours, As Needed -for shortness of breath and/or wheezing  -- For inhalation only.  It is very important that you take or use this exactly as directed.  Do not skip doses or discontinue unless directed by your doctor.  Obtain medical advice before taking any non-prescription drugs as some may affect the action of this medication.  Shake well before use.    -- Indication: For Shortness of breath    omeprazole 40 mg oral delayed release capsule  -- 1 cap(s) by mouth once a day  -- It is very important that you take or use this exactly as directed.  Do not skip doses or discontinue unless directed by your doctor.  Obtain medical advice before taking any non-prescription drugs as some may affect the action of this medication.  Swallow whole.  Do not crush.    -- Indication: For GERD    Vitamin D3 2000 intl units oral capsule  -- 1 cap(s) by mouth once a day  -- Indication: For Need for prophylactic measure

## 2017-07-26 NOTE — PROGRESS NOTE ADULT - SUBJECTIVE AND OBJECTIVE BOX
VASCULAR SURGERY  Pager: [phone]    INTERVAL EVENTS/SUBJECTIVE:   Patient wants to go home to attend a . Awaiting IR collin.  ______________________________________________  OBJECTIVE:   Vital Signs Last 24 Hrs  T(C): 37 (2017 06:44), Max: 37 (2017 21:19)  T(F): 98.6 (2017 06:44), Max: 98.6 (2017 21:19)  HR: 76 (2017 10:05) (76 - 91)  BP: 138/90 (2017 06:44) (138/81 - 151/90)  BP(mean): --  RR: 18 (2017 06:44) (18 - 18)  SpO2: 93% (2017 10:05) (91% - 100%)    I&O's Detail    2017 07:01  -  2017 07:00  --------------------------------------------------------  IN:    Other: 400 mL  Total IN: 400 mL    OUT:    Other: 1411 mL    Voided: 450 mL  Total OUT: 1861 mL    Total NET: -1461 mL      Physical exam:  General: NAD  Right arm with pulses, warm  R IJ Shiley in place  ______________________________________________  LABS:  CBC Full  -  ( 2017 06:45 )  WBC Count : 6.13 K/uL  Hemoglobin : 10.1 g/dL  Hematocrit : 32.5 %  Platelet Count - Automated : 150 K/uL  Mean Cell Volume : 90.0 fL  Mean Cell Hemoglobin : 28.0 pg  Mean Cell Hemoglobin Concentration : 31.1 %  Auto Neutrophil # : x  Auto Lymphocyte # : x  Auto Monocyte # : x  Auto Eosinophil # : x  Auto Basophil # : x  Auto Neutrophil % : x  Auto Lymphocyte % : x  Auto Monocyte % : x  Auto Eosinophil % : x  Auto Basophil % : x        141  |  100  |  52<H>  ----------------------------<  90  5.4<H>   |  26  |  2.50<H>    Ca    9.5      2017 06:45  Phos  3.6       Mg     2.0         TPro  7.5  /  Alb  3.6  /  TBili  0.4  /  DBili  x   /  AST  30  /  ALT  32  /  AlkPhos  69      LIVER FUNCTIONS - ( 2017 06:45 )  Alb: 3.6 g/dL / Pro: 7.5 g/dL / ALK PHOS: 69 u/L / ALT: 32 u/L / AST: 30 u/L / GGT: x           PT/INR - ( 2017 06:45 )   PT: 11.6 SEC;   INR: 1.03          PTT - ( 2017 06:45 )  PTT:26.6 SEC  ______________________________________________  RADIOLOGY:  VA Duplex Ext Veins Upper Comp, Bilat. (17 @ 13:58)   Right Findings:  Right Cephalic Vein                     Diameter (cm)  Proximal upper arm                      0.38  Mid upper arm                              0.42  Distal upper arm                           0.45  Antecubital                                   0.31  Proximal forearm                       not visualized  Mid forearm                              not visualized    Distal forearm                           not visualized    Right Basilic Vein  [place]                                           0.47    Mid upper arm                                thrombosed    Distal upper arm                                   0.42    Right brachial artery: Normal velocities with triphasic  waveforms.  Right radial artery: Normal velocities with triphasic  waveforms.  Right ulnar artery: Normal velocities with triphasic  waveforms.  Left Findings:  Left Cephalic Vein                     Diameter (cm)  Proximal upper arm                   0.40  Mid upper arm                           0.52  Distal upper arm                        0.56  Antecubital   0.49  Proximal forearm                       0.44  Mid forearm                               0.35  Distal forearm                            0.36

## 2017-07-26 NOTE — DISCHARGE NOTE ADULT - CARE PROVIDER_API CALL
Perfecto Rae), Internal Medicine; Nephrology  [address] [facility] [address]  [place] [address]  Phone: [phone]  Fax: [phone]    Unique Lou (MD), Medicine  [address] [place] [address]  Phone: [phone]  Fax: [phone]    Ciara Constantino), EndocrinologyMetabDiabetes; Internal Medicine  [address] [place] [address]  Phone: [phone]  Fax: [phone] Perfecto Rae), Internal Medicine; Nephrology  [address] [facility] [address]  [place] [address]  Phone: [phone]  Fax: [phone]    Unique Lou (MD), Medicine  [address] [place] [address]  Phone: [phone]  Fax: [phone]    Ciara Constantino), EndocrinologyMetabDiabetes; Internal Medicine  [address] [place] [address]  Phone: [phone]  Fax: [phone]    Deepti Vazquez), Surgery  [address] [place] [address]  Phone: [phone]  Fax: [phone] Perfecto Rae), Internal Medicine; Nephrology  [address] [facility] [address]  [place] [address]  Phone: [phone]  Fax: [phone]    Unique Lou (MD), Medicine  [address] [place] [address]  Phone: [phone]  Fax: [phone]    Ciara Constantino), EndocrinologyMetabDiabetes; Internal Medicine  [address] [place] [address]  Phone: [phone]  Fax: [phone]    Deepti Vazquez), Surgery  [address] [place] [address]  Phone: [phone]  Fax: [phone]

## 2017-07-26 NOTE — PROGRESS NOTE ADULT - SUBJECTIVE AND OBJECTIVE BOX
Patient is a 56y old  Female who presents with a chief complaint of hypoglycemia (12 Jul 2017 18:07)      SUBJECTIVE / OVERNIGHT EVENTS:    Pt. seen and examined at bedside. Denies overnight events. Wants to go home. Aware of IR procedure today. Denies f/c/n/v/d/cp/sob, abdominal pain.    MEDICATIONS  (STANDING):  aspirin enteric coated 81 milliGRAM(s) Oral daily  cholecalciferol 1000 Unit(s) Oral daily  pantoprazole    Tablet 40 milliGRAM(s) Oral before breakfast  carvedilol 25 milliGRAM(s) Oral every 12 hours  dextrose 5%. 1000 milliLiter(s) (50 mL/Hr) IV Continuous <Continuous>  dextrose 50% Injectable 12.5 Gram(s) IV Push once  dextrose 50% Injectable 25 Gram(s) IV Push once  dextrose 50% Injectable 25 Gram(s) IV Push once  calcium acetate 667 milliGRAM(s) Oral three times a day with meals  heparin  Injectable 5000 Unit(s) SubCutaneous every 8 hours  gabapentin 100 milliGRAM(s) Oral daily  ALBUTerol/ipratropium for Nebulization 3 milliLiter(s) Nebulizer every 6 hours  senna 1 Tablet(s) Oral daily  docusate sodium 100 milliGRAM(s) Oral daily  insulin lispro (HumaLOG) corrective regimen sliding scale   SubCutaneous three times a day before meals  insulin lispro (HumaLOG) corrective regimen sliding scale   SubCutaneous at bedtime  polyethylene glycol 3350 17 Gram(s) Oral daily  atorvastatin 40 milliGRAM(s) Oral at bedtime  insulin lispro Injectable (HumaLOG) 6 Unit(s) SubCutaneous three times a day before meals  insulin glargine Injectable (LANTUS) 35 Unit(s) SubCutaneous at bedtime    MEDICATIONS  (PRN):  dextrose Gel 1 Dose(s) Oral once PRN Blood Glucose LESS THAN 70 milliGRAM(s)/deciLiter  glucagon  Injectable 1 milliGRAM(s) IntraMuscular once PRN Glucose <70 milliGRAM(s)/deciLiter  acetaminophen   Tablet. 650 milliGRAM(s) Oral every 6 hours PRN Mild Pain (1 - 3)  guaiFENesin   Syrup  (Sugar-Free) 200 milliGRAM(s) Oral every 6 hours PRN Cough  benzonatate 100 milliGRAM(s) Oral every 8 hours PRN Cough  bisacodyl 5 milliGRAM(s) Oral every 12 hours PRN Constipation      T(C): 37 (07-26-17 @ 06:44), Max: 37.1 (07-25-17 @ 10:45)  HR: 87 (07-26-17 @ 07:11) (79 - 92)  BP: 138/90 (07-26-17 @ 06:44) (138/81 - 151/90)  RR: 18 (07-26-17 @ 06:44) (18 - 18)  SpO2: 91% (07-26-17 @ 07:11) (90% - 100%)  CAPILLARY BLOOD GLUCOSE  195 (25 Jul 2017 22:39)  178 (25 Jul 2017 17:58)  90 (25 Jul 2017 12:50)  101 (25 Jul 2017 08:32)        I&O's Summary    25 Jul 2017 07:01  -  26 Jul 2017 07:00  --------------------------------------------------------  IN: 400 mL / OUT: 1661 mL / NET: -1261 mL        PHYSICAL EXAM:  GENERAL: NAD, well-developed  HEAD:  Atraumatic, Normocephalic  EYES: EOMI, PERRLA, conjunctiva and sclera clear  NECK: Supple, No JVD  CHEST/LUNG: Clear to auscultation bilaterally; No wheeze  HEART: Regular rate and rhythm; No murmurs, rubs, or gallops  ABDOMEN: Soft, Nontender, Nondistended; Bowel sounds present  EXTREMITIES:  2+ Peripheral Pulses, No clubbing, cyanosis, or edema  PSYCH: AAOx3  NEUROLOGY: non-focal  SKIN: No rashes or lesions    LABS:  (07-26 @ 06:45)                        10.1  6.13 )-----------( 150                 32.5    Neutrophils = -- (--%)  Lymphocytes = -- (--%)  Eosinophils = -- (--%)  Basophils = -- (--%)  Monocytes = -- (--%)  Bands = --%    WBC Trend: 6.13<--, 6.42<--, 4.68<--  Hb Trend: 10.1<--, 10.0<--, 10.3<--, 10.2<--, 10.4<--  Plt Trend: 150<--, 176<--, 182<--, 179<--, 160<--  07-25    142  |  101  |  46<H>  ----------------------------<  103<H>  5.1   |  31  |  1.85<H>    Ca    9.3      25 Jul 2017 11:00  Phos  3.6     07-25  Mg     2.0     07-25    TPro  7.5  /  Alb  3.7  /  TBili  0.5  /  DBili  x   /  AST  30  /  ALT  26  /  AlkPhos  64  07-25    Creatinine Trend: 1.85<--, 2.48<--, 2.58<--, 2.31<--, 2.80<--, 2.91<--  ( 26 Jul 2017 06:45 )   PT: 11.6 SEC;   INR: 1.03 ;       PTT:26.6 SEC          Microbiology:  Urine Cx:  Blood Cx:    RADIOLOGY & ADDITIONAL TESTS:  X- Ray:  CT:  Ultrasound:  [ ] imaging personally reviewed and interpreted by me    Consultant(s) Notes Reviewed:      Care Discussed with Consultants/Other Providers:

## 2017-07-26 NOTE — DISCHARGE NOTE ADULT - CARE PLAN
Principal Discharge DX:	CKD (chronic kidney disease), stage 3 (moderate)  Goal:	continued management  Instructions for follow-up, activity and diet:	You were diagnosed with renal failure while you were at the hospital which is why you required the right sided catheter in your neck. The neck catheter was replaced with a catheter in front of your shoulder that will be used for future dialysis. You also underwent surgery for an arterio-venous fistula that will take time to mature. This arterio-venous fistula will be used for dialysis in the future (2-3 months).    Please follow-up with Dr. Rae (nephrology) as an outpatient one week after discharge. Please make an appointment with him by calling [phone].  Secondary Diagnosis:	Type 2 diabetes mellitus with hypoglycemia without coma, unspecified long term insulin use status  Instructions for follow-up, activity and diet:	You came into the hospital because your blood sugar was very low due to overdose of insulin. Please follow up with Dr. Constantino (endocrinologist) by calling [phone] to assist in proper management and dosing of insulin. Otherwise, please continue taking all of your diabetes medicine (tujeo, novolog) as prescribed by your previous doctor  Secondary Diagnosis:	Systolic congestive heart failure, unspecified congestive heart failure chronicity  Instructions for follow-up, activity and diet:	Please continue home medications bumex, coreg, entresto, losartan, spironolactone. Please stop taking multaq because of your fluid overload symptoms  Secondary Diagnosis:	Chronic obstructive pulmonary disease with acute exacerbation  Instructions for follow-up, activity and diet:	You had a cough when you came in and were treated with antibiotics. Duoneb????????????  Secondary Diagnosis:	Coronary artery disease involving native coronary artery of native heart without angina pectoris  Instructions for follow-up, activity and diet:	metoprolol  Secondary Diagnosis:	Need for prophylactic measure  Instructions for follow-up, activity and diet:	crestor, gabapentin, omeprazole, vitamin D, uloric (febuxostat), zetia (ezetimibe) Principal Discharge DX:	CKD (chronic kidney disease), stage 3 (moderate)  Goal:	continued management  Instructions for follow-up, activity and diet:	You were diagnosed with renal failure while you were at the hospital which is why you required the right sided catheter in your neck. The neck catheter was replaced with a catheter in front of your shoulder that will be used for future dialysis. You also underwent surgery for an arterio-venous fistula that will take time to mature. This arterio-venous fistula will be used for dialysis in the future (2-3 months).    Please follow-up with Dr. Rae (nephrology) as an outpatient one week after discharge. Please make an appointment with him by calling [phone].  Secondary Diagnosis:	Type 2 diabetes mellitus with hypoglycemia without coma, unspecified long term insulin use status  Instructions for follow-up, activity and diet:	You came into the hospital because your blood sugar was very low due to overdose of insulin. Please follow up with Dr. Constantino (endocrinologist) by calling [phone] to assist in proper management and dosing of insulin. Otherwise, please continue taking all of your diabetes medicine (tujeo, novolog) as prescribed by your previous doctor  Secondary Diagnosis:	Systolic congestive heart failure, unspecified congestive heart failure chronicity  Instructions for follow-up, activity and diet:	Please continue home medications bumex, coreg, entresto, losartan, spironolactone. Please stop taking multaq because of your fluid overload symptoms  Secondary Diagnosis:	Chronic obstructive pulmonary disease with acute exacerbation  Instructions for follow-up, activity and diet:	You had a cough when you came in and were treated with antibiotics. Duoneb????????????  Secondary Diagnosis:	Coronary artery disease involving native coronary artery of native heart without angina pectoris  Instructions for follow-up, activity and diet:	metoprolol  Secondary Diagnosis:	Need for prophylactic measure  Instructions for follow-up, activity and diet:	crestor, gabapentin, omeprazole, vitamin D, uloric (febuxostat), zetia (ezetimibe) Principal Discharge DX:	CKD (chronic kidney disease), stage 3 (moderate)  Goal:	continued management  Instructions for follow-up, activity and diet:	You were diagnosed with renal failure while you were at the hospital which is why you required the right sided catheter in your neck. The neck catheter was replaced with a catheter in front of your shoulder that will be used for future dialysis. You also underwent surgery for an arterio-venous fistula that will take time to mature. This arterio-venous fistula will be used for dialysis in the future (2-3 months).    Please follow-up with Dr. Rae (nephrology) as an outpatient one week after discharge. Please make an appointment with him by calling [phone].  Secondary Diagnosis:	Type 2 diabetes mellitus with hypoglycemia without coma, unspecified long term insulin use status  Instructions for follow-up, activity and diet:	You came into the hospital because your blood sugar was very low due to overdose of insulin. Please follow up with Dr. Constantino (endocrinologist) by calling [phone] to assist in proper management and dosing of insulin. Otherwise, please continue taking all of your diabetes medicine (tujeo, novolog) as prescribed by your previous doctor  Secondary Diagnosis:	Systolic congestive heart failure, unspecified congestive heart failure chronicity  Instructions for follow-up, activity and diet:	Please continue home medications bumex, coreg, entresto, losartan, spironolactone as directed by your previous doctor. Please stop taking multaq because of your fluid overload symptoms  Secondary Diagnosis:	Chronic obstructive pulmonary disease with acute exacerbation  Instructions for follow-up, activity and diet:	You had a cough when you came in and were treated with antibiotics. Please make an appointment with Dr. Lou one week after your discharge from the hospital by calling [phone]  Secondary Diagnosis:	Coronary artery disease involving native coronary artery of native heart without angina pectoris  Instructions for follow-up, activity and diet:	Please continue your home medication metoprolol as directed by your previous doctor  Secondary Diagnosis:	Need for prophylactic measure  Instructions for follow-up, activity and diet:	Please continue your home medications as directed by your previous doctor (crestor, gabapentin, omeprazole, vitamin D, uloric, zetia) Principal Discharge DX:	CKD (chronic kidney disease), stage 3 (moderate)  Goal:	continued management  Instructions for follow-up, activity and diet:	You were diagnosed with renal failure while you were at the hospital which is why you required the right sided catheter in your neck. The neck catheter was replaced with a catheter in front of your shoulder that will be used for future dialysis. You also underwent surgery for an arterio-venous fistula that will take time to mature. This arterio-venous fistula will be used for dialysis in the future (2-3 months).    Please follow-up with Dr. Rae (nephrology) as an outpatient one week after discharge. Please make an appointment with him by calling [phone].  Secondary Diagnosis:	Type 2 diabetes mellitus with hypoglycemia without coma, unspecified long term insulin use status  Instructions for follow-up, activity and diet:	You came into the hospital because your blood sugar was very low due to overdose of insulin. Please follow up with Dr. Constantino (endocrinologist) by calling [phone] to assist in proper management and dosing of insulin. Otherwise, please continue taking all of your diabetes medicine (tujeo, novolog) as prescribed by your previous doctor  Secondary Diagnosis:	Systolic congestive heart failure, unspecified congestive heart failure chronicity  Instructions for follow-up, activity and diet:	Please continue home medications bumex, coreg, entresto, losartan, spironolactone as directed by your previous doctor. Please stop taking multaq because of your fluid overload symptoms  Secondary Diagnosis:	Chronic obstructive pulmonary disease with acute exacerbation  Instructions for follow-up, activity and diet:	You had a cough when you came in and were treated with antibiotics. Please make an appointment with Dr. Lou one week after your discharge from the hospital by calling [phone]  Secondary Diagnosis:	Coronary artery disease involving native coronary artery of native heart without angina pectoris  Instructions for follow-up, activity and diet:	Please continue your home medication metoprolol as directed by your previous doctor  Secondary Diagnosis:	Need for prophylactic measure  Instructions for follow-up, activity and diet:	Please continue your home medications as directed by your previous doctor (crestor, gabapentin, omeprazole, vitamin D, uloric, zetia) Principal Discharge DX:	CKD (chronic kidney disease), stage 3 (moderate)  Goal:	continued management  Instructions for follow-up, activity and diet:	You were diagnosed with renal failure while you were at the hospital which is why you required the right sided catheter in your neck. The neck catheter was replaced with a catheter in front of your shoulder that will be used for future dialysis. You also underwent surgery for an arterio-venous fistula that will take time to mature. This arterio-venous fistula will be used for dialysis in the future (2-3 months).    Please follow-up with Dr. Rae (nephrology) as an outpatient one week after discharge. Please make an appointment with him by calling [phone].  Secondary Diagnosis:	Type 2 diabetes mellitus with hypoglycemia without coma, unspecified long term insulin use status  Instructions for follow-up, activity and diet:	You came into the hospital because your blood sugar was very low due to overdose of insulin. Please follow up with Dr. Constantino (endocrinologist) by calling [phone] to assist in proper management and dosing of insulin. Otherwise, please continue taking all of your diabetes medicine (tujeo, novolog) as prescribed by your previous doctor  Secondary Diagnosis:	Systolic congestive heart failure, unspecified congestive heart failure chronicity  Instructions for follow-up, activity and diet:	Please continue home medications bumex, coreg, entresto, losartan, spironolactone as directed by your previous doctor. Please stop taking multaq because of your fluid overload symptoms  Secondary Diagnosis:	Chronic obstructive pulmonary disease with acute exacerbation  Instructions for follow-up, activity and diet:	You had a cough when you came in and were treated with antibiotics. Please make an appointment with Dr. Lou one week after your discharge from the hospital by calling [phone]  Secondary Diagnosis:	Coronary artery disease involving native coronary artery of native heart without angina pectoris  Instructions for follow-up, activity and diet:	Please continue your home medication metoprolol as directed by your previous doctor  Secondary Diagnosis:	Need for prophylactic measure  Instructions for follow-up, activity and diet:	Please continue your home medications as directed by your previous doctor (crestor, gabapentin, omeprazole, vitamin D, uloric, zetia) Principal Discharge DX:	CKD (chronic kidney disease), stage 3 (moderate)  Goal:	continued management  Instructions for follow-up, activity and diet:	You were diagnosed with renal failure while you were at the hospital which is why you required the right sided catheter in your neck. The neck catheter was replaced with a catheter in front of your shoulder that will be used for future dialysis. You also underwent surgery for an arterio-venous fistula that will take time to mature. This arterio-venous fistula will be used for dialysis in the future (2-3 months).    Please follow-up with Dr. Rae (nephrology) as an outpatient one week after discharge. Please make an appointment with him by calling [phone].  Secondary Diagnosis:	Type 2 diabetes mellitus with hypoglycemia without coma, unspecified long term insulin use status  Instructions for follow-up, activity and diet:	You came into the hospital because your blood sugar was very low due to overdose of insulin. Please follow up with Dr. Constantino (endocrinologist) by calling [phone] to assist in proper management and dosing of insulin. Otherwise, please continue taking all of your diabetes medicine (tujeo, novolog) as prescribed by your previous doctor  Secondary Diagnosis:	Systolic congestive heart failure, unspecified congestive heart failure chronicity  Instructions for follow-up, activity and diet:	Please continue home medications bumex, coreg, entresto, losartan, spironolactone as directed by your previous doctor. Please stop taking multaq because of your fluid overload symptoms  Secondary Diagnosis:	Chronic obstructive pulmonary disease with acute exacerbation  Instructions for follow-up, activity and diet:	You had a cough when you came in and were treated with antibiotics. Please make an appointment with Dr. Lou one week after your discharge from the hospital by calling [phone]  Secondary Diagnosis:	Coronary artery disease involving native coronary artery of native heart without angina pectoris  Instructions for follow-up, activity and diet:	Please continue your home medication metoprolol as directed by your previous doctor  Secondary Diagnosis:	Need for prophylactic measure  Instructions for follow-up, activity and diet:	Please continue your home medications as directed by your previous doctor (crestor, gabapentin, omeprazole, vitamin D, uloric, zetia) Principal Discharge DX:	CKD (chronic kidney disease), stage 3 (moderate)  Goal:	continued management  Instructions for follow-up, activity and diet:	You were diagnosed with renal failure while you were at the hospital which is why you required the right sided catheter in your neck. The neck catheter was replaced with a catheter in front of your shoulder that will be used for future dialysis. You also underwent surgery for an arterio-venous fistula that will take time to mature. This arterio-venous fistula will be used for dialysis in the future (2-3 months).    Please follow-up with Dr. Rae (nephrology) as an outpatient one week after discharge. Please make an appointment with him by calling [phone].  Secondary Diagnosis:	Type 2 diabetes mellitus with hypoglycemia without coma, unspecified long term insulin use status  Instructions for follow-up, activity and diet:	You came into the hospital because your blood sugar was very low due to overdose of insulin. Please follow up with Dr. Constantino (endocrinologist) by calling [phone] to assist in proper management and dosing of insulin. Otherwise, please continue taking all of your diabetes medicine (tujeo, novolog) as prescribed by your previous doctor  Secondary Diagnosis:	Systolic congestive heart failure, unspecified congestive heart failure chronicity  Instructions for follow-up, activity and diet:	Please continue home medications bumex, coreg, entresto, losartan, spironolactone as directed by your previous doctor. Please stop taking multaq because of your fluid overload symptoms  Secondary Diagnosis:	Chronic obstructive pulmonary disease with acute exacerbation  Instructions for follow-up, activity and diet:	You had a cough when you came in and were treated with antibiotics. Please make an appointment with Dr. Lou one week after your discharge from the hospital by calling [phone]  Secondary Diagnosis:	Coronary artery disease involving native coronary artery of native heart without angina pectoris  Instructions for follow-up, activity and diet:	Please continue your home medication metoprolol as directed by your previous doctor  Secondary Diagnosis:	Need for prophylactic measure  Instructions for follow-up, activity and diet:	Please continue your home medications as directed by your previous doctor (crestor, gabapentin, omeprazole, vitamin D, uloric, zetia) Principal Discharge DX:	CKD (chronic kidney disease), stage 3 (moderate)  Goal:	continued management  Instructions for follow-up, activity and diet:	You were diagnosed with renal failure while you were at the hospital which is why you required the right sided catheter in your neck. The neck catheter was replaced with a catheter in front of your shoulder that will be used for future dialysis. You also underwent surgery for an arterio-venous fistula that will take time to mature. This arterio-venous fistula will be used for dialysis in the future (2-3 months).    Please follow-up with Dr. Rae (nephrology) as an outpatient one week after discharge. Please make an appointment with him by calling [phone].  Follow up with Dr. Vazquez in 2 weeks. To make an appointment call  Secondary Diagnosis:	Type 2 diabetes mellitus with hypoglycemia without coma, unspecified long term insulin use status  Instructions for follow-up, activity and diet:	You came into the hospital because your blood sugar was very low due to overdose of insulin. Please follow up with Dr. Constantino (endocrinologist) by calling [phone] to assist in proper management and dosing of insulin. Otherwise, please continue taking all of your diabetes medicine (tujeo, novolog) as prescribed by your previous doctor  Secondary Diagnosis:	Systolic congestive heart failure, unspecified congestive heart failure chronicity  Instructions for follow-up, activity and diet:	Please continue home medications bumex, coreg, entresto, losartan, spironolactone as directed by your previous doctor. Please stop taking multaq because of your fluid overload symptoms  Secondary Diagnosis:	Chronic obstructive pulmonary disease with acute exacerbation  Instructions for follow-up, activity and diet:	You had a cough when you came in and were treated with antibiotics. Please make an appointment with Dr. Lou one week after your discharge from the hospital by calling [phone]  Secondary Diagnosis:	Coronary artery disease involving native coronary artery of native heart without angina pectoris  Instructions for follow-up, activity and diet:	Please continue your home medication metoprolol as directed by your previous doctor  Secondary Diagnosis:	Need for prophylactic measure  Instructions for follow-up, activity and diet:	Please continue your home medications as directed by your previous doctor (crestor, gabapentin, omeprazole, vitamin D, uloric, zetia) Principal Discharge DX:	CKD (chronic kidney disease), stage 3 (moderate)  Goal:	continued management  Instructions for follow-up, activity and diet:	You were diagnosed with renal failure while you were at the hospital which is why you required the right sided catheter in your neck. The neck catheter was replaced with a catheter in front of your shoulder that will be used for future dialysis. You also underwent surgery for an arterio-venous fistula that will take time to mature. This arterio-venous fistula will be used for dialysis in the future (2-3 months).    Please follow-up with Dr. Rae (nephrology) as an outpatient one week after discharge. Please make an appointment with him by calling [phone].  Follow up with Dr. Vazquez in 2 weeks. To make an appointment call [phone]  Secondary Diagnosis:	Type 2 diabetes mellitus with hypoglycemia without coma, unspecified long term insulin use status  Instructions for follow-up, activity and diet:	You came into the hospital because your blood sugar was very low due to overdose of insulin. Please follow up with Dr. Constantino (endocrinologist) by calling [phone] to assist in proper management and dosing of insulin. Otherwise, please continue taking all of your diabetes medicine (tujeo, novolog) as prescribed by your previous doctor  Secondary Diagnosis:	Systolic congestive heart failure, unspecified congestive heart failure chronicity  Instructions for follow-up, activity and diet:	Please continue home medications bumex, coreg, entresto, losartan, spironolactone as directed by your previous doctor. Please stop taking multaq because of your fluid overload symptoms  Secondary Diagnosis:	Chronic obstructive pulmonary disease with acute exacerbation  Instructions for follow-up, activity and diet:	You had a cough when you came in and were treated with antibiotics. Please make an appointment with Dr. Lou one week after your discharge from the hospital by calling [phone]  Secondary Diagnosis:	Coronary artery disease involving native coronary artery of native heart without angina pectoris  Instructions for follow-up, activity and diet:	Please continue your home medication metoprolol as directed by your previous doctor  Secondary Diagnosis:	Need for prophylactic measure  Instructions for follow-up, activity and diet:	Please continue your home medications as directed by your previous doctor (crestor, gabapentin, omeprazole, vitamin D, uloric, zetia) Principal Discharge DX:	CKD (chronic kidney disease), stage 3 (moderate)  Goal:	continued management  Instructions for follow-up, activity and diet:	You were diagnosed with renal failure while you were at the hospital which is why you required the right sided catheter in your neck. The neck catheter was replaced with a catheter in front of your shoulder that will be used for future dialysis. You also underwent surgery for an arterio-venous fistula that will take time to mature. This arterio-venous fistula will be used for dialysis in the future (2-3 months).    Please follow-up with Dr. Rae (nephrology) as an outpatient one week after discharge. Please make an appointment with him by calling [phone].  Follow up with Dr. Vazquez in 2 weeks. To make an appointment call [phone]  Secondary Diagnosis:	Type 2 diabetes mellitus with hypoglycemia without coma, unspecified long term insulin use status  Instructions for follow-up, activity and diet:	You came into the hospital because your blood sugar was very low due to overdose of insulin. Please follow up with Dr. Constantino (endocrinologist) by calling [phone] to assist in proper management and dosing of insulin. Otherwise, please continue taking all of your diabetes medicine (tujeo, novolog) as prescribed by your previous doctor  Secondary Diagnosis:	Systolic congestive heart failure, unspecified congestive heart failure chronicity  Instructions for follow-up, activity and diet:	Please continue home medications bumex, coreg, entresto, losartan, spironolactone as directed by your previous doctor. Please stop taking multaq because of your fluid overload symptoms  Secondary Diagnosis:	Chronic obstructive pulmonary disease with acute exacerbation  Instructions for follow-up, activity and diet:	You had a cough when you came in and were treated with antibiotics. Please make an appointment with Dr. Lou one week after your discharge from the hospital by calling [phone]  Secondary Diagnosis:	Coronary artery disease involving native coronary artery of native heart without angina pectoris  Instructions for follow-up, activity and diet:	Please continue your home medication metoprolol as directed by your previous doctor  Secondary Diagnosis:	Need for prophylactic measure  Instructions for follow-up, activity and diet:	Please continue your home medications as directed by your previous doctor (crestor, gabapentin, omeprazole, vitamin D, uloric, zetia) Principal Discharge DX:	CKD (chronic kidney disease), stage 3 (moderate)  Goal:	continued management  Instructions for follow-up, activity and diet:	You were diagnosed with renal failure while you were at the hospital which is why you required the right sided catheter in your neck. The neck catheter was replaced with a catheter in front of your shoulder that will be used for future dialysis. You also underwent surgery for an arterio-venous fistula that will take time to mature. This arterio-venous fistula will be used for dialysis in the future (2-3 months).    Please follow-up with Dr. Rae (nephrology) as an outpatient one week after discharge. Please make an appointment with him by calling [phone].  Follow up with Dr. Vazquez in 2 weeks. To make an appointment call [phone]  Secondary Diagnosis:	Type 2 diabetes mellitus with hypoglycemia without coma, unspecified long term insulin use status  Instructions for follow-up, activity and diet:	You came into the hospital because your blood sugar was very low due to overdose of insulin. Please follow up with Dr. Constantino (endocrinologist) by calling [phone] to assist in proper management and dosing of insulin. Otherwise, please continue taking all of your diabetes medicine (tujeo, novolog) as prescribed by your previous doctor  Secondary Diagnosis:	Systolic congestive heart failure, unspecified congestive heart failure chronicity  Instructions for follow-up, activity and diet:	Please follow up with your PMD in one week to discuss if any of your heart failure medications should be restarted. Take carvedilol as prescribed.  Secondary Diagnosis:	Chronic obstructive pulmonary disease with acute exacerbation  Instructions for follow-up, activity and diet:	You had a cough when you came in and were treated with antibiotics. Please make an appointment with Dr. Lou one week after your discharge from the hospital by calling [phone]  Secondary Diagnosis:	Coronary artery disease involving native coronary artery of native heart without angina pectoris  Instructions for follow-up, activity and diet:	Please continue your home medication metoprolol as directed by your previous doctor  Secondary Diagnosis:	Need for prophylactic measure  Instructions for follow-up, activity and diet:	Please continue your home medications as directed by your previous doctor (crestor, gabapentin, omeprazole, vitamin D, uloric, zetia) Principal Discharge DX:	CKD (chronic kidney disease), stage 3 (moderate)  Goal:	continued management  Instructions for follow-up, activity and diet:	You were diagnosed with renal failure while you were at the hospital which is why you required the right sided catheter in your neck. The neck catheter was replaced with a catheter in front of your shoulder that will be used for future dialysis. You also underwent surgery for an arterio-venous fistula that will take time to mature. This arterio-venous fistula will be used for dialysis in the future (2-3 months).    Please follow-up with Dr. Rae (nephrology) as an outpatient one week after discharge. Please make an appointment with him by calling [phone].  Follow up with Dr. Vazquez in 2 weeks. To make an appointment call [phone]  Secondary Diagnosis:	Type 2 diabetes mellitus with hypoglycemia without coma, unspecified long term insulin use status  Instructions for follow-up, activity and diet:	You came into the hospital because your blood sugar was very low due to overdose of insulin. Please follow up with Dr. Constantino (endocrinologist) by calling [phone] to assist in proper management and dosing of insulin. Otherwise, please continue taking all of your diabetes medicine (tujeo, novolog) as prescribed by your previous doctor  Secondary Diagnosis:	Systolic congestive heart failure, unspecified congestive heart failure chronicity  Instructions for follow-up, activity and diet:	Please follow up with your PMD in one week to discuss if any of your heart failure medications should be restarted. Take carvedilol as prescribed.  Secondary Diagnosis:	Chronic obstructive pulmonary disease with acute exacerbation  Instructions for follow-up, activity and diet:	You had a cough when you came in and were treated with antibiotics. Please make an appointment with Dr. Lou one week after your discharge from the hospital by calling [phone]  Secondary Diagnosis:	Coronary artery disease involving native coronary artery of native heart without angina pectoris  Instructions for follow-up, activity and diet:	Please continue your home medication metoprolol as directed by your previous doctor  Secondary Diagnosis:	Need for prophylactic measure  Instructions for follow-up, activity and diet:	Please continue your home medications as directed by your previous doctor (crestor, gabapentin, omeprazole, vitamin D, uloric, zetia)

## 2017-07-26 NOTE — DISCHARGE NOTE ADULT - CARE PROVIDERS DIRECT ADDRESSES
,DirectAddress_Unknown,DirectAddress_Unknown,[email or web address] ,DirectAddress_Unknown,DirectAddress_Unknown,[email or web address]

## 2017-07-26 NOTE — DISCHARGE NOTE ADULT - PROVIDER TOKENS
TOKEN:'[id number]',TOKEN:'[id number]',TOKEN:'[id number]' TOKEN:'[id number]',TOKEN:'[id number]:'[id number]',TOKEN:'[id number]'

## 2017-07-26 NOTE — PROGRESS NOTE ADULT - SUBJECTIVE AND OBJECTIVE BOX
House Cardiology Progress Note  Consult Spectra [id number]    Interval Events:  Pt planned for AVF placement.  Asked to risk stratify prior to procedure.    Pt reports breathing is better. Orthopnea is mildly improved but is persistent. Has not exerted self due to inpatient status.    Review of Systems:  +orthopnea  +LE swelling  [x] 10 point review of systems is otherwise negative except as mentioned above            [ ]Unable to obtain      MEDICATIONS:  aspirin enteric coated 81 milliGRAM(s) Oral daily  carvedilol 25 milliGRAM(s) Oral every 12 hours  heparin  Injectable 5000 Unit(s) SubCutaneous every 8 hours  guaiFENesin   Syrup  (Sugar-Free) 200 milliGRAM(s) Oral every 6 hours PRN  ALBUTerol/ipratropium for Nebulization 3 milliLiter(s) Nebulizer every 6 hours  benzonatate 100 milliGRAM(s) Oral every 8 hours PRN  gabapentin 100 milliGRAM(s) Oral daily  acetaminophen   Tablet. 650 milliGRAM(s) Oral every 6 hours PRN  pantoprazole    Tablet 40 milliGRAM(s) Oral before breakfast  senna 1 Tablet(s) Oral daily  docusate sodium 100 milliGRAM(s) Oral daily  polyethylene glycol 3350 17 Gram(s) Oral daily  bisacodyl 5 milliGRAM(s) Oral every 12 hours PRN  insulin lispro (HumaLOG) corrective regimen sliding scale   SubCutaneous three times a day before meals  insulin lispro (HumaLOG) corrective regimen sliding scale   SubCutaneous at bedtime  atorvastatin 40 milliGRAM(s) Oral at bedtime  insulin lispro Injectable (HumaLOG) 6 Unit(s) SubCutaneous three times a day before meals  insulin glargine Injectable (LANTUS) 35 Unit(s) SubCutaneous at bedtime    cholecalciferol 1000 Unit(s) Oral daily  dextrose 5%. 1000 milliLiter(s) IV Continuous <Continuous>  calcium acetate 667 milliGRAM(s) Oral three times a day with meals    PHYSICAL EXAM:  T(C): 36.9 (07-26-17 @ 13:29), Max: 37 (07-25-17 @ 21:19)  HR: 85 (07-26-17 @ 14:40) (76 - 91)  BP: 136/- (07-26-17 @ 13:29) (136/- - 148/80)  RR: 18 (07-26-17 @ 13:29) (18 - 18)  SpO2: 93% (07-26-17 @ 14:40) (91% - 100%)  Wt(kg): --  I&O's Summary    25 Jul 2017 07:01  -  26 Jul 2017 07:00  --------------------------------------------------------  IN: 400 mL / OUT: 1861 mL / NET: -1461 mL    Appearance: obese, no distress.  HEENT:   mmm	  Cardiovascular: s1s2 rrr, trace LE edema  Respiratory: mildly decreased breath sounds but clear throughout  Psychiatry: moderately depressed mood  Gastrointestinal:  soft nt nd, normoactive bs  Skin: No rashes	  Neurologic: grossly non-focal    LABS:	 	  CBC Full  -  ( 26 Jul 2017 06:45 )  WBC Count : 6.13 K/uL  Hemoglobin : 10.1 g/dL  Hematocrit : 32.5 %  Platelet Count - Automated : 150 K/uL    07-26  141  |  100  |  52<H>  ----------------------------<  90  5.4<H>   |  26  |  2.50<H>  07-25  142  |  101  |  46<H>  ----------------------------<  103<H>  5.1   |  31  |  1.85<H>    Ca    9.5      26 Jul 2017 06:45  Ca    9.3      25 Jul 2017 11:00  Phos  3.6     07-25  Mg     2.0     07-25    TPro  7.5  /  Alb  3.6  /  TBili  0.4  /  DBili  x   /  AST  30  /  ALT  32  /  AlkPhos  69  07-26  TPro  7.5  /  Alb  3.7  /  TBili  0.5  /  DBili  x   /  AST  30  /  ALT  26  /  AlkPhos  64  07-25    PREVIOUS DIAGNOSTIC TESTING:    [x] Echocardiogram: < from: Transthoracic Echocardiogram (07.14.17 @ 09:48) >  CONCLUSIONS:  1. Mitral annular calcification, otherwise normal mitral  valve. Moderate mitral regurgitation.  2. Moderate left ventricular enlargement.  3. Severe global left ventricular systolic dysfunction.  4. Right ventricular enlargement with decreased right  ventricular systolic function.  A device wire is noted in  the right heart.    [x] Catheterization: < from: Cardiac Cath Lab (03.14.12 @ 14:28) >  Coronary vessels: The coronary circulation is right dominant.  LM:      LM: Normal.  LAD:      LAD: Normal.  CX:      Circumflex: Normal.  RCA:      RCA: Normal.    [] Stress Test:

## 2017-07-26 NOTE — PROGRESS NOTE ADULT - ASSESSMENT
Assessment/Plan: 56y Female now ESRD on HD, for R AVF/AVG planning. Patient had recent family death and wants to go home to attend . Assessment/Plan: 56y Female now ESRD on HD, for R AVF/AVG planning. Patient had recent family death

## 2017-07-26 NOTE — PROGRESS NOTE ADULT - PROBLEM SELECTOR PLAN 1
- Vein mapping completed  - Will need cardiology/medicine clearance  - Will need IR permacath and HD today  - Can follow-up with Dr. Wood as outpatient for fistula or graft since patient wishes to go home at this time for  Will need cardiology/medicine clearance  - Will need IR permacath and HD today  d/w pt to proceed w puma collazo/juliette Vazquez to  [facility] pt indications risks and benefits d/w pt who consents verbally

## 2017-07-26 NOTE — DISCHARGE NOTE ADULT - COMMUNITY RESOURCES
PATIENT TO START OUTPATIENT HD AT [facility] [address] [place], [place] PHONE [phone]  ON 07/31/2017 MONDAY ; ARRIVAL TIME : 2:00PM;APPOINTMENT: 3:15PM.THE NEXT TX WILL BE ON 08/03/2017 THURSDAY. PT WILL BE ON  TUES/THURS/SAT SCHEDULE ;ARRIVALTIME: 5:00 AM;APPONTMENT: 5:15AM

## 2017-07-26 NOTE — DISCHARGE NOTE ADULT - PLAN OF CARE
continued management You were diagnosed with renal failure while you were at the hospital which is why you required the right sided catheter in your neck. The neck catheter was replaced with a catheter in front of your shoulder that will be used for future dialysis. You also underwent surgery for an arterio-venous fistula that will take time to mature. This arterio-venous fistula will be used for dialysis in the future (2-3 months).    Please follow-up with Dr. Rae (nephrology) as an outpatient one week after discharge. Please make an appointment with him by calling [phone]. You came into the hospital because your blood sugar was very low due to overdose of insulin. Please follow up with Dr. Constantino (endocrinologist) by calling [phone] to assist in proper management and dosing of insulin. Otherwise, please continue taking all of your diabetes medicine (tujeo, novolog) as prescribed by your previous doctor Please continue home medications bumex, coreg, entresto, losartan, spironolactone. Please stop taking multaq because of your fluid overload symptoms crestor, gabapentin, omeprazole, vitamin D, uloric (febuxostat), zetia (ezetimibe) metoprolol You had a cough when you came in and were treated with antibiotics. Duoneb???????????? Please continue home medications bumex, coreg, entresto, losartan, spironolactone as directed by your previous doctor. Please stop taking multaq because of your fluid overload symptoms Please continue your home medications as directed by your previous doctor (crestor, gabapentin, omeprazole, vitamin D, uloric, zetia) Please continue your home medication metoprolol as directed by your previous doctor You had a cough when you came in and were treated with antibiotics. Please make an appointment with Dr. Lou one week after your discharge from the hospital by calling [phone] You were diagnosed with renal failure while you were at the hospital which is why you required the right sided catheter in your neck. The neck catheter was replaced with a catheter in front of your shoulder that will be used for future dialysis. You also underwent surgery for an arterio-venous fistula that will take time to mature. This arterio-venous fistula will be used for dialysis in the future (2-3 months).    Please follow-up with Dr. Rae (nephrology) as an outpatient one week after discharge. Please make an appointment with him by calling [phone].  Follow up with Dr. Vazquez in 2 weeks. To make an appointment call You were diagnosed with renal failure while you were at the hospital which is why you required the right sided catheter in your neck. The neck catheter was replaced with a catheter in front of your shoulder that will be used for future dialysis. You also underwent surgery for an arterio-venous fistula that will take time to mature. This arterio-venous fistula will be used for dialysis in the future (2-3 months).    Please follow-up with Dr. Rae (nephrology) as an outpatient one week after discharge. Please make an appointment with him by calling [phone].  Follow up with Dr. Vazquez in 2 weeks. To make an appointment call [phone] Please follow up with your PMD in one week to discuss if any of your heart failure medications should be restarted. Take carvedilol as prescribed.

## 2017-07-26 NOTE — PROGRESS NOTE ADULT - ASSESSMENT
56F HTN, DM, HFrEF 20-25% s/p [employer] AICD, CKD, ABDIEL, admitted for hypoglycemia with hospital course c/b acute renal failure requiring HD. Pt with persistent fluid overload, hyperkalemia then requiring chronic HD. Now planned for AVF.    RCRI: 3 (CHF, insulin, Cr)  Pt is at elevated risk for a moderate risk procedure.  Pt has no arrhythmias, active ischemia, or significant valvular issues. Her orthopnea is chronic and may be related to non-cardiac causes or a mildly persistent fluid overloaded state. Would recommend fluid optimization via HD prior or soon after her procedure. No further cardiac ischemia evaluation at this time. Continue perioperative betablockers.

## 2017-07-26 NOTE — DISCHARGE NOTE ADULT - MEDICATION SUMMARY - MEDICATIONS TO STOP TAKING
I will STOP taking the medications listed below when I get home from the hospital:    Multaq 400 mg oral tablet  -- 1 tab(s) by mouth 2 times a day I will STOP taking the medications listed below when I get home from the hospital:    Multaq 400 mg oral tablet  -- 1 tab(s) by mouth 2 times a day    losartan 25 mg oral tablet  -- 1 tab(s) by mouth once a day  -- Do not take this drug if you are pregnant.  It is very important that you take or use this exactly as directed.  Do not skip doses or discontinue unless directed by your doctor.  Some non-prescription drugs may aggravate your condition.  Read all labels carefully.  If a warning appears, check with your doctor before taking.    Metoprolol Succinate  mg oral tablet, extended release  -- 1 tab(s) by mouth once a day  -- It is very important that you take or use this exactly as directed.  Do not skip doses or discontinue unless directed by your doctor.  May cause drowsiness.  Alcohol may intensify this effect.  Use care when operating dangerous machinery.  Some non-prescription drugs may aggravate your condition.  Read all labels carefully.  If a warning appears, check with your doctor before taking.  Swallow whole.  Do not crush.  Take with food or milk.  This drug may impair the ability to drive or operate machinery.  Use care until you become familiar with its effects.    Bumex  -- 2 milligram(s)  2 times a day    Entresto 49 mg-51 mg oral tablet  -- 1 tab(s) by mouth 2 times a day    spironolactone 25 mg oral tablet  -- 1 tab(s) by mouth 2 times a day

## 2017-07-26 NOTE — PROGRESS NOTE ADULT - SUBJECTIVE AND OBJECTIVE BOX
Preoperative Note  Right upper extremity AV fistula placement with Dr. Vazquez    The patient is a 56 female with DM II, HTN, systolic CHF s/p AICD (left side) who has recently been diagnosed with ESRD requiring hemodialysis.  The patient is having a permacath placed for dialysis with IR and will require a right upper extremity AV fistula placement, which is planned for tomorrow at 11 am with Dr. Vazquez of vascular surgery.  Preoperative labs (CBC, coagulation profile, BMP) should be ordered for the morning, and she already has an active type and screen in the computer.  The patient refuses to sign the consent form at this time, stating that she will sign it "in the operating room," so she will need consent signed later tonight vs. early tomorrow morning.  I discussed the plan with the primary medical team, team 3 medicine, with Dr. Vazquez and with the vascular surgery team.      EDMUND Monk PGY3 [id number] Preoperative Note  Right upper extremity AV fistula placement with Dr. Vazquez    The patient is a 56 female with DM II, HTN, systolic CHF s/p AICD (left side) who has recently been diagnosed with ESRD requiring hemodialysis.  The patient is having a permacath placed for dialysis with IR and will require a right upper extremity AV fistula placement, which is planned for tomorrow at 11 am with Dr. Vazquez of vascular surgery.  Preoperative labs (CBC, coagulation profile, BMP) should be ordered for the morning, and she already has an active type and screen documented.  She should be made NPO at midnight for the procedure.  The patient agrees to the operation but refuses to sign the consent form at this time, stating that she will sign it "in the operating room."  The document will need to be signed later tonight vs. early tomorrow morning when she is agreeable.  I discussed the plan with the primary medical team, team 3 medicine, with Dr. Vazquez and with the vascular surgery team.      EDMUND Monk PGY3 [id number]

## 2017-07-26 NOTE — PROGRESS NOTE ADULT - ATTENDING COMMENTS
as above pt has left sided ppm and is rt handed given the lt ppm placement will need rue avf/g placement
Agree with findings and plans above.
Agree with findings and plans above.
Agree with above. Seen and examined with resident. 56 year old female with acute renal failure. Had hemodialysis yesterday and again today. Electrolytes stable. BP control. Follow urine output. All nephrotoxic agents stopped.
ICD without therapy, a few NSVT episodes. Still volume overloaded.
as above pt has left sided ppm and is rt handed given the lt ppm placement will need rue avf/g placement

## 2017-07-27 LAB
ALBUMIN SERPL ELPH-MCNC: 3.5 G/DL — SIGNIFICANT CHANGE UP (ref 3.3–5)
ALP SERPL-CCNC: 67 U/L — SIGNIFICANT CHANGE UP (ref 40–120)
ALT FLD-CCNC: 28 U/L — SIGNIFICANT CHANGE UP (ref 4–33)
APTT BLD: 28.3 SEC — SIGNIFICANT CHANGE UP (ref 27.5–37.4)
AST SERPL-CCNC: 23 U/L — SIGNIFICANT CHANGE UP (ref 4–32)
BILIRUB SERPL-MCNC: 0.4 MG/DL — SIGNIFICANT CHANGE UP (ref 0.2–1.2)
BUN SERPL-MCNC: 64 MG/DL — HIGH (ref 7–23)
CALCIUM SERPL-MCNC: 9.4 MG/DL — SIGNIFICANT CHANGE UP (ref 8.4–10.5)
CHLORIDE SERPL-SCNC: 100 MMOL/L — SIGNIFICANT CHANGE UP (ref 98–107)
CO2 SERPL-SCNC: 27 MMOL/L — SIGNIFICANT CHANGE UP (ref 22–31)
CREAT SERPL-MCNC: 2.21 MG/DL — HIGH (ref 0.5–1.3)
GLUCOSE SERPL-MCNC: 107 MG/DL — HIGH (ref 70–99)
HCT VFR BLD CALC: 31.6 % — LOW (ref 34.5–45)
HGB BLD-MCNC: 9.7 G/DL — LOW (ref 11.5–15.5)
INR BLD: 1.03 — SIGNIFICANT CHANGE UP (ref 0.88–1.17)
MCHC RBC-ENTMCNC: 28.1 PG — SIGNIFICANT CHANGE UP (ref 27–34)
MCHC RBC-ENTMCNC: 30.7 % — LOW (ref 32–36)
MCV RBC AUTO: 91.6 FL — SIGNIFICANT CHANGE UP (ref 80–100)
NRBC # FLD: 0 — SIGNIFICANT CHANGE UP
PLATELET # BLD AUTO: 155 K/UL — SIGNIFICANT CHANGE UP (ref 150–400)
PMV BLD: 12.9 FL — SIGNIFICANT CHANGE UP (ref 7–13)
POTASSIUM SERPL-MCNC: 5.2 MMOL/L — SIGNIFICANT CHANGE UP (ref 3.5–5.3)
POTASSIUM SERPL-SCNC: 5.2 MMOL/L — SIGNIFICANT CHANGE UP (ref 3.5–5.3)
PROT SERPL-MCNC: 6.9 G/DL — SIGNIFICANT CHANGE UP (ref 6–8.3)
PROTHROM AB SERPL-ACNC: 11.6 SEC — SIGNIFICANT CHANGE UP (ref 9.8–13.1)
RBC # BLD: 3.45 M/UL — LOW (ref 3.8–5.2)
RBC # FLD: 16.3 % — HIGH (ref 10.3–14.5)
SODIUM SERPL-SCNC: 139 MMOL/L — SIGNIFICANT CHANGE UP (ref 135–145)
WBC # BLD: 6.11 K/UL — SIGNIFICANT CHANGE UP (ref 3.8–10.5)
WBC # FLD AUTO: 6.11 K/UL — SIGNIFICANT CHANGE UP (ref 3.8–10.5)

## 2017-07-27 PROCEDURE — 36821 AV FUSION DIRECT ANY SITE: CPT | Mod: RT

## 2017-07-27 PROCEDURE — 93283 PRGRMG EVAL IMPLANTABLE DFB: CPT | Mod: 26

## 2017-07-27 RX ORDER — FENTANYL CITRATE 50 UG/ML
25 INJECTION INTRAVENOUS
Qty: 0 | Refills: 0 | Status: DISCONTINUED | OUTPATIENT
Start: 2017-07-27 | End: 2017-07-27

## 2017-07-27 RX ORDER — OXYCODONE HYDROCHLORIDE 5 MG/1
5 TABLET ORAL EVERY 4 HOURS
Qty: 0 | Refills: 0 | Status: DISCONTINUED | OUTPATIENT
Start: 2017-07-27 | End: 2017-07-27

## 2017-07-27 RX ORDER — OXYCODONE AND ACETAMINOPHEN 5; 325 MG/1; MG/1
1 TABLET ORAL EVERY 4 HOURS
Qty: 0 | Refills: 0 | Status: DISCONTINUED | OUTPATIENT
Start: 2017-07-27 | End: 2017-07-28

## 2017-07-27 RX ADMIN — Medication 667 MILLIGRAM(S): at 17:30

## 2017-07-27 RX ADMIN — OXYCODONE AND ACETAMINOPHEN 1 TABLET(S): 5; 325 TABLET ORAL at 18:00

## 2017-07-27 RX ADMIN — INSULIN GLARGINE 35 UNIT(S): 100 INJECTION, SOLUTION SUBCUTANEOUS at 23:37

## 2017-07-27 RX ADMIN — CARVEDILOL PHOSPHATE 25 MILLIGRAM(S): 80 CAPSULE, EXTENDED RELEASE ORAL at 06:17

## 2017-07-27 RX ADMIN — Medication 650 MILLIGRAM(S): at 00:30

## 2017-07-27 RX ADMIN — Medication 200 MILLIGRAM(S): at 06:19

## 2017-07-27 RX ADMIN — FENTANYL CITRATE 25 MICROGRAM(S): 50 INJECTION INTRAVENOUS at 15:35

## 2017-07-27 RX ADMIN — FENTANYL CITRATE 25 MICROGRAM(S): 50 INJECTION INTRAVENOUS at 15:44

## 2017-07-27 RX ADMIN — Medication 200 MILLIGRAM(S): at 17:29

## 2017-07-27 RX ADMIN — Medication 3 MILLILITER(S): at 03:37

## 2017-07-27 RX ADMIN — FENTANYL CITRATE 25 MICROGRAM(S): 50 INJECTION INTRAVENOUS at 16:00

## 2017-07-27 RX ADMIN — HEPARIN SODIUM 5000 UNIT(S): 5000 INJECTION INTRAVENOUS; SUBCUTANEOUS at 06:17

## 2017-07-27 RX ADMIN — CARVEDILOL PHOSPHATE 25 MILLIGRAM(S): 80 CAPSULE, EXTENDED RELEASE ORAL at 17:30

## 2017-07-27 RX ADMIN — Medication 6 UNIT(S): at 17:29

## 2017-07-27 RX ADMIN — GABAPENTIN 100 MILLIGRAM(S): 400 CAPSULE ORAL at 16:34

## 2017-07-27 RX ADMIN — FENTANYL CITRATE 25 MICROGRAM(S): 50 INJECTION INTRAVENOUS at 15:45

## 2017-07-27 RX ADMIN — Medication 2: at 17:29

## 2017-07-27 RX ADMIN — OXYCODONE AND ACETAMINOPHEN 1 TABLET(S): 5; 325 TABLET ORAL at 17:27

## 2017-07-27 NOTE — PROGRESS NOTE ADULT - SUBJECTIVE AND OBJECTIVE BOX
No pain, no shortness of breath      VITAL:  T(C): , Max: 36.9 (07-26-17 @ 13:29)  T(F): , Max: 98.4 (07-26-17 @ 13:29)  HR: 83 (07-27-17 @ 05:18)  BP: 125/76 (07-27-17 @ 05:18)  BP(mean): --  RR: 18 (07-27-17 @ 05:18)  SpO2: 99% (07-27-17 @ 05:18)      PHYSICAL EXAM:  Constitutional: NAD; obese  HEENTN: DMM, (+)MATHEW diop  Respiratory: distant b/l anteriorly  Cardiovascular: Irreg S1S2  Gastrointestinal: soft, NTND  Extremities: 1-2+ b/l LE edema        LABS:                        9.7    6.11  )-----------( 155      ( 27 Jul 2017 05:25 )             31.6     Na(139)/K(5.2)/Cl(100)/HCO3(27)/BUN(64)/Cr(2.21)Glu(107)/Ca(9.4)/Mg(--)/PO4(--)    07-27 @ 05:25  Na(141)/K(5.4)/Cl(100)/HCO3(26)/BUN(52)/Cr(2.50)Glu(90)/Ca(9.5)/Mg(--)/PO4(--)    07-26 @ 06:45  Na(142)/K(5.1)/Cl(101)/HCO3(31)/BUN(46)/Cr(1.85)Glu(103)/Ca(9.3)/Mg(2.0)/PO4(3.6)    07-25 @ 11:00      IMPRESSION: 56F w/ HTN, DM2, and HFrEF(20%)-AICD, 7/12/17 a/w hypoglycemia, respiratory acidosis, and ALISA    (1)Renal - Now newly ESRD-HD, s/p failed attempt to discontinue HD last week. Due for HD today.    (2)Now with tunneled HD catheter; planned for AVF/AVG today    (3)SW - needs acceptance into an outpatient HD unit prior to discharge      RECOMMEND:  (1)HD today as planned - coordinate with OR  (2)Follow up with SW regarding outpatient HD        Perfecto Rae MD  [facility]  [phone] No pain, no shortness of breath      VITAL:  T(C): , Max: 36.9 (07-26-17 @ 13:29)  T(F): , Max: 98.4 (07-26-17 @ 13:29)  HR: 83 (07-27-17 @ 05:18)  BP: 125/76 (07-27-17 @ 05:18)  BP(mean): --  RR: 18 (07-27-17 @ 05:18)  SpO2: 99% (07-27-17 @ 05:18)      PHYSICAL EXAM:  Constitutional: NAD; obese  HEENTN: DMM, (+)MATHEW diop  Respiratory: distant b/l anteriorly  Cardiovascular: Irreg S1S2  Gastrointestinal: soft, NTND  Extremities: 1-2+ b/l LE edema      LABS:                        9.7    6.11  )-----------( 155      ( 27 Jul 2017 05:25 )             31.6     Na(139)/K(5.2)/Cl(100)/HCO3(27)/BUN(64)/Cr(2.21)Glu(107)/Ca(9.4)/Mg(--)/PO4(--)    07-27 @ 05:25  Na(141)/K(5.4)/Cl(100)/HCO3(26)/BUN(52)/Cr(2.50)Glu(90)/Ca(9.5)/Mg(--)/PO4(--)    07-26 @ 06:45  Na(142)/K(5.1)/Cl(101)/HCO3(31)/BUN(46)/Cr(1.85)Glu(103)/Ca(9.3)/Mg(2.0)/PO4(3.6)    07-25 @ 11:00      IMPRESSION: 56F w/ HTN, DM2, and HFrEF(20%)-AICD, 7/12/17 a/w hypoglycemia, respiratory acidosis, and ALISA    (1)Renal - Now newly ESRD-HD, s/p failed attempt to discontinue HD last week. Due for HD today.    (2)Now with tunneled HD catheter; planned for RUE AVF/AVG today    (3)SW - needs acceptance into an outpatient HD unit prior to discharge      RECOMMEND:  (1)HD today as planned - coordinate with OR  (2)Follow up with SW regarding outpatient HD  (3)Amira Rae MD  [facility],   [phone]

## 2017-07-27 NOTE — PROGRESS NOTE ADULT - SUBJECTIVE AND OBJECTIVE BOX
Patient is a 56y old  Female who presents with a chief complaint of hypoglycemia (26 Jul 2017 18:00)      SUBJECTIVE / OVERNIGHT EVENTS:    Pt. seen and examined at bedside. Denies overnight events. Aware of surgery today and requests that IV line be placed under US in OR. Denies f/c/n/v/d/cp/sob, abdominal pain    MEDICATIONS  (STANDING):  aspirin enteric coated 81 milliGRAM(s) Oral daily  cholecalciferol 1000 Unit(s) Oral daily  pantoprazole    Tablet 40 milliGRAM(s) Oral before breakfast  carvedilol 25 milliGRAM(s) Oral every 12 hours  dextrose 5%. 1000 milliLiter(s) (50 mL/Hr) IV Continuous <Continuous>  dextrose 50% Injectable 12.5 Gram(s) IV Push once  dextrose 50% Injectable 25 Gram(s) IV Push once  dextrose 50% Injectable 25 Gram(s) IV Push once  calcium acetate 667 milliGRAM(s) Oral three times a day with meals  heparin  Injectable 5000 Unit(s) SubCutaneous every 8 hours  gabapentin 100 milliGRAM(s) Oral daily  ALBUTerol/ipratropium for Nebulization 3 milliLiter(s) Nebulizer every 6 hours  senna 1 Tablet(s) Oral daily  docusate sodium 100 milliGRAM(s) Oral daily  insulin lispro (HumaLOG) corrective regimen sliding scale   SubCutaneous three times a day before meals  insulin lispro (HumaLOG) corrective regimen sliding scale   SubCutaneous at bedtime  polyethylene glycol 3350 17 Gram(s) Oral daily  atorvastatin 40 milliGRAM(s) Oral at bedtime  insulin lispro Injectable (HumaLOG) 6 Unit(s) SubCutaneous three times a day before meals  insulin glargine Injectable (LANTUS) 35 Unit(s) SubCutaneous at bedtime    MEDICATIONS  (PRN):  dextrose Gel 1 Dose(s) Oral once PRN Blood Glucose LESS THAN 70 milliGRAM(s)/deciLiter  glucagon  Injectable 1 milliGRAM(s) IntraMuscular once PRN Glucose <70 milliGRAM(s)/deciLiter  acetaminophen   Tablet. 650 milliGRAM(s) Oral every 6 hours PRN Mild Pain (1 - 3)  guaiFENesin   Syrup  (Sugar-Free) 200 milliGRAM(s) Oral every 6 hours PRN Cough  benzonatate 100 milliGRAM(s) Oral every 8 hours PRN Cough  bisacodyl 5 milliGRAM(s) Oral every 12 hours PRN Constipation      T(C): 37.1 (07-27-17 @ 10:21), Max: 37.1 (07-27-17 @ 10:21)  HR: 75 (07-27-17 @ 10:21) (51 - 96)  BP: 135/64 (07-27-17 @ 10:21) (107/65 - 137/81)  RR: 15 (07-27-17 @ 10:21) (15 - 18)  SpO2: 99% (07-27-17 @ 10:21) (93% - 100%)  CAPILLARY BLOOD GLUCOSE  115 (27 Jul 2017 09:04)  207 (26 Jul 2017 23:01)  101 (26 Jul 2017 19:06)  84 (26 Jul 2017 11:59)        I&O's Summary    26 Jul 2017 07:01  -  27 Jul 2017 07:00  --------------------------------------------------------  IN: 0 mL / OUT: 350 mL / NET: -350 mL        PHYSICAL EXAM:  GENERAL: NAD, well-developed  HEAD:  Atraumatic, Normocephalic  EYES: EOMI, PERRLA, conjunctiva and sclera clear  NECK: Supple, No JVD  CHEST/LUNG: Clear to auscultation bilaterally; No wheeze  HEART: Regular rate and rhythm; No murmurs, rubs, or gallops  ABDOMEN: Soft, Nontender, Nondistended; Bowel sounds present  EXTREMITIES:  2+ Peripheral Pulses, No clubbing, cyanosis, or edema  PSYCH: AAOx3  NEUROLOGY: non-focal  SKIN: No rashes or lesions    LABS:  (07-27 @ 05:25)                        9.7  6.11 )-----------( 155                 31.6    Neutrophils = -- (--%)  Lymphocytes = -- (--%)  Eosinophils = -- (--%)  Basophils = -- (--%)  Monocytes = -- (--%)  Bands = --%    WBC Trend: 6.11<--, 6.13<--, 6.42<--  Hb Trend: 9.7<--, 10.1<--, 10.0<--, 10.3<--, 10.2<--  Plt Trend: 155<--, 150<--, 176<--, 182<--, 179<--  07-27    139  |  100  |  64<H>  ----------------------------<  107<H>  5.2   |  27  |  2.21<H>    Ca    9.4      27 Jul 2017 05:25    TPro  6.9  /  Alb  3.5  /  TBili  0.4  /  DBili  x   /  AST  23  /  ALT  28  /  AlkPhos  67  07-27    Creatinine Trend: 2.21<--, 2.50<--, 1.85<--, 2.48<--, 2.58<--, 2.31<--  ( 27 Jul 2017 05:25 )   PT: 11.6 SEC;   INR: 1.03 ;       PTT:28.3 SEC          Microbiology:  Urine Cx:  Blood Cx:    RADIOLOGY & ADDITIONAL TESTS:  X- Ray:  CT:  Ultrasound:  [ ] imaging personally reviewed and interpreted by me    Consultant(s) Notes Reviewed:      Care Discussed with Consultants/Other Providers:    Pager Number: [phone]

## 2017-07-27 NOTE — PROGRESS NOTE ADULT - ASSESSMENT
ASSESSMENT  55 y/o female s/p creation of right brachiobasilic fistula, doing well postoperatively    PLAN  - HD this evening  - resume diet   - Pain control with PO/IV medications.    - Continue home medications  - OOB, ambulate as tolerated    Pratima Florian PGY-1  [id number]

## 2017-07-27 NOTE — PROGRESS NOTE ADULT - PROBLEM SELECTOR PLAN 2
Cr baseline 1.8, Cr. 7.05 on admission, currently 2.80  Shiley in place for HD in rt IJ, receiving HD daily.   cxr revealed diffuse pulmonary edema  nephro recs: c/w lasix 80 bid. pt may be candidate for chronic dialysis dependence, will set up outpatient HD  -IR for permacath placed yesterday at right IJ  -AVF today  f/u renal labs

## 2017-07-27 NOTE — PROGRESS NOTE ADULT - SUBJECTIVE AND OBJECTIVE BOX
Post-op Check    55 y/o female with hx of ESRD requiring HD, s/p creation of right brachiobasilic fistula.  She is doing well and has no complaints.  She has been tolerating her PO intake, and reports that her pain is well controlled.  Denies nausea/vomiting, fever/chills, CP/SOB.    VITALS  T(C): 36 (07-27-17 @ 15:15), Max: 37.1 (07-27-17 @ 10:21)  HR: 95 (07-27-17 @ 17:31) (70 - 96)  BP: 151/67 (07-27-17 @ 17:31) (107/65 - 151/67)  BP(mean): --  RR: 18 (07-27-17 @ 17:31) (13 - 22)  SpO2: 100% (07-27-17 @ 17:31) (95% - 100%)  Wt(kg): --  CAPILLARY BLOOD GLUCOSE  157 (27 Jul 2017 17:16)  115 (27 Jul 2017 09:04)  207 (26 Jul 2017 23:01)          Is/Os    07-26 @ 07:01  -  07-27 @ 07:00  --------------------------------------------------------  IN:  Total IN: 0 mL    OUT:    Voided: 350 mL  Total OUT: 350 mL    Total NET: -350 mL      07-27 @ 07:01  -  07-27 @ 20:33  --------------------------------------------------------  IN:  Total IN: 0 mL    OUT:    Voided: 250 mL  Total OUT: 250 mL    Total NET: -250 mL    PHYSICAL EXAM:   General: NAD, Lying in bed comfortably  Neuro: alert, oriented x3  Vascular: All 4 extremities warm; weak thrill at fistula site  Skin: Intact, no breakdown  MSK: RUE sensation and motor function grossly intact.      MEDICATIONS (STANDING): aspirin enteric coated 81 milliGRAM(s) Oral daily  cholecalciferol 1000 Unit(s) Oral daily  pantoprazole    Tablet 40 milliGRAM(s) Oral before breakfast  carvedilol 25 milliGRAM(s) Oral every 12 hours  dextrose 5%. 1000 milliLiter(s) IV Continuous <Continuous>  dextrose 50% Injectable 12.5 Gram(s) IV Push once  dextrose 50% Injectable 25 Gram(s) IV Push once  dextrose 50% Injectable 25 Gram(s) IV Push once  calcium acetate 667 milliGRAM(s) Oral three times a day with meals  heparin  Injectable 5000 Unit(s) SubCutaneous every 8 hours  gabapentin 100 milliGRAM(s) Oral daily  ALBUTerol/ipratropium for Nebulization 3 milliLiter(s) Nebulizer every 6 hours  senna 1 Tablet(s) Oral daily  docusate sodium 100 milliGRAM(s) Oral daily  insulin lispro (HumaLOG) corrective regimen sliding scale   SubCutaneous three times a day before meals  insulin lispro (HumaLOG) corrective regimen sliding scale   SubCutaneous at bedtime  polyethylene glycol 3350 17 Gram(s) Oral daily  atorvastatin 40 milliGRAM(s) Oral at bedtime  insulin lispro Injectable (HumaLOG) 6 Unit(s) SubCutaneous three times a day before meals  insulin glargine Injectable (LANTUS) 35 Unit(s) SubCutaneous at bedtime    MEDICATIONS (PRN):dextrose Gel 1 Dose(s) Oral once PRN Blood Glucose LESS THAN 70 milliGRAM(s)/deciLiter  glucagon  Injectable 1 milliGRAM(s) IntraMuscular once PRN Glucose <70 milliGRAM(s)/deciLiter  acetaminophen   Tablet. 650 milliGRAM(s) Oral every 6 hours PRN Mild Pain (1 - 3)  guaiFENesin   Syrup  (Sugar-Free) 200 milliGRAM(s) Oral every 6 hours PRN Cough  benzonatate 100 milliGRAM(s) Oral every 8 hours PRN Cough  bisacodyl 5 milliGRAM(s) Oral every 12 hours PRN Constipation  oxyCODONE    5 mG/acetaminophen 325 mG 1 Tablet(s) Oral every 4 hours PRN Moderate and Severe Pain      LABS  CBC (07-27 @ 05:25)                              9.7<L>                         6.11    )----------------(  155        --    % Neutrophils, --    % Lymphocytes, ANC: --                                  31.6<L>    BMP (07-27 @ 05:25)             139     |  100     |  64<H> 		Ca++ --      Ca 9.4                ---------------------------------( 107<H>		Mg --                 5.2     |  27      |  2.21<H>			Ph --        LFTs (07-27 @ 05:25)      TPro 6.9 / Alb 3.5 / TBili 0.4 / DBili -- / AST 23 / ALT 28 / AlkPhos 67    Coags (07-27 @ 05:25)  aPTT 28.3 / INR 1.03 / PT 11.6

## 2017-07-27 NOTE — PROGRESS NOTE ADULT - SUBJECTIVE AND OBJECTIVE BOX
ELECTROPHYSIOLOGY  Device Interrogation Performed                                  Date/Time: 7/27/17  :       [employer] dual chamber ICD                 Model:                                    Mode:  VVI                           Rate:  40                                                                                                                                                Total /BIV pacing:                                                                                                                                             Zones:     Atrial Lead:  P wave amplitude:      3.0                    mv          Impedence:    380               Ohms      Threshold:  N/A              V@             ms      Ventricular Lead(s):  RV Lead: R wave amplitude:      12.6                    mv          Impedence: 342                   Ohms      Threshold:  1.25              V@       0.40      ms  LV Lead:  R wave amplitude:                          mv          Impedence:                   Ohms      Threshold:                V@             ms     Battery Status:  X                   Good                JOAQUÍN                     EOL    Underlying Rhythm:   Sinus tachycardia with HR 100s    Events/Observation: No events noted     Impression/Plan:  Normal PPM / ICD function.   Normal sensing and pacing via iterative testing. Good battery status. Excellent threshold capture.  Reprogrammed RV output from 2.25 to 2.5 V ELECTROPHYSIOLOGY  Device Interrogation Performed                                  Date/Time: 7/27/17  :       [employer] dual chamber ICD                 Model:    Evera XT DR                                Mode:  VVI                           Rate:  40                                                                                                                                                Total /BIV pacing:                                                                                                                                             Zones:     Atrial Lead:  P wave amplitude:      3.0                    mv          Impedence:    380               Ohms      Threshold:  N/A              V@             ms      Ventricular Lead(s):  RV Lead: R wave amplitude:      12.6                    mv          Impedence: 342                   Ohms      Threshold:  1.25              V@       0.40      ms  LV Lead:  R wave amplitude:                          mv          Impedence:                   Ohms      Threshold:                V@             ms     Battery Status:  X                   Good                JOAQUÍN                     EOL    Underlying Rhythm:   Sinus tachycardia with HR 100s    Events/Observation: No events noted     Impression/Plan:  Normal PPM / ICD function.   Normal sensing and pacing via iterative testing. Good battery status. Excellent threshold capture.  Reprogrammed RV output from 2.25 to 2.5 V

## 2017-07-27 NOTE — PROGRESS NOTE ADULT - SUBJECTIVE AND OBJECTIVE BOX
CHIEF COMPLAINT: none  pt  was seen  @   8 Am  in  her  room    SUBJECTIVE:     REVIEW OF SYSTEMS:    CONSTITUTIONAL: ( -  weakness,  (  -) fevers or chills  EYES/ENT: ( - )visual changes;     NECK: (  ) pain or stiffness  RESPIRATORY:   ( - )cough, wheezing, hemoptysis;  ( - ) shortness of breath  CARDIOVASCULAR:  (-  )chest pain or palpitations  GASTROINTESTINAL:   (-  )abdominal or epigastric pain.  ( - ) nausea, vomiting, or hematemesis;   ( -  ) diarrhea or constipation.   GENITOURINARY:   (  -  ) dysuria, frequency or hematuria  NEUROLOGICAL:  ( -  ) numbness or weakness   All other review of systems is negative unless indicated above    Vital Signs Last 24 Hrs  T(C): 37.1 (27 Jul 2017 10:21), Max: 37.1 (27 Jul 2017 10:21)  T(F): 98.7 (27 Jul 2017 10:21), Max: 98.7 (27 Jul 2017 10:21)  HR: 75 (27 Jul 2017 10:21) (51 - 96)  BP: 135/64 (27 Jul 2017 10:21) (107/65 - 137/81)  BP(mean): --  RR: 15 (27 Jul 2017 10:21) (15 - 18)  SpO2: 99% (27 Jul 2017 10:21) (93% - 100%)    I&O's Summary    26 Jul 2017 07:01  -  27 Jul 2017 07:00  --------------------------------------------------------  IN: 0 mL / OUT: 350 mL / NET: -350 mL        CAPILLARY BLOOD GLUCOSE  115 (27 Jul 2017 09:04)  207 (26 Jul 2017 23:01)  101 (26 Jul 2017 19:06)          PHYSICAL EXAM:    Constitutional:  ( -  ) NAD,   (   +)awake and alert  HEENT: PERR, EOMI,    Neck: Soft and supple, No LAD, No JVD  Respiratory:  (   + Breath sounds are clear bilaterally,    ( -  ) wheezing, rales or rhonchi  Cardiovascular:     ( +  )S1 and S2, regular rate and rhythm, no Murmurs, gallops or rubs  Gastrointestinal:  ( +  )Bowel Sounds present, soft,   (-  )nontender, nondistended,    Extremities:    ( - ) peripheral edema  Vascular: 2+ peripheral pulses  Neurological:    (  +  )A/O x 3,   ( - ) focal deficits  Musculoskeletal:    ( +  )  normal strength b/l upper  (  +   ) normal  lower extremities  Skin: No rashes    MEDICATIONS:  MEDICATIONS  (STANDING):  aspirin enteric coated 81 milliGRAM(s) Oral daily  cholecalciferol 1000 Unit(s) Oral daily  pantoprazole    Tablet 40 milliGRAM(s) Oral before breakfast  carvedilol 25 milliGRAM(s) Oral every 12 hours  dextrose 5%. 1000 milliLiter(s) (50 mL/Hr) IV Continuous <Continuous>  dextrose 50% Injectable 12.5 Gram(s) IV Push once  dextrose 50% Injectable 25 Gram(s) IV Push once  dextrose 50% Injectable 25 Gram(s) IV Push once  calcium acetate 667 milliGRAM(s) Oral three times a day with meals  heparin  Injectable 5000 Unit(s) SubCutaneous every 8 hours  gabapentin 100 milliGRAM(s) Oral daily  ALBUTerol/ipratropium for Nebulization 3 milliLiter(s) Nebulizer every 6 hours  senna 1 Tablet(s) Oral daily  docusate sodium 100 milliGRAM(s) Oral daily  insulin lispro (HumaLOG) corrective regimen sliding scale   SubCutaneous three times a day before meals  insulin lispro (HumaLOG) corrective regimen sliding scale   SubCutaneous at bedtime  polyethylene glycol 3350 17 Gram(s) Oral daily  atorvastatin 40 milliGRAM(s) Oral at bedtime  insulin lispro Injectable (HumaLOG) 6 Unit(s) SubCutaneous three times a day before meals  insulin glargine Injectable (LANTUS) 35 Unit(s) SubCutaneous at bedtime      LABS: All Labs Reviewed:                        9.7    6.11  )-----------( 155      ( 27 Jul 2017 05:25 )             31.6     07-27    139  |  100  |  64<H>  ----------------------------<  107<H>  5.2   |  27  |  2.21<H>    Ca    9.4      27 Jul 2017 05:25    TPro  6.9  /  Alb  3.5  /  TBili  0.4  /  DBili  x   /  AST  23  /  ALT  28  /  AlkPhos  67  07-27    PT/INR - ( 27 Jul 2017 05:25 )   PT: 11.6 SEC;   INR: 1.03          PTT - ( 27 Jul 2017 05:25 )  PTT:28.3 SEC      Blood Culture:   Urine Culture      RADIOLOGY/EKG:    ASSESSMENT AND PLAN:  1 ALISA  FOR  hd  as  per  renal   2  CAD//chf stable  on current  meds    DVT PPX:    ADVANCED DIRECTIVE:    DISPOSITION:

## 2017-07-27 NOTE — BRIEF OPERATIVE NOTE - OPERATION/FINDINGS
Attempted right radiocephalic fistula, but radial artery was thrombosed. Successful creation of a right brachiobasilic fistula. Good signal, weak pulse and thrill at end of case.

## 2017-07-28 ENCOUNTER — TRANSCRIPTION ENCOUNTER (OUTPATIENT)
Age: 57
End: 2017-07-28

## 2017-07-28 VITALS
HEART RATE: 81 BPM | SYSTOLIC BLOOD PRESSURE: 128 MMHG | DIASTOLIC BLOOD PRESSURE: 84 MMHG | TEMPERATURE: 98 F | RESPIRATION RATE: 18 BRPM

## 2017-07-28 LAB
BUN SERPL-MCNC: 47 MG/DL — HIGH (ref 7–23)
CALCIUM SERPL-MCNC: 9.3 MG/DL — SIGNIFICANT CHANGE UP (ref 8.4–10.5)
CHLORIDE SERPL-SCNC: 99 MMOL/L — SIGNIFICANT CHANGE UP (ref 98–107)
CO2 SERPL-SCNC: 27 MMOL/L — SIGNIFICANT CHANGE UP (ref 22–31)
CREAT SERPL-MCNC: 2.1 MG/DL — HIGH (ref 0.5–1.3)
GLUCOSE SERPL-MCNC: 136 MG/DL — HIGH (ref 70–99)
HCT VFR BLD CALC: 32.5 % — LOW (ref 34.5–45)
HGB BLD-MCNC: 10.2 G/DL — LOW (ref 11.5–15.5)
MAGNESIUM SERPL-MCNC: 2 MG/DL — SIGNIFICANT CHANGE UP (ref 1.6–2.6)
MCHC RBC-ENTMCNC: 29.1 PG — SIGNIFICANT CHANGE UP (ref 27–34)
MCHC RBC-ENTMCNC: 31.4 % — LOW (ref 32–36)
MCV RBC AUTO: 92.6 FL — SIGNIFICANT CHANGE UP (ref 80–100)
NRBC # FLD: 0 — SIGNIFICANT CHANGE UP
PHOSPHATE SERPL-MCNC: 4.1 MG/DL — SIGNIFICANT CHANGE UP (ref 2.5–4.5)
PLATELET # BLD AUTO: 140 K/UL — LOW (ref 150–400)
PMV BLD: 13 FL — SIGNIFICANT CHANGE UP (ref 7–13)
POTASSIUM SERPL-MCNC: 4.9 MMOL/L — SIGNIFICANT CHANGE UP (ref 3.5–5.3)
POTASSIUM SERPL-SCNC: 4.9 MMOL/L — SIGNIFICANT CHANGE UP (ref 3.5–5.3)
RBC # BLD: 3.51 M/UL — LOW (ref 3.8–5.2)
RBC # FLD: 16.4 % — HIGH (ref 10.3–14.5)
SODIUM SERPL-SCNC: 140 MMOL/L — SIGNIFICANT CHANGE UP (ref 135–145)
WBC # BLD: 7.16 K/UL — SIGNIFICANT CHANGE UP (ref 3.8–10.5)
WBC # FLD AUTO: 7.16 K/UL — SIGNIFICANT CHANGE UP (ref 3.8–10.5)

## 2017-07-28 PROCEDURE — 99232 SBSQ HOSP IP/OBS MODERATE 35: CPT

## 2017-07-28 RX ORDER — ALBUTEROL 90 UG/1
2 AEROSOL, METERED ORAL
Qty: 28 | Refills: 0 | OUTPATIENT
Start: 2017-07-28 | End: 2017-08-11

## 2017-07-28 RX ORDER — SPIRONOLACTONE 25 MG/1
1 TABLET, FILM COATED ORAL
Qty: 0 | Refills: 0 | COMMUNITY

## 2017-07-28 RX ORDER — GABAPENTIN 400 MG/1
1 CAPSULE ORAL
Qty: 14 | Refills: 0 | OUTPATIENT
Start: 2017-07-28 | End: 2017-08-11

## 2017-07-28 RX ORDER — GABAPENTIN 400 MG/1
100 CAPSULE ORAL ONCE
Qty: 0 | Refills: 0 | Status: COMPLETED | OUTPATIENT
Start: 2017-07-28 | End: 2017-07-28

## 2017-07-28 RX ORDER — INSULIN GLARGINE 100 [IU]/ML
70 INJECTION, SOLUTION SUBCUTANEOUS
Qty: 0 | Refills: 0 | COMMUNITY

## 2017-07-28 RX ORDER — INSULIN ASPART 100 [IU]/ML
30 INJECTION, SOLUTION SUBCUTANEOUS
Qty: 0 | Refills: 0 | COMMUNITY

## 2017-07-28 RX ORDER — SACUBITRIL AND VALSARTAN 24; 26 MG/1; MG/1
1 TABLET, FILM COATED ORAL
Qty: 0 | Refills: 0 | COMMUNITY

## 2017-07-28 RX ORDER — IPRATROPIUM/ALBUTEROL SULFATE 18-103MCG
3 AEROSOL WITH ADAPTER (GRAM) INHALATION EVERY 6 HOURS
Qty: 0 | Refills: 0 | Status: DISCONTINUED | OUTPATIENT
Start: 2017-07-28 | End: 2017-07-28

## 2017-07-28 RX ORDER — DIPHENHYDRAMINE HCL 50 MG
25 CAPSULE ORAL EVERY 4 HOURS
Qty: 0 | Refills: 0 | Status: DISCONTINUED | OUTPATIENT
Start: 2017-07-28 | End: 2017-07-28

## 2017-07-28 RX ADMIN — Medication 1000 UNIT(S): at 12:00

## 2017-07-28 RX ADMIN — ATORVASTATIN CALCIUM 40 MILLIGRAM(S): 80 TABLET, FILM COATED ORAL at 00:06

## 2017-07-28 RX ADMIN — Medication 200 MILLIGRAM(S): at 00:10

## 2017-07-28 RX ADMIN — GABAPENTIN 100 MILLIGRAM(S): 400 CAPSULE ORAL at 12:01

## 2017-07-28 RX ADMIN — Medication 6 UNIT(S): at 08:40

## 2017-07-28 RX ADMIN — Medication 5 MILLIGRAM(S): at 08:38

## 2017-07-28 RX ADMIN — SENNA PLUS 1 TABLET(S): 8.6 TABLET ORAL at 12:01

## 2017-07-28 RX ADMIN — Medication 6 UNIT(S): at 16:56

## 2017-07-28 RX ADMIN — Medication 81 MILLIGRAM(S): at 11:59

## 2017-07-28 RX ADMIN — Medication 25 MILLIGRAM(S): at 14:09

## 2017-07-28 RX ADMIN — HEPARIN SODIUM 5000 UNIT(S): 5000 INJECTION INTRAVENOUS; SUBCUTANEOUS at 05:16

## 2017-07-28 RX ADMIN — OXYCODONE AND ACETAMINOPHEN 1 TABLET(S): 5; 325 TABLET ORAL at 06:07

## 2017-07-28 RX ADMIN — PANTOPRAZOLE SODIUM 40 MILLIGRAM(S): 20 TABLET, DELAYED RELEASE ORAL at 06:03

## 2017-07-28 RX ADMIN — OXYCODONE AND ACETAMINOPHEN 1 TABLET(S): 5; 325 TABLET ORAL at 08:38

## 2017-07-28 RX ADMIN — CARVEDILOL PHOSPHATE 25 MILLIGRAM(S): 80 CAPSULE, EXTENDED RELEASE ORAL at 05:15

## 2017-07-28 RX ADMIN — OXYCODONE AND ACETAMINOPHEN 1 TABLET(S): 5; 325 TABLET ORAL at 05:14

## 2017-07-28 RX ADMIN — Medication 2: at 12:00

## 2017-07-28 RX ADMIN — Medication 3 MILLILITER(S): at 03:37

## 2017-07-28 RX ADMIN — Medication 100 MILLIGRAM(S): at 08:42

## 2017-07-28 RX ADMIN — Medication 2: at 08:40

## 2017-07-28 RX ADMIN — Medication 200 MILLIGRAM(S): at 12:05

## 2017-07-28 RX ADMIN — POLYETHYLENE GLYCOL 3350 17 GRAM(S): 17 POWDER, FOR SOLUTION ORAL at 12:01

## 2017-07-28 RX ADMIN — Medication 100 MILLIGRAM(S): at 12:00

## 2017-07-28 RX ADMIN — Medication 6 UNIT(S): at 12:00

## 2017-07-28 RX ADMIN — HEPARIN SODIUM 5000 UNIT(S): 5000 INJECTION INTRAVENOUS; SUBCUTANEOUS at 14:09

## 2017-07-28 RX ADMIN — Medication 667 MILLIGRAM(S): at 08:39

## 2017-07-28 RX ADMIN — Medication 667 MILLIGRAM(S): at 12:00

## 2017-07-28 RX ADMIN — OXYCODONE AND ACETAMINOPHEN 1 TABLET(S): 5; 325 TABLET ORAL at 15:21

## 2017-07-28 RX ADMIN — OXYCODONE AND ACETAMINOPHEN 1 TABLET(S): 5; 325 TABLET ORAL at 16:00

## 2017-07-28 RX ADMIN — HEPARIN SODIUM 5000 UNIT(S): 5000 INJECTION INTRAVENOUS; SUBCUTANEOUS at 00:06

## 2017-07-28 RX ADMIN — OXYCODONE AND ACETAMINOPHEN 1 TABLET(S): 5; 325 TABLET ORAL at 09:12

## 2017-07-28 RX ADMIN — Medication 100 MILLIGRAM(S): at 00:07

## 2017-07-28 RX ADMIN — GABAPENTIN 100 MILLIGRAM(S): 400 CAPSULE ORAL at 19:02

## 2017-07-28 RX ADMIN — Medication 3 MILLILITER(S): at 09:58

## 2017-07-28 NOTE — PROGRESS NOTE ADULT - SUBJECTIVE AND OBJECTIVE BOX
No pain, no shortness of breath      VITAL:  T(C): , Max: 37.1 (07-27-17 @ 10:21)  T(F): , Max: 98.7 (07-27-17 @ 10:21)  HR: 95 (07-28-17 @ 05:12)  BP: 110/68 (07-28-17 @ 05:12)  BP(mean): --  RR: 18 (07-28-17 @ 05:12)  SpO2: 100% (07-28-17 @ 05:12)      PHYSICAL EXAM:  Constitutional: NAD; obese  HEENTN: DMM, (+)RIJ shiley  Respiratory: distant b/l anteriorly  Cardiovascular: Irreg S1S2  Gastrointestinal: soft, NTND  Extremities: 1-2+ b/l LE edema      LABS:                        10.2   7.16  )-----------( 140      ( 28 Jul 2017 07:05 )             32.5     Na(140)/K(4.9)/Cl(99)/HCO3(27)/BUN(47)/Cr(2.10)Glu(136)/Ca(9.3)/Mg(2.0)/PO4(4.1)    07-28 @ 07:05  Na(139)/K(5.2)/Cl(100)/HCO3(27)/BUN(64)/Cr(2.21)Glu(107)/Ca(9.4)/Mg(--)/PO4(--)    07-27 @ 05:25  Na(141)/K(5.4)/Cl(100)/HCO3(26)/BUN(52)/Cr(2.50)Glu(90)/Ca(9.5)/Mg(--)/PO4(--)    07-26 @ 06:45  Na(142)/K(5.1)/Cl(101)/HCO3(31)/BUN(46)/Cr(1.85)Glu(103)/Ca(9.3)/Mg(2.0)/PO4(3.6)    07-25 @ 11:00      IMPRESSION: 56F w/ HTN, DM2, and HFrEF(20%)-AICD, 7/12/17 a/w hypoglycemia, respiratory acidosis, and ALISA    (1)Renal - Now newly ESRD-HD. Last dialyzed yesterday. Next due for HD tomorrow.    (2)Now with tunneled HD catheter and with immature RUE AVF, s/p AVF creation yesterday.    (3)SW - needs acceptance into an outpatient HD unit prior to discharge      RECOMMEND:  (1)F/U with Social Work regarding outpatient HD; no objection to discharge once set up with outpatient HD unit  (2)Next HD tomorrow - inpatient versus outpatient            Perfecto Rae MD  [facility]  [phone] No pain, no shortness of breath      VITAL:  T(C): , Max: 37.1 (07-27-17 @ 10:21)  T(F): , Max: 98.7 (07-27-17 @ 10:21)  HR: 95 (07-28-17 @ 05:12)  BP: 110/68 (07-28-17 @ 05:12)  BP(mean): --  RR: 18 (07-28-17 @ 05:12)  SpO2: 100% (07-28-17 @ 05:12)      PHYSICAL EXAM:  Constitutional: NAD; obese  HEENTN: DMM, (+)RIJ shiley  Respiratory: distant b/l anteriorly  Cardiovascular: Irreg S1S2  Gastrointestinal: soft, NTND  Extremities: 1-2+ b/l LE edema      LABS:                        10.2   7.16  )-----------( 140      ( 28 Jul 2017 07:05 )             32.5     Na(140)/K(4.9)/Cl(99)/HCO3(27)/BUN(47)/Cr(2.10)Glu(136)/Ca(9.3)/Mg(2.0)/PO4(4.1)    07-28 @ 07:05  Na(139)/K(5.2)/Cl(100)/HCO3(27)/BUN(64)/Cr(2.21)Glu(107)/Ca(9.4)/Mg(--)/PO4(--)    07-27 @ 05:25  Na(141)/K(5.4)/Cl(100)/HCO3(26)/BUN(52)/Cr(2.50)Glu(90)/Ca(9.5)/Mg(--)/PO4(--)    07-26 @ 06:45  Na(142)/K(5.1)/Cl(101)/HCO3(31)/BUN(46)/Cr(1.85)Glu(103)/Ca(9.3)/Mg(2.0)/PO4(3.6)    07-25 @ 11:00      IMPRESSION: 56F w/ HTN, DM2, and HFrEF(20%)-AICD, 7/12/17 a/w hypoglycemia, respiratory acidosis, and ALISA    (1)Renal - Now newly ESRD-HD. Last dialyzed yesterday. Next due for HD tomorrow.    (2)Now with tunneled HD catheter and with immature RUE AVF, s/p AVF creation yesterday.    (3)SW - accepted into the [facility] dialysis unit in [place] - set to start there on Tuesday August 1.       RECOMMEND:  (1)Next HD am tomorrow  (2)No objection to d/c post HD tomorrow AM, with first outpatient HD Tuesday 8/1/17.        Perfecto Rae MD  [facility]  [phone] No pain, no shortness of breath      VITAL:  T(C): , Max: 37.1 (07-27-17 @ 10:21)  T(F): , Max: 98.7 (07-27-17 @ 10:21)  HR: 95 (07-28-17 @ 05:12)  BP: 110/68 (07-28-17 @ 05:12)  BP(mean): --  RR: 18 (07-28-17 @ 05:12)  SpO2: 100% (07-28-17 @ 05:12)      PHYSICAL EXAM:  Constitutional: NAD; obese  HEENTN: DMM, (+)RIJ shiley  Respiratory: distant b/l anteriorly  Cardiovascular: Irreg S1S2  Gastrointestinal: soft, NTND  Extremities: 1-2+ b/l LE edema; RUE AVF- no thrill/no bruit appreciated      LABS:                        10.2   7.16  )-----------( 140      ( 28 Jul 2017 07:05 )             32.5     Na(140)/K(4.9)/Cl(99)/HCO3(27)/BUN(47)/Cr(2.10)Glu(136)/Ca(9.3)/Mg(2.0)/PO4(4.1)    07-28 @ 07:05  Na(139)/K(5.2)/Cl(100)/HCO3(27)/BUN(64)/Cr(2.21)Glu(107)/Ca(9.4)/Mg(--)/PO4(--)    07-27 @ 05:25  Na(141)/K(5.4)/Cl(100)/HCO3(26)/BUN(52)/Cr(2.50)Glu(90)/Ca(9.5)/Mg(--)/PO4(--)    07-26 @ 06:45  Na(142)/K(5.1)/Cl(101)/HCO3(31)/BUN(46)/Cr(1.85)Glu(103)/Ca(9.3)/Mg(2.0)/PO4(3.6)    07-25 @ 11:00      IMPRESSION: 56F w/ HTN, DM2, and HFrEF(20%)-AICD, 7/12/17 a/w hypoglycemia, respiratory acidosis, and ALISA    (1)Renal - Now newly ESRD-HD. Last dialyzed yesterday. Next due for HD tomorrow.    (2)Now with tunneled HD catheter and with immature RUE AVF, s/p AVF creation yesterday - it is patent/functioning? I did not appreciate any bruit nor thrill.    (3)SW - accepted into the [facility] dialysis unit in [place] - set to start there on Tuesday August 1.       RECOMMEND:  (1)Next HD am tomorrow  (2)No objection to d/c post HD tomorrow AM, with first outpatient HD Tuesday 8/1/17.  (3)Vascular followup to check for patency of AVF.      Perfecto Rae MD  [facility]  [phone]

## 2017-07-28 NOTE — PROGRESS NOTE ADULT - SUBJECTIVE AND OBJECTIVE BOX
Patient is a 56y old  Female who presents with a chief complaint of hypoglycemia (26 Jul 2017 18:00)      SUBJECTIVE / OVERNIGHT EVENTS:    Pt. seen and examined at bedside. Denies overnight events. c/o pain due to surgery. States pain not well controlled but appears comfortable. Denies f/c/n/v/d/cp/sob, abdominal pain, dysuria, constipation    MEDICATIONS  (STANDING):  aspirin enteric coated 81 milliGRAM(s) Oral daily  cholecalciferol 1000 Unit(s) Oral daily  pantoprazole    Tablet 40 milliGRAM(s) Oral before breakfast  carvedilol 25 milliGRAM(s) Oral every 12 hours  dextrose 5%. 1000 milliLiter(s) (50 mL/Hr) IV Continuous <Continuous>  dextrose 50% Injectable 12.5 Gram(s) IV Push once  dextrose 50% Injectable 25 Gram(s) IV Push once  dextrose 50% Injectable 25 Gram(s) IV Push once  calcium acetate 667 milliGRAM(s) Oral three times a day with meals  heparin  Injectable 5000 Unit(s) SubCutaneous every 8 hours  gabapentin 100 milliGRAM(s) Oral daily  ALBUTerol/ipratropium for Nebulization 3 milliLiter(s) Nebulizer every 6 hours  senna 1 Tablet(s) Oral daily  docusate sodium 100 milliGRAM(s) Oral daily  insulin lispro (HumaLOG) corrective regimen sliding scale   SubCutaneous three times a day before meals  insulin lispro (HumaLOG) corrective regimen sliding scale   SubCutaneous at bedtime  polyethylene glycol 3350 17 Gram(s) Oral daily  atorvastatin 40 milliGRAM(s) Oral at bedtime  insulin lispro Injectable (HumaLOG) 6 Unit(s) SubCutaneous three times a day before meals  insulin glargine Injectable (LANTUS) 35 Unit(s) SubCutaneous at bedtime    MEDICATIONS  (PRN):  dextrose Gel 1 Dose(s) Oral once PRN Blood Glucose LESS THAN 70 milliGRAM(s)/deciLiter  glucagon  Injectable 1 milliGRAM(s) IntraMuscular once PRN Glucose <70 milliGRAM(s)/deciLiter  acetaminophen   Tablet. 650 milliGRAM(s) Oral every 6 hours PRN Mild Pain (1 - 3)  guaiFENesin   Syrup  (Sugar-Free) 200 milliGRAM(s) Oral every 6 hours PRN Cough  benzonatate 100 milliGRAM(s) Oral every 8 hours PRN Cough  bisacodyl 5 milliGRAM(s) Oral every 12 hours PRN Constipation  oxyCODONE    5 mG/acetaminophen 325 mG 1 Tablet(s) Oral every 4 hours PRN Moderate and Severe Pain      T(C): 36.9 (07-28-17 @ 05:12), Max: 37.1 (07-27-17 @ 10:21)  HR: 95 (07-28-17 @ 05:12) (57 - 95)  BP: 110/68 (07-28-17 @ 05:12) (107/65 - 151/67)  RR: 18 (07-28-17 @ 05:12) (13 - 22)  SpO2: 100% (07-28-17 @ 05:12) (98% - 100%)  CAPILLARY BLOOD GLUCOSE  171 (28 Jul 2017 08:36)  185 (27 Jul 2017 23:46)  179 (27 Jul 2017 21:35)  157 (27 Jul 2017 17:16)  115 (27 Jul 2017 09:04)        I&O's Summary    27 Jul 2017 07:01  -  28 Jul 2017 07:00  --------------------------------------------------------  IN: 400 mL / OUT: 2850 mL / NET: -2450 mL        PHYSICAL EXAM:  GENERAL: NAD, well-developed  HEAD:  Atraumatic, Normocephalic  EYES: EOMI, PERRLA, conjunctiva and sclera clear  NECK: Supple, No JVD  CHEST/LUNG: Clear to auscultation bilaterally; No wheeze  HEART: Regular rate and rhythm; No murmurs, rubs, or gallops  ABDOMEN: Soft, Nontender, Nondistended; Bowel sounds present  EXTREMITIES:  2+ Peripheral Pulses, No clubbing, cyanosis, or edema  PSYCH: AAOx3  NEUROLOGY: non-focal  SKIN: No rashes or lesions    LABS:  (07-28 @ 07:05)                        10.2  7.16 )-----------( 140                 32.5    Neutrophils = -- (--%)  Lymphocytes = -- (--%)  Eosinophils = -- (--%)  Basophils = -- (--%)  Monocytes = -- (--%)  Bands = --%    WBC Trend: 7.16<--, 6.11<--, 6.13<--  Hb Trend: 10.2<--, 9.7<--, 10.1<--, 10.0<--, 10.3<--  Plt Trend: 140<--, 155<--, 150<--, 176<--, 182<--  07-28    140  |  99  |  47<H>  ----------------------------<  136<H>  4.9   |  27  |  2.10<H>    Ca    9.3      28 Jul 2017 07:05  Phos  4.1     07-28  Mg     2.0     07-28    TPro  6.9  /  Alb  3.5  /  TBili  0.4  /  DBili  x   /  AST  23  /  ALT  28  /  AlkPhos  67  07-27    Creatinine Trend: 2.10<--, 2.21<--, 2.50<--, 1.85<--, 2.48<--, 2.58<--  ( 27 Jul 2017 05:25 )   PT: 11.6 SEC;   INR: 1.03 ;       PTT:28.3 SEC          Microbiology:  Urine Cx:  Blood Cx:    RADIOLOGY & ADDITIONAL TESTS:  X- Ray:  CT:  Ultrasound:  [ ] imaging personally reviewed and interpreted by me    Consultant(s) Notes Reviewed:      Care Discussed with Consultants/Other Providers:    Intern Pager Number: [phone]

## 2017-07-28 NOTE — PROGRESS NOTE ADULT - PROBLEM SELECTOR PLAN 5
-c/w home med aspirin  -decrease atorvastatin to 40mg daily  -TTE demonstrated mitral annular calcification
-c/w home med aspirin  -c/w atorvastatin to 40mg daily  -TTE demonstrated mitral annular calcification
-c/w home med aspirin  -decrease atorvastatin to 40mg daily  -TTE demonstrated mitral annular calcification
DVT ppx  protonix  c/w gabapentin for chronic pain  hypocalcemia: on D3 and phoslo
DVT ppx  protonix  c/w gabapentin for chronic pain  hypocalcemia: on D3 and phoslo
DVT ppx  protonix  c/w gabapentin for chronic pain  senna and docusate  hypocalcemia: on D3 and phoslo
DVT ppx  protonix  c/w gabapentin for chronic pain  hypocalcemia: on D3 and phoslo

## 2017-07-28 NOTE — PROGRESS NOTE ADULT - PROBLEM SELECTOR PLAN 1
target fs 100-180 mg/dl  Currently at goal   c/w Lantus at 35 units sq qhs  c/w humalog premeal at 6 units SQ tid ac and c/w correction scale as ordered    will trend and f/u    Patient is currently taking significantly less insulin than she was at home. Reviewed the most recent dosing with her and she verbalizes she understands the current doses is less than her prior home regimen.

## 2017-07-28 NOTE — PROGRESS NOTE ADULT - SUBJECTIVE AND OBJECTIVE BOX
ANESTHESIA POSTOP CHECK    56y Female POSTOP DAY 1 S/P     [ X] NO APPARENT ANESTHESIA COMPLICATIONS      Comments:

## 2017-07-28 NOTE — PROGRESS NOTE ADULT - PROBLEM SELECTOR PLAN 6
DVT ppx  protonix  c/w gabapentin for chronic pain  senna and docusate  hypocalcemia: on D3 and phoslo

## 2017-07-28 NOTE — PROVIDER CONTACT NOTE (OTHER) - DATE AND TIME:
16-Jul-2017 00:47
16-Jul-2017 20:20
17-Jul-2017 02:47
24-Jul-2017 05:55
25-Jul-2017 06:15
28-Jul-2017 12:21

## 2017-07-28 NOTE — PROVIDER CONTACT NOTE (OTHER) - BACKGROUND
Acute renal failure, hypoglycemia
Acute renal failure, hypoglycemia
patient admitted for hypoglycemia PMH- ESRD, DM
Patient admitted for hypoglycemia.
Yes

## 2017-07-28 NOTE — PROGRESS NOTE ADULT - SUBJECTIVE AND OBJECTIVE BOX
Chief Complaint: DM 2    History: Patient denies hypoglycemia symptoms. Tolerating PO intake. FS have been mostly at goal.     MEDICATIONS  (STANDING):  aspirin enteric coated 81 milliGRAM(s) Oral daily  cholecalciferol 1000 Unit(s) Oral daily  pantoprazole    Tablet 40 milliGRAM(s) Oral before breakfast  carvedilol 25 milliGRAM(s) Oral every 12 hours  dextrose 5%. 1000 milliLiter(s) (50 mL/Hr) IV Continuous <Continuous>  dextrose 50% Injectable 12.5 Gram(s) IV Push once  dextrose 50% Injectable 25 Gram(s) IV Push once  dextrose 50% Injectable 25 Gram(s) IV Push once  calcium acetate 667 milliGRAM(s) Oral three times a day with meals  heparin  Injectable 5000 Unit(s) SubCutaneous every 8 hours  gabapentin 100 milliGRAM(s) Oral daily  ALBUTerol/ipratropium for Nebulization 3 milliLiter(s) Nebulizer every 6 hours  senna 1 Tablet(s) Oral daily  docusate sodium 100 milliGRAM(s) Oral daily  insulin lispro (HumaLOG) corrective regimen sliding scale   SubCutaneous three times a day before meals  insulin lispro (HumaLOG) corrective regimen sliding scale   SubCutaneous at bedtime  polyethylene glycol 3350 17 Gram(s) Oral daily  atorvastatin 40 milliGRAM(s) Oral at bedtime  insulin lispro Injectable (HumaLOG) 6 Unit(s) SubCutaneous three times a day before meals  insulin glargine Injectable (LANTUS) 35 Unit(s) SubCutaneous at bedtime    MEDICATIONS  (PRN):  dextrose Gel 1 Dose(s) Oral once PRN Blood Glucose LESS THAN 70 milliGRAM(s)/deciLiter  glucagon  Injectable 1 milliGRAM(s) IntraMuscular once PRN Glucose <70 milliGRAM(s)/deciLiter  acetaminophen   Tablet. 650 milliGRAM(s) Oral every 6 hours PRN Mild Pain (1 - 3)  guaiFENesin   Syrup  (Sugar-Free) 200 milliGRAM(s) Oral every 6 hours PRN Cough  benzonatate 100 milliGRAM(s) Oral every 8 hours PRN Cough  bisacodyl 5 milliGRAM(s) Oral every 12 hours PRN Constipation  oxyCODONE    5 mG/acetaminophen 325 mG 1 Tablet(s) Oral every 4 hours PRN Moderate and Severe Pain      Allergies    penicillin (Unknown)        Review of Systems:  Constitutional: No fever  Cardiovascular: No chest pain, palpitations  Respiratory: No SOB, no cough  GI: No nausea, vomiting, abdominal pain      PHYSICAL EXAM:  VITALS: T(C): 36.9 (07-28-17 @ 05:12)  T(F): 98.5 (07-28-17 @ 05:12), Max: 98.6 (07-27-17 @ 23:39)  HR: 80 (07-28-17 @ 09:58) (57 - 95)  BP: 110/68 (07-28-17 @ 05:12) (110/68 - 151/67)  RR:  (13 - 22)  SpO2:  (98% - 100%)  Wt(kg): --  GENERAL: NAD, OOB to chair   EYES: No proptosis, no lid lag, anicteric  RESPIRATORY: Clear to auscultation bilaterally  GI: Soft, nontender, non distended  PSYCH: Alert and oriented x 3, normal affect, normal mood      CAPILLARY BLOOD GLUCOSE  171 (07-28 @ 08:36)  185 (07-27 @ 23:46)  179 (07-27 @ 21:35)  157 (07-27 @ 17:16)  115 (07-27 @ 09:04)  207 (07-26 @ 23:01)  101 (07-26 @ 19:06)  84 (07-26 @ 11:59)  100 (07-26 @ 08:42)  195 (07-25 @ 22:39)  178 (07-25 @ 17:58)  90 (07-25 @ 12:50)      07-28    140  |  99  |  47<H>  ----------------------------<  136<H>  4.9   |  27  |  2.10<H>    EGFR if : 29  EGFR if non : 25    Ca    9.3      07-28  Mg     2.0     07-28  Phos  4.1     07-28    TPro  6.9  /  Alb  3.5  /  TBili  0.4  /  DBili  x   /  AST  23  /  ALT  28  /  AlkPhos  67  07-27          Thyroid Function Tests:  07-12 @ 06:35 TSH 0.19 FreeT4 -- T3 -- Anti TPO -- Anti Thyroglobulin Ab -- TSI --      Hemoglobin A1C, Whole Blood: 9.2 % <H> [4.0 - 5.6] (07-11-17 @ 20:15)

## 2017-07-28 NOTE — PROVIDER CONTACT NOTE (OTHER) - RECOMMENDATIONS
MD mclean to contact vascular/ MD augustine
Provider notified.
Consider pain medications.
Continue to monitor. MD notified and aware.
Continue to monitor. MD notified and aware.
Should Lantus be given?

## 2017-07-28 NOTE — PROGRESS NOTE ADULT - PROBLEM SELECTOR PROBLEM 4
Chronic obstructive pulmonary disease with acute exacerbation
Coronary artery disease involving native coronary artery of native heart without angina pectoris

## 2017-07-28 NOTE — PROGRESS NOTE ADULT - PROBLEM SELECTOR PROBLEM 1
Type 2 diabetes mellitus with diabetic nephropathy, with long-term current use of insulin
Type 2 diabetes mellitus with diabetic nephropathy, with long-term current use of insulin
Hypoglycemia
ESRD (end stage renal disease)
Hypoglycemia
Type 2 diabetes mellitus with diabetic nephropathy, with long-term current use of insulin
ESRD (end stage renal disease)
Hypoglycemia
Type 2 diabetes mellitus with diabetic nephropathy, with long-term current use of insulin
ESRD (end stage renal disease)

## 2017-07-28 NOTE — PROGRESS NOTE ADULT - PROBLEM SELECTOR PROBLEM 3
Systolic congestive heart failure, unspecified congestive heart failure chronicity
Chronic obstructive pulmonary disease with acute exacerbation
Systolic congestive heart failure, unspecified congestive heart failure chronicity
Chronic obstructive pulmonary disease with acute exacerbation
Hyperlipidemia, unspecified hyperlipidemia type

## 2017-07-28 NOTE — PROGRESS NOTE ADULT - SUBJECTIVE AND OBJECTIVE BOX
VASCULAR SURGERY  Pager: [phone]    STATUS POST: R AVF    INTERVAL EVENTS/SUBJECTIVE:   Reports that R arm numbness has resolved. Denies pain.  ______________________________________________  OBJECTIVE:   Vital Signs Last 24 Hrs  T(C): 36.9 (28 Jul 2017 05:12), Max: 37 (27 Jul 2017 23:39)  T(F): 98.5 (28 Jul 2017 05:12), Max: 98.6 (27 Jul 2017 23:39)  HR: 80 (28 Jul 2017 09:58) (57 - 95)  BP: 110/68 (28 Jul 2017 05:12) (110/68 - 151/67)  BP(mean): --  RR: 18 (28 Jul 2017 05:12) (13 - 22)  SpO2: 99% (28 Jul 2017 09:58) (98% - 100%)    Physical exam:  General: NAD  R arm with two incisions, both C/D/I. Fistula with weak thrill on deep palpation  Thrombosed R radial artery, ulnar pulse present  R hand warm, motorsensory intact  R IJ Shiley in place  ______________________________________________  LABS:  CBC Full  -  ( 28 Jul 2017 07:05 )  WBC Count : 7.16 K/uL  Hemoglobin : 10.2 g/dL  Hematocrit : 32.5 %  Platelet Count - Automated : 140 K/uL  Mean Cell Volume : 92.6 fL  Mean Cell Hemoglobin : 29.1 pg  Mean Cell Hemoglobin Concentration : 31.4 %    07-28    140  |  99  |  47<H>  ----------------------------<  136<H>  4.9   |  27  |  2.10<H>    Ca    9.3      28 Jul 2017 07:05  Phos  4.1     07-28  Mg     2.0     07-28    TPro  6.9  /  Alb  3.5  /  TBili  0.4  /  DBili  x   /  AST  23  /  ALT  28  /  AlkPhos  67  07-27    LIVER FUNCTIONS - ( 27 Jul 2017 05:25 )  Alb: 3.5 g/dL / Pro: 6.9 g/dL / ALK PHOS: 67 u/L / ALT: 28 u/L / AST: 23 u/L / GGT: x           PT/INR - ( 27 Jul 2017 05:25 )   PT: 11.6 SEC;   INR: 1.03          PTT - ( 27 Jul 2017 05:25 )  PTT:28.3 SEC

## 2017-07-28 NOTE — PROGRESS NOTE ADULT - PROBLEM SELECTOR PLAN 3
Cardio recs appreciated: have ICD interrogated, c/w carvedilol, stoped dronedarone due to contraindication in the setting of heart failure, hold entresto and spironolactone.
Cardio recs appreciated: have ICD interrogated, c/w carvedilol, stop dronedarone due to contraindication in the setting of heart failure, hold entresto and spironolactone.
Cardio recs appreciated: have ICD interrogated, c/w carvedilol, stoped dronedarone due to contraindication in the setting of heart failure, hold entresto and spironolactone.
resolving, denies sob  dc levaquin 7/14
cont. lipitor
resolving, denies sob  dc levaquin 7/14

## 2017-07-28 NOTE — PROGRESS NOTE ADULT - PROBLEM SELECTOR PROBLEM 5
Coronary artery disease involving native coronary artery of native heart without angina pectoris
Need for prophylactic measure

## 2017-07-28 NOTE — PROGRESS NOTE ADULT - PROBLEM SELECTOR PROBLEM 2
Hypocalcemia
Acute on chronic kidney failure
Hypocalcemia
Systolic congestive heart failure, unspecified congestive heart failure chronicity
Acute on chronic kidney failure
Systolic congestive heart failure, unspecified congestive heart failure chronicity

## 2017-07-28 NOTE — PROVIDER CONTACT NOTE (OTHER) - ACTION/TREATMENT ORDERED:
Provider to come assess patient.
Continue to monitor.
Continue to monitor. Recheck BP.
Continue to monitor. Recheck BP.
Hold Lantus for tonight.
will continue to monitor

## 2017-07-28 NOTE — PROGRESS NOTE ADULT - PROBLEM SELECTOR PLAN 4
resolving, denies sob  dc levaquin 7/14
-c/w home meds lipitor, aspirin  -TTE demonstrated mitral annular calcification
resolving, denies sob  dc levaquin 7/14
-c/w home meds lipitor, aspirin  -TTE demonstrated mitral annular calcification

## 2017-07-28 NOTE — PROGRESS NOTE ADULT - PROVIDER SPECIALTY LIST ADULT
Anesthesia
Cardiology
Cardiology
Diabetes
Diabetes
Electrophysiology
Endocrinology
Internal Medicine
MICU
Nephrology
Vascular Surgery
Cardiology
Internal Medicine
Internal Medicine
Vascular Surgery
Endocrinology
Internal Medicine
Endocrinology

## 2017-07-28 NOTE — PROGRESS NOTE ADULT - PROBLEM SELECTOR PLAN 1
- Will need to return to OR for superficialization of fistula in approximately 6 weeks  - Follow up with Dr. Vazquez after discharge 2 weeks post-procedure  - Can leave incision without dressing, but may replace dry gauze with tape for patient's comfort  - Please page with further questions

## 2017-07-28 NOTE — PROVIDER CONTACT NOTE (OTHER) - ASSESSMENT
Fingerstick of 97. Should Lantus be given?
Patient complains of severe pain. No severe pain orders.
Patient refusing BiPAP machine.
/53. No acute distress noted.
/55. No acute distress noted.
patient had AVF placed on R AC yesterday. surgery changed dressing this AM- MD augustine at bedside no thrill palpable. surgical dressing still in place

## 2017-07-28 NOTE — PROGRESS NOTE ADULT - PROBLEM SELECTOR PLAN 2
Cr baseline 1.8, Cr. 7.05 on admission, currently 2.80  Shiley in place for HD in rt IJ, receiving HD daily.   cxr revealed diffuse pulmonary edema  nephro recs: c/w lasix 80 bid. pt may be candidate for chronic dialysis dependence, will set up outpatient HD  -IR for permacath placed yesterday at right IJ  -AVF yesterday  f/u renal labs

## 2017-08-09 ENCOUNTER — APPOINTMENT (OUTPATIENT)
Dept: VASCULAR SURGERY | Facility: CLINIC | Age: 57
End: 2017-08-09
Payer: MEDICARE

## 2017-08-09 VITALS
WEIGHT: 235 LBS | BODY MASS INDEX: 40.12 KG/M2 | TEMPERATURE: 98.8 F | SYSTOLIC BLOOD PRESSURE: 111 MMHG | DIASTOLIC BLOOD PRESSURE: 64 MMHG | HEART RATE: 68 BPM | HEIGHT: 64 IN

## 2017-08-09 PROCEDURE — 93971 EXTREMITY STUDY: CPT | Mod: RT

## 2017-08-09 PROCEDURE — 99024 POSTOP FOLLOW-UP VISIT: CPT

## 2017-08-15 ENCOUNTER — INPATIENT (INPATIENT)
Facility: HOSPITAL | Age: 57
LOS: 3 days | Discharge: HOME | End: 2017-08-19
Attending: INTERNAL MEDICINE | Admitting: INTERNAL MEDICINE
Payer: MEDICARE

## 2017-08-15 VITALS
TEMPERATURE: 99 F | SYSTOLIC BLOOD PRESSURE: 139 MMHG | RESPIRATION RATE: 16 BRPM | DIASTOLIC BLOOD PRESSURE: 66 MMHG | HEART RATE: 87 BPM | OXYGEN SATURATION: 87 %

## 2017-08-15 NOTE — ED ADULT NURSE NOTE - OBJECTIVE STATEMENT
Marcia RN : Pt. received in room 16 , A&OX3 w/ hx. CHF and ESRD c/o sob since 2pm. Pt. states after dialysis today , pt. continued to c/o SOB and dialysis center advised pt. to come to the ED. Pt. has a fistula placed on right ac , pt. stating at 85-90 % w/out oxygen. Pt. placed on 2L o2 , stats at  %. Pt. Vitals as noted, and in no acute distress. Pt. denies any pain , able to complete full sentences. MD assessed pt.

## 2017-08-15 NOTE — ED ADULT TRIAGE NOTE - CHIEF COMPLAINT QUOTE
Pt with complaints of SOB which began this AM, pt continued with her dialysis as ordered for T/Thut/Sat  Via R chest wall port, SOB continued after dialysis pt urged to come to ED, Pt 85-90% 02 on RA, able to dpeak in complete sentences appears slightly uncomfortable upon exertion, pt also suffers from congestive heart failure

## 2017-08-16 DIAGNOSIS — I10 ESSENTIAL (PRIMARY) HYPERTENSION: ICD-10-CM

## 2017-08-16 DIAGNOSIS — I24.9 ACUTE ISCHEMIC HEART DISEASE, UNSPECIFIED: ICD-10-CM

## 2017-08-16 DIAGNOSIS — E11.9 TYPE 2 DIABETES MELLITUS WITHOUT COMPLICATIONS: ICD-10-CM

## 2017-08-16 DIAGNOSIS — R06.02 SHORTNESS OF BREATH: ICD-10-CM

## 2017-08-16 DIAGNOSIS — I47.2 VENTRICULAR TACHYCARDIA: ICD-10-CM

## 2017-08-16 DIAGNOSIS — I50.9 HEART FAILURE, UNSPECIFIED: ICD-10-CM

## 2017-08-16 LAB
ALBUMIN SERPL ELPH-MCNC: 4 G/DL — SIGNIFICANT CHANGE UP (ref 3.3–5)
ALP SERPL-CCNC: 102 U/L — SIGNIFICANT CHANGE UP (ref 40–120)
ALT FLD-CCNC: 17 U/L — SIGNIFICANT CHANGE UP (ref 4–33)
APPEARANCE UR: SIGNIFICANT CHANGE UP
APTT BLD: 26.2 SEC — LOW (ref 27.5–37.4)
AST SERPL-CCNC: 18 U/L — SIGNIFICANT CHANGE UP (ref 4–32)
BACTERIA # UR AUTO: HIGH
BASE EXCESS BLDV CALC-SCNC: 7.1 MMOL/L — SIGNIFICANT CHANGE UP
BASOPHILS # BLD AUTO: 0.03 K/UL — SIGNIFICANT CHANGE UP (ref 0–0.2)
BASOPHILS # BLD AUTO: 0.04 K/UL — SIGNIFICANT CHANGE UP (ref 0–0.2)
BASOPHILS NFR BLD AUTO: 0.5 % — SIGNIFICANT CHANGE UP (ref 0–2)
BASOPHILS NFR BLD AUTO: 0.8 % — SIGNIFICANT CHANGE UP (ref 0–2)
BILIRUB SERPL-MCNC: 0.7 MG/DL — SIGNIFICANT CHANGE UP (ref 0.2–1.2)
BILIRUB UR-MCNC: SIGNIFICANT CHANGE UP
BLOOD GAS VENOUS - CREATININE: 1.04 MG/DL — SIGNIFICANT CHANGE UP (ref 0.5–1.3)
BLOOD UR QL VISUAL: NEGATIVE — SIGNIFICANT CHANGE UP
BUN SERPL-MCNC: 12 MG/DL — SIGNIFICANT CHANGE UP (ref 7–23)
BUN SERPL-MCNC: 16 MG/DL — SIGNIFICANT CHANGE UP (ref 7–23)
CALCIUM SERPL-MCNC: 8.6 MG/DL — SIGNIFICANT CHANGE UP (ref 8.4–10.5)
CALCIUM SERPL-MCNC: 8.9 MG/DL — SIGNIFICANT CHANGE UP (ref 8.4–10.5)
CHLORIDE BLDV-SCNC: 101 MMOL/L — SIGNIFICANT CHANGE UP (ref 96–108)
CHLORIDE SERPL-SCNC: 101 MMOL/L — SIGNIFICANT CHANGE UP (ref 98–107)
CHLORIDE SERPL-SCNC: 99 MMOL/L — SIGNIFICANT CHANGE UP (ref 98–107)
CK MB BLD-MCNC: 3.54 NG/ML — SIGNIFICANT CHANGE UP (ref 1–4.7)
CK MB BLD-MCNC: SIGNIFICANT CHANGE UP (ref 0–2.5)
CK SERPL-CCNC: 85 U/L — SIGNIFICANT CHANGE UP (ref 25–170)
CO2 SERPL-SCNC: 29 MMOL/L — SIGNIFICANT CHANGE UP (ref 22–31)
CO2 SERPL-SCNC: 30 MMOL/L — SIGNIFICANT CHANGE UP (ref 22–31)
COLOR SPEC: YELLOW — SIGNIFICANT CHANGE UP
CREAT SERPL-MCNC: 1.11 MG/DL — SIGNIFICANT CHANGE UP (ref 0.5–1.3)
CREAT SERPL-MCNC: 1.36 MG/DL — HIGH (ref 0.5–1.3)
EOSINOPHIL # BLD AUTO: 0.14 K/UL — SIGNIFICANT CHANGE UP (ref 0–0.5)
EOSINOPHIL # BLD AUTO: 0.16 K/UL — SIGNIFICANT CHANGE UP (ref 0–0.5)
EOSINOPHIL NFR BLD AUTO: 2.7 % — SIGNIFICANT CHANGE UP (ref 0–6)
EOSINOPHIL NFR BLD AUTO: 2.9 % — SIGNIFICANT CHANGE UP (ref 0–6)
GAS PNL BLDV: 140 MMOL/L — SIGNIFICANT CHANGE UP (ref 136–146)
GLUCOSE BLDV-MCNC: 167 — HIGH (ref 70–99)
GLUCOSE SERPL-MCNC: 170 MG/DL — HIGH (ref 70–99)
GLUCOSE SERPL-MCNC: 182 MG/DL — HIGH (ref 70–99)
GLUCOSE UR-MCNC: NEGATIVE — SIGNIFICANT CHANGE UP
GRAN CASTS # UR COMP ASSIST: SIGNIFICANT CHANGE UP
HCO3 BLDV-SCNC: 29 MMOL/L — HIGH (ref 20–27)
HCT VFR BLD CALC: 34.3 % — LOW (ref 34.5–45)
HCT VFR BLD CALC: 35.4 % — SIGNIFICANT CHANGE UP (ref 34.5–45)
HCT VFR BLDV CALC: 32.4 % — LOW (ref 34.5–45)
HGB BLD-MCNC: 10.2 G/DL — LOW (ref 11.5–15.5)
HGB BLD-MCNC: 10.3 G/DL — LOW (ref 11.5–15.5)
HGB BLDV-MCNC: 10.5 G/DL — LOW (ref 11.5–15.5)
HYALINE CASTS # UR AUTO: SIGNIFICANT CHANGE UP (ref 0–?)
IMM GRANULOCYTES # BLD AUTO: 0.02 # — SIGNIFICANT CHANGE UP
IMM GRANULOCYTES # BLD AUTO: 0.03 # — SIGNIFICANT CHANGE UP
IMM GRANULOCYTES NFR BLD AUTO: 0.4 % — SIGNIFICANT CHANGE UP (ref 0–1.5)
IMM GRANULOCYTES NFR BLD AUTO: 0.5 % — SIGNIFICANT CHANGE UP (ref 0–1.5)
INR BLD: 1.12 — SIGNIFICANT CHANGE UP (ref 0.88–1.17)
KETONES UR-MCNC: NEGATIVE — SIGNIFICANT CHANGE UP
LACTATE BLDV-MCNC: 2 MMOL/L — SIGNIFICANT CHANGE UP (ref 0.5–2)
LEUKOCYTE ESTERASE UR-ACNC: SIGNIFICANT CHANGE UP
LYMPHOCYTES # BLD AUTO: 0.91 K/UL — LOW (ref 1–3.3)
LYMPHOCYTES # BLD AUTO: 1.67 K/UL — SIGNIFICANT CHANGE UP (ref 1–3.3)
LYMPHOCYTES # BLD AUTO: 19 % — SIGNIFICANT CHANGE UP (ref 13–44)
LYMPHOCYTES # BLD AUTO: 28.6 % — SIGNIFICANT CHANGE UP (ref 13–44)
MAGNESIUM SERPL-MCNC: 1.8 MG/DL — SIGNIFICANT CHANGE UP (ref 1.6–2.6)
MCHC RBC-ENTMCNC: 27.5 PG — SIGNIFICANT CHANGE UP (ref 27–34)
MCHC RBC-ENTMCNC: 28 PG — SIGNIFICANT CHANGE UP (ref 27–34)
MCHC RBC-ENTMCNC: 29.1 % — LOW (ref 32–36)
MCHC RBC-ENTMCNC: 29.7 % — LOW (ref 32–36)
MCV RBC AUTO: 94.2 FL — SIGNIFICANT CHANGE UP (ref 80–100)
MCV RBC AUTO: 94.7 FL — SIGNIFICANT CHANGE UP (ref 80–100)
MONOCYTES # BLD AUTO: 0.52 K/UL — SIGNIFICANT CHANGE UP (ref 0–0.9)
MONOCYTES # BLD AUTO: 0.61 K/UL — SIGNIFICANT CHANGE UP (ref 0–0.9)
MONOCYTES NFR BLD AUTO: 12.7 % — SIGNIFICANT CHANGE UP (ref 2–14)
MONOCYTES NFR BLD AUTO: 8.9 % — SIGNIFICANT CHANGE UP (ref 2–14)
MUCOUS THREADS # UR AUTO: SIGNIFICANT CHANGE UP
NEUTROPHILS # BLD AUTO: 3.07 K/UL — SIGNIFICANT CHANGE UP (ref 1.8–7.4)
NEUTROPHILS # BLD AUTO: 3.42 K/UL — SIGNIFICANT CHANGE UP (ref 1.8–7.4)
NEUTROPHILS NFR BLD AUTO: 58.8 % — SIGNIFICANT CHANGE UP (ref 43–77)
NEUTROPHILS NFR BLD AUTO: 64.2 % — SIGNIFICANT CHANGE UP (ref 43–77)
NITRITE UR-MCNC: NEGATIVE — SIGNIFICANT CHANGE UP
NON-SQ EPI CELLS # UR AUTO: 1 — SIGNIFICANT CHANGE UP
NRBC # FLD: 0.03 — SIGNIFICANT CHANGE UP
NRBC # FLD: 0.03 — SIGNIFICANT CHANGE UP
NT-PROBNP SERPL-SCNC: 7420 PG/ML — SIGNIFICANT CHANGE UP
PCO2 BLDV: 63 MMHG — HIGH (ref 41–51)
PH BLDV: 7.33 PH — SIGNIFICANT CHANGE UP (ref 7.32–7.43)
PH UR: 6 — SIGNIFICANT CHANGE UP (ref 4.6–8)
PHOSPHATE SERPL-MCNC: 3.9 MG/DL — SIGNIFICANT CHANGE UP (ref 2.5–4.5)
PLATELET # BLD AUTO: 194 K/UL — SIGNIFICANT CHANGE UP (ref 150–400)
PLATELET # BLD AUTO: 200 K/UL — SIGNIFICANT CHANGE UP (ref 150–400)
PMV BLD: 11.3 FL — SIGNIFICANT CHANGE UP (ref 7–13)
PMV BLD: 11.3 FL — SIGNIFICANT CHANGE UP (ref 7–13)
PO2 BLDV: 27 MMHG — LOW (ref 35–40)
POTASSIUM BLDV-SCNC: 3.3 MMOL/L — LOW (ref 3.4–4.5)
POTASSIUM SERPL-MCNC: 3.1 MMOL/L — LOW (ref 3.5–5.3)
POTASSIUM SERPL-MCNC: 3.6 MMOL/L — SIGNIFICANT CHANGE UP (ref 3.5–5.3)
POTASSIUM SERPL-SCNC: 3.1 MMOL/L — LOW (ref 3.5–5.3)
POTASSIUM SERPL-SCNC: 3.6 MMOL/L — SIGNIFICANT CHANGE UP (ref 3.5–5.3)
PROT SERPL-MCNC: 7.2 G/DL — SIGNIFICANT CHANGE UP (ref 6–8.3)
PROT UR-MCNC: 150 — HIGH
PROTHROM AB SERPL-ACNC: 12.6 SEC — SIGNIFICANT CHANGE UP (ref 9.8–13.1)
RBC # BLD: 3.64 M/UL — LOW (ref 3.8–5.2)
RBC # BLD: 3.74 M/UL — LOW (ref 3.8–5.2)
RBC # FLD: 17.7 % — HIGH (ref 10.3–14.5)
RBC # FLD: 17.8 % — HIGH (ref 10.3–14.5)
RBC CASTS # UR COMP ASSIST: HIGH (ref 0–?)
SAO2 % BLDV: 35.6 % — LOW (ref 60–85)
SODIUM SERPL-SCNC: 141 MMOL/L — SIGNIFICANT CHANGE UP (ref 135–145)
SODIUM SERPL-SCNC: 142 MMOL/L — SIGNIFICANT CHANGE UP (ref 135–145)
SP GR SPEC: 1.02 — SIGNIFICANT CHANGE UP (ref 1–1.03)
SQUAMOUS # UR AUTO: SIGNIFICANT CHANGE UP
TROPONIN T SERPL-MCNC: < 0.06 NG/ML — SIGNIFICANT CHANGE UP (ref 0–0.06)
TROPONIN T SERPL-MCNC: < 0.06 NG/ML — SIGNIFICANT CHANGE UP (ref 0–0.06)
TSH SERPL-MCNC: 0.54 UIU/ML — SIGNIFICANT CHANGE UP (ref 0.27–4.2)
UROBILINOGEN FLD QL: 4 E.U. — HIGH (ref 0.1–0.2)
WBC # BLD: 4.79 K/UL — SIGNIFICANT CHANGE UP (ref 3.8–10.5)
WBC # BLD: 5.83 K/UL — SIGNIFICANT CHANGE UP (ref 3.8–10.5)
WBC # FLD AUTO: 4.79 K/UL — SIGNIFICANT CHANGE UP (ref 3.8–10.5)
WBC # FLD AUTO: 5.83 K/UL — SIGNIFICANT CHANGE UP (ref 3.8–10.5)
WBC UR QL: SIGNIFICANT CHANGE UP (ref 0–?)

## 2017-08-16 PROCEDURE — 93282 PRGRMG EVAL IMPLANTABLE DFB: CPT | Mod: 26,GC

## 2017-08-16 PROCEDURE — 93016 CV STRESS TEST SUPVJ ONLY: CPT | Mod: GC

## 2017-08-16 PROCEDURE — 71010: CPT | Mod: 26

## 2017-08-16 PROCEDURE — 93018 CV STRESS TEST I&R ONLY: CPT | Mod: GC

## 2017-08-16 PROCEDURE — 78452 HT MUSCLE IMAGE SPECT MULT: CPT | Mod: 26

## 2017-08-16 RX ORDER — DEXTROSE 50 % IN WATER 50 %
25 SYRINGE (ML) INTRAVENOUS ONCE
Qty: 0 | Refills: 0 | Status: DISCONTINUED | OUTPATIENT
Start: 2017-08-16 | End: 2017-08-19

## 2017-08-16 RX ORDER — INSULIN LISPRO 100/ML
VIAL (ML) SUBCUTANEOUS AT BEDTIME
Qty: 0 | Refills: 0 | Status: DISCONTINUED | OUTPATIENT
Start: 2017-08-16 | End: 2017-08-19

## 2017-08-16 RX ORDER — INSULIN GLARGINE 100 [IU]/ML
28 INJECTION, SOLUTION SUBCUTANEOUS AT BEDTIME
Qty: 0 | Refills: 0 | Status: DISCONTINUED | OUTPATIENT
Start: 2017-08-16 | End: 2017-08-19

## 2017-08-16 RX ORDER — GLUCAGON INJECTION, SOLUTION 0.5 MG/.1ML
1 INJECTION, SOLUTION SUBCUTANEOUS ONCE
Qty: 0 | Refills: 0 | Status: DISCONTINUED | OUTPATIENT
Start: 2017-08-16 | End: 2017-08-19

## 2017-08-16 RX ORDER — CHOLECALCIFEROL (VITAMIN D3) 125 MCG
2000 CAPSULE ORAL DAILY
Qty: 0 | Refills: 0 | Status: DISCONTINUED | OUTPATIENT
Start: 2017-08-16 | End: 2017-08-19

## 2017-08-16 RX ORDER — SODIUM CHLORIDE 9 MG/ML
1000 INJECTION, SOLUTION INTRAVENOUS
Qty: 0 | Refills: 0 | Status: DISCONTINUED | OUTPATIENT
Start: 2017-08-16 | End: 2017-08-19

## 2017-08-16 RX ORDER — PANTOPRAZOLE SODIUM 20 MG/1
40 TABLET, DELAYED RELEASE ORAL
Qty: 0 | Refills: 0 | Status: DISCONTINUED | OUTPATIENT
Start: 2017-08-16 | End: 2017-08-19

## 2017-08-16 RX ORDER — HEPARIN SODIUM 5000 [USP'U]/ML
5000 INJECTION INTRAVENOUS; SUBCUTANEOUS EVERY 8 HOURS
Qty: 0 | Refills: 0 | Status: DISCONTINUED | OUTPATIENT
Start: 2017-08-16 | End: 2017-08-19

## 2017-08-16 RX ORDER — DEXTROSE 50 % IN WATER 50 %
12.5 SYRINGE (ML) INTRAVENOUS ONCE
Qty: 0 | Refills: 0 | Status: DISCONTINUED | OUTPATIENT
Start: 2017-08-16 | End: 2017-08-19

## 2017-08-16 RX ORDER — INSULIN LISPRO 100/ML
6 VIAL (ML) SUBCUTANEOUS
Qty: 0 | Refills: 0 | Status: DISCONTINUED | OUTPATIENT
Start: 2017-08-16 | End: 2017-08-19

## 2017-08-16 RX ORDER — DEXTROSE 50 % IN WATER 50 %
1 SYRINGE (ML) INTRAVENOUS ONCE
Qty: 0 | Refills: 0 | Status: DISCONTINUED | OUTPATIENT
Start: 2017-08-16 | End: 2017-08-19

## 2017-08-16 RX ORDER — ASPIRIN/CALCIUM CARB/MAGNESIUM 324 MG
81 TABLET ORAL DAILY
Qty: 0 | Refills: 0 | Status: DISCONTINUED | OUTPATIENT
Start: 2017-08-16 | End: 2017-08-19

## 2017-08-16 RX ORDER — INSULIN LISPRO 100/ML
VIAL (ML) SUBCUTANEOUS
Qty: 0 | Refills: 0 | Status: DISCONTINUED | OUTPATIENT
Start: 2017-08-16 | End: 2017-08-19

## 2017-08-16 RX ORDER — SODIUM CHLORIDE 9 MG/ML
3 INJECTION INTRAMUSCULAR; INTRAVENOUS; SUBCUTANEOUS EVERY 8 HOURS
Qty: 0 | Refills: 0 | Status: DISCONTINUED | OUTPATIENT
Start: 2017-08-16 | End: 2017-08-19

## 2017-08-16 RX ORDER — ATORVASTATIN CALCIUM 80 MG/1
80 TABLET, FILM COATED ORAL AT BEDTIME
Qty: 0 | Refills: 0 | Status: DISCONTINUED | OUTPATIENT
Start: 2017-08-16 | End: 2017-08-19

## 2017-08-16 RX ORDER — CARVEDILOL PHOSPHATE 80 MG/1
25 CAPSULE, EXTENDED RELEASE ORAL EVERY 12 HOURS
Qty: 0 | Refills: 0 | Status: DISCONTINUED | OUTPATIENT
Start: 2017-08-16 | End: 2017-08-19

## 2017-08-16 RX ORDER — ALBUTEROL 90 UG/1
2 AEROSOL, METERED ORAL EVERY 6 HOURS
Qty: 0 | Refills: 0 | Status: DISCONTINUED | OUTPATIENT
Start: 2017-08-16 | End: 2017-08-19

## 2017-08-16 RX ORDER — GABAPENTIN 400 MG/1
100 CAPSULE ORAL DAILY
Qty: 0 | Refills: 0 | Status: DISCONTINUED | OUTPATIENT
Start: 2017-08-16 | End: 2017-08-19

## 2017-08-16 RX ADMIN — Medication 81 MILLIGRAM(S): at 12:07

## 2017-08-16 RX ADMIN — ATORVASTATIN CALCIUM 80 MILLIGRAM(S): 80 TABLET, FILM COATED ORAL at 22:04

## 2017-08-16 RX ADMIN — HEPARIN SODIUM 5000 UNIT(S): 5000 INJECTION INTRAVENOUS; SUBCUTANEOUS at 12:07

## 2017-08-16 RX ADMIN — SODIUM CHLORIDE 3 MILLILITER(S): 9 INJECTION INTRAMUSCULAR; INTRAVENOUS; SUBCUTANEOUS at 22:04

## 2017-08-16 RX ADMIN — HEPARIN SODIUM 5000 UNIT(S): 5000 INJECTION INTRAVENOUS; SUBCUTANEOUS at 22:04

## 2017-08-16 RX ADMIN — PANTOPRAZOLE SODIUM 40 MILLIGRAM(S): 20 TABLET, DELAYED RELEASE ORAL at 09:31

## 2017-08-16 RX ADMIN — SODIUM CHLORIDE 3 MILLILITER(S): 9 INJECTION INTRAMUSCULAR; INTRAVENOUS; SUBCUTANEOUS at 12:07

## 2017-08-16 RX ADMIN — Medication 2000 UNIT(S): at 12:07

## 2017-08-16 RX ADMIN — Medication: at 09:52

## 2017-08-16 RX ADMIN — CARVEDILOL PHOSPHATE 25 MILLIGRAM(S): 80 CAPSULE, EXTENDED RELEASE ORAL at 18:02

## 2017-08-16 RX ADMIN — Medication 6 UNIT(S): at 18:46

## 2017-08-16 RX ADMIN — Medication 6 UNIT(S): at 09:52

## 2017-08-16 RX ADMIN — INSULIN GLARGINE 28 UNIT(S): 100 INJECTION, SOLUTION SUBCUTANEOUS at 22:33

## 2017-08-16 RX ADMIN — GABAPENTIN 100 MILLIGRAM(S): 400 CAPSULE ORAL at 14:58

## 2017-08-16 NOTE — H&P ADULT - NSHPSOCIALHISTORY_GEN_ALL_CORE
57 y/o female, lives alone, in private residence in [facility].  Admits to previous Tobacco use, 1/2 PPD x 20 years, quit 7/10/17.  Denies EtOH use.  No dietary restrictions, avoids adding salt.  Influenza vaccine, 2016.  Received Pneumonia Vaccine 5 years ago.

## 2017-08-16 NOTE — ED PROVIDER NOTE - CARE PLAN
Principal Discharge DX:	Shortness of breath Principal Discharge DX:	Shortness of breath  Secondary Diagnosis:	EKG abnormalities

## 2017-08-16 NOTE — ED PROVIDER NOTE - ATTENDING CONTRIBUTION TO CARE
Attending MD ROMERO:  I personally have seen and examined this patient.  Resident note reviewed and agree on plan of care and except where noted.  See MDM for details.

## 2017-08-16 NOTE — CONSULT NOTE ADULT - SUBJECTIVE AND OBJECTIVE BOX
HPI:  Ms. Bernstein is a 56 year-old woman with history of multiple medical problems including hypertension, type 2 diabetes mellitus, obesity, obstructive sleep apnea, and nonischemic cardiomyopathy with EF 20-25%. She was admitted last month with acute kidney injury, respiratory acidosis, and hypoglycemia; her creatinine was in the 7s. She was immediately started on hemodialysis, as an acute measure, in effort to relieve her shortness of breath through ultrafiltration. During the admission, multiple attempts were made to discontinue hemodialysis, but when we tried this she developed pulmonary edema, as well as hyperkalemia refractory to medical therapy. Therefore, she started chronic dialysis, and [place] [facility] [place] was short of breath yesterday before dialysis; in light of her worsening shortness of breath, her family brought her to the [facility] ER after HD was completed last night.    Of note, with regarding to dialysis access, she had an AV fistula placed by Dr. Deepti Vazquez last admission; the AVF thrombosed. She is planned to undergo new AV access creation with Dr. Vazquez later this month.    PAST MEDICAL & SURGICAL HISTORY:  CKD (chronic kidney disease), stage 3 (moderate)  Nonischemic cardiomyopathy: EF 20-25%  Sleep apnea: noncompliant with CPAP  Diabetic neuropathy  HTN (hypertension)  AICD (automatic cardioverter/defibrillator) present: ICD (Medtronic generator with Medtronic atrial (4076)and ventricular (6947) leads) 07  Cardiac Pacemaker  CHF (Congestive Heart Failure)  HLD (Hyperlipidemia)  Diabetes Mellitus: x 10 years, denies nephropathy or retinopathy  H/O:   H/O: hysterectomy  AICD (automatic cardioverter/defibrillator) present: Medtronic generator with Medtronic atrial (4076)and ventricular (6947) leads) 07      MEDICATIONS  (STANDING):  sodium chloride 0.9% lock flush 3 milliLiter(s) IV Push every 8 hours  aspirin enteric coated 81 milliGRAM(s) Oral daily  atorvastatin 80 milliGRAM(s) Oral at bedtime  carvedilol 25 milliGRAM(s) Oral every 12 hours  pantoprazole    Tablet 40 milliGRAM(s) Oral before breakfast  cholecalciferol 2000 Unit(s) Oral daily  insulin glargine Injectable (LANTUS) 28 Unit(s) SubCutaneous at bedtime  insulin lispro Injectable (HumaLOG) 6 Unit(s) SubCutaneous three times a day before meals  insulin lispro (HumaLOG) corrective regimen sliding scale   SubCutaneous three times a day before meals  insulin lispro (HumaLOG) corrective regimen sliding scale   SubCutaneous at bedtime  dextrose 5%. 1000 milliLiter(s) (50 mL/Hr) IV Continuous <Continuous>  dextrose 50% Injectable 12.5 Gram(s) IV Push once  dextrose 50% Injectable 25 Gram(s) IV Push once  dextrose 50% Injectable 25 Gram(s) IV Push once  heparin  Injectable 5000 Unit(s) SubCutaneous every 8 hours    Allergies  penicillin (Unknown)    SOCIAL HISTORY:  Denies ETOh,Smoking,     FAMILY HISTORY:  No pertinent family history in first degree relatives      REVIEW OF SYSTEMS:  CONSTITUTIONAL: No weakness, fevers or chills  EYES/ENT: No visual changes;  No vertigo or throat pain   NECK: No pain or stiffness  RESPIRATORY: No cough, wheezing, hemoptysis; No shortness of breath  CARDIOVASCULAR: No chest pain or palpitations  GASTROINTESTINAL: No abdominal or epigastric pain. No nausea, vomiting, or hematemesis; No diarrhea or constipation. No melena or hematochezia.  GENITOURINARY: No dysuria, frequency or hematuria  NEUROLOGICAL: No numbness or weakness  SKIN: No itching, burning, rashes, or lesions   All other review of systems is negative unless indicated above.    VITAL:  T(C): , Max: 37 (08-15-17 @ 22:53)  T(F): , Max: 98.6 (08-15-17 @ 22:53)  HR: 73 (17 @ 08:11)  BP: 121/66 (17 @ 04:14)  BP(mean): 70 (08-15-17 @ 23:38)  RR: 18 (17 @ 08:11)  SpO2: 100% (17 @ 08:11)    PHYSICAL EXAM:  Constitutional: NAD, Alert  HEENT: NCAT, MMM  Neck: Supple, No JVD  Respiratory: CTA-b/l  Cardiovascular: RRR s1s2, no m/r/g  Gastrointestinal: BS+, soft, NT/ND  Extremities: No peripheral edema b/l  Neurological: no focal deficits; strength grossly intact  Psychiatric: Normal mood, normal affect  Back: no CVAT b/l  Skin: No rashes, no nevi    LABS:                        10.2   5.83  )-----------( 200      ( 16 Aug 2017 00:30 )             34.3     Na(141)/K(3.1)/Cl(99)/HCO3(30)/BUN(12)/Cr(1.11)Glu(170)/Ca(8.6)/Mg(--)/PO4(--)    08-16 @ 00:30          IMPRESSION:    (1)Renal - ESRD- although her labs suggest that she could potentially discontinue dialysis, the fact that she presents to the ER with shortness of breath despite receiving TIW HD suggests that chronic dialysis is in fact needed. Were she to discontinue dialysis, the would likely be significantly more prone to development of pulmonary edema.    (2)Hypokalemia - due to having been dialyzed immediately before presentation to the ER. No need for repletion unless her next serum potassium is low. Indicated as well for a low-K+ diet in effort to avoid recurrences of hyperkalemia    (3)CV - SOB - pulmonary edema?        RECOMMEND:    (1)Next HD tomorrow  (2)Renal diet  (3) HPI:  Ms. Bernstein is a 56 year-old woman with history of multiple medical problems including hypertension, type 2 diabetes mellitus, obesity, obstructive sleep apnea, and nonischemic cardiomyopathy with EF 20-25%. She was admitted last month with acute kidney injury, respiratory acidosis, and hypoglycemia; her creatinine was in the 7s. She was immediately started on hemodialysis, as an acute measure, in effort to relieve her shortness of breath through ultrafiltration. During the admission, multiple attempts were made to discontinue hemodialysis, but when we tried this she developed pulmonary edema, as well as hyperkalemia refractory to medical therapy. Therefore, she started chronic dialysis, and now receives dialysis at the [facility] [place]. She was short of breath yesterday before dialysis; in light of her worsening shortness of breath, her family brought her to the [facility] ER after HD was completed last night.    Of note, with regarding to dialysis access, she had an AV fistula placed by Dr. Deepti Vazquez last admission; the AVF thrombosed. She is planned to undergo new AV access creation with Dr. Vazquez later this month.    PAST MEDICAL & SURGICAL HISTORY:  CKD (chronic kidney disease), stage 3 (moderate)  Nonischemic cardiomyopathy: EF 20-25%  Sleep apnea: noncompliant with CPAP  Diabetic neuropathy  HTN (hypertension)  AICD (automatic cardioverter/defibrillator) present: ICD (Medtronic generator with Medtronic atrial (4076)and ventricular (6947) leads) 07  Cardiac Pacemaker  CHF (Congestive Heart Failure)  HLD (Hyperlipidemia)  Diabetes Mellitus: x 10 years, denies nephropathy or retinopathy  H/O:   H/O: hysterectomy  AICD (automatic cardioverter/defibrillator) present: Medtronic generator with Medtronic atrial (4076)and ventricular (6947) leads) 07      Allergies  penicillin (Unknown)    SOCIAL HISTORY:  Denies ETOh,Smoking,     FAMILY HISTORY:  No pertinent family history in first degree relatives      REVIEW OF SYSTEMS:  CONSTITUTIONAL: No weakness, fevers or chills  EYES/ENT: No visual changes;  No vertigo or throat pain   NECK: No pain or stiffness  RESPIRATORY: No cough, wheezing, hemoptysis; (+)sob  CARDIOVASCULAR: No chest pain or palpitations  GASTROINTESTINAL: No abdominal or epigastric pain. No nausea, vomiting, or hematemesis; No diarrhea or constipation. No melena or hematochezia.  GENITOURINARY: No dysuria, frequency or hematuria  NEUROLOGICAL: No numbness or weakness  SKIN: No itching, burning, rashes, or lesions   All other review of systems is negative unless indicated above.    VITAL:  T(C): , Max: 37 (08-15-17 @ 22:53)  T(F): , Max: 98.6 (08-15-17 @ 22:53)  HR: 73 (17 @ 08:11)  BP: 121/66 (17 @ 04:14)  BP(mean): 70 (08-15-17 @ 23:38)  RR: 18 (17 @ 08:11)  SpO2: 100% (17 @ 08:11)    PHYSICAL EXAM:  Constitutional: NAD, Alert; overweight; hoarse voice  HEENT: NCAT, MMM  Neck: Supple, No JVD, (+)permacath  Respiratory: CTA-b/l  Cardiovascular: RRR s1s2, no m/r/g  Gastrointestinal: BS+, soft, NT/ND  Extremities: No peripheral edema b/l  Neurological: no focal deficits; strength grossly intact  Psychiatric: Normal mood, normal affect  Back: no CVAT b/l  Skin: No rashes, no nevi    LABS:                        10.2   5.83  )-----------( 200      ( 16 Aug 2017 00:30 )             34.3     Na(141)/K(3.1)/Cl(99)/HCO3(30)/BUN(12)/Cr(1.11)Glu(170)/Ca(8.6)/Mg(--)/PO4(--)     @ 00:30          IMPRESSION:    (1)Renal - ESRD- although her labs suggest that she could potentially discontinue dialysis, the fact that she presents to the ER with shortness of breath despite receiving TIW HD suggests that chronic dialysis is in fact needed. Were she to discontinue dialysis, the would likely be significantly more prone to development of pulmonary edema.    (2)Hypokalemia - due to having been dialyzed immediately before presentation to the ER. No need for repletion unless her next serum potassium is low. Indicated as well for a low-K+ diet in effort to avoid recurrences of hyperkalemia    (3)CV - SOB - pulmonary edema?        RECOMMEND:    (1)Next HD tomorrow - 3kg UF as able  (2)Renal diet  (3)Dose new meds for GFR <10/HD

## 2017-08-16 NOTE — H&P ADULT - RS GEN PE MLT RESP DETAILS PC
respirations non-labored/no rales/airway patent/good air movement/clear to auscultation bilaterally/no intercostal retractions/no rhonchi/no subcutaneous emphysema/no chest wall tenderness/breath sounds equal/normal/no wheezes

## 2017-08-16 NOTE — H&P ADULT - PROBLEM SELECTOR PLAN 1
Admit to Telemetry.  Serial EKG, CE's, FLP.  Discuss and follow up with Cardiology.  Nuclear Stress Test.  Consider Angiogram. Admit to Telemetry, serial CE's, EKG's, FLP, continue ASA, BB, statin, F/U Cardiology note- check pNST

## 2017-08-16 NOTE — H&P ADULT - ASSESSMENT
55 y/o female p/w progressively worsening difficulty breathing x 2 days along with chest tightness and NICHOLSON while walking 15 feet (baseline 2-3 blocks) being admitted to Telemetry for further cardiac workup. 57 y/o female p/w progressively worsening difficulty breathing x 2 days along with chest tightness and NICHOLSON while walking 15 feet (baseline 2-3 blocks) being admitted to Telemetry for further cardiac workup. Noted to have 4 beats NSVT on monitor in ED

## 2017-08-16 NOTE — CHART NOTE - NSCHARTNOTEFT_GEN_A_CORE
ELECTROPHYSIOLOGY  Device Interrogation Performed                                  Date/Time: 8/16/17  :       [employer]                  Model:          evera xt dr                          Mode:      VVI                       Rate:     40                                                                                                                                             Total /BIV pacing: <1%                                                                                                                                                Atrial Lead:  P wave amplitude:          3.1             mv          Impedence: 380                  Ohms      Threshold:      0.5        V@     .4        ms      Ventricular Lead(s):  RV Lead: R wave amplitude:        14.5            mv          Impedence:           380      Ohms      Threshold: 0.5        V@  0.4      ms   LV Lead:  R wave amplitude:                          mv          Impedence:                   Ohms      Threshold:                V@             ms     Battery Status:                     Good        (7.3 years left)    Underlying Rhythm:   Sinus    Events/Observation: several brief episode of NST (rate 192-2312     Impression/Plan:  Normal PPM / ICD function.   Normal sensing and pacing via iterative testing. Good battery status. Excellent threshold capture.  No reprogramming.    PLEASE NOTE: Optivol testing threshold is evelated which may be consistent with CHF - please correlate clinically.

## 2017-08-16 NOTE — H&P ADULT - NEGATIVE MUSCULOSKELETAL SYMPTOMS
no leg pain R/no arm pain L/no arm pain R/no back pain/no leg pain L/no muscle weakness/no neck pain/no muscle cramps/no stiffness

## 2017-08-16 NOTE — H&P ADULT - NEUROLOGICAL DETAILS
sensation intact/responds to pain/responds to verbal commands/alert and oriented x 3 alert and oriented x 3/responds to pain/normal strength/responds to verbal commands/sensation intact

## 2017-08-16 NOTE — H&P ADULT - GIT GEN HX ROS MEA POS PC
constipation/change in bowel habits/Constipation, decrease in bowel movements s/p discharge from hospital x 2 weeks ago

## 2017-08-16 NOTE — ED PROVIDER NOTE - MEDICAL DECISION MAKING DETAILS
MIRYAM: 57 y/o female recently admitted for hypoglycemia discharged on 7/28 on dialysis, had session today, had SOB today while at rest, no CP, no fever, + cough 3 days, nonproductive, no leg pain, no leg swelling, decreased BS lower bases, 1)tele 2)EKG 3)CXR 4)CBC, CMP, PT/PTT, CE, TROPONIN, 5)admit

## 2017-08-16 NOTE — CHART NOTE - NSCHARTNOTEFT_GEN_A_CORE
See H&P  Reviewed with PA  For: Nephrology follow up  Pulm eval for ABDIEL  ICD interrogation  Pt did not have ischemia evaluation last month.  Will attempt pharm est.

## 2017-08-16 NOTE — H&P ADULT - PROBLEM SELECTOR PLAN 2
Monitor BUN/Creat.  Dialysis Tuesday/Thursday/Saturday. Avoid nephrotoxins. Renal c/s -p Pt to continue HD, monitor daily weights

## 2017-08-16 NOTE — ED PROVIDER NOTE - PROGRESS NOTE DETAILS
Discussed case with Dr. Bryan Caicedo and he agreed to admit patient under his service. Discussed Case with on call nephrologist Dr. Robbins who said he will see patient in the morning but no need to dialyze at this moment. La: SANDRA layton - on vacation - admitting pts to dr mendez.

## 2017-08-16 NOTE — H&P ADULT - PMH
AICD (automatic cardioverter/defibrillator) present  ICD (Medtronic generator with Medtronic atrial (4076)and ventricular (6947) leads) 5/14/07  Cardiac Pacemaker    CHF (Congestive Heart Failure)  on Home O2 2L prn  CKD (chronic kidney disease), stage 3 (moderate)    Diabetes Mellitus  x 10 years, denies nephropathy or retinopathy  Diabetic neuropathy    GERD (gastroesophageal reflux disease)    Gout    HLD (Hyperlipidemia)    HTN (hypertension)    Nonischemic cardiomyopathy  EF 20-25%  Sleep apnea  noncompliant with CPAP

## 2017-08-16 NOTE — ED PROVIDER NOTE - OBJECTIVE STATEMENT
56yoF w/ PMHx of HTN, HLD, T2DM, HFrEF(EF22%), ESRD on HD(T-Th-Sat) presenting with shortness of breath X 1 day. PT reports she woke up this AM and felt like she had a hard time breathing. Her symptoms worsened on ambulation and laying flat. She went to HD today and had a full session but still remained SOB despite being dialyzed. She denies any associated palpitations,diaphoresis,chest pain,nausea/vomiting, lower extremity edema but reports productive cough and orthopnea X 3 days.

## 2017-08-16 NOTE — H&P ADULT - HISTORY OF PRESENT ILLNESS
55 y/o female p/w progressively worsening difficulty breathing x 2 days with no precipitating event.  States that it started 2 days ago during the day, was unable to fall asleep at night, then worsened yesterday early morning.  Pt went to dialysis, completed treatment, then came to ED.  Also c/o having intermittent chest tightness, but denies pain, worse with lying down, as well as NICHOLSON when walking 15 feet (baseline: 2-3 blocks), and HA while at dialysis, relieved with Tylenol, now returning.  Pt also has a cough, mainly dry, but had 2 episodes yesterday of producing a small amount of white mucus.  Denies dizziness, lightheadedness, fevers, chills, weight changes, N/V/D, palpitations, chest pain, pleuritic or reproducible pain, edema, claudication, muscle/joint pain. 57 y/o female p/w progressively worsening difficulty breathing x 2 days with no precipitating event.  States that it started 2 days ago during the day, was unable to fall asleep at night, then worsened yesterday early morning.  Pt went to dialysis, completed treatment, then came to ED.  Also c/o accompanying intermittent chest tightness, but denies "pain", worse with lying down, as well as NICHOLSON when walking 15 feet (baseline: 2-3 blocks), and HA while at dialysis, relieved with Tylenol, now returning.  Pt also has a cough, mainly dry, but had 2 episodes yesterday of producing a small amount of white mucus.  Denies dizziness, lightheadedness, fevers, chills, weight changes, N/V/D, palpitations, pleuritic or reproducible pain, edema, claudication, muscle/joint pain. 57 y/o female p/w progressively worsening difficulty breathing x 2 days with no precipitating event.  States that it started 2 days ago during the day, was unable to fall asleep at night, then worsened yesterday early morning.  Pt went to dialysis, completed treatment, then came to ED.  Also c/o accompanying intermittent chest tightness, 8/10 severity, non-radiating, but denies "pain", lasts a few seconds, worse with lying down for the last 2 days. Also has NICHOLSON when walking 15 feet (baseline: 2-3 blocks), and HA while at dialysis, relieved with Tylenol, now returning.  Pt also has a cough, mainly dry, but had 2 episodes yesterday of producing a small amount of white mucus.  Denies dizziness, lightheadedness, fevers, chills, weight changes, N/V/D, palpitations, pleuritic or reproducible pain, edema, claudication, muscle/joint pain.

## 2017-08-16 NOTE — ED ADULT NURSE REASSESSMENT NOTE - NS ED NURSE REASSESS COMMENT FT1
received pt from night RN   pt moved to [facility], awake and alert, on cardiac monitor, RSR noted vss pt has right chest wall temporary dialysis catheter in place with dressing intact no s/s of infection  plan of care discussed with pt will continue to monitor closely

## 2017-08-17 LAB
ALBUMIN SERPL ELPH-MCNC: 3.5 G/DL — SIGNIFICANT CHANGE UP (ref 3.3–5)
ALP SERPL-CCNC: 92 U/L — SIGNIFICANT CHANGE UP (ref 40–120)
ALT FLD-CCNC: 14 U/L — SIGNIFICANT CHANGE UP (ref 4–33)
AST SERPL-CCNC: 14 U/L — SIGNIFICANT CHANGE UP (ref 4–32)
BASOPHILS # BLD AUTO: 0.04 K/UL — SIGNIFICANT CHANGE UP (ref 0–0.2)
BASOPHILS NFR BLD AUTO: 0.8 % — SIGNIFICANT CHANGE UP (ref 0–2)
BILIRUB SERPL-MCNC: 0.4 MG/DL — SIGNIFICANT CHANGE UP (ref 0.2–1.2)
BUN SERPL-MCNC: 28 MG/DL — HIGH (ref 7–23)
CALCIUM SERPL-MCNC: 8.7 MG/DL — SIGNIFICANT CHANGE UP (ref 8.4–10.5)
CHLORIDE SERPL-SCNC: 102 MMOL/L — SIGNIFICANT CHANGE UP (ref 98–107)
CHOLEST SERPL-MCNC: 105 MG/DL — LOW (ref 120–199)
CO2 SERPL-SCNC: 28 MMOL/L — SIGNIFICANT CHANGE UP (ref 22–31)
CREAT SERPL-MCNC: 1.4 MG/DL — HIGH (ref 0.5–1.3)
EOSINOPHIL # BLD AUTO: 0.18 K/UL — SIGNIFICANT CHANGE UP (ref 0–0.5)
EOSINOPHIL NFR BLD AUTO: 3.5 % — SIGNIFICANT CHANGE UP (ref 0–6)
GLUCOSE SERPL-MCNC: 158 MG/DL — HIGH (ref 70–99)
HBA1C BLD-MCNC: 7.1 % — HIGH (ref 4–5.6)
HBV SURFACE AG SER-ACNC: NEGATIVE — SIGNIFICANT CHANGE UP
HCT VFR BLD CALC: 31.6 % — LOW (ref 34.5–45)
HDLC SERPL-MCNC: 41 MG/DL — LOW (ref 45–65)
HGB BLD-MCNC: 9.5 G/DL — LOW (ref 11.5–15.5)
IMM GRANULOCYTES # BLD AUTO: 0.01 # — SIGNIFICANT CHANGE UP
IMM GRANULOCYTES NFR BLD AUTO: 0.2 % — SIGNIFICANT CHANGE UP (ref 0–1.5)
LIPID PNL WITH DIRECT LDL SERPL: 57 MG/DL — SIGNIFICANT CHANGE UP
LYMPHOCYTES # BLD AUTO: 1.11 K/UL — SIGNIFICANT CHANGE UP (ref 1–3.3)
LYMPHOCYTES # BLD AUTO: 21.6 % — SIGNIFICANT CHANGE UP (ref 13–44)
MAGNESIUM SERPL-MCNC: 1.9 MG/DL — SIGNIFICANT CHANGE UP (ref 1.6–2.6)
MCHC RBC-ENTMCNC: 27.9 PG — SIGNIFICANT CHANGE UP (ref 27–34)
MCHC RBC-ENTMCNC: 30.1 % — LOW (ref 32–36)
MCV RBC AUTO: 92.9 FL — SIGNIFICANT CHANGE UP (ref 80–100)
MONOCYTES # BLD AUTO: 0.69 K/UL — SIGNIFICANT CHANGE UP (ref 0–0.9)
MONOCYTES NFR BLD AUTO: 13.4 % — SIGNIFICANT CHANGE UP (ref 2–14)
NEUTROPHILS # BLD AUTO: 3.11 K/UL — SIGNIFICANT CHANGE UP (ref 1.8–7.4)
NEUTROPHILS NFR BLD AUTO: 60.5 % — SIGNIFICANT CHANGE UP (ref 43–77)
NRBC # FLD: 0.02 — SIGNIFICANT CHANGE UP
PHOSPHATE SERPL-MCNC: 5.5 MG/DL — HIGH (ref 2.5–4.5)
PLATELET # BLD AUTO: 188 K/UL — SIGNIFICANT CHANGE UP (ref 150–400)
PMV BLD: 11.4 FL — SIGNIFICANT CHANGE UP (ref 7–13)
POTASSIUM SERPL-MCNC: 3.8 MMOL/L — SIGNIFICANT CHANGE UP (ref 3.5–5.3)
POTASSIUM SERPL-SCNC: 3.8 MMOL/L — SIGNIFICANT CHANGE UP (ref 3.5–5.3)
PROT SERPL-MCNC: 6.5 G/DL — SIGNIFICANT CHANGE UP (ref 6–8.3)
RBC # BLD: 3.4 M/UL — LOW (ref 3.8–5.2)
RBC # FLD: 17.3 % — HIGH (ref 10.3–14.5)
SODIUM SERPL-SCNC: 143 MMOL/L — SIGNIFICANT CHANGE UP (ref 135–145)
TRIGL SERPL-MCNC: 79 MG/DL — SIGNIFICANT CHANGE UP (ref 10–149)
WBC # BLD: 5.14 K/UL — SIGNIFICANT CHANGE UP (ref 3.8–10.5)
WBC # FLD AUTO: 5.14 K/UL — SIGNIFICANT CHANGE UP (ref 3.8–10.5)

## 2017-08-17 RX ORDER — POTASSIUM CHLORIDE 20 MEQ
20 PACKET (EA) ORAL ONCE
Qty: 0 | Refills: 0 | Status: COMPLETED | OUTPATIENT
Start: 2017-08-17 | End: 2017-08-18

## 2017-08-17 RX ORDER — MAGNESIUM OXIDE 400 MG ORAL TABLET 241.3 MG
400 TABLET ORAL
Qty: 0 | Refills: 0 | Status: DISCONTINUED | OUTPATIENT
Start: 2017-08-17 | End: 2017-08-19

## 2017-08-17 RX ADMIN — Medication 1: at 12:12

## 2017-08-17 RX ADMIN — HEPARIN SODIUM 5000 UNIT(S): 5000 INJECTION INTRAVENOUS; SUBCUTANEOUS at 06:17

## 2017-08-17 RX ADMIN — Medication 6 UNIT(S): at 09:03

## 2017-08-17 RX ADMIN — Medication 2000 UNIT(S): at 14:38

## 2017-08-17 RX ADMIN — HEPARIN SODIUM 5000 UNIT(S): 5000 INJECTION INTRAVENOUS; SUBCUTANEOUS at 14:38

## 2017-08-17 RX ADMIN — Medication 6 UNIT(S): at 17:25

## 2017-08-17 RX ADMIN — CARVEDILOL PHOSPHATE 25 MILLIGRAM(S): 80 CAPSULE, EXTENDED RELEASE ORAL at 17:25

## 2017-08-17 RX ADMIN — HEPARIN SODIUM 5000 UNIT(S): 5000 INJECTION INTRAVENOUS; SUBCUTANEOUS at 22:07

## 2017-08-17 RX ADMIN — CARVEDILOL PHOSPHATE 25 MILLIGRAM(S): 80 CAPSULE, EXTENDED RELEASE ORAL at 06:18

## 2017-08-17 RX ADMIN — GABAPENTIN 100 MILLIGRAM(S): 400 CAPSULE ORAL at 14:38

## 2017-08-17 RX ADMIN — SODIUM CHLORIDE 3 MILLILITER(S): 9 INJECTION INTRAMUSCULAR; INTRAVENOUS; SUBCUTANEOUS at 22:06

## 2017-08-17 RX ADMIN — SODIUM CHLORIDE 3 MILLILITER(S): 9 INJECTION INTRAMUSCULAR; INTRAVENOUS; SUBCUTANEOUS at 06:18

## 2017-08-17 RX ADMIN — INSULIN GLARGINE 28 UNIT(S): 100 INJECTION, SOLUTION SUBCUTANEOUS at 22:07

## 2017-08-17 RX ADMIN — Medication 1: at 09:03

## 2017-08-17 RX ADMIN — Medication 81 MILLIGRAM(S): at 14:38

## 2017-08-17 RX ADMIN — Medication 1: at 17:25

## 2017-08-17 RX ADMIN — PANTOPRAZOLE SODIUM 40 MILLIGRAM(S): 20 TABLET, DELAYED RELEASE ORAL at 06:17

## 2017-08-17 RX ADMIN — Medication 6 UNIT(S): at 12:13

## 2017-08-17 RX ADMIN — ATORVASTATIN CALCIUM 80 MILLIGRAM(S): 80 TABLET, FILM COATED ORAL at 22:07

## 2017-08-17 RX ADMIN — SODIUM CHLORIDE 3 MILLILITER(S): 9 INJECTION INTRAMUSCULAR; INTRAVENOUS; SUBCUTANEOUS at 14:34

## 2017-08-17 NOTE — PROGRESS NOTE ADULT - SUBJECTIVE AND OBJECTIVE BOX
No pain, no shortness of breath      VITAL:  T(C): , Max: 37.1 (17 @ 22:02)  T(F): , Max: 98.7 (17 @ 22:02)  HR: 72 (17 @ 06:14)  BP: 153/88 (17 @ 06:14)  BP(mean): --  RR: 18 (17 @ 06:14)  SpO2: 99% (17 @ 06:14)        LABS:                        9.5    5.14  )-----------( 188      ( 17 Aug 2017 05:00 )             31.6     Na(143)/K(3.8)/Cl(102)/HCO3(28)/BUN(28)/Cr(1.40)Glu(158)/Ca(8.7)/Mg(1.9)/PO4(5.5)     @ 05:00  Na(142)/K(3.6)/Cl(101)/HCO3(29)/BUN(16)/Cr(1.36)Glu(182)/Ca(8.9)/Mg(1.8)/PO4(3.9)     @ 08:00  Na(141)/K(3.1)/Cl(99)/HCO3(30)/BUN(12)/Cr(1.11)Glu(170)/Ca(8.6)/Mg(--)/PO4(--)     @ 00:30    Urinalysis Basic - ( 16 Aug 2017 13:27 )  Color: YELLOW / Appearance: HAZY / S.025 / pH: 6.0  Gluc: NEGATIVE / Ketone: NEGATIVE  / Bili: SMALL / Urobili: 4 E.U.   Blood: NEGATIVE / Protein: 150 / Nitrite: NEGATIVE   Leuk Esterase: TRACE / RBC: 5-10 / WBC 25-50   Sq Epi: MOD / Non Sq Epi: x / Bacteria: MANY      IMPRESSION:56F w/ HTN, DM2, ABDIEL, NICM (20-25%) and ESRD-HD, 8/15/17 a/w SOB.    (1)Renal - ESRD- although her labs suggest that she could potentially discontinue dialysis, the fact that she presents to the ER with shortness of breath despite receiving TIW HD suggests that chronic dialysis is in fact needed. Were she to discontinue dialysis, the would likely be significantly more prone to development of pulmonary edema.    (2)Hypokalemia - K+ well-controlled on HD.     (3)CV - SOB - pulmonary edema?      RECOMMEND:  (1)Next HD today - 3kg UF as able  (2)Renal diet  (3)Dose new meds for GFR <10/HD  (4)F/U Stress Test      Perfecto Rae MD  [facility]  [phone] No pain, (+)mild sob    VITAL:  T(C): , Max: 37.1 (17 @ 22:02)  T(F): , Max: 98.7 (17 @ 22:02)  HR: 72 (17 @ 06:14)  BP: 153/88 (17 @ 06:14)  BP(mean): --  RR: 18 (17 @ 06:14)  SpO2: 99% (17 @ 06:14)      PHYSICAL EXAM:  Constitutional: NAD, Alert; overweight;   HEENT: NCAT, MMM  Neck: Supple, No JVD, (+)permacath  Respiratory: CTA-b/l  Cardiovascular: RRR s1s2, no m/r/g  Gastrointestinal: BS+, soft, NT/ND  Extremities: No peripheral edema b/l      LABS:                        9.5    5.14  )-----------( 188      ( 17 Aug 2017 05:00 )             31.6     Na(143)/K(3.8)/Cl(102)/HCO3(28)/BUN(28)/Cr(1.40)Glu(158)/Ca(8.7)/Mg(1.9)/PO4(5.5)     @ 05:00  Na(142)/K(3.6)/Cl(101)/HCO3(29)/BUN(16)/Cr(1.36)Glu(182)/Ca(8.9)/Mg(1.8)/PO4(3.9)     @ 08:00  Na(141)/K(3.1)/Cl(99)/HCO3(30)/BUN(12)/Cr(1.11)Glu(170)/Ca(8.6)/Mg(--)/PO4(--)     @ 00:30    Urinalysis Basic - ( 16 Aug 2017 13:27 )  Color: YELLOW / Appearance: HAZY / S.025 / pH: 6.0  Gluc: NEGATIVE / Ketone: NEGATIVE  / Bili: SMALL / Urobili: 4 E.U.   Blood: NEGATIVE / Protein: 150 / Nitrite: NEGATIVE   Leuk Esterase: TRACE / RBC: 5-10 / WBC 25-50   Sq Epi: MOD / Non Sq Epi: x / Bacteria: MANY      IMPRESSION:56F w/ HTN, DM2, ABDIEL, NICM (20-25%) and ESRD-HD, 8/15/17 a/w SOB.    (1)Renal - ESRD-  her labs suggest that she could potentially discontinue dialysis; however, she likely needs the dialysis to avoid frequent readmissions for pulmonary edema.    (2)Hypokalemia - K+ well-controlled on HD.     (3)CV - SOB - pulmonary edema? We have not been too aggressive with ultrafiltration at [facility]. Can try being aggressive with UF today.      RECOMMEND:  (1)HD today - 3kg UF as able  (2)Renal diet  (3)Dose new meds for GFR <10/HD  (4)F/U Stress Test      Perfecto Rae MD  [facility]  [phone]

## 2017-08-17 NOTE — CONSULT NOTE ADULT - ATTENDING COMMENTS
Pt. was seen and examined. Agree with above. Plan d/w the team.  ESRD on HD via PC  Will plan for AVG next week when pt. is medically optimized.   Pt. agrees with the plan

## 2017-08-17 NOTE — CONSULT NOTE ADULT - SUBJECTIVE AND OBJECTIVE BOX
VASCULAR  SURGERY CONSULT NOTE    Patient is a 56y old  Female with extensive past medical history, who presents with a chief complaint of difficulty breathing x 3 days. Patient had been scheduled for a right AV graft with Dr. Vazquez for Friday, . On  she underwent a right brachobasilic fistula. Patient states that she is new to dialysis last month. She was seen and examined while having dialysis through a right permacath. She states that she has completed part one of a stress test and is awaiting part two.       HPI:  55 y/o female p/w progressively worsening difficulty breathing x 2 days with no precipitating event.  States that it started 2 days ago during the day, was unable to fall asleep at night, then worsened yesterday early morning.  Pt went to dialysis, completed treatment, then came to ED.  Also c/o accompanying intermittent chest tightness, 8/10 severity, non-radiating, but denies "pain", lasts a few seconds, worse with lying down for the last 2 days. Also has NICHOLSON when walking 15 feet (baseline: 2-3 blocks), and HA while at dialysis, relieved with Tylenol, now returning.  Pt also has a cough, mainly dry, but had 2 episodes yesterday of producing a small amount of white mucus.  Denies dizziness, lightheadedness, fevers, chills, weight changes, N/V/D, palpitations, pleuritic or reproducible pain, edema, claudication, muscle/joint pain. (16 Aug 2017 10:24)        PAST MEDICAL & SURGICAL HISTORY:  Gout  GERD (gastroesophageal reflux disease)  CKD (chronic kidney disease), stage 3 (moderate)  Nonischemic cardiomyopathy: EF 20-25%  Sleep apnea: noncompliant with CPAP  Diabetic neuropathy  HTN (hypertension)  AICD (automatic cardioverter/defibrillator) present: ICD (Medtronic generator with Medtronic atrial (4076)and ventricular (6947) leads) 07  Cardiac Pacemaker  CHF (Congestive Heart Failure): on Home O2 2L prn  HLD (Hyperlipidemia)  Diabetes Mellitus: x 10 years, denies nephropathy or retinopathy  H/O:   H/O: hysterectomy  AICD (automatic cardioverter/defibrillator) present: Medtronic generator with Medtronic atrial (4076)and ventricular (6947) leads) 07      FAMILY HISTORY:  Family history of diabetes mellitus in father (Father): Father, , age 87  Family history of hypertension (Mother, Father): Mother and Father  Family history of congestive heart failure (Mother): Mother, CHF, , age 51      SOCIAL HISTORY:    MEDICATIONS  (STANDING):  sodium chloride 0.9% lock flush 3 milliLiter(s) IV Push every 8 hours  aspirin enteric coated 81 milliGRAM(s) Oral daily  atorvastatin 80 milliGRAM(s) Oral at bedtime  carvedilol 25 milliGRAM(s) Oral every 12 hours  pantoprazole    Tablet 40 milliGRAM(s) Oral before breakfast  cholecalciferol 2000 Unit(s) Oral daily  insulin glargine Injectable (LANTUS) 28 Unit(s) SubCutaneous at bedtime  insulin lispro Injectable (HumaLOG) 6 Unit(s) SubCutaneous three times a day before meals  insulin lispro (HumaLOG) corrective regimen sliding scale   SubCutaneous three times a day before meals  insulin lispro (HumaLOG) corrective regimen sliding scale   SubCutaneous at bedtime  dextrose 5%. 1000 milliLiter(s) (50 mL/Hr) IV Continuous <Continuous>  dextrose 50% Injectable 12.5 Gram(s) IV Push once  dextrose 50% Injectable 25 Gram(s) IV Push once  dextrose 50% Injectable 25 Gram(s) IV Push once  heparin  Injectable 5000 Unit(s) SubCutaneous every 8 hours    MEDICATIONS  (PRN):  ALBUTerol    90 MICROgram(s) HFA Inhaler 2 Puff(s) Inhalation every 6 hours PRN Shortness of Breath and/or Wheezing  dextrose Gel 1 Dose(s) Oral once PRN Blood Glucose LESS THAN 70 milliGRAM(s)/deciliter  glucagon  Injectable 1 milliGRAM(s) IntraMuscular once PRN Glucose LESS THAN 70 milligrams/deciliter  gabapentin 100 milliGRAM(s) Oral daily PRN neuropathy    Allergies    penicillin (Anaphylaxis; Short breath)    Intolerances      Physical Exam  Vital Signs Last 24 Hrs  T(C): 36.8 (17 Aug 2017 10:35), Max: 37.1 (16 Aug 2017 22:02)  T(F): 98.2 (17 Aug 2017 10:35), Max: 98.7 (16 Aug 2017 22:02)  HR: 72 (17 Aug 2017 10:35) (72 - 84)  BP: 126/71 (17 Aug 2017 10:35) (126/71 - 164/94)  BP(mean): --  RR: 18 (17 Aug 2017 10:35) (18 - 20)  SpO2: 100% (17 Aug 2017 10:35) (98% - 100%)  Daily     Daily Weight in k.9 (17 Aug 2017 10:35)    Gen: NAD, well nourished, alert and oriented  Resp: in no distress, on home nasal cannula  CV: S1S2 RRR  Abd: obese, soft, not tender  R arm brachobasilic fistula with pulse, no thrill, right neck permacath being used for dialysis, appears clean                        9.5    5.14  )-----------( 188      ( 17 Aug 2017 05:00 )             31.6         143  |  102  |  28<H>  ----------------------------<  158<H>  3.8   |  28  |  1.40<H>    Ca    8.7      17 Aug 2017 05:00  Phos  5.5       Mg     1.9         TPro  6.5  /  Alb  3.5  /  TBili  0.4  /  DBili  x   /  AST  14  /  ALT  14  /  AlkPhos  92  08-17    PT/INR - ( 16 Aug 2017 00:30 )   PT: 12.6 SEC;   INR: 1.12          PTT - ( 16 Aug 2017 00:30 )  PTT:26.2 SEC  Urinalysis Basic - ( 16 Aug 2017 13:27 )    Color: YELLOW / Appearance: HAZY / S.025 / pH: 6.0  Gluc: NEGATIVE / Ketone: NEGATIVE  / Bili: SMALL / Urobili: 4 E.U.   Blood: NEGATIVE / Protein: 150 / Nitrite: NEGATIVE   Leuk Esterase: TRACE / RBC: 5-10 / WBC 25-50   Sq Epi: MOD / Non Sq Epi: x / Bacteria: MANY        IMAGING STUDIES:

## 2017-08-17 NOTE — PROGRESS NOTE ADULT - SUBJECTIVE AND OBJECTIVE BOX
Patient is a 56y old  Female who presents with a chief complaint of Difficulty breathing x 3 days (16 Aug 2017 10:24)    Feels better today    MEDICATIONS  (STANDING):  sodium chloride 0.9% lock flush 3 milliLiter(s) IV Push every 8 hours  aspirin enteric coated 81 milliGRAM(s) Oral daily  atorvastatin 80 milliGRAM(s) Oral at bedtime  carvedilol 25 milliGRAM(s) Oral every 12 hours  pantoprazole    Tablet 40 milliGRAM(s) Oral before breakfast  cholecalciferol 2000 Unit(s) Oral daily  insulin glargine Injectable (LANTUS) 28 Unit(s) SubCutaneous at bedtime  insulin lispro Injectable (HumaLOG) 6 Unit(s) SubCutaneous three times a day before meals  insulin lispro (HumaLOG) corrective regimen sliding scale   SubCutaneous three times a day before meals  insulin lispro (HumaLOG) corrective regimen sliding scale   SubCutaneous at bedtime  dextrose 5%. 1000 milliLiter(s) (50 mL/Hr) IV Continuous <Continuous>  dextrose 50% Injectable 12.5 Gram(s) IV Push once  dextrose 50% Injectable 25 Gram(s) IV Push once  dextrose 50% Injectable 25 Gram(s) IV Push once  heparin  Injectable 5000 Unit(s) SubCutaneous every 8 hours    MEDICATIONS  (PRN):  ALBUTerol    90 MICROgram(s) HFA Inhaler 2 Puff(s) Inhalation every 6 hours PRN Shortness of Breath and/or Wheezing  dextrose Gel 1 Dose(s) Oral once PRN Blood Glucose LESS THAN 70 milliGRAM(s)/deciliter  glucagon  Injectable 1 milliGRAM(s) IntraMuscular once PRN Glucose LESS THAN 70 milligrams/deciliter  gabapentin 100 milliGRAM(s) Oral daily PRN neuropathy              Allergies    penicillin (Anaphylaxis; Short breath)    Intolerances        REVIEW OF SYSTEMS:  CARDIOVASCULAR: No chest pain, palpitations, dizziness, or leg swelling; no shortness of breath     RESPIRATORY: No cough, wheezing, chills or hemoptysis; No shortness of breath    GASTROINTESTINAL: No abdominal or epigastric pain. No nausea, vomiting, or hematemesis; No diarrhea or constipation. No melena or hematochezia.    NEUROLOGICAL: No headaches, memory loss, loss of strength, numbness      PHYSICAL EXAM:  Vital Signs Last 24 Hrs  T(C): 36.6 (17 Aug 2017 06:14), Max: 37.1 (16 Aug 2017 22:02)  T(F): 97.9 (17 Aug 2017 06:14), Max: 98.7 (16 Aug 2017 22:02)  HR: 72 (17 Aug 2017 06:14) (72 - 85)  BP: 153/88 (17 Aug 2017 06:14) (127/72 - 164/94)  BP(mean): --  RR: 18 (17 Aug 2017 06:14) (18 - 20)  SpO2: 99% (17 Aug 2017 06:14) (93% - 99%)    GENERAL: NAD, well-groomed, well-developed  HEAD:  Atraumatic, Normocephalic  EYES: EOMI, PERRLA, conjunctiva and sclera clear  NECK: Supple, No JVD, Normal thyroid  NERVOUS SYSTEM:  Alert & Oriented X3, Good concentration;  and symmetric  CHEST/LUNG: Clear to auscultation bilaterally; No rales, rhonchi, wheezing, or rubs  HEART: S1S2 regular, without murmur, rub nor gallop  ABDOMEN: Soft, Nontender, Nondistended; Bowel sounds present  EXTREMITIES:  2+ Peripheral Pulses, No clubbing, cyanosis, or edema      LABS:                        9.5    5.14  )-----------( 188      ( 17 Aug 2017 05:00 )             31.6     17 Aug 2017 05:00    143    |  102    |  28     ----------------------------<  158    3.8     |  28     |  1.40     Ca    8.7        17 Aug 2017 05:00  Phos  5.5       17 Aug 2017 05:00  Mg     1.9       17 Aug 2017 05:00    TPro  6.5    /  Alb  3.5    /  TBili  0.4    /  DBili  x      /  AST  14     /  ALT  14     /  AlkPhos  92     17 Aug 2017 05:00    PT/INR - ( 16 Aug 2017 00:30 )   PT: 12.6 SEC;   INR: 1.12          PTT - ( 16 Aug 2017 00:30 )  PTT:26.2 SEC  CAPILLARY BLOOD GLUCOSE  194 (17 Aug 2017 08:45)  183 (17 Aug 2017 07:31)  184 (16 Aug 2017 22:24)  121 (16 Aug 2017 18:13)  154 (16 Aug 2017 14:03)              Consultant(s) Notes Reviewed:  Nephrology      assessment: SOB- Etiology?  Fluid overload      Plan:   HD today.  To complete stress test (pharm) today

## 2017-08-17 NOTE — CONSULT NOTE ADULT - ASSESSMENT
56F with multiple medical problems presents with shortness of breath and chest pain, scheduled for AV graft on 8/25  -right brachiobasilic AVF non functional, currently being dialyzed through [facility]  -full medical optimization prior to surgery next week  -will f/u result of stress test  -d/w Dr. Vazquez

## 2017-08-18 LAB
BUN SERPL-MCNC: 36 MG/DL — HIGH (ref 7–23)
CALCIUM SERPL-MCNC: 9.4 MG/DL — SIGNIFICANT CHANGE UP (ref 8.4–10.5)
CHLORIDE SERPL-SCNC: 100 MMOL/L — SIGNIFICANT CHANGE UP (ref 98–107)
CO2 SERPL-SCNC: 25 MMOL/L — SIGNIFICANT CHANGE UP (ref 22–31)
CREAT SERPL-MCNC: 1.94 MG/DL — HIGH (ref 0.5–1.3)
GLUCOSE SERPL-MCNC: 168 MG/DL — HIGH (ref 70–99)
HCT VFR BLD CALC: 35.5 % — SIGNIFICANT CHANGE UP (ref 34.5–45)
HGB BLD-MCNC: 10.5 G/DL — LOW (ref 11.5–15.5)
MAGNESIUM SERPL-MCNC: 1.9 MG/DL — SIGNIFICANT CHANGE UP (ref 1.6–2.6)
MCHC RBC-ENTMCNC: 27.9 PG — SIGNIFICANT CHANGE UP (ref 27–34)
MCHC RBC-ENTMCNC: 29.6 % — LOW (ref 32–36)
MCV RBC AUTO: 94.2 FL — SIGNIFICANT CHANGE UP (ref 80–100)
NRBC # FLD: 0 — SIGNIFICANT CHANGE UP
PLATELET # BLD AUTO: 177 K/UL — SIGNIFICANT CHANGE UP (ref 150–400)
PMV BLD: 11.1 FL — SIGNIFICANT CHANGE UP (ref 7–13)
POTASSIUM SERPL-MCNC: 4.3 MMOL/L — SIGNIFICANT CHANGE UP (ref 3.5–5.3)
POTASSIUM SERPL-SCNC: 4.3 MMOL/L — SIGNIFICANT CHANGE UP (ref 3.5–5.3)
RBC # BLD: 3.77 M/UL — LOW (ref 3.8–5.2)
RBC # FLD: 17.2 % — HIGH (ref 10.3–14.5)
SODIUM SERPL-SCNC: 140 MMOL/L — SIGNIFICANT CHANGE UP (ref 135–145)
WBC # BLD: 5.67 K/UL — SIGNIFICANT CHANGE UP (ref 3.8–10.5)
WBC # FLD AUTO: 5.67 K/UL — SIGNIFICANT CHANGE UP (ref 3.8–10.5)

## 2017-08-18 RX ORDER — ACETAMINOPHEN 500 MG
650 TABLET ORAL ONCE
Qty: 0 | Refills: 0 | Status: COMPLETED | OUTPATIENT
Start: 2017-08-18 | End: 2017-08-18

## 2017-08-18 RX ADMIN — Medication 20 MILLIEQUIVALENT(S): at 00:19

## 2017-08-18 RX ADMIN — SODIUM CHLORIDE 3 MILLILITER(S): 9 INJECTION INTRAMUSCULAR; INTRAVENOUS; SUBCUTANEOUS at 21:59

## 2017-08-18 RX ADMIN — Medication 6 UNIT(S): at 13:12

## 2017-08-18 RX ADMIN — Medication 81 MILLIGRAM(S): at 13:11

## 2017-08-18 RX ADMIN — HEPARIN SODIUM 5000 UNIT(S): 5000 INJECTION INTRAVENOUS; SUBCUTANEOUS at 13:11

## 2017-08-18 RX ADMIN — Medication 2000 UNIT(S): at 13:11

## 2017-08-18 RX ADMIN — Medication: at 13:12

## 2017-08-18 RX ADMIN — GABAPENTIN 100 MILLIGRAM(S): 400 CAPSULE ORAL at 09:18

## 2017-08-18 RX ADMIN — SODIUM CHLORIDE 3 MILLILITER(S): 9 INJECTION INTRAMUSCULAR; INTRAVENOUS; SUBCUTANEOUS at 05:34

## 2017-08-18 RX ADMIN — Medication 6 UNIT(S): at 17:18

## 2017-08-18 RX ADMIN — Medication 650 MILLIGRAM(S): at 10:18

## 2017-08-18 RX ADMIN — MAGNESIUM OXIDE 400 MG ORAL TABLET 400 MILLIGRAM(S): 241.3 TABLET ORAL at 09:18

## 2017-08-18 RX ADMIN — INSULIN GLARGINE 28 UNIT(S): 100 INJECTION, SOLUTION SUBCUTANEOUS at 21:54

## 2017-08-18 RX ADMIN — HEPARIN SODIUM 5000 UNIT(S): 5000 INJECTION INTRAVENOUS; SUBCUTANEOUS at 21:54

## 2017-08-18 RX ADMIN — HEPARIN SODIUM 5000 UNIT(S): 5000 INJECTION INTRAVENOUS; SUBCUTANEOUS at 05:33

## 2017-08-18 RX ADMIN — Medication 6 UNIT(S): at 08:45

## 2017-08-18 RX ADMIN — SODIUM CHLORIDE 3 MILLILITER(S): 9 INJECTION INTRAMUSCULAR; INTRAVENOUS; SUBCUTANEOUS at 13:12

## 2017-08-18 RX ADMIN — Medication 650 MILLIGRAM(S): at 09:18

## 2017-08-18 RX ADMIN — PANTOPRAZOLE SODIUM 40 MILLIGRAM(S): 20 TABLET, DELAYED RELEASE ORAL at 05:33

## 2017-08-18 RX ADMIN — ATORVASTATIN CALCIUM 80 MILLIGRAM(S): 80 TABLET, FILM COATED ORAL at 21:54

## 2017-08-18 RX ADMIN — Medication 1: at 08:44

## 2017-08-18 RX ADMIN — MAGNESIUM OXIDE 400 MG ORAL TABLET 400 MILLIGRAM(S): 241.3 TABLET ORAL at 17:18

## 2017-08-18 RX ADMIN — CARVEDILOL PHOSPHATE 25 MILLIGRAM(S): 80 CAPSULE, EXTENDED RELEASE ORAL at 17:18

## 2017-08-18 RX ADMIN — CARVEDILOL PHOSPHATE 25 MILLIGRAM(S): 80 CAPSULE, EXTENDED RELEASE ORAL at 05:32

## 2017-08-18 NOTE — PROGRESS NOTE ADULT - SUBJECTIVE AND OBJECTIVE BOX
Patient is a 56y old  Female who presents with a chief complaint of Difficulty breathing x 3 days (16 Aug 2017 10:24)  patient feels somewhat better; has not ambulated    MEDICATIONS  (STANDING):  sodium chloride 0.9% lock flush 3 milliLiter(s) IV Push every 8 hours  aspirin enteric coated 81 milliGRAM(s) Oral daily  atorvastatin 80 milliGRAM(s) Oral at bedtime  carvedilol 25 milliGRAM(s) Oral every 12 hours  pantoprazole    Tablet 40 milliGRAM(s) Oral before breakfast  cholecalciferol 2000 Unit(s) Oral daily  insulin glargine Injectable (LANTUS) 28 Unit(s) SubCutaneous at bedtime  insulin lispro Injectable (HumaLOG) 6 Unit(s) SubCutaneous three times a day before meals  insulin lispro (HumaLOG) corrective regimen sliding scale   SubCutaneous three times a day before meals  insulin lispro (HumaLOG) corrective regimen sliding scale   SubCutaneous at bedtime  dextrose 5%. 1000 milliLiter(s) (50 mL/Hr) IV Continuous <Continuous>  dextrose 50% Injectable 12.5 Gram(s) IV Push once  dextrose 50% Injectable 25 Gram(s) IV Push once  dextrose 50% Injectable 25 Gram(s) IV Push once  heparin  Injectable 5000 Unit(s) SubCutaneous every 8 hours  magnesium oxide 400 milliGRAM(s) Oral two times a day with meals    MEDICATIONS  (PRN):  ALBUTerol    90 MICROgram(s) HFA Inhaler 2 Puff(s) Inhalation every 6 hours PRN Shortness of Breath and/or Wheezing  dextrose Gel 1 Dose(s) Oral once PRN Blood Glucose LESS THAN 70 milliGRAM(s)/deciliter  glucagon  Injectable 1 milliGRAM(s) IntraMuscular once PRN Glucose LESS THAN 70 milligrams/deciliter  gabapentin 100 milliGRAM(s) Oral daily PRN neuropathy            Allergies    penicillin (Anaphylaxis; Short breath)    Intolerances        REVIEW OF SYSTEMS:  CARDIOVASCULAR: No chest pain, palpitations, dizziness, or leg swelling; no shortness of breath     RESPIRATORY: No cough, wheezing, chills or hemoptysis; No shortness of breath    GASTROINTESTINAL: No abdominal or epigastric pain. No nausea, vomiting, or hematemesis; No diarrhea or constipation. No melena or hematochezia.    NEUROLOGICAL: No headaches, memory loss, loss of strength, numbness      PHYSICAL EXAM:  Vital Signs Last 24 Hrs  T(C): 36.4 (18 Aug 2017 05:26), Max: 36.8 (17 Aug 2017 10:35)  T(F): 97.6 (18 Aug 2017 05:26), Max: 98.2 (17 Aug 2017 10:35)  HR: 70 (18 Aug 2017 05:26) (70 - 78)  BP: 131/75 (18 Aug 2017 05:26) (119/65 - 131/75)  BP(mean): --  RR: 18 (18 Aug 2017 05:26) (16 - 18)  SpO2: 100% (18 Aug 2017 05:26) (98% - 100%)    GENERAL: NAD, well-groomed, well-developed  HEAD:  Atraumatic, Normocephalic  EYES: EOMI, PERRLA, conjunctiva and sclera clear  NECK: Supple, No JVD, Normal thyroid  NERVOUS SYSTEM:  Alert & Oriented X3, Good concentration;  and symmetric  CHEST/LUNG: Clear to auscultation bilaterally; No rales, rhonchi, wheezing, or rubs  HEART: S1S2 regular, without murmur, rub nor gallop  ABDOMEN: Soft, Nontender, Nondistended; Bowel sounds present  EXTREMITIES:  2+ Peripheral Pulses, No clubbing, cyanosis, or edema      LABS:                        10.5   5.67  )-----------( 177      ( 18 Aug 2017 06:00 )             35.5 < from: Nuclear Stress Test-Pharmacologic (08.16.17 @ 14:35) >  I >      18 Aug 2017 06:00    140    |  100    |  36     ----------------------------<  168    4.3     |  25     |  1.94     Ca    9.4        18 Aug 2017 06:00  Mg     1.9       18 Aug 2017 06:00        CAPILLARY BLOOD GLUCOSE  156 (18 Aug 2017 08:24)  207 (17 Aug 2017 21:43)  165 (17 Aug 2017 16:59)  159 (17 Aug 2017 12:07)    MPRESSIONS:Abnormal Study  * Myocardial Perfusion SPECT results are abnormal.  * There is a medium sized, moderate to severe defect in  proximal to mid lateral wall that is fixed, suggestive of  infarct.There is a small, severe defect in apical wall  that is fixed, suggestive of infarct. There is a somewhat  non-homogenous or patchy pattern of tracer uptake in the  remaining segments.  * Post-stress gated wall motion analysis was performed  (LVEF = 17 %;LVEDV =285 ml.), revealing severe  hypokinesis with paradoxical septal motion.  *** Compared with Nuclear/Stress test of 5/16/2011, the  findings were similar. However, the observed defects are  somewhat more pronounced in the current study.    < end of copied text         Consultant(s) Notes Reviewed:        assessment:  cardiomyopathy    Plan:   ambulate patient.  If no NICHOLSON, ok to discharge with medical management.  Nephrology should comment on safety of ACE or ARB, or Entresto.   outpatient CPAP eval.   Consider HF eval    :

## 2017-08-18 NOTE — DIETITIAN INITIAL EVALUATION ADULT. - OTHER INFO
Pt seen for Consult for RD visit. 57 y/o male admitted with the DX of ESRD on HD ,HTN, HLD, DM2. Pt reports good po and appetite with no N/V/D eating, chewing difficulty or significant weight loss reported at this time. Pt sees a Dietitian at the Dialysis center and is aware of diet restrictions. LABS reviewed, current diet remains appropriate . Diet compliance encouraged. RD remains available, Pt made aware.

## 2017-08-18 NOTE — DIETITIAN INITIAL EVALUATION ADULT. - PROBLEM SELECTOR PLAN 1
Admit to Telemetry, serial CE's, EKG's, FLP, continue ASA, BB, statin, F/U Cardiology note- check pNST

## 2017-08-18 NOTE — PROGRESS NOTE ADULT - SUBJECTIVE AND OBJECTIVE BOX
No pain, no shortness of breath      VITAL:  T(C): , Max: 36.8 (08-17-17 @ 10:35)  T(F): , Max: 98.2 (08-17-17 @ 10:35)  HR: 70 (08-18-17 @ 05:26)  BP: 131/75 (08-18-17 @ 05:26)  BP(mean): --  RR: 18 (08-18-17 @ 05:26)  SpO2: 100% (08-18-17 @ 05:26)      PHYSICAL EXAM:  Constitutional: NAD, Alert; overweight;   HEENT: NCAT, MMM  Neck: Supple, No JVD, (+)permacath  Respiratory: CTA-b/l  Cardiovascular: RRR s1s2, no m/r/g  Gastrointestinal: BS+, soft, NT/ND  Extremities: No peripheral edema b/l      LABS:                        10.5   5.67  )-----------( 177      ( 18 Aug 2017 06:00 )             35.5     Na(140)/K(4.3)/Cl(100)/HCO3(25)/BUN(36)/Cr(1.94)Glu(168)/Ca(9.4)/Mg(1.9)/PO4(--)    08-18 @ 06:00  Na(143)/K(3.8)/Cl(102)/HCO3(28)/BUN(28)/Cr(1.40)Glu(158)/Ca(8.7)/Mg(1.9)/PO4(5.5)    08-17 @ 05:00  Na(142)/K(3.6)/Cl(101)/HCO3(29)/BUN(16)/Cr(1.36)Glu(182)/Ca(8.9)/Mg(1.8)/PO4(3.9)    08-16 @ 08:00  Na(141)/K(3.1)/Cl(99)/HCO3(30)/BUN(12)/Cr(1.11)Glu(170)/Ca(8.6)/Mg(--)/PO4(--)    08-16 @ 00:30    Stress (8/17/17) - EF 17%; fixed defects    IMPRESSION:56F w/ HTN, DM2, ABDIEL, NICM (20-25%) and ESRD-HD, 8/15/17 a/w SOB.    (1)Renal - ESRD-  her labs suggest that she could potentially discontinue dialysis; however, she likely needs the dialysis to avoid frequent readmissions for pulmonary edema.    (2)Hypokalemia - K+ well-controlled on HD.     (3)CV - presumed pulmonary edema - s/p HD yesterday - tolerated aggressive UF      RECOMMEND:                Perfecto Rae MD  [facility]  [phone] No pain, no shortness of breath - feels better than yesterday, s/p HD with aggressive UF. Admits that she did cramp towards the end of HD.      VITAL:  T(C): , Max: 36.8 (08-17-17 @ 10:35)  T(F): , Max: 98.2 (08-17-17 @ 10:35)  HR: 70 (08-18-17 @ 05:26)  BP: 131/75 (08-18-17 @ 05:26)  BP(mean): --  RR: 18 (08-18-17 @ 05:26)  SpO2: 100% (08-18-17 @ 05:26)      PHYSICAL EXAM:  Constitutional: NAD, Alert; overweight;   HEENT: NCAT, MMM  Neck: Supple, No JVD, (+)permacath  Respiratory: CTA-b/l  Cardiovascular: RRR s1s2, no m/r/g  Gastrointestinal: BS+, soft, NT/ND  Extremities: No peripheral edema b/l      LABS:                        10.5   5.67  )-----------( 177      ( 18 Aug 2017 06:00 )             35.5     Na(140)/K(4.3)/Cl(100)/HCO3(25)/BUN(36)/Cr(1.94)Glu(168)/Ca(9.4)/Mg(1.9)/PO4(--)    08-18 @ 06:00  Na(143)/K(3.8)/Cl(102)/HCO3(28)/BUN(28)/Cr(1.40)Glu(158)/Ca(8.7)/Mg(1.9)/PO4(5.5)    08-17 @ 05:00  Na(142)/K(3.6)/Cl(101)/HCO3(29)/BUN(16)/Cr(1.36)Glu(182)/Ca(8.9)/Mg(1.8)/PO4(3.9)    08-16 @ 08:00  Na(141)/K(3.1)/Cl(99)/HCO3(30)/BUN(12)/Cr(1.11)Glu(170)/Ca(8.6)/Mg(--)/PO4(--)    08-16 @ 00:30    Stress (8/17/17) - EF 17%; fixed defects    IMPRESSION:56F w/ HTN, DM2, ABDIEL, NICM (20-25%) and ESRD-HD, 8/15/17 a/w SOB.    (1)Renal - ESRD-HD TTS; feels less short of breath s/p HD with aggressive UF yesterday    (2)Hypokalemia - K+ well-controlled on HD.     (3)CV - SOB due to pulmonary edema. Improved, s/p HD with aggressive UF yesterday      RECOMMEND:    (1)No objection to discharge with next HD tomorrow at [facility]  (2)I will reach out to setting and lower her goal weight by 1kg              Perfecto Rae MD  [facility]  [phone]

## 2017-08-19 VITALS — WEIGHT: 233.03 LBS

## 2017-08-19 LAB
BUN SERPL-MCNC: 46 MG/DL — HIGH (ref 7–23)
CALCIUM SERPL-MCNC: 8.8 MG/DL — SIGNIFICANT CHANGE UP (ref 8.4–10.5)
CHLORIDE SERPL-SCNC: 101 MMOL/L — SIGNIFICANT CHANGE UP (ref 98–107)
CO2 SERPL-SCNC: 28 MMOL/L — SIGNIFICANT CHANGE UP (ref 22–31)
CREAT SERPL-MCNC: 1.38 MG/DL — HIGH (ref 0.5–1.3)
GLUCOSE SERPL-MCNC: 166 MG/DL — HIGH (ref 70–99)
HCT VFR BLD CALC: 31.6 % — LOW (ref 34.5–45)
HGB BLD-MCNC: 9.5 G/DL — LOW (ref 11.5–15.5)
MCHC RBC-ENTMCNC: 27.9 PG — SIGNIFICANT CHANGE UP (ref 27–34)
MCHC RBC-ENTMCNC: 30.1 % — LOW (ref 32–36)
MCV RBC AUTO: 92.9 FL — SIGNIFICANT CHANGE UP (ref 80–100)
NRBC # FLD: 0 — SIGNIFICANT CHANGE UP
PLATELET # BLD AUTO: 186 K/UL — SIGNIFICANT CHANGE UP (ref 150–400)
PMV BLD: 11.6 FL — SIGNIFICANT CHANGE UP (ref 7–13)
POTASSIUM SERPL-MCNC: 4.1 MMOL/L — SIGNIFICANT CHANGE UP (ref 3.5–5.3)
POTASSIUM SERPL-SCNC: 4.1 MMOL/L — SIGNIFICANT CHANGE UP (ref 3.5–5.3)
RBC # BLD: 3.4 M/UL — LOW (ref 3.8–5.2)
RBC # FLD: 17.1 % — HIGH (ref 10.3–14.5)
SODIUM SERPL-SCNC: 141 MMOL/L — SIGNIFICANT CHANGE UP (ref 135–145)
WBC # BLD: 4.71 K/UL — SIGNIFICANT CHANGE UP (ref 3.8–10.5)
WBC # FLD AUTO: 4.71 K/UL — SIGNIFICANT CHANGE UP (ref 3.8–10.5)

## 2017-08-19 RX ORDER — LISINOPRIL 2.5 MG/1
2.5 TABLET ORAL DAILY
Qty: 0 | Refills: 0 | Status: DISCONTINUED | OUTPATIENT
Start: 2017-08-20 | End: 2017-08-19

## 2017-08-19 RX ORDER — ALBUTEROL 90 UG/1
2 AEROSOL, METERED ORAL
Qty: 28 | Refills: 0 | OUTPATIENT
Start: 2017-08-19 | End: 2017-09-02

## 2017-08-19 RX ORDER — MAGNESIUM OXIDE 400 MG ORAL TABLET 241.3 MG
1 TABLET ORAL
Qty: 60 | Refills: 0 | OUTPATIENT
Start: 2017-08-19 | End: 2017-09-18

## 2017-08-19 RX ORDER — LISINOPRIL 2.5 MG/1
1 TABLET ORAL
Qty: 30 | Refills: 0 | OUTPATIENT
Start: 2017-08-19 | End: 2017-09-18

## 2017-08-19 RX ADMIN — Medication 81 MILLIGRAM(S): at 11:43

## 2017-08-19 RX ADMIN — PANTOPRAZOLE SODIUM 40 MILLIGRAM(S): 20 TABLET, DELAYED RELEASE ORAL at 05:16

## 2017-08-19 RX ADMIN — Medication 6 UNIT(S): at 08:53

## 2017-08-19 RX ADMIN — SODIUM CHLORIDE 3 MILLILITER(S): 9 INJECTION INTRAMUSCULAR; INTRAVENOUS; SUBCUTANEOUS at 05:16

## 2017-08-19 RX ADMIN — Medication 6 UNIT(S): at 12:39

## 2017-08-19 RX ADMIN — Medication 1: at 12:38

## 2017-08-19 RX ADMIN — Medication 2000 UNIT(S): at 11:43

## 2017-08-19 RX ADMIN — GABAPENTIN 100 MILLIGRAM(S): 400 CAPSULE ORAL at 05:16

## 2017-08-19 RX ADMIN — HEPARIN SODIUM 5000 UNIT(S): 5000 INJECTION INTRAVENOUS; SUBCUTANEOUS at 05:16

## 2017-08-19 NOTE — DISCHARGE NOTE ADULT - INSTRUCTIONS
Low salt, low fat, low cholesterol, low carb diet with fluid and renal restrictions as instructed by your cardiologist and nephrologist

## 2017-08-19 NOTE — DISCHARGE NOTE ADULT - PATIENT PORTAL LINK FT
“You can access the Follow[employer] Patient Portal, offered by [facility], by registering with the following website: [email or web address]”

## 2017-08-19 NOTE — DISCHARGE NOTE ADULT - CARE PLAN
Principal Discharge DX:	Acute on chronic systolic (congestive) heart failure  Goal:	Prevent fluid overload. Maintain euvolemia. Ensure compliance with medications.  Instructions for follow-up, activity and diet:	Follow up with cardiologist within one week of discharge. Call for appointment. Return to ED for any concerning symptoms. Continue medications as prescribed. Low salt diet with fluid restriction.  Secondary Diagnosis:	ESRD (end stage renal disease) on dialysis  Goal:	Prevent fluid overload. Maintain euvolemia. Ensure compliance with medications. Continue dialysis as scheduled.  Instructions for follow-up, activity and diet:	Follow up with cardiologist within one week of discharge. Call for appointment. Return to ED for any concerning symptoms. Continue medications as prescribed. Low salt diet with fluid restriction.  Secondary Diagnosis:	HTN (hypertension)  Goal:	Maintain adequate control of your blood pressure. Goal BP < 130/80. Continue low sodium diet.  Instructions for follow-up, activity and diet:	Follow up with PCP and/or cardiologist for ongoing medical management of your hypertension. Continue medications as prescribed. Low salt diet.  Secondary Diagnosis:	DM (diabetes mellitus)  Goal:	Maintain adequate glycemic control. Monitor your sugars. Goal HgA1C < 7.0%. Low carb diet.  Instructions for follow-up, activity and diet:	Follow up with PCP and/or endocrinologist for ongoing medical management of your diabetes. Continue your medications as prescribed. Low carb diet.

## 2017-08-19 NOTE — DISCHARGE NOTE ADULT - CARE PROVIDER_API CALL
Bryan Caicedo), Internal Medicine  [address] [address]  Phone: [phone]  Fax: [phone]    Cody Nielson), Internal Medicine  [address] [place] [id number]  Phone: [phone]  Fax: [phone]

## 2017-08-19 NOTE — DISCHARGE NOTE ADULT - NS AS ACTIVITY OBS
Showering allowed/Walking-Indoors allowed/Do not drive or operate machinery/Walking-Outdoors allowed/As tolerated/No Heavy lifting/straining

## 2017-08-19 NOTE — DISCHARGE NOTE ADULT - ADDITIONAL INSTRUCTIONS
Follow up with cardiologist within one week of discharge. Call for appointment. Return to ED for any concerning symptoms. Continue medications as prescribed. Low salt diet with fluid restriction.

## 2017-08-19 NOTE — DISCHARGE NOTE ADULT - COMMUNITY RESOURCES
[facility] [id number] [facility]. [facility] [id number] [phone] F: [phone] TTS

## 2017-08-19 NOTE — DISCHARGE NOTE ADULT - PLAN OF CARE
Prevent fluid overload. Maintain euvolemia. Ensure compliance with medications. Follow up with cardiologist within one week of discharge. Call for appointment. Return to ED for any concerning symptoms. Continue medications as prescribed. Low salt diet with fluid restriction. Prevent fluid overload. Maintain euvolemia. Ensure compliance with medications. Continue dialysis as scheduled. Maintain adequate control of your blood pressure. Goal BP < 130/80. Continue low sodium diet. Follow up with PCP and/or cardiologist for ongoing medical management of your hypertension. Continue medications as prescribed. Low salt diet. Maintain adequate glycemic control. Monitor your sugars. Goal HgA1C < 7.0%. Low carb diet. Follow up with PCP and/or endocrinologist for ongoing medical management of your diabetes. Continue your medications as prescribed. Low carb diet.

## 2017-08-19 NOTE — DISCHARGE NOTE ADULT - MEDICATION SUMMARY - MEDICATIONS TO TAKE
I will START or STAY ON the medications listed below when I get home from the hospital:    aspirin 81 mg oral delayed release tablet  -- 1 tab(s) by mouth once a day  -- Indication: For Cardioprotective    lisinopril 2.5 mg oral tablet  -- 1 tab(s) by mouth once a day  -- Indication: For HTN (hypertension)    NovoLOG 100 units/mL subcutaneous solution  -- 6 unit(s) subcutaneous 3 times a day  -- Indication: For DM (diabetes mellitus)    Toujeo SoloStar 300 units/mL subcutaneous solution  -- 35 unit(s) subcutaneous once a day (at bedtime)  -- Indication: For DM (diabetes mellitus)    Zetia 10 mg oral tablet  -- 1 tab(s) by mouth once a day  -- Indication: For HLD (hyperlipidemia)    Crestor 20 mg oral tablet  -- 1 tab(s) by mouth once a day (at bedtime)  -- Indication: For HLD (hyperlipidemia)    Uloric 40 mg oral tablet  -- 1 tab(s) by mouth once a day  -- Indication: For Home med    Coreg 25 mg oral tablet  -- 1 tab(s) by mouth 2 times a day  -- Indication: For HTN (hypertension)    Proventil HFA 90 mcg/inh inhalation aerosol  -- 2 puff(s) inhaled every 6 hours, As Needed -for shortness of breath and/or wheezing  -- For inhalation only.  It is very important that you take or use this exactly as directed.  Do not skip doses or discontinue unless directed by your doctor.  Obtain medical advice before taking any non-prescription drugs as some may affect the action of this medication.  Shake well before use.    -- Indication: For Shortness of breath    magnesium oxide 400 mg (241.3 mg elemental magnesium) oral tablet  -- 1 tab(s) by mouth 2 times a day (with meals)  -- Indication: For Supplementation    omeprazole 40 mg oral delayed release capsule  -- 1 cap(s) by mouth once a day  -- It is very important that you take or use this exactly as directed.  Do not skip doses or discontinue unless directed by your doctor.  Obtain medical advice before taking any non-prescription drugs as some may affect the action of this medication.  Swallow whole.  Do not crush.    -- Indication: For GERD (gastroesophageal reflux disease)    Vitamin D3 2000 intl units oral capsule  -- 1 cap(s) by mouth once a day  -- Indication: For Supplementation I will START or STAY ON the medications listed below when I get home from the hospital:    aspirin 81 mg oral delayed release tablet  -- 1 tab(s) by mouth once a day  -- Indication: For Cardioprotective    lisinopril 2.5 mg oral tablet  -- 1 tab(s) by mouth once a day  -- Indication: For HTN (hypertension)    NovoLOG 100 units/mL subcutaneous solution  -- 6 unit(s) subcutaneous 3 times a day  -- Indication: For DM (diabetes mellitus)    Toujeo SoloStar 300 units/mL subcutaneous solution  -- 35 unit(s) subcutaneous once a day (at bedtime)  -- Indication: For DM (diabetes mellitus)    Zetia 10 mg oral tablet  -- 1 tab(s) by mouth once a day  -- Indication: For HLD (hyperlipidemia)    Crestor 20 mg oral tablet  -- 1 tab(s) by mouth once a day (at bedtime)  -- Indication: For HLD (hyperlipidemia)    Uloric 40 mg oral tablet  -- 1 tab(s) by mouth once a day  -- Indication: For Home med    Coreg 25 mg oral tablet  -- 1 tab(s) by mouth 2 times a day  -- Indication: For HTN (hypertension)    Proventil HFA 90 mcg/inh inhalation aerosol  -- 2 puff(s) inhaled every 6 hours, As Needed -for shortness of breath and/or wheezing  -- For inhalation only.  It is very important that you take or use this exactly as directed.  Do not skip doses or discontinue unless directed by your doctor.  Obtain medical advice before taking any non-prescription drugs as some may affect the action of this medication.  Shake well before use.    -- Indication: For Home med    magnesium oxide 400 mg (241.3 mg elemental magnesium) oral tablet  -- 1 tab(s) by mouth 2 times a day (with meals)  -- Indication: For Supplementation    omeprazole 40 mg oral delayed release capsule  -- 1 cap(s) by mouth once a day  -- It is very important that you take or use this exactly as directed.  Do not skip doses or discontinue unless directed by your doctor.  Obtain medical advice before taking any non-prescription drugs as some may affect the action of this medication.  Swallow whole.  Do not crush.    -- Indication: For GERD (gastroesophageal reflux disease)    Vitamin D3 2000 intl units oral capsule  -- 1 cap(s) by mouth once a day  -- Indication: For Supplementation

## 2017-08-19 NOTE — DISCHARGE NOTE ADULT - HOSPITAL COURSE
55 y/o female with a PMHx of NICM with severe LV dysfunction, ESRD on HD, HTN, HLD and DM presented to [facility] 57 y/o female with a PMHx of NICM with severe LV dysfunction, ESRD on HD, HTN, HLD and DM presented to [facility] with shortness of breath. EKG revealed NSR with lateral TWI. Pt ruled out for ACS with two sets of negative cardiac enzymes. CXR showed pulmonary edema with bilateral pleural effusions. ProBNP was 7420. Pt was admitted to telemetry for acute on chronic systolic heart failure. Pt was seen by renal and cardiology and was diuresed via ultrafiltration with hemodialysis. Strict I&Os and daily weights were monitored during admission. Pt continued to do well with dialysis and shortness of breath improved. Pt already has home oxygen set up at home. ACE therapy was added as per renal. NST done during admission showed fixed infarcts. Pt can follow up with vascular as outpatient for AV graft on 8/25. Case discussed with Dr. Caicedo on 8/19. Pt now stable for discharge home. SW reinstated HD. 57 y/o female with a PMHx of NICM with severe LV dysfunction, ESRD on HD, HTN, HLD and DM presented to [facility] with shortness of breath. EKG revealed NSR with lateral TWI. Pt ruled out for ACS with two sets of negative cardiac enzymes. CXR showed pulmonary edema with bilateral pleural effusions. ProBNP was 7420. Pt was admitted to telemetry for acute on chronic systolic heart failure. Pt was seen by renal and cardiology and was diuresed via ultrafiltration with hemodialysis. Strict I&Os and daily weights were monitored during admission. Pt continued to do well with dialysis and shortness of breath improved. Pt already has home oxygen set up at home. ACE therapy was added as per renal. NST done during admission showed fixed infarcts. Pt can follow up with vascular as outpatient for AV graft on 8/25. Case discussed with Dr. Caicedo on 8/19. Pt now stable for discharge home. SW reinstated HD.   Pt to f/u with dr. Lou

## 2017-08-20 RX ORDER — MAGNESIUM OXIDE 400 MG ORAL TABLET 241.3 MG
1 TABLET ORAL
Qty: 10 | Refills: 0 | OUTPATIENT
Start: 2017-08-20 | End: 2017-08-25

## 2017-08-23 ENCOUNTER — OUTPATIENT (OUTPATIENT)
Dept: OUTPATIENT SERVICES | Facility: HOSPITAL | Age: 57
LOS: 1 days | End: 2017-08-23
Payer: MEDICARE

## 2017-08-23 VITALS
RESPIRATION RATE: 16 BRPM | HEART RATE: 76 BPM | WEIGHT: 225.09 LBS | SYSTOLIC BLOOD PRESSURE: 102 MMHG | TEMPERATURE: 97 F | DIASTOLIC BLOOD PRESSURE: 70 MMHG | OXYGEN SATURATION: 98 % | HEIGHT: 64 IN

## 2017-08-23 DIAGNOSIS — N18.6 END STAGE RENAL DISEASE: ICD-10-CM

## 2017-08-23 DIAGNOSIS — I77.0 ARTERIOVENOUS FISTULA, ACQUIRED: Chronic | ICD-10-CM

## 2017-08-23 DIAGNOSIS — I10 ESSENTIAL (PRIMARY) HYPERTENSION: ICD-10-CM

## 2017-08-23 DIAGNOSIS — E11.9 TYPE 2 DIABETES MELLITUS WITHOUT COMPLICATIONS: ICD-10-CM

## 2017-08-23 DIAGNOSIS — Z95.810 PRESENCE OF AUTOMATIC (IMPLANTABLE) CARDIAC DEFIBRILLATOR: ICD-10-CM

## 2017-08-23 DIAGNOSIS — G47.33 OBSTRUCTIVE SLEEP APNEA (ADULT) (PEDIATRIC): ICD-10-CM

## 2017-08-23 PROCEDURE — 93010 ELECTROCARDIOGRAM REPORT: CPT

## 2017-08-23 RX ORDER — INSULIN GLARGINE 100 [IU]/ML
35 INJECTION, SOLUTION SUBCUTANEOUS
Qty: 0 | Refills: 0 | COMMUNITY

## 2017-08-23 RX ORDER — SODIUM CHLORIDE 9 MG/ML
1000 INJECTION INTRAMUSCULAR; INTRAVENOUS; SUBCUTANEOUS
Qty: 0 | Refills: 0 | Status: DISCONTINUED | OUTPATIENT
Start: 2017-08-25 | End: 2017-09-09

## 2017-08-23 NOTE — H&P PST ADULT - HISTORY OF PRESENT ILLNESS
56yr old morbidly obese female with h/o CHF, CKD, HTN, DM neuropathy, dialysed 3 x week (Tue, Thur & Sat) and with  preop dx of end stage renal disease presents to have PST eval for Right arm arteriovenous graft scheduled on 8/25/2017.   Patient states she was hospitalized on August 15th to August 19th 2017 with c/o sob and chest pain.

## 2017-08-23 NOTE — H&P PST ADULT - NSANTHOSAYNRD_GEN_A_CORE
No. ABDIEL screening performed.  STOP BANG Legend: 0-2 = LOW Risk; 3-4 = INTERMEDIATE Risk; 5-8 = HIGH Risk

## 2017-08-23 NOTE — H&P PST ADULT - NEGATIVE ALLERGY TYPES
no reactions to food/no outdoor environmental allergies/no reactions to animals/no reactions to insect bites/no indoor environmental allergies

## 2017-08-23 NOTE — H&P PST ADULT - PROBLEM SELECTOR PLAN 1
Scheduled for Right arm arteriovenous graft on 8/25/2017.  Preop instructions provided and pt verbalizes understanding.  Lab results in chart.  Hibiclens provided with instructions.  Patient was seen by Dr Caicedo in the ER during hospitalization last week. Nuclear stress test result in chart. Case discussed with Dr Maria and spoke to Dr Caicedo (Cardiologist) and as per the cardiologist patient is cleared for the procedure. No cardiac clearance required.

## 2017-08-23 NOTE — H&P PST ADULT - PSH
AICD (automatic cardioverter/defibrillator) present  Medtronic generator with Medtronic atrial (4076)and ventricular (6947) leads) 07  H/O:     H/O: 1993 AICD (automatic cardioverter/defibrillator) present  Medtronic generator with Medtronic atrial (4076)and ventricular (6947) leads) 07  AV fistula  right x 2July   H/O:     H/O: hysterectomy  1993

## 2017-08-23 NOTE — H&P PST ADULT - NS MD HP INPLANTS MED DEV
right chest wall permacath right chest wall permcatsheree, [employer] Serial # [id number], Model # [id number]/Automatic Implantable Cardioverter Defibrillator

## 2017-08-23 NOTE — H&P PST ADULT - FAMILY HISTORY
Mother  Still living? No  Family history of congestive heart failure, Age at diagnosis: Age Unknown  Family history of hypertension, Age at diagnosis: Age Unknown     Father  Still living? No  Family history of hypertension, Age at diagnosis: Age Unknown  Family history of diabetes mellitus in father, Age at diagnosis: Age Unknown

## 2017-08-24 NOTE — ASU PATIENT PROFILE, ADULT - PSH
AICD (automatic cardioverter/defibrillator) present  Medtronic generator with Medtronic atrial (4076)and ventricular (6947) leads) 07  AV fistula  right x 2July   H/O:     H/O: hysterectomy  1993

## 2017-08-25 ENCOUNTER — APPOINTMENT (OUTPATIENT)
Dept: VASCULAR SURGERY | Facility: HOSPITAL | Age: 57
End: 2017-08-25

## 2017-08-25 ENCOUNTER — OUTPATIENT (OUTPATIENT)
Dept: OUTPATIENT SERVICES | Facility: HOSPITAL | Age: 57
LOS: 1 days | Discharge: HOME | End: 2017-08-25
Payer: MEDICARE

## 2017-08-25 VITALS
HEART RATE: 74 BPM | HEIGHT: 64 IN | SYSTOLIC BLOOD PRESSURE: 138 MMHG | DIASTOLIC BLOOD PRESSURE: 66 MMHG | WEIGHT: 225.09 LBS | TEMPERATURE: 98 F | RESPIRATION RATE: 14 BRPM | OXYGEN SATURATION: 96 %

## 2017-08-25 VITALS
HEART RATE: 97 BPM | OXYGEN SATURATION: 93 % | SYSTOLIC BLOOD PRESSURE: 145 MMHG | DIASTOLIC BLOOD PRESSURE: 81 MMHG | RESPIRATION RATE: 16 BRPM

## 2017-08-25 DIAGNOSIS — N18.6 END STAGE RENAL DISEASE: ICD-10-CM

## 2017-08-25 DIAGNOSIS — I77.0 ARTERIOVENOUS FISTULA, ACQUIRED: Chronic | ICD-10-CM

## 2017-08-25 LAB
GLUCOSE BLDV-MCNC: 196 — HIGH (ref 70–99)
POTASSIUM BLDV-SCNC: 4.7 MMOL/L — HIGH (ref 3.4–4.5)

## 2017-08-25 PROCEDURE — 36581 REPLACE TUNNELED CV CATH: CPT | Mod: 79,RT

## 2017-08-25 PROCEDURE — 71010: CPT | Mod: 26

## 2017-08-25 PROCEDURE — 93283 PRGRMG EVAL IMPLANTABLE DFB: CPT | Mod: 26

## 2017-08-25 RX ORDER — GABAPENTIN 400 MG/1
0 CAPSULE ORAL
Qty: 0 | Refills: 0 | COMMUNITY
Start: 2017-08-25

## 2017-08-25 RX ORDER — GABAPENTIN 400 MG/1
100 CAPSULE ORAL ONCE
Qty: 0 | Refills: 0 | Status: COMPLETED | OUTPATIENT
Start: 2017-08-25 | End: 2017-08-25

## 2017-08-25 RX ORDER — GABAPENTIN 400 MG/1
1 CAPSULE ORAL
Qty: 0 | Refills: 0 | COMMUNITY

## 2017-08-25 RX ADMIN — GABAPENTIN 100 MILLIGRAM(S): 400 CAPSULE ORAL at 14:40

## 2017-08-25 NOTE — ASU DISCHARGE PLAN (ADULT/PEDIATRIC). - CONDITIONS AT DISCHARGE
Verbalizing good understanding of discharge instructions and medication review.Discharge criteria met.  Cleared for discharge home by anesthesia.  Escorted to entrance  discharged home. Right chest wall permacath site CDI. Cleared  by Dr Guerra for D/C

## 2017-08-25 NOTE — BRIEF OPERATIVE NOTE - PROCEDURE
Permacath dialysis catheter insertion  08/25/2017  exchange of permacath over a wire  aborted AV graft due to shortness of breath  Active  JGOLDEN

## 2017-08-25 NOTE — ASU DISCHARGE PLAN (ADULT/PEDIATRIC). - MEDICATION SUMMARY - MEDICATIONS TO TAKE
I will START or STAY ON the medications listed below when I get home from the hospital:    aspirin 81 mg oral delayed release tablet  -- 1 tab(s) by mouth once a day  -- Indication: For End-stage renal disease    lisinopril 2.5 mg oral tablet  -- 1 tab(s) by mouth once a day  -- Indication: For End-stage renal disease    Neurontin 100 mg oral capsule  --  by mouth once a day  -- Indication: For End-stage renal disease    Toujeo SoloStar 300 units/mL subcutaneous solution  -- 36 unit(s) subcutaneous once a day (at bedtime)  -- Indication: For End-stage renal disease    NovoLOG 100 units/mL subcutaneous solution  -- 6 unit(s) subcutaneous 3 times a day  -- Indication: For End-stage renal disease    Zetia 10 mg oral tablet  -- 1 tab(s) by mouth once a day  -- Indication: For End-stage renal disease    Crestor 20 mg oral tablet  -- 1 tab(s) by mouth once a day (at bedtime)  -- Indication: For End-stage renal disease    Uloric 40 mg oral tablet  -- 1 tab(s) by mouth once a day  -- Indication: For End-stage renal disease    Coreg 25 mg oral tablet  -- 1 tab(s) by mouth 2 times a day  -- Indication: For End-stage renal disease    Proventil HFA 90 mcg/inh inhalation aerosol  -- 2 puff(s) inhaled every 6 hours, As Needed -for shortness of breath and/or wheezing  -- For inhalation only.  It is very important that you take or use this exactly as directed.  Do not skip doses or discontinue unless directed by your doctor.  Obtain medical advice before taking any non-prescription drugs as some may affect the action of this medication.  Shake well before use.    -- Indication: For End-stage renal disease    magnesium oxide 400 mg (241.3 mg elemental magnesium) oral tablet  -- 1 tab(s) by mouth 2 times a day (with meals)  -- Indication: For End-stage renal disease    omeprazole 40 mg oral delayed release capsule  -- 1 cap(s) by mouth once a day  -- It is very important that you take or use this exactly as directed.  Do not skip doses or discontinue unless directed by your doctor.  Obtain medical advice before taking any non-prescription drugs as some may affect the action of this medication.  Swallow whole.  Do not crush.    -- Indication: For End-stage renal disease    Vitamin D3 2000 intl units oral capsule  -- 1 cap(s) by mouth once a day  -- Indication: For End-stage renal disease

## 2017-08-25 NOTE — ASU DISCHARGE PLAN (ADULT/PEDIATRIC). - NOTIFY
Bleeding that does not stop/Numbness, tingling/Pain not relieved by Medications/Excessive Diarrhea/Persistent Nausea and Vomiting/Fever greater than 101/Swelling that continues/Unable to Urinate/Numbness, color, or temperature change to extremity/Inability to Tolerate Liquids or Foods

## 2017-08-25 NOTE — PROGRESS NOTE ADULT - SUBJECTIVE AND OBJECTIVE BOX
ELECTROPHYSIOLOGY  Device Interrogation Performed                                  Date/Time: 8/25/17 3:15 pm  : FARHEEN dual chamber ICD                      Model:     Evera XT DR                               Mode: VVI                              Rate: 40 bpm                                                                                                                                                 Total /BIV pacing:                                                                                                                                             Zones:     Atrial Lead:  P wave amplitude:   3.3                       mv          Impedence:   380                Ohms      Threshold:  not done              V@             ms      Ventricular Lead(s):  RV Lead: R wave amplitude:  14.8                       mv          Impedence:     342              Ohms      Threshold:     not done           V@             ms   LV Lead:  R wave amplitude:                          mv          Impedence:                   Ohms      Threshold:                V@             ms     Battery Status: X                    Good                JOAQUÍN                     EOL    Underlying Rhythm:   Sinus rhythm with HR 90s    Events/Observation: Two episodes of NSVT lasting up to 2 secs on 8/24/17 and 8/21/17     Impression/Plan:  Normal PPM / ICD function.    Good battery status. Excellent threshold capture.  No reprogramming.

## 2017-08-30 ENCOUNTER — TRANSCRIPTION ENCOUNTER (OUTPATIENT)
Age: 57
End: 2017-08-30

## 2017-09-21 ENCOUNTER — INPATIENT (INPATIENT)
Facility: HOSPITAL | Age: 57
LOS: 4 days | Discharge: HOME | End: 2017-09-26
Attending: INTERNAL MEDICINE | Admitting: INTERNAL MEDICINE
Payer: MEDICARE

## 2017-09-21 VITALS
HEART RATE: 86 BPM | OXYGEN SATURATION: 100 % | DIASTOLIC BLOOD PRESSURE: 93 MMHG | SYSTOLIC BLOOD PRESSURE: 127 MMHG | TEMPERATURE: 99 F | RESPIRATION RATE: 20 BRPM

## 2017-09-21 DIAGNOSIS — N18.6 END STAGE RENAL DISEASE: ICD-10-CM

## 2017-09-21 DIAGNOSIS — I77.0 ARTERIOVENOUS FISTULA, ACQUIRED: Chronic | ICD-10-CM

## 2017-09-21 DIAGNOSIS — E11.9 TYPE 2 DIABETES MELLITUS WITHOUT COMPLICATIONS: ICD-10-CM

## 2017-09-21 DIAGNOSIS — I50.22 CHRONIC SYSTOLIC (CONGESTIVE) HEART FAILURE: ICD-10-CM

## 2017-09-21 DIAGNOSIS — E87.70 FLUID OVERLOAD, UNSPECIFIED: ICD-10-CM

## 2017-09-21 DIAGNOSIS — T82.9XXA UNSPECIFIED COMPLICATION OF CARDIAC AND VASCULAR PROSTHETIC DEVICE, IMPLANT AND GRAFT, INITIAL ENCOUNTER: ICD-10-CM

## 2017-09-21 DIAGNOSIS — M10.9 GOUT, UNSPECIFIED: ICD-10-CM

## 2017-09-21 DIAGNOSIS — E11.40 TYPE 2 DIABETES MELLITUS WITH DIABETIC NEUROPATHY, UNSPECIFIED: ICD-10-CM

## 2017-09-21 DIAGNOSIS — Z29.9 ENCOUNTER FOR PROPHYLACTIC MEASURES, UNSPECIFIED: ICD-10-CM

## 2017-09-21 LAB
ALBUMIN SERPL ELPH-MCNC: 4 G/DL — SIGNIFICANT CHANGE UP (ref 3.3–5)
ALP SERPL-CCNC: 118 U/L — SIGNIFICANT CHANGE UP (ref 40–120)
ALT FLD-CCNC: 13 U/L — SIGNIFICANT CHANGE UP (ref 4–33)
APTT BLD: 30.8 SEC — SIGNIFICANT CHANGE UP (ref 27.5–37.4)
AST SERPL-CCNC: 17 U/L — SIGNIFICANT CHANGE UP (ref 4–32)
BASE EXCESS BLDV CALC-SCNC: 2.2 MMOL/L — SIGNIFICANT CHANGE UP
BILIRUB SERPL-MCNC: 0.5 MG/DL — SIGNIFICANT CHANGE UP (ref 0.2–1.2)
BLOOD GAS VENOUS - CREATININE: 1.06 MG/DL — SIGNIFICANT CHANGE UP (ref 0.5–1.3)
BUN SERPL-MCNC: 23 MG/DL — SIGNIFICANT CHANGE UP (ref 7–23)
CALCIUM SERPL-MCNC: 9.1 MG/DL — SIGNIFICANT CHANGE UP (ref 8.4–10.5)
CHLORIDE BLDV-SCNC: 107 MMOL/L — SIGNIFICANT CHANGE UP (ref 96–108)
CHLORIDE SERPL-SCNC: 105 MMOL/L — SIGNIFICANT CHANGE UP (ref 98–107)
CO2 SERPL-SCNC: 24 MMOL/L — SIGNIFICANT CHANGE UP (ref 22–31)
CREAT SERPL-MCNC: 1.05 MG/DL — SIGNIFICANT CHANGE UP (ref 0.5–1.3)
GAS PNL BLDV: 142 MMOL/L — SIGNIFICANT CHANGE UP (ref 136–146)
GLUCOSE BLDV-MCNC: 183 — HIGH (ref 70–99)
GLUCOSE SERPL-MCNC: 181 MG/DL — HIGH (ref 70–99)
HBV SURFACE AG SER-ACNC: NEGATIVE — SIGNIFICANT CHANGE UP
HCO3 BLDV-SCNC: 25 MMOL/L — SIGNIFICANT CHANGE UP (ref 20–27)
HCT VFR BLD CALC: 35.3 % — SIGNIFICANT CHANGE UP (ref 34.5–45)
HCT VFR BLDV CALC: 33.3 % — LOW (ref 34.5–45)
HGB BLD-MCNC: 10.7 G/DL — LOW (ref 11.5–15.5)
HGB BLDV-MCNC: 10.8 G/DL — LOW (ref 11.5–15.5)
INR BLD: 0.99 — SIGNIFICANT CHANGE UP (ref 0.88–1.17)
LACTATE BLDV-MCNC: 1.3 MMOL/L — SIGNIFICANT CHANGE UP (ref 0.5–2)
MCHC RBC-ENTMCNC: 27.2 PG — SIGNIFICANT CHANGE UP (ref 27–34)
MCHC RBC-ENTMCNC: 30.3 % — LOW (ref 32–36)
MCV RBC AUTO: 89.6 FL — SIGNIFICANT CHANGE UP (ref 80–100)
NRBC # FLD: 0 — SIGNIFICANT CHANGE UP
NT-PROBNP SERPL-SCNC: 4619 PG/ML — SIGNIFICANT CHANGE UP
PCO2 BLDV: 55 MMHG — HIGH (ref 41–51)
PH BLDV: 7.32 PH — SIGNIFICANT CHANGE UP (ref 7.32–7.43)
PLATELET # BLD AUTO: 184 K/UL — SIGNIFICANT CHANGE UP (ref 150–400)
PMV BLD: 12.4 FL — SIGNIFICANT CHANGE UP (ref 7–13)
PO2 BLDV: 48 MMHG — HIGH (ref 35–40)
POTASSIUM BLDV-SCNC: 4.1 MMOL/L — SIGNIFICANT CHANGE UP (ref 3.4–4.5)
POTASSIUM SERPL-MCNC: 4.4 MMOL/L — SIGNIFICANT CHANGE UP (ref 3.5–5.3)
POTASSIUM SERPL-SCNC: 4.4 MMOL/L — SIGNIFICANT CHANGE UP (ref 3.5–5.3)
PROT SERPL-MCNC: 7.5 G/DL — SIGNIFICANT CHANGE UP (ref 6–8.3)
PROTHROM AB SERPL-ACNC: 11.1 SEC — SIGNIFICANT CHANGE UP (ref 9.8–13.1)
RBC # BLD: 3.94 M/UL — SIGNIFICANT CHANGE UP (ref 3.8–5.2)
RBC # FLD: 15.9 % — HIGH (ref 10.3–14.5)
SAO2 % BLDV: 74.2 % — SIGNIFICANT CHANGE UP (ref 60–85)
SODIUM SERPL-SCNC: 142 MMOL/L — SIGNIFICANT CHANGE UP (ref 135–145)
TROPONIN T SERPL-MCNC: < 0.06 NG/ML — SIGNIFICANT CHANGE UP (ref 0–0.06)
WBC # BLD: 4.38 K/UL — SIGNIFICANT CHANGE UP (ref 3.8–10.5)
WBC # FLD AUTO: 4.38 K/UL — SIGNIFICANT CHANGE UP (ref 3.8–10.5)

## 2017-09-21 PROCEDURE — 99223 1ST HOSP IP/OBS HIGH 75: CPT | Mod: AI

## 2017-09-21 PROCEDURE — 71020: CPT | Mod: 26

## 2017-09-21 RX ORDER — ALTEPLASE 100 MG
2 KIT INTRAVENOUS ONCE
Qty: 0 | Refills: 0 | Status: COMPLETED | OUTPATIENT
Start: 2017-09-21 | End: 2017-09-21

## 2017-09-21 RX ORDER — BUMETANIDE 0.25 MG/ML
2 INJECTION INTRAMUSCULAR; INTRAVENOUS
Qty: 0 | Refills: 0 | Status: DISCONTINUED | OUTPATIENT
Start: 2017-09-21 | End: 2017-09-26

## 2017-09-21 RX ORDER — DEXTROSE 50 % IN WATER 50 %
25 SYRINGE (ML) INTRAVENOUS ONCE
Qty: 0 | Refills: 0 | Status: DISCONTINUED | OUTPATIENT
Start: 2017-09-21 | End: 2017-09-26

## 2017-09-21 RX ORDER — INFLUENZA VIRUS VACCINE 15; 15; 15; 15 UG/.5ML; UG/.5ML; UG/.5ML; UG/.5ML
0.5 SUSPENSION INTRAMUSCULAR ONCE
Qty: 0 | Refills: 0 | Status: DISCONTINUED | OUTPATIENT
Start: 2017-09-21 | End: 2017-09-26

## 2017-09-21 RX ORDER — DEXTROSE 50 % IN WATER 50 %
12.5 SYRINGE (ML) INTRAVENOUS ONCE
Qty: 0 | Refills: 0 | Status: DISCONTINUED | OUTPATIENT
Start: 2017-09-21 | End: 2017-09-26

## 2017-09-21 RX ORDER — INSULIN LISPRO 100/ML
6 VIAL (ML) SUBCUTANEOUS
Qty: 0 | Refills: 0 | Status: DISCONTINUED | OUTPATIENT
Start: 2017-09-21 | End: 2017-09-26

## 2017-09-21 RX ORDER — DEXTROSE 50 % IN WATER 50 %
1 SYRINGE (ML) INTRAVENOUS ONCE
Qty: 0 | Refills: 0 | Status: DISCONTINUED | OUTPATIENT
Start: 2017-09-21 | End: 2017-09-26

## 2017-09-21 RX ORDER — INSULIN LISPRO 100/ML
VIAL (ML) SUBCUTANEOUS
Qty: 0 | Refills: 0 | Status: DISCONTINUED | OUTPATIENT
Start: 2017-09-21 | End: 2017-09-26

## 2017-09-21 RX ORDER — SODIUM CHLORIDE 9 MG/ML
1000 INJECTION, SOLUTION INTRAVENOUS
Qty: 0 | Refills: 0 | Status: DISCONTINUED | OUTPATIENT
Start: 2017-09-21 | End: 2017-09-26

## 2017-09-21 RX ORDER — HEPARIN SODIUM 5000 [USP'U]/ML
5000 INJECTION INTRAVENOUS; SUBCUTANEOUS EVERY 12 HOURS
Qty: 0 | Refills: 0 | Status: DISCONTINUED | OUTPATIENT
Start: 2017-09-21 | End: 2017-09-26

## 2017-09-21 RX ORDER — LISINOPRIL 2.5 MG/1
2.5 TABLET ORAL DAILY
Qty: 0 | Refills: 0 | Status: DISCONTINUED | OUTPATIENT
Start: 2017-09-21 | End: 2017-09-26

## 2017-09-21 RX ORDER — ATORVASTATIN CALCIUM 80 MG/1
80 TABLET, FILM COATED ORAL AT BEDTIME
Qty: 0 | Refills: 0 | Status: DISCONTINUED | OUTPATIENT
Start: 2017-09-21 | End: 2017-09-26

## 2017-09-21 RX ORDER — ASPIRIN/CALCIUM CARB/MAGNESIUM 324 MG
81 TABLET ORAL DAILY
Qty: 0 | Refills: 0 | Status: DISCONTINUED | OUTPATIENT
Start: 2017-09-21 | End: 2017-09-26

## 2017-09-21 RX ORDER — GABAPENTIN 400 MG/1
100 CAPSULE ORAL DAILY
Qty: 0 | Refills: 0 | Status: DISCONTINUED | OUTPATIENT
Start: 2017-09-21 | End: 2017-09-26

## 2017-09-21 RX ORDER — ALBUTEROL 90 UG/1
2 AEROSOL, METERED ORAL EVERY 6 HOURS
Qty: 0 | Refills: 0 | Status: DISCONTINUED | OUTPATIENT
Start: 2017-09-21 | End: 2017-09-26

## 2017-09-21 RX ORDER — CARVEDILOL PHOSPHATE 80 MG/1
25 CAPSULE, EXTENDED RELEASE ORAL EVERY 12 HOURS
Qty: 0 | Refills: 0 | Status: DISCONTINUED | OUTPATIENT
Start: 2017-09-21 | End: 2017-09-26

## 2017-09-21 RX ORDER — GLUCAGON INJECTION, SOLUTION 0.5 MG/.1ML
1 INJECTION, SOLUTION SUBCUTANEOUS ONCE
Qty: 0 | Refills: 0 | Status: DISCONTINUED | OUTPATIENT
Start: 2017-09-21 | End: 2017-09-26

## 2017-09-21 RX ORDER — INSULIN GLARGINE 100 [IU]/ML
36 INJECTION, SOLUTION SUBCUTANEOUS AT BEDTIME
Qty: 0 | Refills: 0 | Status: DISCONTINUED | OUTPATIENT
Start: 2017-09-21 | End: 2017-09-26

## 2017-09-21 RX ORDER — PANTOPRAZOLE SODIUM 20 MG/1
40 TABLET, DELAYED RELEASE ORAL
Qty: 0 | Refills: 0 | Status: DISCONTINUED | OUTPATIENT
Start: 2017-09-21 | End: 2017-09-26

## 2017-09-21 RX ADMIN — GABAPENTIN 100 MILLIGRAM(S): 400 CAPSULE ORAL at 23:59

## 2017-09-21 RX ADMIN — Medication 81 MILLIGRAM(S): at 23:59

## 2017-09-21 RX ADMIN — ALTEPLASE 2 MILLIGRAM(S): KIT at 12:22

## 2017-09-21 RX ADMIN — ALTEPLASE 2 MILLIGRAM(S): KIT at 16:55

## 2017-09-21 RX ADMIN — ATORVASTATIN CALCIUM 80 MILLIGRAM(S): 80 TABLET, FILM COATED ORAL at 23:58

## 2017-09-21 RX ADMIN — INSULIN GLARGINE 36 UNIT(S): 100 INJECTION, SOLUTION SUBCUTANEOUS at 23:59

## 2017-09-21 NOTE — H&P ADULT - NSHPLABSRESULTS_GEN_ALL_CORE
CBC Full  -  ( 21 Sep 2017 11:00 )  WBC Count : 4.38 K/uL  Hemoglobin : 10.7 g/dL  Hematocrit : 35.3 %  Platelet Count - Automated : 184 K/uL  Mean Cell Volume : 89.6 fL  Mean Cell Hemoglobin : 27.2 pg  Mean Cell Hemoglobin Concentration : 30.3 %  Auto Neutrophil # : x  Auto Lymphocyte # : x  Auto Monocyte # : x  Auto Eosinophil # : x  Auto Basophil # : x  Auto Neutrophil % : x  Auto Lymphocyte % : x  Auto Monocyte % : x  Auto Eosinophil % : x  Auto Basophil % : x    PT/INR - ( 21 Sep 2017 11:00 )   PT: 11.1 SEC;   INR: 0.99     PTT - ( 21 Sep 2017 11:00 )  PTT:30.8 SEC    09-21    142  |  105  |  23  ----------------------------<  181<H>  4.4   |  24  |  1.05    Ca    9.1      21 Sep 2017 11:00    TPro  7.5  /  Alb  4.0  /  TBili  0.5  /  DBili  x   /  AST  17  /  ALT  13  /  AlkPhos  118  09-21    Troponin T, Serum (09.21.17 @ 11:00)    Troponin T, Serum: < 0.06: INCLUDES THE 99th PERCENTILE OF A HEALTHY  POPULATION AT A  METHOD C.V. OF 10% OR LESS.    Serum Pro-Brain Natriuretic Peptide (09.21.17 @ 11:00)    Serum Pro-Brain Natriuretic Peptide: 4619: ACUTE CONGESTIVE HEART FAILURE is UNLIKELY if NT-proBNP is < 300 pg/mL.    Blood Gas Venous Comprehensive (09.21.17 @ 11:00)    Blood Gas Venous - Lactate: 1.3: Please note updated reference range. mmol/L    Blood Gas Venous - Chloride: 107 mmol/L    Blood Gas Venous - Creatinine: 1.06 mg/dL    pH, Venous: 7.32 pH    pCO2, Venous: 55 mmHg    pO2, Venous: 48 mmHg    HCO3, Venous: 25 mmol/L    Base Excess, Venous: 2.2: REFERENCE RANGE = -3 + 2 mmol/L mmol/L    Oxygen Saturation, Venous: 74.2 %    Blood Gas Venous - Sodium: 142 mmol/L    Blood Gas Venous - Potassium: 4.1 mmol/L    Blood Gas Venous - Glucose: 183    Blood Gas Venous - Hemoglobin: 10.8 g/dL    Blood Gas Venous - Hematocrit: 33.3 %    < from: Xray Chest 2 Views PA/Lat (09.21.17 @ 11:13) >  IMPRESSION:  Cardiomegaly with small effusions and congestion consistent with CHF.    JONATHAN CHUN M.D., RADIOLOGY RESIDENT  This document has been electronically signed.  YAMILA MARQUEZ M.D.; ATTENDING RADIOLOGIST  This document has been electronically signed. Sep 21 2017  1:25PM  < end of copied text >

## 2017-09-21 NOTE — ED PROVIDER NOTE - OBJECTIVE STATEMENT
57 year old female pmh dm chf s/p aicd esrd presents from dialysis center for sob and unable to access dialysis cath.  patient states sob is worse when lying flat. mild cough.  patient fully dialyzed 2 days ago.  no fever no abd pain. stil makes urine no cp

## 2017-09-21 NOTE — ED PROVIDER NOTE - PROGRESS NOTE DETAILS
have attempted to contact dr. Lou for admission multiple times at his office as well as his pager [phone] and cell phone [phone] have attempted to contact dr. Lou for admission multiple times at his office at [phone] as well as his pager [phone] and cell phone [phone] No callback from Dr. Lou. Spoke with hospitalist. Pt recently admitted under Dr. Bryan Caicedo; requests we reach out to Dr. Caicedo prior to accepting admission. Called Dr. Caicedo's office, no answer. No covering physician on answering machine; will attempt to reach Dr. Caicedo at cell phone number from machine. Unable to reach Dr. Caicedo. Message left. Received callback from Dr. Lou, as he is pt's PMD will admit to his service. MAR textpage sent. Vascular to see patient. [place]: Patient signed out to me.  57F here with pulmonary edema and malfunctioning HD catheter.  tPA has been infused.  Patient to receive HD tonight.  Admitted.

## 2017-09-21 NOTE — H&P ADULT - PROBLEM SELECTOR PLAN 4
Patient on 54 units of insulin per day. Will continue with insulin and place patient on Renal consistent carb diet

## 2017-09-21 NOTE — ED PROVIDER NOTE - MEDICAL DECISION MAKING DETAILS
concern for fluid overload vs chf exacerbation.  will get labs check for hyperk, contact renal for dialysis.  less likely pericarditis acs pe pna copd.

## 2017-09-21 NOTE — H&P ADULT - HISTORY OF PRESENT ILLNESS
57F hx of ESRD on HD (T/Th/Sat), Morbid Obesity, DM2, NICM w/ sCHF (EF=22%) with AICD, Diabetic Neuropathy, HTN, HLD, presents to [facility] ED from outpatient dialysis due to inability of center to access dialysis cath. Patient was also concomitantly short of breath at the time. Because unable to perform dialysis patient was sent to the ED. At this time, as well as the days preceding before today, she was about in her usual state of health. She usually feels a little dyspneic right before dialysis sessions. Denies fevers/chills/chest pain/nausea/vomiting/diarrhea/cough at this time    In the ED patient given Cathflo for dialysis catheter and seen by Nephrology    Of note, patient hospitalized several times the last two months for hypervolemia and respiratory failure as well as created and eventually thrombosed AVF.

## 2017-09-21 NOTE — CONSULT NOTE ADULT - SUBJECTIVE AND OBJECTIVE BOX
VASCULAR SURGERY CONSULT NOTE     CC: 57y old Female admitted with a chief complaint of Dialysis catheter malfunction (21 Sep 2017 15:09)    HPI:  Ms. Bernstein is a 57 year-old woman with history of multiple medical problems including hypertension, diabetes mellitus, systolic congestive heart failure, and end-stage renal disease on HD TTS. She arrived this a.m. to [facility] for HD. However, her non-tunneled catheter did not function. She also complained of shortness of breath. Therefore, she was sent to the [facility] ER. She last underwent regular dialysis on 17. Of note, she had an arteriovenous fistula created by Dr. Deepti Vazquez within the past few months; unfortunately, the fistula thrombosed. There is plan for eventual repeat AV access creation once she is cardiac cleared for the surgery. She has been HD-dependent for approximately 2 months. She was started on HD back in early July when she presented to [facility] with a creatinine in the high 7s, hyperkalemia, and acute respiratory failure. At present time, her creatinine is 1mg/dL. She states that she is only very mildly short of breath. She is voiding reasonably well. She is interested in efforts to see if we can hold off with further dialysis on her.      PAST MEDICAL & SURGICAL HISTORY:  Gout  GERD (gastroesophageal reflux disease)  CKD (chronic kidney disease  Nonischemic cardiomyopathy: EF 20-25%  Sleep apnea: noncompliant with CPAP  Diabetic neuropathy  HTN (hypertension)  AICD (automatic cardioverter/defibrillator) present: ICD (Medtronic generator with Medtronic atrial (4076)and ventricular (6947) leads) 07  Cardiac Pacemaker  CHF (Congestive Heart Failure): on Home O2 2L prn  HLD (Hyperlipidemia)  Diabetes Mellitus: x 10 years, denies nephropathy or retinopathy  AV fistula: right x 2July   H/O:   H/O: hysterectomy:   AICD (automatic cardioverter/defibrillator) present: Medtronic generator with Medtronic atrial (4076)and ventricular (6947) leads) 07    Allergies  penicillin (Anaphylaxis; Short breath)    SOCIAL HISTORY:  Denies ETOh,Smoking,     FAMILY HISTORY:  Family history of diabetes mellitus in father (Father): Father, , age 87  Family history of hypertension (Mother, Father): Mother and Father  Family history of congestive heart failure (Mother): Mother, CHF, , age 51      REVIEW OF SYSTEMS:  CONSTITUTIONAL: No weakness, fevers or chills  EYES/ENT: No visual changes;  No vertigo or throat pain   NECK: No pain or stiffness  RESPIRATORY: No cough, wheezing, hemoptysis; (+) shortness of breath  CARDIOVASCULAR: No chest pain or palpitations  GASTROINTESTINAL: No abdominal or epigastric pain. No nausea, vomiting, or hematemesis; No diarrhea or constipation. No melena or hematochezia.  GENITOURINARY: No dysuria, frequency or hematuria  NEUROLOGICAL: No numbness or weakness  SKIN: No itching, burning, rashes, or lesions   All other review of systems is negative unless indicated above.      Medications (home): Medications (inpatient): aspirin enteric coated 81 milliGRAM(s) Oral daily  lisinopril 2.5 milliGRAM(s) Oral daily  gabapentin 100 milliGRAM(s) Oral daily  atorvastatin 80 milliGRAM(s) Oral at bedtime  carvedilol 25 milliGRAM(s) Oral every 12 hours  pantoprazole    Tablet 40 milliGRAM(s) Oral before breakfast  insulin glargine Injectable (LANTUS) 36 Unit(s) SubCutaneous at bedtime  insulin lispro Injectable (HumaLOG) 6 Unit(s) SubCutaneous three times a day before meals  insulin lispro (HumaLOG) corrective regimen sliding scale   SubCutaneous three times a day before meals  dextrose 5%. 1000 milliLiter(s) IV Continuous <Continuous>  dextrose 50% Injectable 12.5 Gram(s) IV Push once  dextrose 50% Injectable 25 Gram(s) IV Push once  dextrose 50% Injectable 25 Gram(s) IV Push once  heparin  Injectable 5000 Unit(s) SubCutaneous every 12 hours  buMETAnide 2 milliGRAM(s) Oral two times a day  influenza   Vaccine 0.5 milliLiter(s) IntraMuscular once    Medications (PRN):ALBUTerol    90 MICROgram(s) HFA Inhaler 2 Puff(s) Inhalation every 6 hours PRN  dextrose Gel 1 Dose(s) Oral once PRN  glucagon  Injectable 1 milliGRAM(s) IntraMuscular once PRN    Allergies  penicillin (Anaphylaxis; Short breath)  Intolerances        Physical Exam  T(C): 36.7 (17 @ 20:09), Max: 37.1 (17 @ 09:40)  HR: 88 (17 @ 20:09) (81 - 98)  BP: 136/76 (17 @ 20:09) (127/93 - 172/85)  RR: 18 (17 @ 20:09) (16 - 20)  SpO2: 99% (17 @ 20:09) (98% - 100%)  Wt(kg): --  Tmax: T(C): , Max: 37.1 (17 @ 09:40)  Wt(kg): --    17  -  17  --------------------------------------------------------  IN:    Other: 400 mL  Total IN: 400 mL    OUT:    Other: 400 mL  Total OUT: 400 mL    Total NET: 0 mL          PHYSICAL EXAM:  Constitutional: NAD at rest on NCO2; Alert, obese  HEENT: NCAT, MMM  Neck: Supple, No JVD  Respiratory: distant BS b/l  Cardiovascular: RRR s1s2, no m/r/g  Gastrointestinal: BS+, soft, NT/ND  Extremities: No peripheral edema b/l  Neurological: no focal deficits; strength grossly intact  Psychiatric: Normal mood, normal affect  Back: no CVAT b/l  Skin: No rashes, no nevi  Access: (+)[facility]    Labs:                        10.7   4.38  )-----------( 184      ( 21 Sep 2017 11:00 )             35.3     PT/INR - ( 21 Sep 2017 11:00 )   PT: 11.1 SEC;   INR: 0.99          PTT - ( 21 Sep 2017 11:00 )  PTT:30.8 SEC      142  |  105  |  23  ----------------------------<  181<H>  4.4   |  24  |  1.05    Ca    9.1      21 Sep 2017 11:00    TPro  7.5  /  Alb  4.0  /  TBili  0.5  /  DBili  x   /  AST  17  /  ALT  13  /  AlkPhos  118        Imaging and other studies:  < from: [facility] Duplex Ext Veins Upper Comp, Bilat. (17 @ 13:58) >  Procedure: Bilateral Vein Mapping for hemodialysis access  Indications: End stage renal disease (N18.6)  ------------------------------------------------------------------------  RESULT:  ------------------------------------------------------------------------  RIGHT:  Deep venous thrombosis: (Not Examined)  Superficial venous thrombosis: No  ------------------------------------------------------------------------  LEFT:  Deep venous thrombosis: (Not Examined)  Superficial venous thrombosis: No  ------------------------------------------------------------------------  Right Findings:  Right Cephalic Vein                     Diameter (cm)  Proximal upper arm                      0.38  Mid upper arm                              0.42  Distal upper arm                           0.45  Antecubital                                   0.31  Proximal forearm                       not visualized  Mid forearm                              not visualized    Distal forearm                           not visualized    Right Basilic Vein  [place]                                           0.47    Mid upper arm                                thrombosed    Distal upper arm                                   0.42    Right brachial artery: Normal velocities with triphasic  waveforms.  Right radial artery: Normal velocities with triphasic  waveforms.  Right ulnar artery: Normal velocities with triphasic  waveforms.  Left Findings:  Left Cephalic Vein                     Diameter (cm)  Proximal upper arm                   0.40  Mid upper arm                           0.52  Distal upper arm                        0.56  Antecubital   0.49  Proximal forearm                       0.44  Mid forearm                               0.35  Distal forearm                            0.36    Left Basilic Vein  [place]                             0.48  Mid upper arm  0.45  Distal upper arm                     0.47  Left brachial artery: Normal velocities with triphasic  waveforms.  Left radial artery: Normal velocities with triphasic  waveforms.  Left ulnar artery: Normal velocities with triphasic  waveforms.  ------------------------------------------------------------------------  Summary/Impressions:  No evidence of thrombosis or significant wall thickening  visualized in the right and left basilic and cephalic  veins.  Vessel diameters as noted above.  ------------------------------------------------------------------------  Confirmed on  2017 - 3:03 PM by LUIS Rodriguez  By signing this report, the attending physician certifies  that he or she has personally supervised and interpreted  the vascular study and has reviewed and or edited and  agrees with the written comments contained within the  report.    < end of copied text >

## 2017-09-21 NOTE — ED ADULT TRIAGE NOTE - CHIEF COMPLAINT QUOTE
pt presents via ems for evaluation of difficulty breathing, was at dialysis but was unable to access right chest wall shiley and developed worsening sob with rales, and hypertension. denies any additional symptoms. Given NTG 0.4mg SL x 1 with relief PMHX: esrd, htn, dm

## 2017-09-21 NOTE — H&P ADULT - NSHPREVIEWOFSYSTEMS_GEN_ALL_CORE
REVIEW OF SYSTEMS:    CONSTITUTIONAL: No weakness, fevers or chills  EYES/ENT: No visual changes;  No vertigo or throat pain   NECK: No pain or stiffness  RESPIRATORY: (+) Slight dyspnea, No cough, wheezing, hemoptysis  CARDIOVASCULAR: No chest pain or palpitations  GASTROINTESTINAL: No abdominal or epigastric pain. No nausea, vomiting, or hematemesis; No diarrhea or constipation. No melena or hematochezia.  GENITOURINARY: No dysuria, frequency or hematuria  NEUROLOGICAL: No numbness or weakness  SKIN: No itching, burning, rashes, or lesions   All other review of systems is negative unless indicated above.

## 2017-09-21 NOTE — H&P ADULT - PROBLEM SELECTOR PLAN 1
Patient unable to have dialysis today 2/2 problems with vascular access. Vascular surgery to assess patient. As per Renal note, there will be an attempt for dialysis today for 1 hour with current catheter, with possibility of patient needing a change in catheter if dialysis fails

## 2017-09-21 NOTE — ED PROVIDER NOTE - CHPI ED SYMPTOMS NEG
no decreased eating/drinking/no nausea/no pain/no fever/no numbness/no chills/no vomiting/no weakness/no dizziness

## 2017-09-21 NOTE — H&P ADULT - ASSESSMENT
57F hx of ESRD on HD (T/Th/Sat), Morbid Obesity, Gout, DM2, NICM w/ sCHF (EF=22%) with AICD, Diabetic Neuropathy, HTN, HLD, presents to [facility] ED from outpatient dialysis for failed vascular access

## 2017-09-21 NOTE — ED PROVIDER NOTE - FAMILY HISTORY
Mother  Still living? Unknown  Family history of congestive heart failure, Age at diagnosis: Age Unknown  Family history of hypertension, Age at diagnosis: Age Unknown     Father  Still living? Unknown  Family history of hypertension, Age at diagnosis: Age Unknown  Family history of diabetes mellitus in father, Age at diagnosis: Age Unknown

## 2017-09-21 NOTE — CONSULT NOTE ADULT - ASSESSMENT
Patient is a 57 year old female with ESRD s/p attempted R radiocephalic fistula (thrombosed radial artery)-July 2017, brachiobasilic fistula-now non functioning, s/p permacath placement 8/25/2017 Patient is a 57 year old female with ESRD s/p attempted R radiocephalic fistula (thrombosed radial artery)-July 2017, brachiobasilic fistula-now non functioning, s/p permacath placement 8/25/2017.  No emergent need for hemodialysis today  -plan for operative AV access this admission  -vein mapping from 7/25 competed, would obtain recent vein mapping from this admission  -cardiac optimization and risk stratification and assessment needs to be documented by Cardiology  -save the left upper extremity  -to be discussed with Attending INDRA Lima PGYII #[id number] Patient is a 57 year old female with ESRD s/p attempted R radiocephalic fistula (thrombosed radial artery)-July 2017, brachiobasilic fistula-now non functioning, s/p permacath placement 8/25/2017.  No emergent need for hemodialysis today  -plan for operative AV access this admission  -vein mapping from 7/25 competed, would obtain recent vein mapping from this admission  -cardiac optimization and risk stratification and assessment needs to be documented by Cardiology  -save the left upper extremity  -to be discussed with Attending INDRA Lima PGYII #[id number]    Addendum:  No current need for AV access, as the patient and Nephrology are trying to wean patient off of HD.  Please call back with any questions.

## 2017-09-21 NOTE — PROGRESS NOTE ADULT - SUBJECTIVE AND OBJECTIVE BOX
CHIEF COMPLAINT:    SUBJECTIVE:     REVIEW OF SYSTEMS:    CONSTITUTIONAL: (  )  weakness,  (  ) fevers or chills  EYES/ENT: (  )visual changes;     NECK: (  ) pain or stiffness  RESPIRATORY:   (  )cough, wheezing, hemoptysis;  (  ) shortness of breath  CARDIOVASCULAR:  (  )chest pain or palpitations  GASTROINTESTINAL:   (  )abdominal or epigastric pain.  (  ) nausea, vomiting, or hematemesis;   (   ) diarrhea or constipation.   GENITOURINARY:   (    ) dysuria, frequency or hematuria  NEUROLOGICAL:  (   ) numbness or weakness   All other review of systems is negative unless indicated above    Vital Signs Last 24 Hrs  T(C): 36.7 (21 Sep 2017 20:09), Max: 37.1 (21 Sep 2017 09:40)  T(F): 98 (21 Sep 2017 20:09), Max: 98.8 (21 Sep 2017 09:40)  HR: 88 (21 Sep 2017 20:09) (81 - 98)  BP: 136/76 (21 Sep 2017 20:09) (127/93 - 172/85)  BP(mean): --  RR: 18 (21 Sep 2017 20:09) (16 - 20)  SpO2: 99% (21 Sep 2017 20:09) (98% - 100%)    I&O's Summary    21 Sep 2017 07:01  -  21 Sep 2017 22:35  --------------------------------------------------------  IN: 400 mL / OUT: 400 mL / NET: 0 mL        CAPILLARY BLOOD GLUCOSE  133 (21 Sep 2017 21:07)  164 (21 Sep 2017 16:37)          PHYSICAL EXAM:    Constitutional:  (   ) NAD,   (   )awake and alert  HEENT: PERR, EOMI,    Neck: Soft and supple, No LAD, No JVD  Respiratory:  (    Breath sounds are clear bilaterally,    (   ) wheezing, rales or rhonchi  Cardiovascular:     (   )S1 and S2, regular rate and rhythm, no Murmurs, gallops or rubs  Gastrointestinal:  (   )Bowel Sounds present, soft,   (  )nontender, nondistended,    Extremities:    (  ) peripheral edema  Vascular: 2+ peripheral pulses  Neurological:    (    )A/O x 3,   (  ) focal deficits  Musculoskeletal:    (   )  normal strength b/l upper  (     ) normal  lower extremities  Skin: No rashes    MEDICATIONS:  MEDICATIONS  (STANDING):  aspirin enteric coated 81 milliGRAM(s) Oral daily  lisinopril 2.5 milliGRAM(s) Oral daily  gabapentin 100 milliGRAM(s) Oral daily  atorvastatin 80 milliGRAM(s) Oral at bedtime  carvedilol 25 milliGRAM(s) Oral every 12 hours  pantoprazole    Tablet 40 milliGRAM(s) Oral before breakfast  insulin glargine Injectable (LANTUS) 36 Unit(s) SubCutaneous at bedtime  insulin lispro Injectable (HumaLOG) 6 Unit(s) SubCutaneous three times a day before meals  insulin lispro (HumaLOG) corrective regimen sliding scale   SubCutaneous three times a day before meals  dextrose 5%. 1000 milliLiter(s) (50 mL/Hr) IV Continuous <Continuous>  dextrose 50% Injectable 12.5 Gram(s) IV Push once  dextrose 50% Injectable 25 Gram(s) IV Push once  dextrose 50% Injectable 25 Gram(s) IV Push once  heparin  Injectable 5000 Unit(s) SubCutaneous every 12 hours  buMETAnide 2 milliGRAM(s) Oral two times a day  influenza   Vaccine 0.5 milliLiter(s) IntraMuscular once      LABS: All Labs Reviewed:                        10.7   4.38  )-----------( 184      ( 21 Sep 2017 11:00 )             35.3     09-21    142  |  105  |  23  ----------------------------<  181<H>  4.4   |  24  |  1.05    Ca    9.1      21 Sep 2017 11:00    TPro  7.5  /  Alb  4.0  /  TBili  0.5  /  DBili  x   /  AST  17  /  ALT  13  /  AlkPhos  118  09-21    PT/INR - ( 21 Sep 2017 11:00 )   PT: 11.1 SEC;   INR: 0.99          PTT - ( 21 Sep 2017 11:00 )  PTT:30.8 SEC  CARDIAC MARKERS ( 21 Sep 2017 11:00 )  x     / < 0.06 ng/mL / x     / x     / x          Blood Culture:   Urine Culture      RADIOLOGY/EKG:    ASSESSMENT AND PLAN:    DVT PPX:    ADVANCED DIRECTIVE:    DISPOSITION: CHIEF COMPLAINT:  History of Present Illness:  	  57F hx of ESRD on HD (T/Th/Sat), Morbid Obesity, DM2, NICM w/ sCHF (EF=22%) with AICD, Diabetic Neuropathy, HTN, HLD, presents to [facility] ED from outpatient dialysis due to inability of center to access dialysis cath. Patient was also concomitantly short of breath at the time. Because unable to perform dialysis patient was sent to the ED. At this time, as well as the days preceding before today, she was about in her usual state of health. She usually feels a little dyspneic right before dialysis sessions. Denies fevers/chills/chest pain/nausea/vomiting/diarrhea/cough at this time    In the ED patient given Cathflo for dialysis catheter and seen by Nephrology      patient  well known  to  me  had  thrombosed    RIGHT AVF.     SUBJECTIVE:     REVIEW OF SYSTEMS:    CONSTITUTIONAL: (+  )  weakness,  ( - ) fevers or chills  EYES/ENT: (-  )visual changes;     NECK: ( - ) pain or stiffness  RESPIRATORY:   ( - )cough, wheezing, hemoptysis;  ( + ) shortness of breath  CARDIOVASCULAR:  ( - )chest pain or palpitations  GASTROINTESTINAL:   ( - )abdominal or epigastric pain.  (-  ) nausea, vomiting, or hematemesis;   ( -  ) diarrhea or constipation.   GENITOURINARY:   (   - ) dysuria, frequency or hematuria  NEUROLOGICAL:  (   ) numbness or weakness   All other review of systems is negative unless indicated above    Vital Signs Last 24 Hrs  T(C): 36.7 (21 Sep 2017 20:09), Max: 37.1 (21 Sep 2017 09:40)  T(F): 98 (21 Sep 2017 20:09), Max: 98.8 (21 Sep 2017 09:40)  HR: 88 (21 Sep 2017 20:09) (81 - 98)  BP: 136/76 (21 Sep 2017 20:09) (127/93 - 172/85)  BP(mean): --  RR: 18 (21 Sep 2017 20:09) (16 - 20)  SpO2: 99% (21 Sep 2017 20:09) (98% - 100%)    I&O's Summary    21 Sep 2017 07:01  -  21 Sep 2017 22:35  --------------------------------------------------------  IN: 400 mL / OUT: 400 mL / NET: 0 mL        CAPILLARY BLOOD GLUCOSE  133 (21 Sep 2017 21:07)  164 (21 Sep 2017 16:37)          PHYSICAL EXAM:    Constitutional:  (  - ) NAD,   (  + )awake and alert  HEENT: PERR, EOMI,    Neck: Soft and supple, No LAD, No JVD  Respiratory:  ( +   Breath sounds are clear bilaterally,    (  - ) wheezing, rales or rhonchi  Cardiovascular:     (  + )S1 and S2, regular rate and rhythm, no Murmurs, gallops or rubs  Gastrointestinal:  (  + )Bowel Sounds present, soft,   ( - )nontender, nondistended,    Extremities:    (-  ) peripheral edema  Vascular: 2+ peripheral pulses  Neurological:    (  +  )A/O x 3,   (-  ) focal deficits  Musculoskeletal:    ( +  )  normal strength b/l upper  ( +    ) normal  lower extremities  Skin: No rashes    MEDICATIONS:  MEDICATIONS  (STANDING):  aspirin enteric coated 81 milliGRAM(s) Oral daily  lisinopril 2.5 milliGRAM(s) Oral daily  gabapentin 100 milliGRAM(s) Oral daily  atorvastatin 80 milliGRAM(s) Oral at bedtime  carvedilol 25 milliGRAM(s) Oral every 12 hours  pantoprazole    Tablet 40 milliGRAM(s) Oral before breakfast  insulin glargine Injectable (LANTUS) 36 Unit(s) SubCutaneous at bedtime  insulin lispro Injectable (HumaLOG) 6 Unit(s) SubCutaneous three times a day before meals  insulin lispro (HumaLOG) corrective regimen sliding scale   SubCutaneous three times a day before meals  dextrose 5%. 1000 milliLiter(s) (50 mL/Hr) IV Continuous <Continuous>  dextrose 50% Injectable 12.5 Gram(s) IV Push once  dextrose 50% Injectable 25 Gram(s) IV Push once  dextrose 50% Injectable 25 Gram(s) IV Push once  heparin  Injectable 5000 Unit(s) SubCutaneous every 12 hours  buMETAnide 2 milliGRAM(s) Oral two times a day  influenza   Vaccine 0.5 milliLiter(s) IntraMuscular once      LABS: All Labs Reviewed:                        10.7   4.38  )-----------( 184      ( 21 Sep 2017 11:00 )             35.3     09-21    142  |  105  |  23  ----------------------------<  181<H>  4.4   |  24  |  1.05    Ca    9.1      21 Sep 2017 11:00    TPro  7.5  /  Alb  4.0  /  TBili  0.5  /  DBili  x   /  AST  17  /  ALT  13  /  AlkPhos  118  09-21    PT/INR - ( 21 Sep 2017 11:00 )   PT: 11.1 SEC;   INR: 0.99          PTT - ( 21 Sep 2017 11:00 )  PTT:30.8 SEC  CARDIAC MARKERS ( 21 Sep 2017 11:00 )  x     / < 0.06 ng/mL / x     / x     / x          Blood Culture:   Urine Culture    Assessment and Plan:   57F hx of ESRD on HD (T/Th/Sat), Morbid Obesity, Gout, DM2, NICM w/ sCHF (EF=22%) with AICD, Diabetic Neuropathy, HTN, HLD, presents to [facility] ED from outpatient dialysis for failed vascular access    Problem/Plan - 1:  ·  Problem: Dialysis complication,    had  dialysis today for 1 hour with current catheter,     Problem/Plan - 2:  ·  Problem: Chronic systolic congestive heart failure.  Plan: c/w Bumex 2 mg PO BID and Lisinopril 2.5 mg PO qD and Coreg 25 mg PO BID.     Problem/Plan - 3:  ·  Problem: ESRD (end stage renal disease) on dialysis.  Plan: Regularly scheduled dialysis Tues, Thurs, Sat. Renal following pt  refuse   further HD  agree and  will monitor  - Renal Diet.     Problem/Plan - 4:  ·  Problem: Diabetes Mellitus.  Plan: Patient on 54 units of insulin per day. Will continue with insulin and place patient on Renal consistent carb diet.     Problem/Plan - 5:  ·  Problem: Diabetic neuropathy.  Plan: c/w Gabapentin 100 mg PO qD.     Problem/Plan - 6:  Problem: Prophylactic measure. Gout  Plan: HSQ 5000U q12h for DVT ppx. c/w Uloric          DISPOSITION:  home  in  2-3  days

## 2017-09-21 NOTE — ED ADULT NURSE NOTE - OBJECTIVE STATEMENT
Hx of CHF, dialysis, DM, HTN presents from dialysis due to inability to access right chest wall shiley. EMS gave 1 nitro prior to arrival for HTN and SOB. Pt  c/o SOB starting last night. Pt states the SOB has become worse lately since her MD took her off her water pills. Pt reports orthopnea, denies chest pain, fever, chills, cough. Lung diminished and clear bilaterally. Resp even and unlabored. Minimal lower extremity edema noted.

## 2017-09-22 LAB
BUN SERPL-MCNC: 25 MG/DL — HIGH (ref 7–23)
CALCIUM SERPL-MCNC: 9.1 MG/DL — SIGNIFICANT CHANGE UP (ref 8.4–10.5)
CHLORIDE SERPL-SCNC: 102 MMOL/L — SIGNIFICANT CHANGE UP (ref 98–107)
CO2 SERPL-SCNC: 25 MMOL/L — SIGNIFICANT CHANGE UP (ref 22–31)
CREAT SERPL-MCNC: 0.98 MG/DL — SIGNIFICANT CHANGE UP (ref 0.5–1.3)
GLUCOSE SERPL-MCNC: 197 MG/DL — HIGH (ref 70–99)
HBV CORE AB SER-ACNC: NONREACTIVE — SIGNIFICANT CHANGE UP
HBV SURFACE AB SER-ACNC: <3 MLU/ML — LOW
MAGNESIUM SERPL-MCNC: 1.8 MG/DL — SIGNIFICANT CHANGE UP (ref 1.6–2.6)
PHOSPHATE SERPL-MCNC: 3.9 MG/DL — SIGNIFICANT CHANGE UP (ref 2.5–4.5)
POTASSIUM SERPL-MCNC: 4.3 MMOL/L — SIGNIFICANT CHANGE UP (ref 3.5–5.3)
POTASSIUM SERPL-SCNC: 4.3 MMOL/L — SIGNIFICANT CHANGE UP (ref 3.5–5.3)
SODIUM SERPL-SCNC: 141 MMOL/L — SIGNIFICANT CHANGE UP (ref 135–145)

## 2017-09-22 RX ADMIN — Medication 6 UNIT(S): at 09:45

## 2017-09-22 RX ADMIN — CARVEDILOL PHOSPHATE 25 MILLIGRAM(S): 80 CAPSULE, EXTENDED RELEASE ORAL at 17:23

## 2017-09-22 RX ADMIN — GABAPENTIN 100 MILLIGRAM(S): 400 CAPSULE ORAL at 12:13

## 2017-09-22 RX ADMIN — Medication 6 UNIT(S): at 17:24

## 2017-09-22 RX ADMIN — HEPARIN SODIUM 5000 UNIT(S): 5000 INJECTION INTRAVENOUS; SUBCUTANEOUS at 05:50

## 2017-09-22 RX ADMIN — Medication 2: at 13:08

## 2017-09-22 RX ADMIN — BUMETANIDE 2 MILLIGRAM(S): 0.25 INJECTION INTRAMUSCULAR; INTRAVENOUS at 17:24

## 2017-09-22 RX ADMIN — Medication 81 MILLIGRAM(S): at 12:13

## 2017-09-22 RX ADMIN — CARVEDILOL PHOSPHATE 25 MILLIGRAM(S): 80 CAPSULE, EXTENDED RELEASE ORAL at 05:49

## 2017-09-22 RX ADMIN — ATORVASTATIN CALCIUM 80 MILLIGRAM(S): 80 TABLET, FILM COATED ORAL at 22:12

## 2017-09-22 RX ADMIN — LISINOPRIL 2.5 MILLIGRAM(S): 2.5 TABLET ORAL at 05:49

## 2017-09-22 RX ADMIN — INSULIN GLARGINE 36 UNIT(S): 100 INJECTION, SOLUTION SUBCUTANEOUS at 22:12

## 2017-09-22 RX ADMIN — Medication 6 UNIT(S): at 13:06

## 2017-09-22 RX ADMIN — BUMETANIDE 2 MILLIGRAM(S): 0.25 INJECTION INTRAMUSCULAR; INTRAVENOUS at 05:49

## 2017-09-22 RX ADMIN — HEPARIN SODIUM 5000 UNIT(S): 5000 INJECTION INTRAVENOUS; SUBCUTANEOUS at 17:24

## 2017-09-22 RX ADMIN — Medication 2: at 09:44

## 2017-09-22 RX ADMIN — PANTOPRAZOLE SODIUM 40 MILLIGRAM(S): 20 TABLET, DELAYED RELEASE ORAL at 05:49

## 2017-09-22 NOTE — PROGRESS NOTE ADULT - SUBJECTIVE AND OBJECTIVE BOX
No pain, no shortness of breath      VITAL:  T(C): , Max: 36.9 (09-21-17 @ 16:50)  T(F): , Max: 98.5 (09-21-17 @ 16:50)  HR: 87 (09-22-17 @ 05:13)  BP: 122/61 (09-22-17 @ 05:13)  BP(mean): --  RR: 17 (09-22-17 @ 05:13)  SpO2: 92% (09-22-17 @ 05:13)      PHYSICAL EXAM:  Constitutional: NAD at rest on NCO2; Alert, obese  HEENT: NCAT, MMM  Neck: Supple, No JVD  Respiratory: distant BS b/l  Cardiovascular: RRR s1s2, no m/r/g  Gastrointestinal: BS+, soft, NT/ND  Extremities: No peripheral edema b/l  Neurological: no focal deficits; strength grossly intact  Psychiatric: Normal mood, normal affect  Back: no CVAT b/l  Skin: No rashes, no nevi  Access: (+)[facility]            LABS:                        10.7   4.38  )-----------( 184      ( 21 Sep 2017 11:00 )             35.3     Na(141)/K(4.3)/Cl(102)/HCO3(25)/BUN(25)/Cr(0.98)Glu(197)/Ca(9.1)/Mg(1.8)/PO4(3.9)    09-22 @ 06:10  Na(142)/K(4.4)/Cl(105)/HCO3(24)/BUN(23)/Cr(1.05)Glu(181)/Ca(9.1)/Mg(--)/PO4(--)    09-21 @ 11:00      IMPRESSION:  57F w/ HTN, DM2, HFrEF, and ESRD-HD TTS, 9/21/17 a/w poorly functioning tunneled HD catheter.    (1)Renal - ESRD-HD TTS - she receive 1 hour of HD yesterday (minimal therapy) - despite not having received a regular dialysis session for the past 3 days, her creatinine today is below 1. It does not appear that she should require further HD at this point; we should monitor her for a couple days to ensure she does not become fluid-overloaded or hyperkalemic off HD.    (2)Vasc-catheter - BFR of ~200ml/min achieved with the tunneled HD catheter, after cathflo administration. The catheter can be emergently used over the weekend if needed.      RECOMMEND:    (1)Continue inpatient trial of withholding HD; if as of Monday there remains no need for HD, we could at that point remove her tunneled catheter    (2)Continue renal diet    (3)BMP +Mag + Phos daily    (4)Bumex 2mg po bid for now.        Perfecto Rae MD  [facility]  [phone] No pain, no shortness of breath      VITAL:  T(C): , Max: 36.9 (09-21-17 @ 16:50)  T(F): , Max: 98.5 (09-21-17 @ 16:50)  HR: 87 (09-22-17 @ 05:13)  BP: 122/61 (09-22-17 @ 05:13)  BP(mean): --  RR: 17 (09-22-17 @ 05:13)  SpO2: 92% (09-22-17 @ 05:13)      PHYSICAL EXAM:  Constitutional: NAD at rest on NCO2; Alert, obese  HEENT: NCAT, MMM  Neck: Supple, No JVD  Respiratory: distant BS b/l  Cardiovascular: RRR s1s2, no m/r/g  Gastrointestinal: BS+, soft, NT/ND  Extremities: No peripheral edema b/l  Neurological: no focal deficits; strength grossly intact  Psychiatric: Normal mood, normal affect  Back: no CVAT b/l  Skin: No rashes, no nevi  Access: (+)[facility]      LABS:                        10.7   4.38  )-----------( 184      ( 21 Sep 2017 11:00 )             35.3     Na(141)/K(4.3)/Cl(102)/HCO3(25)/BUN(25)/Cr(0.98)Glu(197)/Ca(9.1)/Mg(1.8)/PO4(3.9)    09-22 @ 06:10  Na(142)/K(4.4)/Cl(105)/HCO3(24)/BUN(23)/Cr(1.05)Glu(181)/Ca(9.1)/Mg(--)/PO4(--)    09-21 @ 11:00      IMPRESSION:  57F w/ HTN, DM2, HFrEF, and ESRD-HD TTS, 9/21/17 a/w poorly functioning tunneled HD catheter.    (1)Renal - ESRD-HD TTS - she receive 1 hour of HD yesterday (minimal therapy) - despite not having received a regular dialysis session for the past 3 days, her creatinine today is below 1. It does not appear that she should require further HD at this point; we should monitor her for a couple days to ensure she does not become fluid-overloaded or hyperkalemic off HD.    (2)Vasc-catheter - BFR of ~200ml/min achieved with the tunneled HD catheter, after cathflo administration. The catheter can be emergently used over the weekend if needed.      RECOMMEND:    (1)Continue inpatient trial of withholding HD; if as of Monday there remains no need for HD, we could at that point remove her tunneled catheter    (2)Continue renal diet    (3)BMP +Mag + Phos daily    (4)Bumex 2mg po bid for now.    (5)Discontinue ASA if reasonable to do so from a cardiac standpoint, in anticipation of tunneled catheter removal next week (discussed with DONTA Cardoza)      Perfecto aRe MD  [facility]  [phone]

## 2017-09-22 NOTE — DIETITIAN INITIAL EVALUATION ADULT. - OTHER INFO
Nutrition Consult X BMI greater than 40. Pt 56 yo female with ESRD on dialysis. Pt appears alert, oriented. Per Pt her appetite "okay"; No chew/swallow problem voiced; no nausea/vomiting/diarrhea reported @ present. Pt C/O constipation. Of note Pt's HbA1c level 7.1% (9/17). Pt usually avoids salt & sugar reported. Pt aware of Renal dietary restrictions reported as well. Weight management nutrition therapy discussed with Pt including better food choices, foods to avoid. Written materials on diet also provided. RDN remains available, Pt made aware.

## 2017-09-22 NOTE — DIETITIAN INITIAL EVALUATION ADULT. - NS AS NUTRI INTERV MEALS SNACK
Diets modified for specific foods and ingredients/1. Suggest: Continue current PO diet order which remains appropriate @ present; Monitor PO diet tolerance;       2. Monitor labs, weights, hydration status;          3. Recommend: to follow up with weight management dietitians;/Other (specify)

## 2017-09-22 NOTE — PROGRESS NOTE ADULT - SUBJECTIVE AND OBJECTIVE BOX
CHIEF COMPLAINT:  pt feel  ok  no   event   overnight    SUBJECTIVE:     REVIEW OF SYSTEMS:    CONSTITUTIONAL: ( - )  weakness,  ( - ) fevers or chills  EYES/ENT: ( - )visual changes;     NECK: ( - ) pain or stiffness  RESPIRATORY:   (-  )cough, wheezing, hemoptysis;  ( - ) shortness of breath  CARDIOVASCULAR:  (-  )chest pain or palpitations  GASTROINTESTINAL:   ( - )abdominal or epigastric pain.  (-  ) nausea, vomiting, or hematemesis;   (  - ) diarrhea or constipation.   GENITOURINARY:   (    ) dysuria, frequency or hematuria  NEUROLOGICAL:  (   ) numbness or weakness   All other review of systems is negative unless indicated above    Vital Signs Last 24 Hrs  T(C): 36.8 (22 Sep 2017 14:35), Max: 36.8 (22 Sep 2017 05:13)  T(F): 98.2 (22 Sep 2017 14:35), Max: 98.2 (22 Sep 2017 05:13)  HR: 83 (22 Sep 2017 14:35) (83 - 88)  BP: 126/77 (22 Sep 2017 14:35) (122/61 - 140/80)  BP(mean): --  RR: 18 (22 Sep 2017 14:35) (16 - 18)  SpO2: 94% (22 Sep 2017 14:35) (92% - 100%)    I&O's Summary    21 Sep 2017 07:01  -  22 Sep 2017 07:00  --------------------------------------------------------  IN: 400 mL / OUT: 400 mL / NET: 0 mL        CAPILLARY BLOOD GLUCOSE  164 (22 Sep 2017 12:31)  171 (22 Sep 2017 07:50)  133 (21 Sep 2017 21:07)          PHYSICAL EXAM:    Constitutional:  ( -  ) NAD,   ( +  )awake and alert  HEENT: PERR, EOMI,    Neck: Soft and supple, No LAD, No JVD  Respiratory:  (  +  Breath sounds are clear bilaterally,    ( -  ) wheezing, rales or rhonchi  Cardiovascular:     (  + )S1 and S2, regular rate and rhythm, no Murmurs, gallops or rubs  Gastrointestinal:  ( +  )Bowel Sounds present, soft,   ( + )nontender, nondistended,    Extremities:    ( - ) peripheral edema  Vascular: 2+ peripheral pulses  Neurological:    (   + )A/O x 3,   (  -) focal deficits  Musculoskeletal:    (  + )  normal strength b/l upper  (  +   ) normal  lower extremities  Skin: No rashes    MEDICATIONS:  MEDICATIONS  (STANDING):  aspirin enteric coated 81 milliGRAM(s) Oral daily  lisinopril 2.5 milliGRAM(s) Oral daily  gabapentin 100 milliGRAM(s) Oral daily  atorvastatin 80 milliGRAM(s) Oral at bedtime  carvedilol 25 milliGRAM(s) Oral every 12 hours  pantoprazole    Tablet 40 milliGRAM(s) Oral before breakfast  insulin glargine Injectable (LANTUS) 36 Unit(s) SubCutaneous at bedtime  insulin lispro Injectable (HumaLOG) 6 Unit(s) SubCutaneous three times a day before meals  insulin lispro (HumaLOG) corrective regimen sliding scale   SubCutaneous three times a day before meals  dextrose 5%. 1000 milliLiter(s) (50 mL/Hr) IV Continuous <Continuous>  dextrose 50% Injectable 12.5 Gram(s) IV Push once  dextrose 50% Injectable 25 Gram(s) IV Push once  dextrose 50% Injectable 25 Gram(s) IV Push once  heparin  Injectable 5000 Unit(s) SubCutaneous every 12 hours  buMETAnide 2 milliGRAM(s) Oral two times a day  influenza   Vaccine 0.5 milliLiter(s) IntraMuscular once      LABS: All Labs Reviewed:                        10.7   4.38  )-----------( 184      ( 21 Sep 2017 11:00 )             35.3     09-22    141  |  102  |  25<H>  ----------------------------<  197<H>  4.3   |  25  |  0.98    Ca    9.1      22 Sep 2017 06:10  Phos  3.9     09-22  Mg     1.8     09-22    TPro  7.5  /  Alb  4.0  /  TBili  0.5  /  DBili  x   /  AST  17  /  ALT  13  /  AlkPhos  118  09-21    PT/INR - ( 21 Sep 2017 11:00 )   PT: 11.1 SEC;   INR: 0.99          PTT - ( 21 Sep 2017 11:00 )  PTT:30.8 SEC  CARDIAC MARKERS ( 21 Sep 2017 11:00 )  x     / < 0.06 ng/mL / x     / x     / x          Blood Culture:   Urine Culture      RADIOLOGY/EKG:    ASSESSMENT AND PLAN: Assessment and Plan:   57F hx of ESRD on HD (T/Th/Sat), Morbid Obesity, Gout, DM2, NICM w/ sCHF (EF=22%) with AICD, Diabetic Neuropathy, HTN, HLD, presents to [facility] ED from outpatient dialysis for failed vascular access    Problem/Plan - 1:  ·  Problem: Dialysis complication,    had  dialysis  on  9/21/17  for 1 hour with current catheter,      Problem/Plan - 2:  ·  Problem: Chronic systolic congestive heart failure.  Plan: c/w Bumex 2 mg PO BID and Lisinopril 2.5 mg PO qD and Coreg 25 mg PO BID.     Problem/Plan - 3:  ·  Problem: ESRD (end stage renal disease) on dialysis.  Plan: Regularly scheduled dialysis Tues, Thurs, Sat. Renal following pt  refuse   further HD  agree and  will monitor   urine   out   put   x  48   hours daily   weight  - Renal Diet.     Problem/Plan - 4:  ·  Problem: Diabetes Mellitus.  Plan: Patient on 54 units of insulin per day. Will continue with insulin and place patient on Renal consistent carb diet.     Problem/Plan - 5:  ·  Problem: Diabetic neuropathy.  Plan: c/w Gabapentin 100 mg PO qD.     Problem/Plan - 6:  Problem: Prophylactic measure. Gout  Plan: HSQ 5000U q12h for DVT ppx. c/w Uloric          DISPOSITION:  home  in  2-3  days

## 2017-09-22 NOTE — CHART NOTE - NSCHARTNOTEFT_GEN_A_CORE
Pt was seen and examined at bedside, denies chest pain, SOB palp at present. Per Dr Rae Nephnakia the plan to continue inpatient trial of withholding HD; if as of Monday there remains no need for HD, we could at that point remove her tunneled catheter. Had discussion with Attending, per Dr Lou pt to c/w ASA as prescribed 2/2 extended cardiac history. Will continue to monitor pts clinical status, daily weights, strict I&Os.

## 2017-09-23 LAB
HCT VFR BLD CALC: 33.9 % — LOW (ref 34.5–45)
HGB BLD-MCNC: 10.5 G/DL — LOW (ref 11.5–15.5)
MCHC RBC-ENTMCNC: 27.6 PG — SIGNIFICANT CHANGE UP (ref 27–34)
MCHC RBC-ENTMCNC: 31 % — LOW (ref 32–36)
MCV RBC AUTO: 89 FL — SIGNIFICANT CHANGE UP (ref 80–100)
NRBC # FLD: 0 — SIGNIFICANT CHANGE UP
PLATELET # BLD AUTO: 173 K/UL — SIGNIFICANT CHANGE UP (ref 150–400)
PMV BLD: 12.3 FL — SIGNIFICANT CHANGE UP (ref 7–13)
RBC # BLD: 3.81 M/UL — SIGNIFICANT CHANGE UP (ref 3.8–5.2)
RBC # FLD: 15.5 % — HIGH (ref 10.3–14.5)
WBC # BLD: 4.62 K/UL — SIGNIFICANT CHANGE UP (ref 3.8–10.5)
WBC # FLD AUTO: 4.62 K/UL — SIGNIFICANT CHANGE UP (ref 3.8–10.5)

## 2017-09-23 RX ADMIN — Medication 2: at 09:35

## 2017-09-23 RX ADMIN — GABAPENTIN 100 MILLIGRAM(S): 400 CAPSULE ORAL at 13:40

## 2017-09-23 RX ADMIN — Medication 6 UNIT(S): at 09:35

## 2017-09-23 RX ADMIN — CARVEDILOL PHOSPHATE 25 MILLIGRAM(S): 80 CAPSULE, EXTENDED RELEASE ORAL at 05:51

## 2017-09-23 RX ADMIN — Medication 2: at 13:36

## 2017-09-23 RX ADMIN — Medication 81 MILLIGRAM(S): at 13:40

## 2017-09-23 RX ADMIN — HEPARIN SODIUM 5000 UNIT(S): 5000 INJECTION INTRAVENOUS; SUBCUTANEOUS at 05:51

## 2017-09-23 RX ADMIN — ATORVASTATIN CALCIUM 80 MILLIGRAM(S): 80 TABLET, FILM COATED ORAL at 22:18

## 2017-09-23 RX ADMIN — PANTOPRAZOLE SODIUM 40 MILLIGRAM(S): 20 TABLET, DELAYED RELEASE ORAL at 05:50

## 2017-09-23 RX ADMIN — Medication 6 UNIT(S): at 13:37

## 2017-09-23 RX ADMIN — BUMETANIDE 2 MILLIGRAM(S): 0.25 INJECTION INTRAMUSCULAR; INTRAVENOUS at 05:50

## 2017-09-23 RX ADMIN — Medication 6 UNIT(S): at 17:40

## 2017-09-23 RX ADMIN — LISINOPRIL 2.5 MILLIGRAM(S): 2.5 TABLET ORAL at 05:50

## 2017-09-23 RX ADMIN — BUMETANIDE 2 MILLIGRAM(S): 0.25 INJECTION INTRAMUSCULAR; INTRAVENOUS at 17:39

## 2017-09-23 RX ADMIN — INSULIN GLARGINE 36 UNIT(S): 100 INJECTION, SOLUTION SUBCUTANEOUS at 22:18

## 2017-09-23 RX ADMIN — CARVEDILOL PHOSPHATE 25 MILLIGRAM(S): 80 CAPSULE, EXTENDED RELEASE ORAL at 17:40

## 2017-09-23 RX ADMIN — HEPARIN SODIUM 5000 UNIT(S): 5000 INJECTION INTRAVENOUS; SUBCUTANEOUS at 17:40

## 2017-09-23 NOTE — PROGRESS NOTE ADULT - SUBJECTIVE AND OBJECTIVE BOX
Patient seen and examined in bed with no new complaints.  NAD noted.  No new events overnight.    REVIEW OF SYSTEMS:  As per HPI, otherwise 8 full 10 ROS were unremarkable    MEDICATIONS  (STANDING):  aspirin enteric coated 81 milliGRAM(s) Oral daily  lisinopril 2.5 milliGRAM(s) Oral daily  gabapentin 100 milliGRAM(s) Oral daily  atorvastatin 80 milliGRAM(s) Oral at bedtime  carvedilol 25 milliGRAM(s) Oral every 12 hours  pantoprazole    Tablet 40 milliGRAM(s) Oral before breakfast  insulin glargine Injectable (LANTUS) 36 Unit(s) SubCutaneous at bedtime  insulin lispro Injectable (HumaLOG) 6 Unit(s) SubCutaneous three times a day before meals  insulin lispro (HumaLOG) corrective regimen sliding scale   SubCutaneous three times a day before meals  dextrose 5%. 1000 milliLiter(s) (50 mL/Hr) IV Continuous <Continuous>  dextrose 50% Injectable 12.5 Gram(s) IV Push once  dextrose 50% Injectable 25 Gram(s) IV Push once  dextrose 50% Injectable 25 Gram(s) IV Push once  heparin  Injectable 5000 Unit(s) SubCutaneous every 12 hours  buMETAnide 2 milliGRAM(s) Oral two times a day  influenza   Vaccine 0.5 milliLiter(s) IntraMuscular once      VITAL:  T(C): , Max: 37 (09-23-17 @ 15:05)  T(F): , Max: 98.6 (09-23-17 @ 15:05)  HR: 74 (09-23-17 @ 15:05)  BP: 131/65 (09-23-17 @ 15:05)  BP(mean): 105 (09-22-17 @ 17:22)  RR: 18 (09-23-17 @ 15:05)  SpO2: 98% (09-23-17 @ 15:05)  Wt(kg): --    I and O's:    09-22 @ 07:01  -  09-23 @ 07:00  --------------------------------------------------------  IN: 220 mL / OUT: 750 mL / NET: -530 mL    09-23 @ 07:01  -  09-23 @ 16:05  --------------------------------------------------------  IN: 0 mL / OUT: 760 mL / NET: -760 mL          PHYSICAL EXAM:    Constitutional: NAD  HEENT: PERRLA, EOMI,  MMM  Neck: No LAD, No JVD  Respiratory: CTAB  Cardiovascular: S1 and S2  Gastrointestinal: BS+, soft, NT/ND  Extremities: No peripheral edema  Neurological: A/O x 3, no focal deficits  Psychiatric: Normal mood, normal affect  : No Light  Skin: No rashes  Access: (+)[facility] permacat    LABS:                        10.5   4.62  )-----------( 173      ( 23 Sep 2017 06:48 )             33.9     09-22    141  |  102  |  25<H>  ----------------------------<  197<H>  4.3   |  25  |  0.98    Ca    9.1      22 Sep 2017 06:10  Phos  3.9     09-22  Mg     1.8     09-22    IMPRESSION:  57F w/ HTN, DM2, HFrEF, and ESRD-HD TTS, 9/21/17 a/w poorly functioning tunneled HD catheter.    (1)Renal - ESRD-HD TTS - last HD was Thursday with minimal therapy.  Creatinine has been around 1.0 as of late off HD-no new labs;  u/o 750cc/24hr.    (2)Vasc-catheter - BFR of ~200ml/min achieved with the tunneled HD catheter, after cathflo administration. The catheter can be emergently used over the weekend if needed but there is no clinical indication at this time for use/HD.  As per Dr. Lou, ok to resume ASA in anticipation of catheter removal next week    RECOMMEND:  1.  BMP, Mg & Phos in AM  2.  Continue to monitor patient off HD; if renal function remains intact, will look to remove tunneled catheter  3.  Continue Bumex 2mg po bid for now  4.  I & Os as able  5.  Maintain renal diet as ordered.

## 2017-09-23 NOTE — PROGRESS NOTE ADULT - SUBJECTIVE AND OBJECTIVE BOX
CHIEF COMPLAINT:nonr    SUBJECTIVE:     REVIEW OF SYSTEMS:    CONSTITUTIONAL: ( - )  weakness,  ( - ) fevers or chills  EYES/ENT: (-  )visual changes;     NECK: ( - ) pain or stiffness  RESPIRATORY:   (-  )cough, wheezing, hemoptysis;  ( - ) shortness of breath  CARDIOVASCULAR:  ( - )chest pain or palpitations  GASTROINTESTINAL: -  (  )abdominal or epigastric pain.  (  ) nausea, vomiting, or hematemesis;   (   ) diarrhea or constipation.   GENITOURINARY:   (  -  ) dysuria, frequency or hematuria  NEUROLOGICAL:  (  - ) numbness or weakness   All other review of systems is negative unless indicated above    Vital Signs Last 24 Hrs  T(C): 37 (23 Sep 2017 15:05), Max: 37 (23 Sep 2017 15:05)  T(F): 98.6 (23 Sep 2017 15:05), Max: 98.6 (23 Sep 2017 15:05)  HR: 76 (23 Sep 2017 17:41) (74 - 83)  BP: 128/82 (23 Sep 2017 17:41) (125/75 - 131/65)  BP(mean): --  RR: 18 (23 Sep 2017 15:05) (17 - 18)  SpO2: 98% (23 Sep 2017 15:05) (94% - 98%)    I&O's Summary    22 Sep 2017 07:01  -  23 Sep 2017 07:00  --------------------------------------------------------  IN: 220 mL / OUT: 750 mL / NET: -530 mL    23 Sep 2017 07:01  -  23 Sep 2017 20:29  --------------------------------------------------------  IN: 720 mL / OUT: 1160 mL / NET: -440 mL        CAPILLARY BLOOD GLUCOSE  149 (23 Sep 2017 17:30)  171 (23 Sep 2017 12:31)  167 (23 Sep 2017 07:25)  186 (22 Sep 2017 20:56)          PHYSICAL EXAM:    Constitutional:  (  - ) NAD,   (  + )awake and alert  HEENT: PERR, EOMI,    Neck: Soft and supple, No LAD, No JVD  Respiratory:  ( +   Breath sounds are clear bilaterally,    (  -) wheezing, rales or rhonchi  Cardiovascular:     (  + )S1 and S2, regular rate and rhythm, no Murmurs, gallops or rubs  Gastrointestinal:  (  + )Bowel Sounds present, soft,   (  )nontender, nondistended,    Extremities:    ( - ) peripheral edema  Vascular: 2+ peripheral pulses  Neurological:    (  +  )A/O x 3,   ( - ) focal deficits  Musculoskeletal:    (  + )  normal strength b/l upper  (   +  ) normal  lower extremities  Skin: No rashes    MEDICATIONS:  MEDICATIONS  (STANDING):  aspirin enteric coated 81 milliGRAM(s) Oral daily  lisinopril 2.5 milliGRAM(s) Oral daily  gabapentin 100 milliGRAM(s) Oral daily  atorvastatin 80 milliGRAM(s) Oral at bedtime  carvedilol 25 milliGRAM(s) Oral every 12 hours  pantoprazole    Tablet 40 milliGRAM(s) Oral before breakfast  insulin glargine Injectable (LANTUS) 36 Unit(s) SubCutaneous at bedtime  insulin lispro Injectable (HumaLOG) 6 Unit(s) SubCutaneous three times a day before meals  insulin lispro (HumaLOG) corrective regimen sliding scale   SubCutaneous three times a day before meals  dextrose 5%. 1000 milliLiter(s) (50 mL/Hr) IV Continuous <Continuous>  dextrose 50% Injectable 12.5 Gram(s) IV Push once  dextrose 50% Injectable 25 Gram(s) IV Push once  dextrose 50% Injectable 25 Gram(s) IV Push once  heparin  Injectable 5000 Unit(s) SubCutaneous every 12 hours  buMETAnide 2 milliGRAM(s) Oral two times a day  influenza   Vaccine 0.5 milliLiter(s) IntraMuscular once      LABS: All Labs Reviewed:                        10.5   4.62  )-----------( 173      ( 23 Sep 2017 06:48 )             33.9     09-22    141  |  102  |  25<H>  ----------------------------<  197<H>  4.3   |  25  |  0.98    Ca    9.1      22 Sep 2017 06:10  Phos  3.9     09-22  Mg     1.8     09-22            Blood Culture:   Urine Culture      RADIOLOGY/EKG:       : Assessment and Plan:   57F hx of ESRD on HD (T/Th/Sat), Morbid Obesity, Gout, DM2, NICM w/ sCHF (EF=22%) with AICD, Diabetic Neuropathy, HTN, HLD, presents to [facility] ED from outpatient dialysis for failed vascular access    Problem/Plan - 1:  ·  Problem: Dialysis complication,    had  dialysis  on  9/21/17  for 1 hour with current catheter,      Problem/Plan - 2:  ·  Problem: Chronic systolic congestive heart failure.  Plan: c/w Bumex 2 mg PO BID and Lisinopril 2.5 mg PO qD and Coreg 25 mg PO BID.     Problem/Plan - 3:  ·  Problem: ESRD (end stage renal disease) on dialysis.  Plan: Regularly scheduled dialysis Tues, Thurs, Sat.     pt  refuse   further HD  agree and  will monitor   urine   out   put    today   >  1100  cc  - Renal Diet.     Problem/Plan - 4:  ·  Problem: Diabetes Mellitus.  Plan: Patient on 54 units of insulin per day. Will continue with insulin and place patient on Renal consistent carb diet.     Problem/Plan - 5:  ·  Problem: Diabetic neuropathy.  Plan: c/w Gabapentin 100 mg PO qD.     Problem/Plan - 6:  Problem: Prophylactic measure. Gout  Plan: HSQ 5000U q12h for DVT ppx. c/w Uloric          DISPOSITION:  home  in  2  days  DVT PPX:    ADVANCED DIRECTIVE:    DISPOSITION:

## 2017-09-24 ENCOUNTER — TRANSCRIPTION ENCOUNTER (OUTPATIENT)
Age: 57
End: 2017-09-24

## 2017-09-24 RX ORDER — BUMETANIDE 0.25 MG/ML
1 INJECTION INTRAMUSCULAR; INTRAVENOUS
Qty: 0 | Refills: 0 | COMMUNITY
Start: 2017-09-24

## 2017-09-24 RX ADMIN — BUMETANIDE 2 MILLIGRAM(S): 0.25 INJECTION INTRAMUSCULAR; INTRAVENOUS at 18:07

## 2017-09-24 RX ADMIN — HEPARIN SODIUM 5000 UNIT(S): 5000 INJECTION INTRAVENOUS; SUBCUTANEOUS at 05:09

## 2017-09-24 RX ADMIN — Medication 6 UNIT(S): at 18:07

## 2017-09-24 RX ADMIN — Medication 6 UNIT(S): at 13:40

## 2017-09-24 RX ADMIN — GABAPENTIN 100 MILLIGRAM(S): 400 CAPSULE ORAL at 13:41

## 2017-09-24 RX ADMIN — INSULIN GLARGINE 36 UNIT(S): 100 INJECTION, SOLUTION SUBCUTANEOUS at 21:34

## 2017-09-24 RX ADMIN — Medication 6 UNIT(S): at 09:24

## 2017-09-24 RX ADMIN — HEPARIN SODIUM 5000 UNIT(S): 5000 INJECTION INTRAVENOUS; SUBCUTANEOUS at 18:07

## 2017-09-24 RX ADMIN — CARVEDILOL PHOSPHATE 25 MILLIGRAM(S): 80 CAPSULE, EXTENDED RELEASE ORAL at 18:08

## 2017-09-24 RX ADMIN — PANTOPRAZOLE SODIUM 40 MILLIGRAM(S): 20 TABLET, DELAYED RELEASE ORAL at 05:08

## 2017-09-24 RX ADMIN — BUMETANIDE 2 MILLIGRAM(S): 0.25 INJECTION INTRAMUSCULAR; INTRAVENOUS at 05:08

## 2017-09-24 RX ADMIN — LISINOPRIL 2.5 MILLIGRAM(S): 2.5 TABLET ORAL at 05:08

## 2017-09-24 RX ADMIN — Medication 81 MILLIGRAM(S): at 13:41

## 2017-09-24 RX ADMIN — ATORVASTATIN CALCIUM 80 MILLIGRAM(S): 80 TABLET, FILM COATED ORAL at 21:34

## 2017-09-24 RX ADMIN — Medication 4: at 13:40

## 2017-09-24 RX ADMIN — CARVEDILOL PHOSPHATE 25 MILLIGRAM(S): 80 CAPSULE, EXTENDED RELEASE ORAL at 05:09

## 2017-09-24 NOTE — DISCHARGE NOTE ADULT - HOSPITAL COURSE
57 year old female pmh DM, CHF s/p AICD, ESRD presents from dialysis center for sob and unable to access dialysis cath.  patient states sob is worse when lying flat. mild cough.  patient fully dialyzed 2 days ago.    Hospital Course    Dialysis complication, initial encounter- Patient unable to have dialysis today 2/2 problems with vascular access. Vascular surgery Dr Vazquez consulted, s/p attempted R radiocephalic fistula (thrombosed radial artery)-July 2017, brachiobasilic fistula-now non functioning, s/p PermaCath placement 8/25/2017.      Chronic systolic congestive heart failure- c/w Bumex 2 mg PO BID and Lisinopril 2.5 mg PO qD and Coreg 25 mg PO BID. CXR- Cardiomegaly with small effusions and congestion consistent with CHF.    ESRD (end stage renal disease) on dialysis- HD Tues, Thurs, Sat. Renal Dr Rae consulted. Continue inpatient trial of withholding HD; if as of Monday there remains no need for HD, we could at that point remove her tunneled catheter    Diabetes Mellitus- Patient on 54 units of insulin per day, c/w insulin and place patient on Renal consistent carb diet.     Diabetic neuropathy- c/w Gabapentin 100 mg PO qD. 57 year old female pmh DM, CHF s/p AICD, ESRD presents from dialysis center for sob and unable to access dialysis cath.  patient states sob is worse when lying flat. mild cough.  patient fully dialyzed 2 days ago.    Hospital Course    Dialysis complication, initial encounter- Patient was unable to have dialysis 2/2 problems with vascular access. Vascular surgery Dr Vazquez consulted, s/p attempted R radiocephalic fistula (thrombosed radial artery)-July 2017, brachiobasilic fistula-now non functioning, s/p PermaCath placement 8/25/2017.  Per Dr Rae there is no need to continue with dialyses treatment at present. Permacath was removed by Vasc team on 9/25/17.    Chronic systolic congestive heart failure- c/w Bumex 2 mg PO decreased to daily and Lisinopril 2.5 mg PO qD and Coreg 25 mg decreased to 12.5 mg PO BID. CXR- Cardiomegaly with small effusions and congestion consistent with CHF.    ESRD (end stage renal disease) on dialysis-  Renal Dr Rae consulted. Continued inpatient trial of withholding HD; There is no need to continue with dialyses at present. Permacath was removed by Vasc team on 9/25/17.    Diabetes Mellitus- Patient on 54 units of insulin per day, c/w insulin and place patient on Renal consistent carb diet.     Diabetic neuropathy- c/w Gabapentin 100 mg PO qD. Pt stable for discharge home today with HC services and outpatient follow up.

## 2017-09-24 NOTE — DISCHARGE NOTE ADULT - PLAN OF CARE
Optimal renal function Please follow up with Dr Butch Castellanos as outpatient for further management and treatment. Follow up with PCP and Card doctor as outpatient for further management and treatment. Continue taking Bumex as prescribed. Monitor daily weights at home. Follow up with PCP Dr Lou as outpatient for further management and treatment. Continue taking Bumex as prescribed. Monitor daily weights at home. Continue administering Insulin as prescribed, monitor blood sugar levels before meals and at bedtime. Your HgA1C level is 7.1% Please follow up with Dr Butch Castellanos as outpatient for further management and treatment. There is no need to continue with dialyses at present. Permacath was removed by [facility] team on 9/25/17. Follow up with PCP Dr Lou and Card doctor as outpatient for further management and treatment. Continue taking Bumex 2 mg oral decreased to daily as prescribed. Monitor daily weights at home. Coreg was decreased to 12.5 mg oral 2xdaily 2/2 low BP. Monitor BP at home. Call your doctor if you have weight gain >5lbs, feel short of breath. Please follow up with Dr Butch Castellanos as outpatient for further management and treatment. There is no need to continue with dialyses at present. PermCath was removed by [facility] team on 9/25/17.

## 2017-09-24 NOTE — DISCHARGE NOTE ADULT - CARE PLAN
Principal Discharge DX:	Dialysis complication, initial encounter  Goal:	Optimal renal function  Instructions for follow-up, activity and diet:	Please follow up with Dr Butch Castellanos as outpatient for further management and treatment.  Secondary Diagnosis:	Chronic systolic congestive heart failure  Instructions for follow-up, activity and diet:	Follow up with PCP and Card doctor as outpatient for further management and treatment. Continue taking Bumex as prescribed. Monitor daily weights at home.  Secondary Diagnosis:	ESRD (end stage renal disease) on dialysis  Instructions for follow-up, activity and diet:	Please follow up with Dr Butch Castellanos as outpatient for further management and treatment.  Secondary Diagnosis:	Hypervolemia, unspecified hypervolemia type  Instructions for follow-up, activity and diet:	Follow up with PCP Dr Lou as outpatient for further management and treatment. Continue taking Bumex as prescribed. Monitor daily weights at home.  Secondary Diagnosis:	Type 2 diabetes mellitus with stage 4 chronic kidney disease, unspecified long term insulin use status  Instructions for follow-up, activity and diet:	Continue administering Insulin as prescribed, monitor blood sugar levels before meals and at bedtime. Your HgA1C level is 7.1% Principal Discharge DX:	Dialysis complication, initial encounter  Goal:	Optimal renal function  Instructions for follow-up, activity and diet:	Please follow up with Dr Butch Castellanos as outpatient for further management and treatment. There is no need to continue with dialyses at present. Permacath was removed by [facility] team on 9/25/17.  Secondary Diagnosis:	Chronic systolic congestive heart failure  Instructions for follow-up, activity and diet:	Follow up with PCP Dr Lou and Card doctor as outpatient for further management and treatment. Continue taking Bumex 2 mg oral decreased to daily as prescribed. Monitor daily weights at home. Coreg was decreased to 12.5 mg oral 2xdaily 2/2 low BP. Monitor BP at home. Call your doctor if you have weight gain >5lbs, feel short of breath.  Secondary Diagnosis:	ESRD (end stage renal disease) on dialysis  Instructions for follow-up, activity and diet:	Please follow up with Dr Butch Castellanos as outpatient for further management and treatment. There is no need to continue with dialyses at present. PermCath was removed by [facility] team on 9/25/17.  Secondary Diagnosis:	Hypervolemia, unspecified hypervolemia type  Instructions for follow-up, activity and diet:	Follow up with PCP Dr Lou as outpatient for further management and treatment. Continue taking Bumex as prescribed. Monitor daily weights at home.  Secondary Diagnosis:	Type 2 diabetes mellitus with stage 4 chronic kidney disease, unspecified long term insulin use status  Instructions for follow-up, activity and diet:	Continue administering Insulin as prescribed, monitor blood sugar levels before meals and at bedtime. Your HgA1C level is 7.1%

## 2017-09-24 NOTE — DISCHARGE NOTE ADULT - SECONDARY DIAGNOSIS.
Chronic systolic congestive heart failure ESRD (end stage renal disease) on dialysis Hypervolemia, unspecified hypervolemia type Type 2 diabetes mellitus with stage 4 chronic kidney disease, unspecified long term insulin use status

## 2017-09-24 NOTE — DISCHARGE NOTE ADULT - CARE PROVIDER_API CALL
Perfecto Rae), Internal Medicine; Nephrology  [address]  [place] [address]  Phone: [phone]  Fax: [phone]    Unique Lou), Medicine  [address] [place] [address]  Phone: [phone]  Fax: [phone]

## 2017-09-24 NOTE — DISCHARGE NOTE ADULT - PATIENT PORTAL LINK FT
“You can access the Follow[employer] Patient Portal, offered by [facility], by registering with the following website: [email or web address]”

## 2017-09-24 NOTE — PROGRESS NOTE ADULT - SUBJECTIVE AND OBJECTIVE BOX
CHIEF COMPLAINT: patient condition is  stable  no events overnight    SUBJECTIVE:     REVIEW OF SYSTEMS:    CONSTITUTIONAL: ( - )  weakness,  ( - ) fevers or chills  EYES/ENT: (-  )visual changes;     NECK: (  ) pain or stiffness  RESPIRATORY:   (-  )cough, wheezing, hemoptysis;  (-  ) shortness of breath  CARDIOVASCULAR:  ( - )chest pain or palpitations  GASTROINTESTINAL:   ( - )abdominal or epigastric pain.  ( - ) nausea, vomiting, or hematemesis;   (  - ) diarrhea or constipation.   GENITOURINARY:   (  -  ) dysuria, frequency or hematuria  NEUROLOGICAL:  ( -  ) numbness or weakness   All other review of systems is negative unless indicated above    Vital Signs Last 24 Hrs  T(C): 36.9 (24 Sep 2017 14:30), Max: 36.9 (23 Sep 2017 20:44)  T(F): 98.4 (24 Sep 2017 14:30), Max: 98.5 (23 Sep 2017 20:44)  HR: 88 (24 Sep 2017 18:02) (66 - 88)  BP: 137/82 (24 Sep 2017 18:02) (111/62 - 137/82)  BP(mean): --  RR: 17 (24 Sep 2017 14:30) (16 - 17)  SpO2: 98% (24 Sep 2017 14:30) (95% - 98%)    I&O's Summary    23 Sep 2017 07:01  -  24 Sep 2017 07:00  --------------------------------------------------------  IN: 970 mL / OUT: 2240 mL / NET: -1270 mL    24 Sep 2017 07:01  -  24 Sep 2017 20:09  --------------------------------------------------------  IN: 0 mL / OUT: 1400 mL / NET: -1400 mL        CAPILLARY BLOOD GLUCOSE  137 (24 Sep 2017 17:46)  235 (24 Sep 2017 12:14)  137 (24 Sep 2017 07:22)  144 (23 Sep 2017 20:44)          PHYSICAL EXAM:    Constitutional:  (  - ) NAD,   ( +  )awake and alert  HEENT: PERR, EOMI,    Neck: Soft and supple, No LAD, No JVD  Respiratory:  ( +  Breath sounds are clear bilaterally,    ( -  ) wheezing, rales or rhonchi  Cardiovascular:     ( +  )S1 and S2, regular rate and rhythm, no Murmurs, gallops or rubs  Gastrointestinal:  (  + )Bowel Sounds present, soft,   ( - )nontender, nondistended,    Extremities:    (-  ) peripheral edema  Vascular: 2+ peripheral pulses  Neurological:    (   + )A/O x 3,   (  ) focal deficits  Musculoskeletal:    (  + )  normal strength b/l upper  (  +   ) normal  lower extremities  Skin: No rashes    MEDICATIONS:  MEDICATIONS  (STANDING):  aspirin enteric coated 81 milliGRAM(s) Oral daily  lisinopril 2.5 milliGRAM(s) Oral daily  gabapentin 100 milliGRAM(s) Oral daily  atorvastatin 80 milliGRAM(s) Oral at bedtime  carvedilol 25 milliGRAM(s) Oral every 12 hours  pantoprazole    Tablet 40 milliGRAM(s) Oral before breakfast  insulin glargine Injectable (LANTUS) 36 Unit(s) SubCutaneous at bedtime  .insulin lispro Injectable (HumaLOG) 6 Unit(s) SubCutaneous three times a day before meals  insulin lispro (HumaLOG) corrective regimen sliding scale   SubCutaneous three times a day before meals  dextrose 5%. 1000 milliLiter(s) (50 mL/Hr) IV Continuous <Continuous>  dextrose 50% Injectable 12.5 Gram(s) IV Push once  dextrose 50% Injectable 25 Gram(s) IV Push once  dextrose 50% Injectable 25 Gram(s) IV Push once  heparin  Injectable 5000 Unit(s) SubCutaneous every 12 hours  buMETAnide 2 milliGRAM(s) Oral two times a day  influenza   Vaccine 0.5 milliLiter(s) IntraMuscular once      LABS: All Labs Reviewed:                        10.5   4.62  )-----------( 173      ( 23 Sep 2017 06:48 )             33.9                 Blood Culture:   Urine Culture      RADIOLOGY/EKG:    ASSESSMENT AND PLAN:  IMPRESSION:  57F w/ HTN, DM2, HFrEF, and ESRD-HD TTS, 9/21/17 a/w poorly functioning tunneled HD catheter.    (1)Renal - ESRD-HD TTS - last HD was Thursday with minimal therapy.  Creatinine has been around 1.0 as of late; now off HD-no new labs    (2)Vasc-catheter - BFR of ~200ml/min achieved with the tunneled HD catheter, after cathflo administration. The catheter can be emergently used over the weekend if needed but there is no clinical indication at this time for use/HD.     1.  BMP, Mg & Phos in AM  2.  Continue to monitor patient off HD; if renal function remains intact, will look to remove tunneled catheter  3.  Continue Bumex 2mg po bid for now  4.  I & Os      1400 cc today  5.  Maintain renal diet as ordered  DVT PPX:    ADVANCED DIRECTIVE:    DISPOSITION: home in  a m

## 2017-09-24 NOTE — DISCHARGE NOTE ADULT - ADDITIONAL INSTRUCTIONS
Follow up with PCP Dr Lou, Nephro Dr Rae and Card doctor as outpatient for further management and treatment. Continue taking Bumex as prescribed. Monitor daily weights at home.

## 2017-09-24 NOTE — PROGRESS NOTE ADULT - SUBJECTIVE AND OBJECTIVE BOX
Patient seen and examined in chair with no new complaints.  NAD noted.  No new events.    REVIEW OF SYSTEMS:  As per HPI, otherwise 8 full 10 ROS were unremarkable    MEDICATIONS  (STANDING):  aspirin enteric coated 81 milliGRAM(s) Oral daily  lisinopril 2.5 milliGRAM(s) Oral daily  gabapentin 100 milliGRAM(s) Oral daily  atorvastatin 80 milliGRAM(s) Oral at bedtime  carvedilol 25 milliGRAM(s) Oral every 12 hours  pantoprazole    Tablet 40 milliGRAM(s) Oral before breakfast  insulin glargine Injectable (LANTUS) 36 Unit(s) SubCutaneous at bedtime  insulin lispro Injectable (HumaLOG) 6 Unit(s) SubCutaneous three times a day before meals  insulin lispro (HumaLOG) corrective regimen sliding scale   SubCutaneous three times a day before meals  dextrose 5%. 1000 milliLiter(s) (50 mL/Hr) IV Continuous <Continuous>  dextrose 50% Injectable 12.5 Gram(s) IV Push once  dextrose 50% Injectable 25 Gram(s) IV Push once  dextrose 50% Injectable 25 Gram(s) IV Push once  heparin  Injectable 5000 Unit(s) SubCutaneous every 12 hours  buMETAnide 2 milliGRAM(s) Oral two times a day  influenza   Vaccine 0.5 milliLiter(s) IntraMuscular once      VITAL:  T(C): , Max: 36.9 (09-23-17 @ 20:44)  T(F): , Max: 98.5 (09-23-17 @ 20:44)  HR: 78 (09-24-17 @ 14:30)  BP: 119/57 (09-24-17 @ 14:30)  BP(mean): --  RR: 17 (09-24-17 @ 14:30)  SpO2: 98% (09-24-17 @ 14:30)  Wt(kg): --    I and O's:    09-23 @ 07:01  -  09-24 @ 07:00  --------------------------------------------------------  IN: 970 mL / OUT: 2240 mL / NET: -1270 mL    09-24 @ 07:01  -  09-24 @ 15:21  --------------------------------------------------------  IN: 0 mL / OUT: 900 mL / NET: -900 mL          PHYSICAL EXAM:    Constitutional: NAD  HEENT: PERRLA, EOMI,  MMM  Neck: No LAD, No JVD  Respiratory: CTAB  Cardiovascular: S1 and S2  Gastrointestinal: BS+, soft, NT/ND  Extremities: No peripheral edema  Neurological: A/O x 3, no focal deficits  Psychiatric: Normal mood, normal affect  : No Light  Skin: No rashes  Access: (+)[facility] permacat    LABS:                        10.5   4.62  )-----------( 173      ( 23 Sep 2017 06:48 )             33.9     IMPRESSION:  57F w/ HTN, DM2, HFrEF, and ESRD-HD TTS, 9/21/17 a/w poorly functioning tunneled HD catheter.    (1)Renal - ESRD-HD TTS - last HD was Thursday with minimal therapy.  Creatinine has been around 1.0 as of late; now off HD-no new labs    (2)Vasc-catheter - BFR of ~200ml/min achieved with the tunneled HD catheter, after cathflo administration. The catheter can be emergently used over the weekend if needed but there is no clinical indication at this time for use/HD.  As per Dr. Lou, ok to resume ASA in anticipation of catheter removal next week    RECOMMEND:  1.  BMP, Mg & Phos in AM  2.  Continue to monitor patient off HD; if renal function remains intact, will look to remove tunneled catheter  3.  Continue Bumex 2mg po bid for now  4.  I & Os as able  5.  Maintain renal diet as ordered.

## 2017-09-24 NOTE — DISCHARGE NOTE ADULT - MEDICATION SUMMARY - MEDICATIONS TO TAKE
I will START or STAY ON the medications listed below when I get home from the hospital:    aspirin 81 mg oral delayed release tablet  -- 1 tab(s) by mouth once a day  -- Indication: For Chronic systolic congestive heart failure    lisinopril 2.5 mg oral tablet  -- 1 tab(s) by mouth once a day  -- Indication: For Chronic systolic congestive heart failure    Neurontin 100 mg oral capsule  --  by mouth once a day  -- Indication: For Diabetic neuropathy    Toujeo SoloStar 300 units/mL subcutaneous solution  -- 36 unit(s) subcutaneous once a day (at bedtime)  -- Indication: For Type 2 diabetes mellitus with stage 4 chronic kidney disease, unspecified long term insulin use status    NovoLOG 100 units/mL subcutaneous solution  -- 6 unit(s) subcutaneous 3 times a day  -- Indication: For Type 2 diabetes mellitus with stage 4 chronic kidney disease, unspecified long term insulin use status    Zetia 10 mg oral tablet  -- 1 tab(s) by mouth once a day  -- Indication: For Hyperlipidemia    Crestor 20 mg oral tablet  -- 1 tab(s) by mouth once a day (at bedtime)  -- Indication: For HLD    Uloric 40 mg oral tablet  -- 1 tab(s) by mouth once a day  -- Indication: For Gout    carvedilol 12.5 mg oral tablet  -- 1 tab(s) by mouth every 12 hours  -- Indication: For Chronic systolic congestive heart failure    Proventil HFA 90 mcg/inh inhalation aerosol  -- 2 puff(s) inhaled every 6 hours, As Needed -for shortness of breath and/or wheezing  -- For inhalation only.  It is very important that you take or use this exactly as directed.  Do not skip doses or discontinue unless directed by your doctor.  Obtain medical advice before taking any non-prescription drugs as some may affect the action of this medication.  Shake well before use.    -- Indication: For Chronic systolic congestive heart failure    bumetanide 2 mg oral tablet  -- 1 tab(s) by mouth once a day  -- Indication: For Chronic systolic congestive heart failure    magnesium oxide 400 mg (241.3 mg elemental magnesium) oral tablet  -- 1 tab(s) by mouth 2 times a day (with meals)  -- Indication: For Prophylactic measure    omeprazole 40 mg oral delayed release capsule  -- 1 cap(s) by mouth once a day  -- It is very important that you take or use this exactly as directed.  Do not skip doses or discontinue unless directed by your doctor.  Obtain medical advice before taking any non-prescription drugs as some may affect the action of this medication.  Swallow whole.  Do not crush.    -- Indication: For GERD    Vitamin D3 2000 intl units oral capsule  -- 1 cap(s) by mouth once a day  -- Indication: For Prophylactic measure I will START or STAY ON the medications listed below when I get home from the hospital:    aspirin 81 mg oral delayed release tablet  -- 1 tab(s) by mouth once a day  -- Indication: For Chronic systolic congestive heart failure    lisinopril 2.5 mg oral tablet  -- 1 tab(s) by mouth once a day  -- Indication: For Chronic systolic congestive heart failure    Neurontin 100 mg oral capsule  -- 1 cap(s) by mouth once a day   -- Indication: For Diabetic neuropathy    NovoLOG 100 units/mL subcutaneous solution  -- 6 unit(s) subcutaneous 3 times a day  -- Indication: For Type 2 diabetes mellitus with stage 4 chronic kidney disease, unspecified long term insulin use status    Toujeo SoloStar 300 units/mL subcutaneous solution  -- 36 unit(s) subcutaneous once a day (at bedtime)  -- Indication: For Type 2 diabetes mellitus with stage 4 chronic kidney disease, unspecified long term insulin use status    Zetia 10 mg oral tablet  -- 1 tab(s) by mouth once a day  -- Indication: For Hyperlipidemia    Crestor 20 mg oral tablet  -- 1 tab(s) by mouth once a day (at bedtime)  -- Indication: For HLD    Uloric 40 mg oral tablet  -- 1 tab(s) by mouth once a day  -- Indication: For Gout    carvedilol 12.5 mg oral tablet  -- 1 tab(s) by mouth every 12 hours  -- Indication: For Chronic systolic congestive heart failure    Proventil HFA 90 mcg/inh inhalation aerosol  -- 2 puff(s) inhaled every 6 hours, As Needed -for shortness of breath and/or wheezing  -- For inhalation only.  It is very important that you take or use this exactly as directed.  Do not skip doses or discontinue unless directed by your doctor.  Obtain medical advice before taking any non-prescription drugs as some may affect the action of this medication.  Shake well before use.    -- Indication: For Chronic systolic congestive heart failure    bumetanide 2 mg oral tablet  -- 1 tab(s) by mouth once a day  -- Indication: For Chronic systolic congestive heart failure    magnesium oxide 400 mg (241.3 mg elemental magnesium) oral tablet  -- 1 tab(s) by mouth 2 times a day (with meals)  -- Indication: For Prophylactic measure    omeprazole 40 mg oral delayed release capsule  -- 1 cap(s) by mouth once a day  -- It is very important that you take or use this exactly as directed.  Do not skip doses or discontinue unless directed by your doctor.  Obtain medical advice before taking any non-prescription drugs as some may affect the action of this medication.  Swallow whole.  Do not crush.    -- Indication: For GERD    Vitamin D3 2000 intl units oral capsule  -- 1 cap(s) by mouth once a day  -- Indication: For Prophylactic measure

## 2017-09-25 LAB
BUN SERPL-MCNC: 60 MG/DL — HIGH (ref 7–23)
BUN SERPL-MCNC: 60 MG/DL — HIGH (ref 7–23)
CALCIUM SERPL-MCNC: 8.9 MG/DL — SIGNIFICANT CHANGE UP (ref 8.4–10.5)
CALCIUM SERPL-MCNC: 8.9 MG/DL — SIGNIFICANT CHANGE UP (ref 8.4–10.5)
CHLORIDE SERPL-SCNC: 98 MMOL/L — SIGNIFICANT CHANGE UP (ref 98–107)
CHLORIDE SERPL-SCNC: 98 MMOL/L — SIGNIFICANT CHANGE UP (ref 98–107)
CO2 SERPL-SCNC: 28 MMOL/L — SIGNIFICANT CHANGE UP (ref 22–31)
CO2 SERPL-SCNC: 28 MMOL/L — SIGNIFICANT CHANGE UP (ref 22–31)
CREAT SERPL-MCNC: 1.95 MG/DL — HIGH (ref 0.5–1.3)
CREAT SERPL-MCNC: 1.95 MG/DL — HIGH (ref 0.5–1.3)
GLUCOSE SERPL-MCNC: 183 MG/DL — HIGH (ref 70–99)
GLUCOSE SERPL-MCNC: 183 MG/DL — HIGH (ref 70–99)
HCT VFR BLD CALC: 34.4 % — LOW (ref 34.5–45)
HGB BLD-MCNC: 10.6 G/DL — LOW (ref 11.5–15.5)
MAGNESIUM SERPL-MCNC: 1.4 MG/DL — LOW (ref 1.6–2.6)
MAGNESIUM SERPL-MCNC: 1.4 MG/DL — LOW (ref 1.6–2.6)
MCHC RBC-ENTMCNC: 27 PG — SIGNIFICANT CHANGE UP (ref 27–34)
MCHC RBC-ENTMCNC: 30.8 % — LOW (ref 32–36)
MCV RBC AUTO: 87.8 FL — SIGNIFICANT CHANGE UP (ref 80–100)
NRBC # FLD: 0 — SIGNIFICANT CHANGE UP
PHOSPHATE SERPL-MCNC: 5.5 MG/DL — HIGH (ref 2.5–4.5)
PLATELET # BLD AUTO: 226 K/UL — SIGNIFICANT CHANGE UP (ref 150–400)
PMV BLD: 12.3 FL — SIGNIFICANT CHANGE UP (ref 7–13)
POTASSIUM SERPL-MCNC: 4.3 MMOL/L — SIGNIFICANT CHANGE UP (ref 3.5–5.3)
POTASSIUM SERPL-MCNC: 4.3 MMOL/L — SIGNIFICANT CHANGE UP (ref 3.5–5.3)
POTASSIUM SERPL-SCNC: 4.3 MMOL/L — SIGNIFICANT CHANGE UP (ref 3.5–5.3)
POTASSIUM SERPL-SCNC: 4.3 MMOL/L — SIGNIFICANT CHANGE UP (ref 3.5–5.3)
RBC # BLD: 3.92 M/UL — SIGNIFICANT CHANGE UP (ref 3.8–5.2)
RBC # FLD: 15.9 % — HIGH (ref 10.3–14.5)
SODIUM SERPL-SCNC: 141 MMOL/L — SIGNIFICANT CHANGE UP (ref 135–145)
SODIUM SERPL-SCNC: 141 MMOL/L — SIGNIFICANT CHANGE UP (ref 135–145)
WBC # BLD: 5.78 K/UL — SIGNIFICANT CHANGE UP (ref 3.8–10.5)
WBC # FLD AUTO: 5.78 K/UL — SIGNIFICANT CHANGE UP (ref 3.8–10.5)

## 2017-09-25 PROCEDURE — 36589 REMOVAL TUNNELED CV CATH: CPT | Mod: 78,RT

## 2017-09-25 RX ORDER — MAGNESIUM OXIDE 400 MG ORAL TABLET 241.3 MG
400 TABLET ORAL
Qty: 0 | Refills: 0 | Status: COMPLETED | OUTPATIENT
Start: 2017-09-25 | End: 2017-09-26

## 2017-09-25 RX ORDER — LIDOCAINE HCL 20 MG/ML
50 VIAL (ML) INJECTION ONCE
Qty: 0 | Refills: 0 | Status: DISCONTINUED | OUTPATIENT
Start: 2017-09-25 | End: 2017-09-26

## 2017-09-25 RX ADMIN — BUMETANIDE 2 MILLIGRAM(S): 0.25 INJECTION INTRAMUSCULAR; INTRAVENOUS at 18:27

## 2017-09-25 RX ADMIN — HEPARIN SODIUM 5000 UNIT(S): 5000 INJECTION INTRAVENOUS; SUBCUTANEOUS at 18:27

## 2017-09-25 RX ADMIN — CARVEDILOL PHOSPHATE 25 MILLIGRAM(S): 80 CAPSULE, EXTENDED RELEASE ORAL at 05:38

## 2017-09-25 RX ADMIN — ATORVASTATIN CALCIUM 80 MILLIGRAM(S): 80 TABLET, FILM COATED ORAL at 22:19

## 2017-09-25 RX ADMIN — Medication 81 MILLIGRAM(S): at 13:36

## 2017-09-25 RX ADMIN — HEPARIN SODIUM 5000 UNIT(S): 5000 INJECTION INTRAVENOUS; SUBCUTANEOUS at 05:38

## 2017-09-25 RX ADMIN — Medication 6 UNIT(S): at 13:32

## 2017-09-25 RX ADMIN — MAGNESIUM OXIDE 400 MG ORAL TABLET 400 MILLIGRAM(S): 241.3 TABLET ORAL at 13:36

## 2017-09-25 RX ADMIN — GABAPENTIN 100 MILLIGRAM(S): 400 CAPSULE ORAL at 13:36

## 2017-09-25 RX ADMIN — Medication 2: at 08:56

## 2017-09-25 RX ADMIN — Medication 2: at 18:28

## 2017-09-25 RX ADMIN — MAGNESIUM OXIDE 400 MG ORAL TABLET 400 MILLIGRAM(S): 241.3 TABLET ORAL at 08:57

## 2017-09-25 RX ADMIN — INSULIN GLARGINE 36 UNIT(S): 100 INJECTION, SOLUTION SUBCUTANEOUS at 22:19

## 2017-09-25 RX ADMIN — MAGNESIUM OXIDE 400 MG ORAL TABLET 400 MILLIGRAM(S): 241.3 TABLET ORAL at 18:29

## 2017-09-25 RX ADMIN — Medication 6 UNIT(S): at 08:57

## 2017-09-25 RX ADMIN — CARVEDILOL PHOSPHATE 25 MILLIGRAM(S): 80 CAPSULE, EXTENDED RELEASE ORAL at 18:27

## 2017-09-25 RX ADMIN — BUMETANIDE 2 MILLIGRAM(S): 0.25 INJECTION INTRAMUSCULAR; INTRAVENOUS at 05:38

## 2017-09-25 RX ADMIN — LISINOPRIL 2.5 MILLIGRAM(S): 2.5 TABLET ORAL at 05:37

## 2017-09-25 RX ADMIN — Medication 6 UNIT(S): at 18:27

## 2017-09-25 RX ADMIN — PANTOPRAZOLE SODIUM 40 MILLIGRAM(S): 20 TABLET, DELAYED RELEASE ORAL at 05:38

## 2017-09-25 NOTE — PROGRESS NOTE ADULT - SUBJECTIVE AND OBJECTIVE BOX
CHIEF COMPLAINT:    SUBJECTIVE:     REVIEW OF SYSTEMS:    CONSTITUTIONAL: (  )  weakness,  (  ) fevers or chills  EYES/ENT: (  )visual changes;     NECK: (  ) pain or stiffness  RESPIRATORY:   (  )cough, wheezing, hemoptysis;  (  ) shortness of breath  CARDIOVASCULAR:  (  )chest pain or palpitations  GASTROINTESTINAL:   (  )abdominal or epigastric pain.  (  ) nausea, vomiting, or hematemesis;   (   ) diarrhea or constipation.   GENITOURINARY:   (    ) dysuria, frequency or hematuria  NEUROLOGICAL:  (   ) numbness or weakness   All other review of systems is negative unless indicated above    Vital Signs Last 24 Hrs  T(C): 36.7 (25 Sep 2017 05:24), Max: 37.2 (24 Sep 2017 21:01)  T(F): 98 (25 Sep 2017 05:24), Max: 98.9 (24 Sep 2017 21:01)  HR: 78 (25 Sep 2017 05:24) (78 - 88)  BP: 106/60 (25 Sep 2017 05:24) (106/60 - 137/82)  BP(mean): --  RR: 17 (25 Sep 2017 05:24) (17 - 17)  SpO2: 95% (25 Sep 2017 05:24) (95% - 98%)    I&O's Summary    24 Sep 2017 07:01  -  25 Sep 2017 07:00  --------------------------------------------------------  IN: 0 mL / OUT: 1825 mL / NET: -1825 mL        CAPILLARY BLOOD GLUCOSE  166 (25 Sep 2017 07:41)  170 (24 Sep 2017 21:11)  137 (24 Sep 2017 17:46)  235 (24 Sep 2017 12:14)          PHYSICAL EXAM:    Constitutional:  (   ) NAD,   (   )awake and alert  HEENT: PERR, EOMI,    Neck: Soft and supple, No LAD, No JVD  Respiratory:  (    Breath sounds are clear bilaterally,    (   ) wheezing, rales or rhonchi  Cardiovascular:     (   )S1 and S2, regular rate and rhythm, no Murmurs, gallops or rubs  Gastrointestinal:  (   )Bowel Sounds present, soft,   (  )nontender, nondistended,    Extremities:    (  ) peripheral edema  Vascular: 2+ peripheral pulses  Neurological:    (    )A/O x 3,   (  ) focal deficits  Musculoskeletal:    (   )  normal strength b/l upper  (     ) normal  lower extremities  Skin: No rashes    MEDICATIONS:  MEDICATIONS  (STANDING):  aspirin enteric coated 81 milliGRAM(s) Oral daily  lisinopril 2.5 milliGRAM(s) Oral daily  gabapentin 100 milliGRAM(s) Oral daily  atorvastatin 80 milliGRAM(s) Oral at bedtime  carvedilol 25 milliGRAM(s) Oral every 12 hours  pantoprazole    Tablet 40 milliGRAM(s) Oral before breakfast  insulin glargine Injectable (LANTUS) 36 Unit(s) SubCutaneous at bedtime  insulin lispro Injectable (HumaLOG) 6 Unit(s) SubCutaneous three times a day before meals  insulin lispro (HumaLOG) corrective regimen sliding scale   SubCutaneous three times a day before meals  dextrose 5%. 1000 milliLiter(s) (50 mL/Hr) IV Continuous <Continuous>  dextrose 50% Injectable 12.5 Gram(s) IV Push once  dextrose 50% Injectable 25 Gram(s) IV Push once  dextrose 50% Injectable 25 Gram(s) IV Push once  heparin  Injectable 5000 Unit(s) SubCutaneous every 12 hours  buMETAnide 2 milliGRAM(s) Oral two times a day  influenza   Vaccine 0.5 milliLiter(s) IntraMuscular once  magnesium oxide 400 milliGRAM(s) Oral three times a day with meals      LABS: All Labs Reviewed:                        10.6   5.78  )-----------( 226      ( 25 Sep 2017 05:45 )             34.4     09-25    141  |  98  |  60<H>  ----------------------------<  183<H>  4.3   |  28  |  1.95<H>    Ca    8.9      25 Sep 2017 05:45  Phos  5.5     09-25  Mg     1.4     09-25            Blood Culture:   Urine Culture      RADIOLOGY/EKG:    ASSESSMENT AND PLAN:    DVT PPX:    ADVANCED DIRECTIVE:    DISPOSITION: CHIEF COMPLAINT:  no   even  over night    SUBJECTIVE:     REVIEW OF SYSTEMS:    CONSTITUTIONAL: (  -)  weakness,  ( - ) fevers or chills  EYES/ENT: ( - )visual changes;     NECK: ( - ) pain or stiffness  RESPIRATORY:   (-  )cough, wheezing, hemoptysis;  ( - ) shortness of breath  CARDIOVASCULAR:  ( - )chest pain or palpitations  GASTROINTESTINAL:   (-  )abdominal or epigastric pain.  (-  ) nausea, vomiting, or hematemesis;   ( -  ) diarrhea or constipation.   GENITOURINARY:   (   - ) dysuria, frequency or hematuria  NEUROLOGICAL:  (  - ) numbness or weakness   All other review of systems is negative unless indicated above    Vital Signs Last 24 Hrs  T(C): 36.7 (25 Sep 2017 05:24), Max: 37.2 (24 Sep 2017 21:01)  T(F): 98 (25 Sep 2017 05:24), Max: 98.9 (24 Sep 2017 21:01)  HR: 78 (25 Sep 2017 05:24) (78 - 88)  BP: 106/60 (25 Sep 2017 05:24) (106/60 - 137/82)  BP(mean): --  RR: 17 (25 Sep 2017 05:24) (17 - 17)  SpO2: 95% (25 Sep 2017 05:24) (95% - 98%)    I&O's Summary    24 Sep 2017 07:01  -  25 Sep 2017 07:00  --------------------------------------------------------  IN: 0 mL / OUT: 1825 mL / NET: -1825 mL        CAPILLARY BLOOD GLUCOSE  166 (25 Sep 2017 07:41)  170 (24 Sep 2017 21:11)  137 (24 Sep 2017 17:46)  235 (24 Sep 2017 12:14)          PHYSICAL EXAM:    Constitutional:  ( -  ) NAD,   ( +  )awake and alert  HEENT: PERR, EOMI,    Neck: Soft and supple, No LAD, No JVD  Respiratory:  ( +   Breath sounds are clear bilaterally,    ( -  ) wheezing, rales or rhonchi  Cardiovascular:     ( +  )S1 and S2, regular rate and rhythm, no Murmurs, gallops or rubs  Gastrointestinal:  (  + )Bowel Sounds present, soft,   ( - )nontender, nondistended,    Extremities:    ( - ) peripheral edema  Vascular: 2+ peripheral pulses  Neurological:    ( +   )A/O x 3,   ( - ) focal deficits  Musculoskeletal:    ( +  )  normal strength b/l upper  (  +   ) normal  lower extremities  Skin: No rashes    MEDICATIONS:  MEDICATIONS  (STANDING):  aspirin enteric coated 81 milliGRAM(s) Oral daily  lisinopril 2.5 milliGRAM(s) Oral daily  gabapentin 100 milliGRAM(s) Oral daily  atorvastatin 80 milliGRAM(s) Oral at bedtime  carvedilol 25 milliGRAM(s) Oral every 12 hours  pantoprazole    Tablet 40 milliGRAM(s) Oral before breakfast  insulin glargine Injectable (LANTUS) 36 Unit(s) SubCutaneous at bedtime  insulin lispro Injectable (HumaLOG) 6 Unit(s) SubCutaneous three times a day before meals  insulin lispro (HumaLOG) corrective regimen sliding scale   SubCutaneous three times a day before meals  dextrose 5%. 1000 milliLiter(s) (50 mL/Hr) IV Continuous <Continuous>  dextrose 50% Injectable 12.5 Gram(s) IV Push once  dextrose 50% Injectable 25 Gram(s) IV Push once  dextrose 50% Injectable 25 Gram(s) IV Push once  heparin  Injectable 5000 Unit(s) SubCutaneous every 12 hours  buMETAnide 2 milliGRAM(s) Oral two times a day  influenza   Vaccine 0.5 milliLiter(s) IntraMuscular once  magnesium oxide 400 milliGRAM(s) Oral three times a day with meals      LABS: All Labs Reviewed:                        10.6   5.78  )-----------( 226      ( 25 Sep 2017 05:45 )             34.4     09-25    141  |  98  |  60<H>  ----------------------------<  183<H>  4.3   |  28  |  1.95<H>    Ca    8.9      25 Sep 2017 05:45  Phos  5.5     09-25  Mg     1.4     09-25            Blood Culture:   Urine Culture      RADIOLOGY/EKG:    ASSESSMENT AND PLAN  :57F w/ HTN, DM2, HFrEF, and ESRD-HD TTS, 9/21/17 a/w poorly functioning tunneled HD catheter.    (1)Renal - ESRD-HD TTS - last HD was Thursday with minimal therapy.  Creatinine  1.9 as of late; now off HD-     (2)Vasc-catheter - BFR of ~200ml/min achieved with the tunneled HD catheter,  was   removed     1.  BMP, Mg & Phos in AM  2.  Continue to monitor patient off HD;  tunneled catheter  3.  Continue Bumex 2mg po bid for now  4.  I & Os      1400 cc today  5.  Maintain renal diet as ordered      ADVANCED DIRECTIVE:    DISPOSITION: home

## 2017-09-25 NOTE — CHART NOTE - NSCHARTNOTEFT_GEN_A_CORE
Vascular Surgery    Vascular surgery was asked to remove Ms. Tee's permacath.  Permacath was removed at the beside without complication.  Pressure was applied and no bleeding was appreciated.  Patient tolerated the procedure well.  Can be discharged according to primary team with no restrictions. Vascular Surgery    Vascular surgery was asked to remove Ms. Tee's permacath.   Consent was obtained from the patient  Right chest wall and neck were prepped and draped.    Skin over the catheter was anesthetized with 10cc of 1% Lidocaine.   Cuff was dissected from subcutaneous tissues.   Permacath was removed at the beside without complication.  Pressure was applied and no bleeding was appreciated.  Patient tolerated the procedure well.

## 2017-09-25 NOTE — PROGRESS NOTE ADULT - SUBJECTIVE AND OBJECTIVE BOX
No pain, no shortness of breath      VITAL:  T(C): , Max: 37.2 (09-24-17 @ 21:01)  T(F): , Max: 98.9 (09-24-17 @ 21:01)  HR: 78 (09-25-17 @ 05:24)  BP: 106/60 (09-25-17 @ 05:24)  BP(mean): --  RR: 17 (09-25-17 @ 05:24)  SpO2: 95% (09-25-17 @ 05:24)      PHYSICAL EXAM:  Constitutional: NAD at rest on NCO2; Alert, obese  HEENT: NCAT, MMM  Neck: Supple, No JVD  Respiratory: distant BS b/l  Cardiovascular: RRR s1s2, no m/r/g  Gastrointestinal: BS+, soft, NT/ND  Extremities: No peripheral edema b/l  Neurological: no focal deficits; strength grossly intact  Psychiatric: Normal mood, normal affect  Back: no CVAT b/l  Skin: No rashes, no nevi  Access: (+)[facility] permacat      LABS:                        10.6   5.78  )-----------( 226      ( 25 Sep 2017 05:45 )             34.4     Na(141)/K(4.3)/Cl(98)/HCO3(28)/BUN(60)/Cr(1.95)Glu(183)/Ca(8.9)/Mg(1.4)/PO4(5.5)    09-25 @ 05:45      IMPRESSION: 57F w/ HTN, DM2, HFrEF, and ESRD-HD TTS, 9/21/17 a/w poorly functioning tunneled HD catheter.    (1)Renal - ESRD-HD TTS - has received 1 hour of HD within the past 6 days - renal parameters quite good in light of this. Appears that she does not require further HD    (2)Vasc- tunneled catheter - could be removed at this point, as she does not require further HD. Remove as inpatient? Of note, is it felt that risks outweigh benefits of holding aspirin for catheter removal.      RECOMMEND:    (1)No further HD    (2)Bumex as ordered    (3)Encourage dietary potassium limitation    (4)Tunneled catheter removal - favor having it performed as inpatient if logistically reasonable to do so    (5)Follow up at my office 1-2 weeks post discharge                Perfecto Rae MD  [facility]  [phone]

## 2017-09-26 VITALS
OXYGEN SATURATION: 100 % | SYSTOLIC BLOOD PRESSURE: 117 MMHG | DIASTOLIC BLOOD PRESSURE: 71 MMHG | RESPIRATION RATE: 18 BRPM | HEART RATE: 84 BPM | TEMPERATURE: 98 F

## 2017-09-26 LAB
BUN SERPL-MCNC: 71 MG/DL — HIGH (ref 7–23)
BUN SERPL-MCNC: 71 MG/DL — HIGH (ref 7–23)
CALCIUM SERPL-MCNC: 9.1 MG/DL — SIGNIFICANT CHANGE UP (ref 8.4–10.5)
CALCIUM SERPL-MCNC: 9.1 MG/DL — SIGNIFICANT CHANGE UP (ref 8.4–10.5)
CHLORIDE SERPL-SCNC: 97 MMOL/L — LOW (ref 98–107)
CHLORIDE SERPL-SCNC: 97 MMOL/L — LOW (ref 98–107)
CO2 SERPL-SCNC: 25 MMOL/L — SIGNIFICANT CHANGE UP (ref 22–31)
CO2 SERPL-SCNC: 25 MMOL/L — SIGNIFICANT CHANGE UP (ref 22–31)
CREAT SERPL-MCNC: 2.05 MG/DL — HIGH (ref 0.5–1.3)
CREAT SERPL-MCNC: 2.05 MG/DL — HIGH (ref 0.5–1.3)
GLUCOSE SERPL-MCNC: 163 MG/DL — HIGH (ref 70–99)
GLUCOSE SERPL-MCNC: 163 MG/DL — HIGH (ref 70–99)
MAGNESIUM SERPL-MCNC: 1.5 MG/DL — LOW (ref 1.6–2.6)
POTASSIUM SERPL-MCNC: 4.4 MMOL/L — SIGNIFICANT CHANGE UP (ref 3.5–5.3)
POTASSIUM SERPL-MCNC: 4.4 MMOL/L — SIGNIFICANT CHANGE UP (ref 3.5–5.3)
POTASSIUM SERPL-SCNC: 4.4 MMOL/L — SIGNIFICANT CHANGE UP (ref 3.5–5.3)
POTASSIUM SERPL-SCNC: 4.4 MMOL/L — SIGNIFICANT CHANGE UP (ref 3.5–5.3)
SODIUM SERPL-SCNC: 141 MMOL/L — SIGNIFICANT CHANGE UP (ref 135–145)
SODIUM SERPL-SCNC: 141 MMOL/L — SIGNIFICANT CHANGE UP (ref 135–145)

## 2017-09-26 RX ORDER — CARVEDILOL PHOSPHATE 80 MG/1
12.5 CAPSULE, EXTENDED RELEASE ORAL EVERY 12 HOURS
Qty: 0 | Refills: 0 | Status: DISCONTINUED | OUTPATIENT
Start: 2017-09-26 | End: 2017-09-26

## 2017-09-26 RX ORDER — BUMETANIDE 0.25 MG/ML
2 INJECTION INTRAMUSCULAR; INTRAVENOUS DAILY
Qty: 0 | Refills: 0 | Status: DISCONTINUED | OUTPATIENT
Start: 2017-09-26 | End: 2017-09-26

## 2017-09-26 RX ORDER — CARVEDILOL PHOSPHATE 80 MG/1
1 CAPSULE, EXTENDED RELEASE ORAL
Qty: 30 | Refills: 0 | OUTPATIENT
Start: 2017-09-26 | End: 2017-10-11

## 2017-09-26 RX ORDER — BUMETANIDE 0.25 MG/ML
1 INJECTION INTRAMUSCULAR; INTRAVENOUS
Qty: 30 | Refills: 0 | OUTPATIENT
Start: 2017-09-26 | End: 2017-10-26

## 2017-09-26 RX ORDER — MAGNESIUM SULFATE 500 MG/ML
1 VIAL (ML) INJECTION ONCE
Qty: 0 | Refills: 0 | Status: COMPLETED | OUTPATIENT
Start: 2017-09-26 | End: 2017-09-26

## 2017-09-26 RX ORDER — MAGNESIUM OXIDE 400 MG ORAL TABLET 241.3 MG
1 TABLET ORAL
Qty: 20 | Refills: 0 | OUTPATIENT
Start: 2017-09-26 | End: 2017-10-06

## 2017-09-26 RX ORDER — GABAPENTIN 400 MG/1
1 CAPSULE ORAL
Qty: 30 | Refills: 0 | OUTPATIENT
Start: 2017-09-26 | End: 2017-10-26

## 2017-09-26 RX ADMIN — BUMETANIDE 2 MILLIGRAM(S): 0.25 INJECTION INTRAMUSCULAR; INTRAVENOUS at 05:58

## 2017-09-26 RX ADMIN — Medication 2: at 13:25

## 2017-09-26 RX ADMIN — Medication 6 UNIT(S): at 13:24

## 2017-09-26 RX ADMIN — MAGNESIUM OXIDE 400 MG ORAL TABLET 400 MILLIGRAM(S): 241.3 TABLET ORAL at 08:16

## 2017-09-26 RX ADMIN — Medication 6 UNIT(S): at 08:16

## 2017-09-26 RX ADMIN — LISINOPRIL 2.5 MILLIGRAM(S): 2.5 TABLET ORAL at 05:58

## 2017-09-26 RX ADMIN — HEPARIN SODIUM 5000 UNIT(S): 5000 INJECTION INTRAVENOUS; SUBCUTANEOUS at 05:58

## 2017-09-26 RX ADMIN — PANTOPRAZOLE SODIUM 40 MILLIGRAM(S): 20 TABLET, DELAYED RELEASE ORAL at 05:57

## 2017-09-26 RX ADMIN — CARVEDILOL PHOSPHATE 25 MILLIGRAM(S): 80 CAPSULE, EXTENDED RELEASE ORAL at 05:58

## 2017-09-26 RX ADMIN — MAGNESIUM OXIDE 400 MG ORAL TABLET 400 MILLIGRAM(S): 241.3 TABLET ORAL at 13:24

## 2017-09-26 RX ADMIN — Medication 100 GRAM(S): at 10:35

## 2017-09-26 NOTE — PROGRESS NOTE ADULT - PROVIDER SPECIALTY LIST ADULT
Internal Medicine
Nephrology
Internal Medicine

## 2017-09-26 NOTE — PROGRESS NOTE ADULT - SUBJECTIVE AND OBJECTIVE BOX
CHIEF COMPLAINT: none  no  event  overnight    SUBJECTIVE:   her   o2  sat   88  %   on  9/25/17    REVIEW OF SYSTEMS:    CONSTITUTIONAL: ( - )  weakness,  ( -) fevers or chills  EYES/ENT: ( - )visual changes;     NECK: (  ) pain or stiffness  RESPIRATORY:   ( - )cough, wheezing, hemoptysis;  (  -) shortness of breath  CARDIOVASCULAR:  (  -hest pain or palpitations  GASTROINTESTINAL:   ( - )abdominal or epigastric pain.  ( - ) nausea, vomiting, or hematemesis;   (  - ) diarrhea or constipation.   GENITOURINARY:   (  -  ) dysuria, frequency or hematuria  NEUROLOGICAL:  (  - ) numbness or weakness   All other review of systems is negative unless indicated above    Vital Signs Last 24 Hrs  T(C): 36.7 (26 Sep 2017 05:52), Max: 36.8 (25 Sep 2017 15:27)  T(F): 98.1 (26 Sep 2017 05:52), Max: 98.3 (25 Sep 2017 15:27)  HR: 79 (26 Sep 2017 05:52) (59 - 81)  BP: 104/66 (26 Sep 2017 05:52) (104/66 - 127/74)  BP(mean): --  RR: 17 (26 Sep 2017 05:52) (17 - 18)  SpO2: 98% (26 Sep 2017 05:52) (88% - 98%)    I&O's Summary    25 Sep 2017 07:01  -  26 Sep 2017 07:00  --------------------------------------------------------  IN: 1000 mL / OUT: 800 mL / NET: 200 mL        CAPILLARY BLOOD GLUCOSE  134 (26 Sep 2017 08:10)  140 (26 Sep 2017 07:14)  140 (25 Sep 2017 20:52)  178 (25 Sep 2017 17:20)  102 (25 Sep 2017 13:07)          PHYSICAL EXAM:    Constitutional:  ( -  ) NAD,   ( - )awake and alert  HEENT: PERR, EOMI,    Neck: Soft and supple, No LAD, No JVD  Respiratory:  (   +Breath sounds are clear bilaterally,    (  - ) wheezing, rales or rhonchi  Cardiovascular:     (   +)S1 and S2, regular rate and rhythm, no Murmurs, gallops or rubs  Gastrointestinal:  (  +Bowel Sounds present, soft,   (  -)nontender, nondistended,    Extremities:    (  -) peripheral edema  Vascular: 2+ peripheral pulses  Neurological:    ( +  )A/O x 3,   (  ) focal deficits  Musculoskeletal:    ( +  )  normal strength b/l upper  (   +  ) normal  lower extremities  Skin: No rashes    MEDICATIONS:  MEDICATIONS  (STANDING):  aspirin enteric coated 81 milliGRAM(s) Oral daily  lisinopril 2.5 milliGRAM(s) Oral daily  gabapentin 100 milliGRAM(s) Oral daily  atorvastatin 80 milliGRAM(s) Oral at bedtime  carvedilol 25 milliGRAM(s) Oral every 12 hours  pantoprazole    Tablet 40 milliGRAM(s) Oral before breakfast  insulin glargine Injectable (LANTUS) 36 Unit(s) SubCutaneous at bedtime  insulin lispro Injectable (HumaLOG) 6 Unit(s) SubCutaneous three times a day before meals  insulin lispro (HumaLOG) corrective regimen sliding scale   SubCutaneous three times a day before meals  dextrose 5%. 1000 milliLiter(s) (50 mL/Hr) IV Continuous <Continuous>  dextrose 50% Injectable 12.5 Gram(s) IV Push once  dextrose 50% Injectable 25 Gram(s) IV Push once  dextrose 50% Injectable 25 Gram(s) IV Push once  heparin  Injectable 5000 Unit(s) SubCutaneous every 12 hours  influenza   Vaccine 0.5 milliLiter(s) IntraMuscular once  magnesium oxide 400 milliGRAM(s) Oral three times a day with meals  lidocaine 1% Injectable 50 milliLiter(s) Local Injection once  buMETAnide 2 milliGRAM(s) Oral daily  magnesium sulfate  IVPB 1 Gram(s) IV Intermittent once      LABS: All Labs Reviewed:                        10.6   5.78  )-----------( 226      ( 25 Sep 2017 05:45 )             34.4     09-26    141  |  97<L>  |  71<H>  ----------------------------<  163<H>  4.4   |  25  |  2.05<H>    Ca    9.1      26 Sep 2017 06:15  Phos  5.5     09-25  Mg     1.5     09-26            Blood Culture:   Urine Culture      RADIOLOGY/EKG:    ASSESSMENT AND PLAN       57F w/ HTN, DM2, HFrEF, and ESRD-HD TTS, 9/21/17 a/w poorly functioning tunneled HD catheter.    (1)Renal - Has been receiving TIW HD for the past 2 months. Appears that she no longer requires HD. BUN is creeping up more so than creatinine.     reduce   Bumex  2   mg    (2)Vasc- s/p cathter removal yesterday    :    DISPOSITION:  home today

## 2017-09-26 NOTE — PROGRESS NOTE ADULT - SUBJECTIVE AND OBJECTIVE BOX
No pain, no shortness of breath      VITAL:  T(C): , Max: 36.8 (09-25-17 @ 15:27)  T(F): , Max: 98.3 (09-25-17 @ 15:27)  HR: 79 (09-26-17 @ 05:52)  BP: 104/66 (09-26-17 @ 05:52)  BP(mean): --  RR: 17 (09-26-17 @ 05:52)  SpO2: 98% (09-26-17 @ 05:52)      PHYSICAL EXAM:  Constitutional: NAD at rest on NCO2; Alert, obese  HEENT: NCAT, MMM  Neck: Supple, No JVD  Respiratory: distant BS b/l  Cardiovascular: RRR s1s2, no m/r/g  Gastrointestinal: BS+, soft, NT/ND  Extremities: No peripheral edema b/l  Neurological: no focal deficits; strength grossly intact  Psychiatric: Normal mood, normal affect  Back: no CVAT b/l  Skin: No rashes, no nevi  Access: (+)[facility]      LABS:                        10.6   5.78  )-----------( 226      ( 25 Sep 2017 05:45 )             34.4     Na(141)/K(4.4)/Cl(97)/HCO3(25)/BUN(71)/Cr(2.05)Glu(163)/Ca(9.1)/Mg(1.5)/PO4(--)    09-26 @ 06:15  Na(141)/K(4.3)/Cl(98)/HCO3(28)/BUN(60)/Cr(1.95)Glu(183)/Ca(8.9)/Mg(1.4)/PO4(5.5)    09-25 @ 05:45      IMPRESSION: 57F w/ HTN, DM2, HFrEF, and ESRD-HD TTS, 9/21/17 a/w poorly functioning tunneled HD catheter.    (1)Renal - Has been receiving TIW HD for the past 2 months. Appears that she no longer requires HD. BUN is creeping up more so than creatinine. Are we giving too high a dose of Bumex?    (2)Vasc- s/p cathter removal yesterday      RECOMMEND:    (1)No further HD    (2)Would reduce Bumex from 2mg po bid to 2mg po qd for now    (3)No objection to discharge with followup at my office in 1-2 weeks                Perfecto Rae MD  [facility]  [phone] No pain, no shortness of breath      VITAL:  T(C): , Max: 36.8 (09-25-17 @ 15:27)  T(F): , Max: 98.3 (09-25-17 @ 15:27)  HR: 79 (09-26-17 @ 05:52)  BP: 104/66 (09-26-17 @ 05:52)  BP(mean): --  RR: 17 (09-26-17 @ 05:52)  SpO2: 98% (09-26-17 @ 05:52)      PHYSICAL EXAM:  Constitutional: NAD at rest on NCO2; Alert, obese  HEENT: NCAT, DMM  Neck: Supple, No JVD  Respiratory: distant BS b/l  Cardiovascular: RRR s1s2, no m/r/g  Gastrointestinal: BS+, soft, NT/ND  Extremities: No peripheral edema b/l  Neurological: no focal deficits; strength grossly intact  Psychiatric: Normal mood, normal affect  Back: no CVAT b/l  Skin: (+)patch of hypopigmentation of forehead  Access: none      LABS:                        10.6   5.78  )-----------( 226      ( 25 Sep 2017 05:45 )             34.4     Na(141)/K(4.4)/Cl(97)/HCO3(25)/BUN(71)/Cr(2.05)Glu(163)/Ca(9.1)/Mg(1.5)/PO4(--)    09-26 @ 06:15  Na(141)/K(4.3)/Cl(98)/HCO3(28)/BUN(60)/Cr(1.95)Glu(183)/Ca(8.9)/Mg(1.4)/PO4(5.5)    09-25 @ 05:45      IMPRESSION: 57F w/ HTN, DM2, HFrEF, and ESRD-HD TTS, 9/21/17 a/w poorly functioning tunneled HD catheter.    (1)Renal - Has been receiving TIW HD for the past 2 months. Appears that she no longer requires HD. BUN is creeping up more so than creatinine. Are we giving too high a dose of Bumex?    (2)Vasc- s/p cathter removal yesterday      RECOMMEND:    (1)No further HD    (2)Would reduce Bumex from 2mg po bid to 2mg po qd for now; counseled patient that if develops worsening shortness of breath and/or swelling as outpatient, she could revert back to BID Bumex on her own.    (3)No objection to discharge with followup at my office in 1-2 weeks                Perfecto Rae MD  [facility]  [phone]

## 2017-10-06 ENCOUNTER — APPOINTMENT (OUTPATIENT)
Dept: RHEUMATOLOGY | Facility: CLINIC | Age: 57
End: 2017-10-06
Payer: MEDICARE

## 2017-10-06 VITALS
TEMPERATURE: 98.3 F | DIASTOLIC BLOOD PRESSURE: 79 MMHG | SYSTOLIC BLOOD PRESSURE: 128 MMHG | WEIGHT: 228 LBS | BODY MASS INDEX: 38.93 KG/M2 | OXYGEN SATURATION: 94 % | HEIGHT: 64 IN | HEART RATE: 94 BPM

## 2017-10-06 DIAGNOSIS — M19.049 PRIMARY OSTEOARTHRITIS, UNSPECIFIED HAND: ICD-10-CM

## 2017-10-06 DIAGNOSIS — M10.9 GOUT, UNSPECIFIED: ICD-10-CM

## 2017-10-06 DIAGNOSIS — M72.2 PLANTAR FASCIAL FIBROMATOSIS: ICD-10-CM

## 2017-10-06 PROCEDURE — 99214 OFFICE O/P EST MOD 30 MIN: CPT

## 2017-10-06 RX ORDER — COLCHICINE 0.6 MG/1
0.6 TABLET ORAL DAILY
Qty: 30 | Refills: 2 | Status: ACTIVE | COMMUNITY
Start: 2017-10-06 | End: 1900-01-01

## 2017-10-06 RX ORDER — FEBUXOSTAT 40 MG/1
40 TABLET ORAL DAILY
Qty: 90 | Refills: 2 | Status: ACTIVE | COMMUNITY
Start: 2017-02-24 | End: 1900-01-01

## 2017-10-07 PROBLEM — M72.2 PLANTAR FASCIITIS, RIGHT: Status: ACTIVE | Noted: 2017-06-06

## 2017-10-07 PROBLEM — M10.9 GOUT, ARTHRITIS: Status: ACTIVE | Noted: 2017-01-30

## 2017-10-07 PROBLEM — M19.049 OSTEOARTHRITIS OF HAND: Status: ACTIVE | Noted: 2017-03-17

## 2017-11-13 ENCOUNTER — EMERGENCY (EMERGENCY)
Facility: HOSPITAL | Age: 57
LOS: 1 days | Discharge: HOME | End: 2017-11-13
Attending: EMERGENCY MEDICINE | Admitting: EMERGENCY MEDICINE
Payer: COMMERCIAL

## 2017-11-13 VITALS
HEART RATE: 87 BPM | SYSTOLIC BLOOD PRESSURE: 138 MMHG | OXYGEN SATURATION: 93 % | RESPIRATION RATE: 28 BRPM | DIASTOLIC BLOOD PRESSURE: 61 MMHG | TEMPERATURE: 98 F

## 2017-11-13 DIAGNOSIS — I77.0 ARTERIOVENOUS FISTULA, ACQUIRED: Chronic | ICD-10-CM

## 2017-11-13 LAB
ALBUMIN SERPL ELPH-MCNC: 3.6 G/DL — SIGNIFICANT CHANGE UP (ref 3.3–5)
ALP SERPL-CCNC: 118 U/L — SIGNIFICANT CHANGE UP (ref 40–120)
ALT FLD-CCNC: 31 U/L — SIGNIFICANT CHANGE UP (ref 4–33)
APTT BLD: 26.7 SEC — LOW (ref 27.5–37.4)
AST SERPL-CCNC: 26 U/L — SIGNIFICANT CHANGE UP (ref 4–32)
BASE EXCESS BLDV CALC-SCNC: 6.4 MMOL/L — SIGNIFICANT CHANGE UP
BASOPHILS # BLD AUTO: 0.01 K/UL — SIGNIFICANT CHANGE UP (ref 0–0.2)
BASOPHILS NFR BLD AUTO: 0.3 % — SIGNIFICANT CHANGE UP (ref 0–2)
BILIRUB SERPL-MCNC: 0.4 MG/DL — SIGNIFICANT CHANGE UP (ref 0.2–1.2)
BLOOD GAS VENOUS - CREATININE: 1.43 MG/DL — HIGH (ref 0.5–1.3)
BUN SERPL-MCNC: 29 MG/DL — HIGH (ref 7–23)
CALCIUM SERPL-MCNC: 8.5 MG/DL — SIGNIFICANT CHANGE UP (ref 8.4–10.5)
CHLORIDE BLDV-SCNC: 105 MMOL/L — SIGNIFICANT CHANGE UP (ref 96–108)
CHLORIDE SERPL-SCNC: 102 MMOL/L — SIGNIFICANT CHANGE UP (ref 98–107)
CK MB BLD-MCNC: 3.09 NG/ML — SIGNIFICANT CHANGE UP (ref 1–4.7)
CK SERPL-CCNC: 120 U/L — SIGNIFICANT CHANGE UP (ref 25–170)
CO2 SERPL-SCNC: 29 MMOL/L — SIGNIFICANT CHANGE UP (ref 22–31)
CREAT SERPL-MCNC: 1.52 MG/DL — HIGH (ref 0.5–1.3)
EOSINOPHIL # BLD AUTO: 0.03 K/UL — SIGNIFICANT CHANGE UP (ref 0–0.5)
EOSINOPHIL NFR BLD AUTO: 0.8 % — SIGNIFICANT CHANGE UP (ref 0–6)
GAS PNL BLDV: 138 MMOL/L — SIGNIFICANT CHANGE UP (ref 136–146)
GLUCOSE BLDV-MCNC: 276 — HIGH (ref 70–99)
GLUCOSE SERPL-MCNC: 289 MG/DL — HIGH (ref 70–99)
HCO3 BLDV-SCNC: 29 MMOL/L — HIGH (ref 20–27)
HCT VFR BLD CALC: 33.3 % — LOW (ref 34.5–45)
HCT VFR BLDV CALC: 32.6 % — LOW (ref 34.5–45)
HGB BLD-MCNC: 10 G/DL — LOW (ref 11.5–15.5)
HGB BLDV-MCNC: 10.6 G/DL — LOW (ref 11.5–15.5)
IMM GRANULOCYTES # BLD AUTO: 0 # — SIGNIFICANT CHANGE UP
IMM GRANULOCYTES NFR BLD AUTO: 0 % — SIGNIFICANT CHANGE UP (ref 0–1.5)
INR BLD: 1.2 — HIGH (ref 0.88–1.17)
LACTATE BLDV-MCNC: 1.5 MMOL/L — SIGNIFICANT CHANGE UP (ref 0.5–2)
LYMPHOCYTES # BLD AUTO: 0.84 K/UL — LOW (ref 1–3.3)
LYMPHOCYTES # BLD AUTO: 21.2 % — SIGNIFICANT CHANGE UP (ref 13–44)
MAGNESIUM SERPL-MCNC: 1.4 MG/DL — LOW (ref 1.6–2.6)
MCHC RBC-ENTMCNC: 25.4 PG — LOW (ref 27–34)
MCHC RBC-ENTMCNC: 30 % — LOW (ref 32–36)
MCV RBC AUTO: 84.5 FL — SIGNIFICANT CHANGE UP (ref 80–100)
MONOCYTES # BLD AUTO: 0.5 K/UL — SIGNIFICANT CHANGE UP (ref 0–0.9)
MONOCYTES NFR BLD AUTO: 12.6 % — SIGNIFICANT CHANGE UP (ref 2–14)
NEUTROPHILS # BLD AUTO: 2.58 K/UL — SIGNIFICANT CHANGE UP (ref 1.8–7.4)
NEUTROPHILS NFR BLD AUTO: 65.1 % — SIGNIFICANT CHANGE UP (ref 43–77)
NRBC # FLD: 0.02 — SIGNIFICANT CHANGE UP
NT-PROBNP SERPL-SCNC: 9447 PG/ML — SIGNIFICANT CHANGE UP
PCO2 BLDV: 61 MMHG — HIGH (ref 41–51)
PH BLDV: 7.34 PH — SIGNIFICANT CHANGE UP (ref 7.32–7.43)
PHOSPHATE SERPL-MCNC: 3.8 MG/DL — SIGNIFICANT CHANGE UP (ref 2.5–4.5)
PLATELET # BLD AUTO: 213 K/UL — SIGNIFICANT CHANGE UP (ref 150–400)
PMV BLD: 11.8 FL — SIGNIFICANT CHANGE UP (ref 7–13)
PO2 BLDV: 42 MMHG — HIGH (ref 35–40)
POTASSIUM BLDV-SCNC: 4.1 MMOL/L — SIGNIFICANT CHANGE UP (ref 3.4–4.5)
POTASSIUM SERPL-MCNC: 4.3 MMOL/L — SIGNIFICANT CHANGE UP (ref 3.5–5.3)
POTASSIUM SERPL-SCNC: 4.3 MMOL/L — SIGNIFICANT CHANGE UP (ref 3.5–5.3)
PROT SERPL-MCNC: 6.6 G/DL — SIGNIFICANT CHANGE UP (ref 6–8.3)
PROTHROM AB SERPL-ACNC: 13.5 SEC — HIGH (ref 9.8–13.1)
RBC # BLD: 3.94 M/UL — SIGNIFICANT CHANGE UP (ref 3.8–5.2)
RBC # FLD: 17.2 % — HIGH (ref 10.3–14.5)
SAO2 % BLDV: 68.6 % — SIGNIFICANT CHANGE UP (ref 60–85)
SODIUM SERPL-SCNC: 143 MMOL/L — SIGNIFICANT CHANGE UP (ref 135–145)
TROPONIN T SERPL-MCNC: < 0.06 NG/ML — SIGNIFICANT CHANGE UP (ref 0–0.06)
WBC # BLD: 3.96 K/UL — SIGNIFICANT CHANGE UP (ref 3.8–10.5)
WBC # FLD AUTO: 3.96 K/UL — SIGNIFICANT CHANGE UP (ref 3.8–10.5)

## 2017-11-13 PROCEDURE — 99285 EMERGENCY DEPT VISIT HI MDM: CPT | Mod: GC

## 2017-11-13 PROCEDURE — 71020: CPT | Mod: 26

## 2017-11-13 RX ORDER — IPRATROPIUM/ALBUTEROL SULFATE 18-103MCG
3 AEROSOL WITH ADAPTER (GRAM) INHALATION ONCE
Qty: 0 | Refills: 0 | Status: COMPLETED | OUTPATIENT
Start: 2017-11-13 | End: 2017-11-13

## 2017-11-13 RX ADMIN — Medication 3 MILLILITER(S): at 17:34

## 2017-11-13 RX ADMIN — Medication 50 MILLIGRAM(S): at 18:40

## 2017-11-13 NOTE — ED PROVIDER NOTE - NS ED ROS FT
General: No fevers / chills  HENT: No head trauma, ear pain, runny nose, or sore throat  Eyes: No visual changes  CP: No chest pain, palpitations, or light headedness  Resp: + shortness of breath, no cough  GI: + R flank / abdominal pain, no diarrhea, + chronic constipation, no nausea, no vomiting  : No urinary fz, dysuria, or hematuria  Neuro: No numbness, tingling, or weakness  Endo: +hx of diabetes  Heme: No hx of easy bleeding or bruising

## 2017-11-13 NOTE — ED PROVIDER NOTE - OBJECTIVE STATEMENT
Hx COPD / CHF / DM presenting with shortness of breath of 1 week duration. Pt saw PMD 1 week ago started on albuterol inhaler but has not had significant relief with this medication. No fevers/chills, but notes that she has a history of COPD and CHF and has had difficulties breathing "for some time". She is chronically on 2L nc at home, although she is uncertain why. She has not been having significant relief of her shortness of breath despite use of her inhaler.

## 2017-11-13 NOTE — ED PROVIDER NOTE - ATTENDING CONTRIBUTION TO CARE
56yo F PMH: COPD, CHF, DM p/w 1wk of worsening dyspnea with exertion and now some at rest, O2 dependent at home, no increase in O2 usage, denies LE edema  On exam awake & alert, mild distress, mmm, lungs diffuse wheeze no crackle, RRR, no edema, no calf tenderness, 2+ pulses b/l, neuro A&Ox3, skin warm and dry no rash

## 2017-11-13 NOTE — ED PROVIDER NOTE - MEDICAL DECISION MAKING DETAILS
57yoF hx of COPD presenting with shortness of breath worsening x7 days, no cough, no fevers, no chills. Clinically pt lung sounds distant but wheezy. Trial of duoned significantly improved patient's respirations. Suspect that this is COPD exacerbation. Prednisone 50mg given here, per utd 40mg qd for 10 days shows significant improvement in patient outpatient therapy compared to standard taper, will perform this dosing. Pt able to walk and talk well after treatments, will discharge w/ instructions to f/u with pmd, return precautions for fevers, chills, or other symptoms of worsening.

## 2017-11-13 NOTE — ED PROVIDER NOTE - PHYSICAL EXAMINATION
Gen: NAD, non-toxic, conversational  Eyes: PERRLA, EOMI   HENT: Normocephalic, atraumatic. External ears normal, no rhinorrhea, moist mucous membranes.   CV: RRR, no M/R/G  Resp: distant lung sounds bilaterally, no wheeze or crackle auscultated, speaking in full sentences makes pt SOB on room air  Abd: soft, non tender, non rigid, no guarding or rebound tenderness  Skin: dry, wwp   Neuro: AOx3, speech is fluent and appropriate  Psych: Mood good, affect euthymic

## 2017-11-13 NOTE — ED PROVIDER NOTE - CARE PLAN
Principal Discharge DX:	COPD exacerbation  Instructions for follow-up, activity and diet:	Take the prednisone medication, 40 mg, once a day for the next 10 days. Continue your other home medications as prescribed. Follow up with your primary doctor in 2-5 days for repeat evaluation. Return here to the emergency department for any worsening shortness of breath, fevers with cough, unilateral leg swelling, or other new symptoms of concern.

## 2017-11-13 NOTE — ED ADULT TRIAGE NOTE - CHIEF COMPLAINT QUOTE
Pt c/o of worsening sob with right side chest pain  over the past few days.  Pt is sob in triage worse when she is speaking pt o2sat is reading  93% on room air.

## 2017-11-13 NOTE — ED PROVIDER NOTE - PLAN OF CARE
Take the prednisone medication, 40 mg, once a day for the next 10 days. Continue your other home medications as prescribed. Follow up with your primary doctor in 2-5 days for repeat evaluation. Return here to the emergency department for any worsening shortness of breath, fevers with cough, unilateral leg swelling, or other new symptoms of concern.

## 2017-11-17 ENCOUNTER — APPOINTMENT (OUTPATIENT)
Dept: RHEUMATOLOGY | Facility: CLINIC | Age: 57
End: 2017-11-17

## 2017-11-30 RX ORDER — DEXTROMETHORPHAN HYDROBROMIDE AND PROMETHAZINE HYDROCHLORIDE 15; 6.25 MG/5ML; MG/5ML
10 SYRUP ORAL
Qty: 0 | Refills: 0 | COMMUNITY
Start: 2017-11-30

## 2017-12-06 ENCOUNTER — INPATIENT (INPATIENT)
Facility: HOSPITAL | Age: 57
LOS: 8 days | Discharge: HOME | End: 2017-12-15
Attending: INTERNAL MEDICINE | Admitting: INTERNAL MEDICINE
Payer: COMMERCIAL

## 2017-12-06 VITALS
SYSTOLIC BLOOD PRESSURE: 109 MMHG | TEMPERATURE: 98 F | HEART RATE: 81 BPM | RESPIRATION RATE: 22 BRPM | DIASTOLIC BLOOD PRESSURE: 65 MMHG | OXYGEN SATURATION: 96 %

## 2017-12-06 DIAGNOSIS — J44.9 CHRONIC OBSTRUCTIVE PULMONARY DISEASE, UNSPECIFIED: ICD-10-CM

## 2017-12-06 DIAGNOSIS — R06.02 SHORTNESS OF BREATH: ICD-10-CM

## 2017-12-06 DIAGNOSIS — I10 ESSENTIAL (PRIMARY) HYPERTENSION: ICD-10-CM

## 2017-12-06 DIAGNOSIS — I77.0 ARTERIOVENOUS FISTULA, ACQUIRED: Chronic | ICD-10-CM

## 2017-12-06 DIAGNOSIS — N18.3 CHRONIC KIDNEY DISEASE, STAGE 3 (MODERATE): ICD-10-CM

## 2017-12-06 DIAGNOSIS — Z29.9 ENCOUNTER FOR PROPHYLACTIC MEASURES, UNSPECIFIED: ICD-10-CM

## 2017-12-06 DIAGNOSIS — M10.9 GOUT, UNSPECIFIED: ICD-10-CM

## 2017-12-06 DIAGNOSIS — E11.40 TYPE 2 DIABETES MELLITUS WITH DIABETIC NEUROPATHY, UNSPECIFIED: ICD-10-CM

## 2017-12-06 DIAGNOSIS — I50.23 ACUTE ON CHRONIC SYSTOLIC (CONGESTIVE) HEART FAILURE: ICD-10-CM

## 2017-12-06 LAB
ALBUMIN SERPL ELPH-MCNC: 3.3 G/DL — SIGNIFICANT CHANGE UP (ref 3.3–5)
ALP SERPL-CCNC: 141 U/L — HIGH (ref 40–120)
ALT FLD-CCNC: 40 U/L — HIGH (ref 4–33)
APTT BLD: 29.4 SEC — SIGNIFICANT CHANGE UP (ref 27.5–37.4)
AST SERPL-CCNC: 30 U/L — SIGNIFICANT CHANGE UP (ref 4–32)
BASE EXCESS BLDV CALC-SCNC: 7.7 MMOL/L — SIGNIFICANT CHANGE UP
BASOPHILS # BLD AUTO: 0.03 K/UL — SIGNIFICANT CHANGE UP (ref 0–0.2)
BASOPHILS NFR BLD AUTO: 0.6 % — SIGNIFICANT CHANGE UP (ref 0–2)
BILIRUB SERPL-MCNC: 0.7 MG/DL — SIGNIFICANT CHANGE UP (ref 0.2–1.2)
BLOOD GAS VENOUS - CREATININE: 1.31 MG/DL — HIGH (ref 0.5–1.3)
BUN SERPL-MCNC: 35 MG/DL — HIGH (ref 7–23)
CALCIUM SERPL-MCNC: 8.5 MG/DL — SIGNIFICANT CHANGE UP (ref 8.4–10.5)
CHLORIDE BLDV-SCNC: 106 MMOL/L — SIGNIFICANT CHANGE UP (ref 96–108)
CHLORIDE SERPL-SCNC: 103 MMOL/L — SIGNIFICANT CHANGE UP (ref 98–107)
CO2 SERPL-SCNC: 32 MMOL/L — HIGH (ref 22–31)
CREAT SERPL-MCNC: 1.46 MG/DL — HIGH (ref 0.5–1.3)
EOSINOPHIL # BLD AUTO: 0.15 K/UL — SIGNIFICANT CHANGE UP (ref 0–0.5)
EOSINOPHIL NFR BLD AUTO: 3.2 % — SIGNIFICANT CHANGE UP (ref 0–6)
GAS PNL BLDV: 144 MMOL/L — SIGNIFICANT CHANGE UP (ref 136–146)
GLUCOSE BLDC GLUCOMTR-MCNC: 187 MG/DL — HIGH (ref 70–99)
GLUCOSE BLDC GLUCOMTR-MCNC: 192 MG/DL — HIGH (ref 70–99)
GLUCOSE BLDC GLUCOMTR-MCNC: 227 MG/DL — HIGH (ref 70–99)
GLUCOSE BLDV-MCNC: 120 — HIGH (ref 70–99)
GLUCOSE SERPL-MCNC: 123 MG/DL — HIGH (ref 70–99)
HCO3 BLDV-SCNC: 30 MMOL/L — HIGH (ref 20–27)
HCT VFR BLD CALC: 32.8 % — LOW (ref 34.5–45)
HCT VFR BLDV CALC: 31.3 % — LOW (ref 34.5–45)
HGB BLD-MCNC: 10 G/DL — LOW (ref 11.5–15.5)
HGB BLDV-MCNC: 10.1 G/DL — LOW (ref 11.5–15.5)
IMM GRANULOCYTES # BLD AUTO: 0.01 # — SIGNIFICANT CHANGE UP
IMM GRANULOCYTES NFR BLD AUTO: 0.2 % — SIGNIFICANT CHANGE UP (ref 0–1.5)
INR BLD: 1.28 — HIGH (ref 0.88–1.17)
LACTATE BLDV-MCNC: 1.8 MMOL/L — SIGNIFICANT CHANGE UP (ref 0.5–2)
LYMPHOCYTES # BLD AUTO: 1.09 K/UL — SIGNIFICANT CHANGE UP (ref 1–3.3)
LYMPHOCYTES # BLD AUTO: 23.4 % — SIGNIFICANT CHANGE UP (ref 13–44)
MAGNESIUM SERPL-MCNC: 1.5 MG/DL — LOW (ref 1.6–2.6)
MCHC RBC-ENTMCNC: 25.4 PG — LOW (ref 27–34)
MCHC RBC-ENTMCNC: 30.5 % — LOW (ref 32–36)
MCV RBC AUTO: 83.5 FL — SIGNIFICANT CHANGE UP (ref 80–100)
MONOCYTES # BLD AUTO: 0.59 K/UL — SIGNIFICANT CHANGE UP (ref 0–0.9)
MONOCYTES NFR BLD AUTO: 12.7 % — SIGNIFICANT CHANGE UP (ref 2–14)
NEUTROPHILS # BLD AUTO: 2.79 K/UL — SIGNIFICANT CHANGE UP (ref 1.8–7.4)
NEUTROPHILS NFR BLD AUTO: 59.9 % — SIGNIFICANT CHANGE UP (ref 43–77)
NRBC # FLD: 0.03 — SIGNIFICANT CHANGE UP
NT-PROBNP SERPL-SCNC: SIGNIFICANT CHANGE UP PG/ML
PCO2 BLDV: 60 MMHG — HIGH (ref 41–51)
PH BLDV: 7.36 PH — SIGNIFICANT CHANGE UP (ref 7.32–7.43)
PHOSPHATE SERPL-MCNC: 3.6 MG/DL — SIGNIFICANT CHANGE UP (ref 2.5–4.5)
PLATELET # BLD AUTO: 199 K/UL — SIGNIFICANT CHANGE UP (ref 150–400)
PMV BLD: 11.7 FL — SIGNIFICANT CHANGE UP (ref 7–13)
PO2 BLDV: 40 MMHG — SIGNIFICANT CHANGE UP (ref 35–40)
POTASSIUM BLDV-SCNC: 4 MMOL/L — SIGNIFICANT CHANGE UP (ref 3.4–4.5)
POTASSIUM SERPL-MCNC: 4.2 MMOL/L — SIGNIFICANT CHANGE UP (ref 3.5–5.3)
POTASSIUM SERPL-SCNC: 4.2 MMOL/L — SIGNIFICANT CHANGE UP (ref 3.5–5.3)
PROT SERPL-MCNC: 6.2 G/DL — SIGNIFICANT CHANGE UP (ref 6–8.3)
PROTHROM AB SERPL-ACNC: 14.4 SEC — HIGH (ref 9.8–13.1)
RBC # BLD: 3.93 M/UL — SIGNIFICANT CHANGE UP (ref 3.8–5.2)
RBC # FLD: 19.2 % — HIGH (ref 10.3–14.5)
SAO2 % BLDV: 65.5 % — SIGNIFICANT CHANGE UP (ref 60–85)
SODIUM SERPL-SCNC: 147 MMOL/L — HIGH (ref 135–145)
WBC # BLD: 4.66 K/UL — SIGNIFICANT CHANGE UP (ref 3.8–10.5)
WBC # FLD AUTO: 4.66 K/UL — SIGNIFICANT CHANGE UP (ref 3.8–10.5)

## 2017-12-06 PROCEDURE — 71010: CPT | Mod: 26

## 2017-12-06 RX ORDER — INSULIN LISPRO 100/ML
6 VIAL (ML) SUBCUTANEOUS
Qty: 0 | Refills: 0 | Status: DISCONTINUED | OUTPATIENT
Start: 2017-12-06 | End: 2017-12-12

## 2017-12-06 RX ORDER — GABAPENTIN 400 MG/1
100 CAPSULE ORAL DAILY
Qty: 0 | Refills: 0 | Status: DISCONTINUED | OUTPATIENT
Start: 2017-12-06 | End: 2017-12-15

## 2017-12-06 RX ORDER — BUDESONIDE AND FORMOTEROL FUMARATE DIHYDRATE 160; 4.5 UG/1; UG/1
2 AEROSOL RESPIRATORY (INHALATION)
Qty: 0 | Refills: 0 | Status: DISCONTINUED | OUTPATIENT
Start: 2017-12-06 | End: 2017-12-15

## 2017-12-06 RX ORDER — ASPIRIN/CALCIUM CARB/MAGNESIUM 324 MG
81 TABLET ORAL DAILY
Qty: 0 | Refills: 0 | Status: DISCONTINUED | OUTPATIENT
Start: 2017-12-06 | End: 2017-12-15

## 2017-12-06 RX ORDER — HEPARIN SODIUM 5000 [USP'U]/ML
5000 INJECTION INTRAVENOUS; SUBCUTANEOUS EVERY 8 HOURS
Qty: 0 | Refills: 0 | Status: DISCONTINUED | OUTPATIENT
Start: 2017-12-06 | End: 2017-12-15

## 2017-12-06 RX ORDER — FEBUXOSTAT 40 MG/1
40 TABLET ORAL DAILY
Qty: 0 | Refills: 0 | Status: DISCONTINUED | OUTPATIENT
Start: 2017-12-06 | End: 2017-12-15

## 2017-12-06 RX ORDER — GLYCOPYRROLATE AND FORMOTEROL FUMARATE 9; 4.8 UG/1; UG/1
1 AEROSOL, METERED RESPIRATORY (INHALATION)
Qty: 0 | Refills: 0 | COMMUNITY

## 2017-12-06 RX ORDER — IPRATROPIUM/ALBUTEROL SULFATE 18-103MCG
3 AEROSOL WITH ADAPTER (GRAM) INHALATION EVERY 6 HOURS
Qty: 0 | Refills: 0 | Status: DISCONTINUED | OUTPATIENT
Start: 2017-12-06 | End: 2017-12-11

## 2017-12-06 RX ORDER — FEBUXOSTAT 40 MG/1
1 TABLET ORAL
Qty: 0 | Refills: 0 | COMMUNITY

## 2017-12-06 RX ORDER — ATORVASTATIN CALCIUM 80 MG/1
80 TABLET, FILM COATED ORAL AT BEDTIME
Qty: 0 | Refills: 0 | Status: DISCONTINUED | OUTPATIENT
Start: 2017-12-06 | End: 2017-12-15

## 2017-12-06 RX ORDER — CHOLECALCIFEROL (VITAMIN D3) 125 MCG
2000 CAPSULE ORAL DAILY
Qty: 0 | Refills: 0 | Status: DISCONTINUED | OUTPATIENT
Start: 2017-12-06 | End: 2017-12-15

## 2017-12-06 RX ORDER — INSULIN ASPART 100 [IU]/ML
6 INJECTION, SOLUTION SUBCUTANEOUS
Qty: 0 | Refills: 0 | COMMUNITY

## 2017-12-06 RX ORDER — INSULIN GLARGINE 100 [IU]/ML
36 INJECTION, SOLUTION SUBCUTANEOUS AT BEDTIME
Qty: 0 | Refills: 0 | Status: DISCONTINUED | OUTPATIENT
Start: 2017-12-06 | End: 2017-12-08

## 2017-12-06 RX ORDER — ACETAMINOPHEN 500 MG
650 TABLET ORAL EVERY 6 HOURS
Qty: 0 | Refills: 0 | Status: DISCONTINUED | OUTPATIENT
Start: 2017-12-06 | End: 2017-12-15

## 2017-12-06 RX ORDER — IPRATROPIUM/ALBUTEROL SULFATE 18-103MCG
3 AEROSOL WITH ADAPTER (GRAM) INHALATION ONCE
Qty: 0 | Refills: 0 | Status: COMPLETED | OUTPATIENT
Start: 2017-12-06 | End: 2017-12-06

## 2017-12-06 RX ORDER — LISINOPRIL 2.5 MG/1
2.5 TABLET ORAL DAILY
Qty: 0 | Refills: 0 | Status: DISCONTINUED | OUTPATIENT
Start: 2017-12-06 | End: 2017-12-13

## 2017-12-06 RX ORDER — MAGNESIUM SULFATE 500 MG/ML
2 VIAL (ML) INJECTION ONCE
Qty: 0 | Refills: 0 | Status: COMPLETED | OUTPATIENT
Start: 2017-12-06 | End: 2017-12-06

## 2017-12-06 RX ORDER — CARVEDILOL PHOSPHATE 80 MG/1
12.5 CAPSULE, EXTENDED RELEASE ORAL EVERY 12 HOURS
Qty: 0 | Refills: 0 | Status: DISCONTINUED | OUTPATIENT
Start: 2017-12-06 | End: 2017-12-15

## 2017-12-06 RX ORDER — INSULIN GLARGINE 100 [IU]/ML
36 INJECTION, SOLUTION SUBCUTANEOUS
Qty: 0 | Refills: 0 | COMMUNITY

## 2017-12-06 RX ORDER — ALBUTEROL 90 UG/1
3 AEROSOL, METERED ORAL
Qty: 0 | Refills: 0 | COMMUNITY

## 2017-12-06 RX ORDER — FUROSEMIDE 40 MG
80 TABLET ORAL EVERY 12 HOURS
Qty: 0 | Refills: 0 | Status: DISCONTINUED | OUTPATIENT
Start: 2017-12-06 | End: 2017-12-09

## 2017-12-06 RX ADMIN — Medication 3 MILLILITER(S): at 22:40

## 2017-12-06 RX ADMIN — ATORVASTATIN CALCIUM 80 MILLIGRAM(S): 80 TABLET, FILM COATED ORAL at 21:45

## 2017-12-06 RX ADMIN — LISINOPRIL 2.5 MILLIGRAM(S): 2.5 TABLET ORAL at 15:18

## 2017-12-06 RX ADMIN — Medication 3 MILLILITER(S): at 16:43

## 2017-12-06 RX ADMIN — Medication 3 MILLILITER(S): at 11:19

## 2017-12-06 RX ADMIN — BUDESONIDE AND FORMOTEROL FUMARATE DIHYDRATE 2 PUFF(S): 160; 4.5 AEROSOL RESPIRATORY (INHALATION) at 21:46

## 2017-12-06 RX ADMIN — Medication 6 UNIT(S): at 19:52

## 2017-12-06 RX ADMIN — HEPARIN SODIUM 5000 UNIT(S): 5000 INJECTION INTRAVENOUS; SUBCUTANEOUS at 21:46

## 2017-12-06 RX ADMIN — Medication 100 MILLIGRAM(S): at 19:45

## 2017-12-06 RX ADMIN — Medication 80 MILLIGRAM(S): at 15:17

## 2017-12-06 RX ADMIN — INSULIN GLARGINE 36 UNIT(S): 100 INJECTION, SOLUTION SUBCUTANEOUS at 21:46

## 2017-12-06 RX ADMIN — Medication 650 MILLIGRAM(S): at 21:45

## 2017-12-06 RX ADMIN — Medication 81 MILLIGRAM(S): at 15:17

## 2017-12-06 RX ADMIN — Medication 650 MILLIGRAM(S): at 22:45

## 2017-12-06 RX ADMIN — Medication 2000 UNIT(S): at 15:17

## 2017-12-06 RX ADMIN — CARVEDILOL PHOSPHATE 12.5 MILLIGRAM(S): 80 CAPSULE, EXTENDED RELEASE ORAL at 17:44

## 2017-12-06 RX ADMIN — Medication 80 MILLIGRAM(S): at 18:00

## 2017-12-06 RX ADMIN — Medication 50 GRAM(S): at 15:27

## 2017-12-06 NOTE — ED ADULT NURSE NOTE - OBJECTIVE STATEMENT
Pt presents to room 17, A&Ox3, ambulatory at baseline at with a cane, coming in for evaluation of shortness of breath and cough x 1 weeks.  pt has a hx of CHF, CAD, cardiomegaly, CKD, right av fistula (not on dialysis) COPD, AICD/PM, HTN, HLD, and DM. pt on home O2 via NC @ 2lpm.  pt unable to lay in a bed, must sit in chair due to orthopnea. Denies any chest pain, dizziness, nausea, vomiting,  palpitations, diarrhea, fever, constipation, or chills.  IV established in left ac with a 20g, labs drawn and sent, call bell in reach, side rails up, bed in locked position, md evaluation in progress, will continue to monitor.

## 2017-12-06 NOTE — ED PROVIDER NOTE - CHPI ED SYMPTOMS NEG
no nausea/no vomiting/no fever/no tingling/no dizziness/no pain/no chills/no decreased eating/drinking/no weakness/no numbness

## 2017-12-06 NOTE — CONSULT NOTE ADULT - SUBJECTIVE AND OBJECTIVE BOX
HPI:  Ms. Arreola is a 57 year-old woman, well-known to me, with history of multiple medical issues including hypertension, type 2 diabetes mellitus, systolic congestive heart failure, and chronic kidney disease. She is s/p admission in 2017 with acute respiratory failure, ALISA, uremia, and hyperkalemia; she was started on chronic dialysis during this admission. In 2017, she returned to [facility]; during this admission, we realized that she had regained enough renal function to discontinue dialysis, and as a result we discontinued her chronic dialysis therapy. She presented to the [facility] ER today with progressively worsening shortness of breath for the past month.      PAST MEDICAL & SURGICAL HISTORY:  Gout  GERD (gastroesophageal reflux disease)  CKD (chronic kidney disease), stage 3 (moderate)  Nonischemic cardiomyopathy: EF 20-25%  Sleep apnea: noncompliant with CPAP  Diabetic neuropathy  HTN (hypertension)  AICD (automatic cardioverter/defibrillator) present: ICD (Medtronic generator with Medtronic atrial (4076)and ventricular (6947) leads) 07  Cardiac Pacemaker  CHF (Congestive Heart Failure): on Home O2 2L prn  HLD (Hyperlipidemia)  Diabetes Mellitus: x 10 years, denies nephropathy or retinopathy  AV fistula: right x 2July   H/O:   H/O: hysterectomy:   AICD (automatic cardioverter/defibrillator) present: Medtronic generator with Medtronic atrial (4076)and ventricular (6947) leads) 07    Allergies  penicillin (Anaphylaxis; Short breath)    SOCIAL HISTORY:  Denies ETOh,Smoking,     FAMILY HISTORY:  Family history of diabetes mellitus in father: Father, , age 87  Family history of hypertension: Mother and Father  Family history of congestive heart failure: Mother, CHF, , age 51    REVIEW OF SYSTEMS:  CONSTITUTIONAL: No weakness, fevers or chills  EYES/ENT: No visual changes;  No vertigo or throat pain   NECK: No pain or stiffness  RESPIRATORY: No cough, wheezing, hemoptysis; No shortness of breath  CARDIOVASCULAR: No chest pain or palpitations  GASTROINTESTINAL: No abdominal or epigastric pain. No nausea, vomiting, or hematemesis; No diarrhea or constipation. No melena or hematochezia.  GENITOURINARY: No dysuria, frequency or hematuria  NEUROLOGICAL: No numbness or weakness  SKIN: No itching, burning, rashes, or lesions   All other review of systems is negative unless indicated above.    VITAL:  T(C): , Max: 36.9 (17 @ 11:27)  T(F): , Max: 98.4 (17 @ 11:27)  HR: 77 (17 @ 12:34)  BP: 152/93 (17 @ 12:34)  BP(mean): --  RR: 23 (17 @ 12:34)  SpO2: 99% (17 @ 12:34)  Wt(kg): --    PHYSICAL EXAM:  Constitutional: NAD, Alert  HEENT: NCAT, MMM  Neck: Supple, No JVD  Respiratory: CTA-b/l  Cardiovascular: RRR s1s2, no m/r/g  Gastrointestinal: BS+, soft, NT/ND  Extremities: No peripheral edema b/l  Neurological: no focal deficits; strength grossly intact  Psychiatric: Normal mood, normal affect  Back: no CVAT b/l  Skin: No rashes, no nevi    LABS:                        10.0   4.66  )-----------( 199      ( 06 Dec 2017 11:15 )             32.8     Na(147)/K(4.2)/Cl(103)/HCO3(32)/BUN(35)/Cr(1.46)Glu(123)/Ca(8.5)/Mg(--)/PO4(--)     @ 11:15      IMAGING:    < from: Xray Chest 1 View AP-PORTABLE IMMEDIATE (17 @ 11:04) >  IMPRESSION:  Mild interstitial edema.      < from: Transthoracic Echocardiogram (17 @ 09:48) >  1. Mitral annular calcification, otherwise normal mitral  valve. Moderate mitral regurgitation.  2. Moderate left ventricular enlargement.  3. Severe global left ventricular systolic dysfunction.  4. Right ventricular enlargement with decreased right  ventricular systolic function.  A device wire is noted in  the right heart.        ASSESSMENT:      RECOMMEND:      Thank you for involving [facility] in this patient's care.    With warm regards,    Perfecto Rae MD   [facility]  [phone] HPI:  Ms. Arreola is a 57 year-old woman, well-known to me, with history of multiple medical issues including hypertension, type 2 diabetes mellitus, systolic congestive heart failure, and chronic kidney disease. She is s/p admission in 2017 with acute respiratory failure, ALISA, uremia, and hyperkalemia; she was started on chronic dialysis during this admission. In 2017, she returned to [facility]; during this admission, we realized that she had regained enough renal function to discontinue dialysis, and as a result we discontinued her chronic dialysis therapy. She presented to the [facility] ER today with progressively worsening shortness of breath for the past month.      PAST MEDICAL & SURGICAL HISTORY:  Gout  GERD (gastroesophageal reflux disease)  CKD (chronic kidney disease), stage 3 (moderate)  Nonischemic cardiomyopathy: EF 20-25%  Sleep apnea: noncompliant with CPAP  Diabetic neuropathy  HTN (hypertension)  AICD (automatic cardioverter/defibrillator) present: ICD (Medtronic generator with Medtronic atrial (4076)and ventricular (6947) leads) 07  Cardiac Pacemaker  CHF (Congestive Heart Failure): on Home O2 2L prn  HLD (Hyperlipidemia)  Diabetes Mellitus: x 10 years, denies nephropathy or retinopathy  AV fistula: right x 2July   H/O:   H/O: hysterectomy:   AICD (automatic cardioverter/defibrillator) present: Medtronic generator with Medtronic atrial (4076)and ventricular (6947) leads) 07    Allergies  penicillin (Anaphylaxis; Short breath)    SOCIAL HISTORY:  Denies ETOh,Smoking,     FAMILY HISTORY:  Family history of diabetes mellitus in father: Father, , age 87  Family history of hypertension: Mother and Father  Family history of congestive heart failure: Mother, CHF, , age 51    REVIEW OF SYSTEMS:  CONSTITUTIONAL: No weakness, fevers or chills  EYES/ENT: No visual changes;  No vertigo or throat pain   NECK: No pain or stiffness  RESPIRATORY: No cough, wheezing, hemoptysis; (+) shortness of breath  CARDIOVASCULAR: No chest pain or palpitations  GASTROINTESTINAL: No abdominal or epigastric pain. No nausea, vomiting, or hematemesis; No diarrhea or constipation. No melena or hematochezia.  GENITOURINARY: No dysuria, frequency or hematuria  NEUROLOGICAL: No numbness or weakness  SKIN: No itching, burning, rashes, or lesions   All other review of systems is negative unless indicated above.    VITAL:  T(C): , Max: 36.9 (17 @ 11:27)  T(F): , Max: 98.4 (17 @ 11:27)  HR: 77 (17 @ 12:34)  BP: 152/93 (17 @ 12:34)  BP(mean): --  RR: 23 (17 @ 12:34)  SpO2: 99% (17 @ 12:34)  Wt(kg): --    PHYSICAL EXAM:  Constitutional: NAD, Alert  HEENT: NCAT, MMM  Neck: Supple, No JVD  Respiratory: CTA-b/l  Cardiovascular: RRR s1s2, no m/r/g  Gastrointestinal: BS+, soft, NT/ND  Extremities: No peripheral edema b/l  Neurological: no focal deficits; strength grossly intact  Psychiatric: Normal mood, normal affect  Back: no CVAT b/l  Skin: No rashes, no nevi    LABS:                        10.0   4.66  )-----------( 199      ( 06 Dec 2017 11:15 )             32.8     Na(147)/K(4.2)/Cl(103)/HCO3(32)/BUN(35)/Cr(1.46)Glu(123)/Ca(8.5)/Mg(--)/PO4(--)     @ 11:15      IMAGING:    < from: Xray Chest 1 View AP-PORTABLE IMMEDIATE (17 @ 11:04) >  IMPRESSION:  Mild interstitial edema.      < from: Transthoracic Echocardiogram (17 @ 09:48) >  1. Mitral annular calcification, otherwise normal mitral  valve. Moderate mitral regurgitation.  2. Moderate left ventricular enlargement.  3. Severe global left ventricular systolic dysfunction.  4. Right ventricular enlargement with decreased right  ventricular systolic function.  A device wire is noted in  the right heart.        ASSESSMENT:  (1)Renal - CKD3 - stable function  (2)Lytes acceptable  (3)Shortness of breath - CHF-associated?    RECOMMEND:  (1)IV Lasix; Is<Os for now  (2)Dose new meds for GFR 40-50ml/min  (3)No HD for now    Thank you for involving [facility] in this patient's care.    With warm regards,    Perfecto Rae MD   [facility]  [phone] HPI:  Ms. Arreola is a 57 year-old woman, well-known to me, with history of multiple medical issues including hypertension, type 2 diabetes mellitus, systolic congestive heart failure, and chronic kidney disease. She is s/p admission in 2017 with acute respiratory failure, ALISA, uremia, and hyperkalemia; she was started on chronic dialysis during this admission. In 2017, she returned to [facility]; during this admission, we realized that she had regained enough renal function to discontinue dialysis, and as a result we discontinued her chronic dialysis therapy. She presented to the [facility] ER today with progressively worsening shortness of breath for the past month. She informs me that she missed her appointment at my office 2-3 weeks ago due to her shortness of breath. She tells me that she has been taking Bumex 2mg po qd at home.      PAST MEDICAL & SURGICAL HISTORY:  Gout  GERD (gastroesophageal reflux disease)  CKD (chronic kidney disease), stage 3 (moderate)  Nonischemic cardiomyopathy: EF 20-25%  Sleep apnea: noncompliant with CPAP  Diabetic neuropathy  HTN (hypertension)  AICD (automatic cardioverter/defibrillator) present: ICD (Medtronic generator with Medtronic atrial (4076)and ventricular (6947) leads) 07  Cardiac Pacemaker  CHF (Congestive Heart Failure): on Home O2 2L prn  HLD (Hyperlipidemia)  Diabetes Mellitus: x 10 years, denies nephropathy or retinopathy  AV fistula: right x 2July   H/O:   H/O: hysterectomy:   AICD (automatic cardioverter/defibrillator) present: Medtronic generator with Medtronic atrial (4076)and ventricular (6947) leads) 07    Allergies  penicillin (Anaphylaxis; Short breath)    SOCIAL HISTORY:  Denies ETOh,Smoking,     FAMILY HISTORY:  Family history of diabetes mellitus in father: Father, , age 87  Family history of hypertension: Mother and Father  Family history of congestive heart failure: Mother, CHF, , age 51    REVIEW OF SYSTEMS:  CONSTITUTIONAL: No weakness, fevers or chills  EYES/ENT: No visual changes;  No vertigo or throat pain   NECK: No pain or stiffness  RESPIRATORY: No cough, wheezing, hemoptysis; (+) shortness of breath  CARDIOVASCULAR: No chest pain or palpitations  GASTROINTESTINAL: No abdominal or epigastric pain. No nausea, vomiting, or hematemesis; No diarrhea or constipation. No melena or hematochezia.  GENITOURINARY: No dysuria, frequency or hematuria  NEUROLOGICAL: No numbness or weakness  SKIN: No itching, burning, rashes, or lesions   All other review of systems is negative unless indicated above.    VITAL:  T(C): , Max: 36.9 (17 @ 11:27)  T(F): , Max: 98.4 (17 @ 11:27)  HR: 77 (17 @ 12:34)  BP: 152/93 (17 @ 12:34)  BP(mean): --  RR: 23 (17 @ 12:34)  SpO2: 99% (17 @ 12:34)  Wt(kg): --    PHYSICAL EXAM:  Constitutional: NAD, resting and on supplemental O2; obese  HEENT: NCAT, DMM  Neck: Supple, No JVD  Respiratory: distant BS b/l  Cardiovascular: distant s1s2, RRR   Gastrointestinal: BS+, soft, NT/ND  Extremities: 1+ b/l LE edema  Neurological: no focal deficits; strength grossly intact  Back: no CVAT b/l  Skin: No rashes (+)hypopigmental frontal patch  LABS:                        10.0   4.66  )-----------( 199      ( 06 Dec 2017 11:15 )             32.8     Na(147)/K(4.2)/Cl(103)/HCO3(32)/BUN(35)/Cr(1.46)Glu(123)/Ca(8.5)/Mg(--)/PO4(--)     @ 11:15      IMAGING:    < from: Xray Chest 1 View AP-PORTABLE IMMEDIATE (17 @ 11:04) >  IMPRESSION:  Mild interstitial edema.      < from: Transthoracic Echocardiogram (17 @ 09:48) >  1. Mitral annular calcification, otherwise normal mitral  valve. Moderate mitral regurgitation.  2. Moderate left ventricular enlargement.  3. Severe global left ventricular systolic dysfunction.  4. Right ventricular enlargement with decreased right  ventricular systolic function.  A device wire is noted in  the right heart.        ASSESSMENT:  (1)Renal - CKD3 - stable function  (2)Lytes acceptable  (3)Shortness of breath - CHF-associated?    RECOMMEND:  (1)IV diuretics; Is<Os for now - Lasix 80mg iv bid?  (2)Dose new meds for GFR 40-50ml/min  (3)No HD for now    Thank you for involving [facility] in this patient's care.    With warm regards,    Perfecto Rae MD   [facility]  [phone]

## 2017-12-06 NOTE — H&P ADULT - ASSESSMENT
57 y.o. female with , Morbid Obesity, DM2, NICM w/ CHFrEF (EF=22%) with AICD, COPD on 2L NC, Diabetic Neuropathy, CKD III, HTN, HLD, admitted for CHF exacerbation

## 2017-12-06 NOTE — H&P ADULT - LV FUNCTION ASSESSMENT
[facility]  [facility]: [place]      Community Health Worker Note      Patient: Asher Gonzales  Date: November 15, 2017  Preferred Name: Asher    Previous PHQ-9:   PHQ-9 SCORE 10/9/2012 3/24/2017 4/5/2017   Total Score 0 - -   Total Score - 13 5     Previous FRANKLIN-7:   FRANKLIN-7 SCORE 3/24/2017 4/5/2017   Total Score 10 5     MAVERICK LEVEL:  MAVERICK Score (Last Two) 3/24/2017 4/5/2017   MAVERICK Raw Score 36 30   Activation Score 75.5 56   MAVERICK Level 4 3       Preferred Contact:  Need for : No  Preferred Contact: Cell    Type of Contact Today: Phone call (not reached/unavailable)      Data: (Subjective / Objective):  CHW attempted to reach patient for monthly [facility], but was unsuccessful. Left vm asking for return call to [facility].      Nirmala Rehman, JAIRON                no

## 2017-12-06 NOTE — H&P ADULT - NSHPLABSRESULTS_GEN_ALL_CORE
The Labs were reviewed by me   The Radiology was reviewed by me    EKG tracing reviewed by me    12-06    147<H>  |  103  |  35<H>  ----------------------------<  123<H>  4.2   |  32<H>  |  1.46<H>    Ca    8.5      06 Dec 2017 11:15  Phos  3.6     12-06  Mg     1.5     12-06    TPro  6.2  /  Alb  3.3  /  TBili  0.7  /  DBili  x   /  AST  30  /  ALT  40<H>  /  AlkPhos  141<H>  12-06      PT/INR - ( 06 Dec 2017 11:15 )   PT: 14.4 SEC;   INR: 1.28          PTT - ( 06 Dec 2017 11:15 )  PTT:29.4 SEC                                        10.0   4.66  )-----------( 199      ( 06 Dec 2017 11:15 )             32.8     CAPILLARY BLOOD GLUCOSE The Labs were reviewed by me   The Radiology was reviewed by me    EKG tracing reviewed by me    12-06    147<H>  |  103  |  35<H>  ----------------------------<  123<H>  4.2   |  32<H>  |  1.46<H>    Ca    8.5      06 Dec 2017 11:15  Phos  3.6     12-06  Mg     1.5     12-06    TPro  6.2  /  Alb  3.3  /  TBili  0.7  /  DBili  x   /  AST  30  /  ALT  40<H>  /  AlkPhos  141<H>  12-06      PT/INR - ( 06 Dec 2017 11:15 )   PT: 14.4 SEC;   INR: 1.28          PTT - ( 06 Dec 2017 11:15 )  PTT:29.4 SEC                                        10.0   4.66  )-----------( 199      ( 06 Dec 2017 11:15 )             32.8       Serum Pro-Brain Natriuretic Peptide: 79099: ACUTE CONGESTIVE HEART FAILURE is UNLIKELY if NT-proBNP is < 300 pg/mL.    CONSIDER ACUTE CONGESTIVE HEART FAILURE if:    AGE                NT-proBNP RESULT  ---                -------------  < 50 YEARS      >  450 pg/mL  50 - 75 YEARS      >  900 pg/mL  > 75 YEARS      > 1800 pg/mL    All results require clinical correlation.  Consider obtaining a  baseline or "dry" NT-proBNP level when the patient is stabilized,  so that subsequent levels can be compared to that value.  Patients  with recurrent CHF may have elevated NT-proBNP levels.  Acute  failure episodes generally produce levels at least 25% greater  than baseline levels.  The above values are derived from a large  multi-center international study, "ESTEFANIA Rosenthal, et al, European  Heart Journal, 2006; 27:330-337."   pg/mL (12.06.17 @ 11:15)    HCO3, Venous: 30 mmol/L (12-06-17 @ 11:15)  pH, Venous: 7.36 pH (12-06-17 @ 11:15)  pO2, Venous: 40 mmHg (12-06-17 @ 11:15)  pCO2, Venous: 60 mmHg (12-06-17 @ 11:15)    Blood Gas Venous - Lactate: 1.8 mmol/L (12-06-17 @ 11:15)    Hemoglobin A1C, Whole Blood: 7.1: High Risk (prediabetic)    5.7 - 6.4 %  Diabetic, diagnostic           > 6.5 %  ADA diabetic treatment goal    < 7.0 %    HbA1C values may not accurately reflect mean blood glucose  in patients with Hb variants.  Suggest clinical correlation. % (08.17.17 @ 05:00)        Radiology:    < from: Xray Chest 1 View AP-PORTABLE IMMEDIATE (12.06.17 @ 11:04) >    IMPRESSION:  Mild interstitial edema.    < end of copied text >    EKG:

## 2017-12-06 NOTE — ED PROVIDER NOTE - PROGRESS NOTE DETAILS
NATHAN donahue: pt improved with 3.5 L NC, sat 99%, cxr with interstitial edema otherwise neg. improved with duonebs. d/w kinjal and will admit.

## 2017-12-06 NOTE — H&P ADULT - ATTENDING COMMENTS
57 y.o. female with , Morbid Obesity, DM2, NICM w/ CHFrEF (EF=22%) with AICD, COPD on 2L NC, Diabetic Neuropathy, CKD III, HTN, HLD presenting with worsening dyspnea for the last 2-3 days. The patient states she has been having recurrent episodes of dyspnea over the last two weeks. She had been previously treated 2 weeks ago for a COPD exacerbation with a prednisone taper. Her dyspnea did not improve much following completes. States she has not been able to lay flat on her back for the past week as well, sleeping in her recliner instead of her bed. Also endorses a dry cough as well, with minimal to no sputum production. The patient denies fever, chills, night sweats, cough, nasal congestion, chest pain, palpations, nausea/vomiting, abd. pain, diarrhea, constipation, dysuria, urinary frequency, leg swelling, joint pain, and rashes.      her medical condition discuses with  emergency room team  on 12/6/2017 and also with that physician assistant today  in detail possible discharge in 2-3 days

## 2017-12-06 NOTE — ED ADULT TRIAGE NOTE - CHIEF COMPLAINT QUOTE
Pt with co sob x 1 month, pt was seen here two weeks ago for same thing. Pt states has been feeling worse over past few days no relief with steroid. Pt is 02 dependent states uses 02 at home

## 2017-12-06 NOTE — ED PROVIDER NOTE - MEDICAL DECISION MAKING DETAILS
56 yo F here for SOB, acute on chronic. Otherwise without complaints. Recent ER visit for same. Will obtain labs, bnp, cxr, duonebs, consider lasix if fluid overload. copd vs. chf. will d/w with kinjal, likely admit.

## 2017-12-06 NOTE — H&P ADULT - NSHPREVIEWOFSYSTEMS_GEN_ALL_CORE
REVIEW OF SYSTEMS:  CONSTITUTIONAL: No fever, chills, night sweats. + for fatigue  EYES: No eye pain, visual disturbances, or discharge  ENMT:  No difficulty hearing, tinnitus, vertigo; No sinus or throat pain  RESPIRATORY: No wheezing, or hemoptysis; + for shortness of breath and cough  CARDIOVASCULAR: No chest pain, palpitations, dizziness, or leg swelling  GASTROINTESTINAL: No abdominal or epigastric pain. No nausea, vomiting, or hematemesis; No diarrhea or constipation. No melena or hematochezia.  GENITOURINARY: No dysuria, frequency, hematuria, or incontinence  NEUROLOGICAL: No headaches, memory loss, loss of strength, numbness, or tremors  SKIN: No itching, burning, rashes, or lesions   ENDOCRINE: No heat or cold intolerance; No hair loss  MUSCULOSKELETAL: No joint pain or swelling; No muscle, back, or extremity pain  PSYCHIATRIC: No depression, anxiety, mood swings, or difficulty sleeping

## 2017-12-06 NOTE — ED PROVIDER NOTE - ATTENDING CONTRIBUTION TO CARE
Pertinent PMH/PSH/FHx/SHx and Review of Systems contained within:  56yo F pmh inclusive of htn, DM, GERD, copd on home 02 (2L via NC ATC), chf/cardiomyopathy, CKD (remote dialysis earlier this year, then told dialysis no longer necessary), obesity, AICD, seen here 2 wks ago for copd exacerbation, followed up with PMD Carmine last week (given inhaler with minimal improvement),  present for worsening sob X 2-3 days, associated w/ dry cough and mild LE edema noticed today. No fever/chills, No photophobia/eye pain/changes in vision, No ear pain/sore throat/dysphagia, No chest pain/palpitations, no wheeze/stridor, No abdominal pain, No N/V/D, no dysuria/frequency/discharge, No neck/back pain, no rash, no changes in neurological status/function.   Gen: Alert, mild resp distress, obese  Head: NC, AT, EOMI, normal lids/conjunctiva  ENT:  normal hearing, patent oropharynx without erythema/exudate, uvula midline  Neck: +supple, no tenderness/meningismus/JVD, +Trachea midline  Chest: no chest wall tenderness, equal chest rise  Pulm: Bilateral BS, increased wob, +crackles diffusely, no wheeze/stridor/retractions  CV: RRR, no M/R/G, +dist pulses  Abd: soft, NT/ND, +BS, no hepatosplenomegaly  Rectal: deferred  Mskel: erythema/cyanosis, 1+edema b/l LEs  Skin: no rash  Neuro: AAOx3  MDM:  56yo F extensive pmh as above here for acute on chronic sob. Diff dx includes chf vs copd exacerbation vs pna. No suspicion for PE at this time. ACS also on differential. Pt with increased wob, tachypnea, increased O2 requirement, will need admission.

## 2017-12-06 NOTE — H&P ADULT - NSHPSOCIALHISTORY_GEN_ALL_CORE
Social History:    Marital Status:  (   )    (  x ) Single    (   )    (  )   Occupation: Une  Lives with: (  ) alone  (  ) children   (  ) spouse   (  ) parents  (  ) other    Substance Use (street drugs): (  ) never used  (  ) other:  Tobacco Usage:  (   ) never smoked   (   ) former smoker   (   ) current smoker  (     ) pack year  (        ) last cigarette date  Alcohol Usage:  Sexual History:

## 2017-12-06 NOTE — H&P ADULT - PROBLEM SELECTOR PLAN 1
- pt c/o dyspnea at rest, on exertion, and orthopnea   - Mild crackles on exam   - BNP elevated and CXR showed interstitial edema  - TTE: EF 22%  - Increased Lasix to 80mg BID   - Goal net negative of 1L   - monitor I/Os  - daily weights  - c/w Coreg, lisinopril

## 2017-12-06 NOTE — H&P ADULT - NSHPPHYSICALEXAM_GEN_ALL_CORE
Vital Signs Last 24 Hrs  T(C): 36.9 (06 Dec 2017 11:27), Max: 36.9 (06 Dec 2017 11:27)  T(F): 98.4 (06 Dec 2017 11:27), Max: 98.4 (06 Dec 2017 11:27)  HR: 77 (06 Dec 2017 12:34) (76 - 81)  BP: 152/93 (06 Dec 2017 12:34) (109/65 - 152/93)  BP(mean): --  RR: 23 (06 Dec 2017 12:34) (22 - 23)  SpO2: 99% (06 Dec 2017 12:34) (96% - 99%)    PHYSICAL EXAM:  GENERAL: NAD, well-groomed, well-developed  HEAD:  Atraumatic, Normocephalic  EYES: EOMI, PERRLA, conjunctiva and sclera clear  ENMT: No tonsillar erythema, exudates, or enlargement; Moist mucous membranes, Good dentition, No lesions  NECK: Supple, No JVD, Normal thyroid  CHEST/LUNG: Clear to percussion bilaterally; No rales, rhonchi, wheezing, or rubs  HEART: Regular rate and rhythm; No murmurs, rubs, or gallops  ABDOMEN: Soft, Nontender, Nondistended; Bowel sounds present  VASCULAR:  2+ Peripheral Pulses, No clubbing, cyanosis, or edema  LYMPH: No lymphadenopathy noted  SKIN: No rashes or lesions  NERVOUS SYSTEM:  Alert & Oriented X3, Good concentration; Motor Strength 5/5 B/L upper and lower extremities; DTRs 2+ intact and symmetric Vital Signs Last 24 Hrs  T(C): 36.9 (06 Dec 2017 11:27), Max: 36.9 (06 Dec 2017 11:27)  T(F): 98.4 (06 Dec 2017 11:27), Max: 98.4 (06 Dec 2017 11:27)  HR: 77 (06 Dec 2017 12:34) (76 - 81)  BP: 152/93 (06 Dec 2017 12:34) (109/65 - 152/93)  BP(mean): --  RR: 23 (06 Dec 2017 12:34) (22 - 23)  SpO2: 99% (06 Dec 2017 12:34) (96% - 99%)    PHYSICAL EXAM:  GENERAL: NAD, well-groomed, well-developed  EYES: EOMI, PERRLA, conjunctiva and sclera clear  ENMT: No tonsillar erythema, exudates, or enlargement  NECK: +JVD  CHEST/LUNG: Mild crackles bilateral, no rhonchi or wheezing    HEART: Regular rate and rhythm; No murmurs, rubs, or gallops  ABDOMEN: Soft, Nontender, Nondistended; Bowel sounds present  VASCULAR:  2+ Peripheral Pulses, No clubbing, cyanosis, or edema  SKIN: No rashes or lesions  NERVOUS SYSTEM:  Alert & Oriented X3, Good concentration; Motor Strength 5/5 B/L upper and lower extremities

## 2017-12-06 NOTE — ED PROVIDER NOTE - OBJECTIVE STATEMENT
56 yo F pmhx of COPD, CHF, AICD, CKD, DM, GERD, HTN, HLD, cardiomyopathy here for SOB x 2-3 days. Pt reports she has had SOB "for a long time now", however worse over the past 2-3 days, feels like she cannot catch breath. Worse with activity. Is on 2L NC at home at baseline but feels that it has not been enough. Was seen in [facility] 2 weeks ago for COPD exacerbation and improved with steroids and nebs and was d/c'ed. Follow up with PMD Dr. Lou and was given an inhaler which pt used this AM with minimal improvements. Reports dry cough. Denies fever chills vomiting diarrhea CP weakness HA dizziness abdominal pain.

## 2017-12-06 NOTE — H&P ADULT - HISTORY OF PRESENT ILLNESS
57F with , Morbid Obesity, DM2, NICM w/ CHFrEF (EF=22%) with AICD, COPD on 2L NC, Diabetic Neuropathy, CKD III, HTN, HLD presenting with worsening dyspnea for the last 2-3 days. The patient states she has been having recurrent episodes of dyspnea over the last two weeks. She had been previously treated 2 weeks ago for a COPD exacerbation with a prednisone taper. Her dyspnea did not improve much following completes. States she has not been able to lay flat on her back for the past week as well, sleeping in her recliner instead of her bed. Also endorses a dry cough as well, with minimal to no sputum production. The patient denies fever, chills, night sweats, cough, nasal congestion, chest pain, palpations, nausea/vomiting, abd. pain, diarrhea, constipation, dysuria, urinary frequency, leg swelling, joint pain, and rashes. 57 y.o. female with , Morbid Obesity, DM2, NICM w/ CHFrEF (EF=22%) with AICD, COPD on 2L NC, Diabetic Neuropathy, CKD III, HTN, HLD presenting with worsening dyspnea for the last 2-3 days. The patient states she has been having recurrent episodes of dyspnea over the last two weeks. She had been previously treated 2 weeks ago for a COPD exacerbation with a prednisone taper. Her dyspnea did not improve much following completes. States she has not been able to lay flat on her back for the past week as well, sleeping in her recliner instead of her bed. Also endorses a dry cough as well, with minimal to no sputum production. The patient denies fever, chills, night sweats, cough, nasal congestion, chest pain, palpations, nausea/vomiting, abd. pain, diarrhea, constipation, dysuria, urinary frequency, leg swelling, joint pain, and rashes.

## 2017-12-07 LAB
BASOPHILS # BLD AUTO: 0.02 K/UL — SIGNIFICANT CHANGE UP (ref 0–0.2)
BASOPHILS NFR BLD AUTO: 0.6 % — SIGNIFICANT CHANGE UP (ref 0–2)
BUN SERPL-MCNC: 36 MG/DL — HIGH (ref 7–23)
CALCIUM SERPL-MCNC: 8.5 MG/DL — SIGNIFICANT CHANGE UP (ref 8.4–10.5)
CHLORIDE SERPL-SCNC: 103 MMOL/L — SIGNIFICANT CHANGE UP (ref 98–107)
CO2 SERPL-SCNC: 34 MMOL/L — HIGH (ref 22–31)
CREAT SERPL-MCNC: 1.41 MG/DL — HIGH (ref 0.5–1.3)
EOSINOPHIL # BLD AUTO: 0.18 K/UL — SIGNIFICANT CHANGE UP (ref 0–0.5)
EOSINOPHIL NFR BLD AUTO: 5 % — SIGNIFICANT CHANGE UP (ref 0–6)
GLUCOSE BLDC GLUCOMTR-MCNC: 103 MG/DL — HIGH (ref 70–99)
GLUCOSE BLDC GLUCOMTR-MCNC: 108 MG/DL — HIGH (ref 70–99)
GLUCOSE BLDC GLUCOMTR-MCNC: 126 MG/DL — HIGH (ref 70–99)
GLUCOSE BLDC GLUCOMTR-MCNC: 96 MG/DL — SIGNIFICANT CHANGE UP (ref 70–99)
GLUCOSE SERPL-MCNC: 106 MG/DL — HIGH (ref 70–99)
HCT VFR BLD CALC: 34.5 % — SIGNIFICANT CHANGE UP (ref 34.5–45)
HGB BLD-MCNC: 10.4 G/DL — LOW (ref 11.5–15.5)
IMM GRANULOCYTES # BLD AUTO: 0.03 # — SIGNIFICANT CHANGE UP
IMM GRANULOCYTES NFR BLD AUTO: 0.8 % — SIGNIFICANT CHANGE UP (ref 0–1.5)
LYMPHOCYTES # BLD AUTO: 0.92 K/UL — LOW (ref 1–3.3)
LYMPHOCYTES # BLD AUTO: 25.6 % — SIGNIFICANT CHANGE UP (ref 13–44)
MAGNESIUM SERPL-MCNC: 1.8 MG/DL — SIGNIFICANT CHANGE UP (ref 1.6–2.6)
MCHC RBC-ENTMCNC: 25.4 PG — LOW (ref 27–34)
MCHC RBC-ENTMCNC: 30.1 % — LOW (ref 32–36)
MCV RBC AUTO: 84.1 FL — SIGNIFICANT CHANGE UP (ref 80–100)
MONOCYTES # BLD AUTO: 0.46 K/UL — SIGNIFICANT CHANGE UP (ref 0–0.9)
MONOCYTES NFR BLD AUTO: 12.8 % — SIGNIFICANT CHANGE UP (ref 2–14)
NEUTROPHILS # BLD AUTO: 1.98 K/UL — SIGNIFICANT CHANGE UP (ref 1.8–7.4)
NEUTROPHILS NFR BLD AUTO: 55.2 % — SIGNIFICANT CHANGE UP (ref 43–77)
NRBC # FLD: 0.03 — SIGNIFICANT CHANGE UP
PHOSPHATE SERPL-MCNC: 4.2 MG/DL — SIGNIFICANT CHANGE UP (ref 2.5–4.5)
PLATELET # BLD AUTO: 194 K/UL — SIGNIFICANT CHANGE UP (ref 150–400)
PMV BLD: SIGNIFICANT CHANGE UP FL (ref 7–13)
POTASSIUM SERPL-MCNC: 4.1 MMOL/L — SIGNIFICANT CHANGE UP (ref 3.5–5.3)
POTASSIUM SERPL-SCNC: 4.1 MMOL/L — SIGNIFICANT CHANGE UP (ref 3.5–5.3)
RBC # BLD: 4.1 M/UL — SIGNIFICANT CHANGE UP (ref 3.8–5.2)
RBC # FLD: SIGNIFICANT CHANGE UP % (ref 10.3–14.5)
SODIUM SERPL-SCNC: 147 MMOL/L — HIGH (ref 135–145)
WBC # BLD: 3.59 K/UL — LOW (ref 3.8–10.5)
WBC # FLD AUTO: 3.59 K/UL — LOW (ref 3.8–10.5)

## 2017-12-07 RX ADMIN — GABAPENTIN 100 MILLIGRAM(S): 400 CAPSULE ORAL at 12:49

## 2017-12-07 RX ADMIN — Medication 80 MILLIGRAM(S): at 17:26

## 2017-12-07 RX ADMIN — Medication 3 MILLILITER(S): at 09:26

## 2017-12-07 RX ADMIN — Medication 80 MILLIGRAM(S): at 05:20

## 2017-12-07 RX ADMIN — BUDESONIDE AND FORMOTEROL FUMARATE DIHYDRATE 2 PUFF(S): 160; 4.5 AEROSOL RESPIRATORY (INHALATION) at 21:17

## 2017-12-07 RX ADMIN — FEBUXOSTAT 40 MILLIGRAM(S): 40 TABLET ORAL at 12:49

## 2017-12-07 RX ADMIN — Medication 2000 UNIT(S): at 12:49

## 2017-12-07 RX ADMIN — CARVEDILOL PHOSPHATE 12.5 MILLIGRAM(S): 80 CAPSULE, EXTENDED RELEASE ORAL at 17:26

## 2017-12-07 RX ADMIN — Medication 6 UNIT(S): at 12:48

## 2017-12-07 RX ADMIN — Medication 3 MILLILITER(S): at 22:34

## 2017-12-07 RX ADMIN — HEPARIN SODIUM 5000 UNIT(S): 5000 INJECTION INTRAVENOUS; SUBCUTANEOUS at 13:42

## 2017-12-07 RX ADMIN — Medication 100 MILLIGRAM(S): at 21:21

## 2017-12-07 RX ADMIN — Medication 100 MILLIGRAM(S): at 05:20

## 2017-12-07 RX ADMIN — HEPARIN SODIUM 5000 UNIT(S): 5000 INJECTION INTRAVENOUS; SUBCUTANEOUS at 21:16

## 2017-12-07 RX ADMIN — Medication 6 UNIT(S): at 17:26

## 2017-12-07 RX ADMIN — INSULIN GLARGINE 36 UNIT(S): 100 INJECTION, SOLUTION SUBCUTANEOUS at 22:05

## 2017-12-07 RX ADMIN — HEPARIN SODIUM 5000 UNIT(S): 5000 INJECTION INTRAVENOUS; SUBCUTANEOUS at 05:20

## 2017-12-07 RX ADMIN — Medication 6 UNIT(S): at 08:57

## 2017-12-07 RX ADMIN — LISINOPRIL 2.5 MILLIGRAM(S): 2.5 TABLET ORAL at 05:20

## 2017-12-07 RX ADMIN — ATORVASTATIN CALCIUM 80 MILLIGRAM(S): 80 TABLET, FILM COATED ORAL at 21:16

## 2017-12-07 RX ADMIN — Medication 3 MILLILITER(S): at 04:52

## 2017-12-07 RX ADMIN — CARVEDILOL PHOSPHATE 12.5 MILLIGRAM(S): 80 CAPSULE, EXTENDED RELEASE ORAL at 05:19

## 2017-12-07 RX ADMIN — BUDESONIDE AND FORMOTEROL FUMARATE DIHYDRATE 2 PUFF(S): 160; 4.5 AEROSOL RESPIRATORY (INHALATION) at 08:57

## 2017-12-07 RX ADMIN — Medication 3 MILLILITER(S): at 16:37

## 2017-12-07 RX ADMIN — Medication 81 MILLIGRAM(S): at 12:49

## 2017-12-07 RX ADMIN — Medication 100 MILLIGRAM(S): at 13:12

## 2017-12-07 NOTE — PROGRESS NOTE ADULT - SUBJECTIVE AND OBJECTIVE BOX
No pain, no shortness of breath      VITAL:  T(C): , Max: 37 (12-07-17 @ 05:11)  T(F): , Max: 98.6 (12-07-17 @ 05:11)  HR: 76 (12-07-17 @ 05:11)  BP: 114/77 (12-07-17 @ 05:11)  BP(mean): --  RR: 20 (12-07-17 @ 05:11)  SpO2: 92% (12-07-17 @ 05:11)      PHYSICAL EXAM:  Constitutional: NAD, resting and on supplemental O2; obese  HEENT: NCAT, DMM  Neck: Supple, No JVD  Respiratory: distant BS b/l  Cardiovascular: distant s1s2, RRR   Gastrointestinal: BS+, soft, NT/ND  Extremities: 1+ b/l LE edema  Neurological: no focal deficits; strength grossly intact  Back: no CVAT b/l  Skin: No rashes (+)hypopigmental frontal patch      LABS:                        10.4   3.59  )-----------( 194      ( 07 Dec 2017 06:05 )             34.5     Na(147)/K(4.1)/Cl(103)/HCO3(34)/BUN(36)/Cr(1.41)Glu(106)/Ca(8.5)/Mg(1.8)/PO4(4.2)    12-07 @ 06:05  Na(147)/K(4.2)/Cl(103)/HCO3(32)/BUN(35)/Cr(1.46)Glu(123)/Ca(8.5)/Mg(1.5)/PO4(3.6)    12-06 @ 11:15          ASSESSMENT: 57F w/ HTN, DM2, HFrEF, COPD, and CKD3, s/p recent ALISA requiring HD x 2 months; 12/6/17 a/w acute on chronic HFrEF  (1)Renal - CKD3 - stable function  (2)Lytes acceptable  (3)Shortness of breath - CHF+/- COPD exacerbation. On IV lasix.    RECOMMEND:  (1)IV diuretics; Is<Os for now - Lasix 80mg iv bid as ordered  (2)Dose new meds for GFR 40-50ml/min  (3)No HD for now          Perfecto Rae MD  [facility],   [phone] No pain. SOB improving.      VITAL:  T(C): , Max: 37 (12-07-17 @ 05:11)  T(F): , Max: 98.6 (12-07-17 @ 05:11)  HR: 76 (12-07-17 @ 05:11)  BP: 114/77 (12-07-17 @ 05:11)  BP(mean): --  RR: 20 (12-07-17 @ 05:11)  SpO2: 92% (12-07-17 @ 05:11)      PHYSICAL EXAM:  Constitutional: NAD on NCO2; obese  HEENT: NCAT, DMM  Neck: Supple, No JVD  Respiratory: distant BS b/l  Cardiovascular: distant s1s2, RRR   Gastrointestinal: BS+, soft, NT/ND  Extremities: 1+ b/l LE edema  Neurological: no focal deficits; strength grossly intact  Back: no CVAT b/l  Skin: No rashes (+)hypopigmental frontal patch      LABS:                        10.4   3.59  )-----------( 194      ( 07 Dec 2017 06:05 )             34.5     Na(147)/K(4.1)/Cl(103)/HCO3(34)/BUN(36)/Cr(1.41)Glu(106)/Ca(8.5)/Mg(1.8)/PO4(4.2)    12-07 @ 06:05  Na(147)/K(4.2)/Cl(103)/HCO3(32)/BUN(35)/Cr(1.46)Glu(123)/Ca(8.5)/Mg(1.5)/PO4(3.6)    12-06 @ 11:15          ASSESSMENT: 57F w/ HTN, DM2, HFrEF, COPD, and CKD3, s/p recent ALISA requiring HD x 2 months; 12/6/17 a/w acute on chronic HFrEF  (1)Renal - CKD3 - stable function  (2)Lytes acceptable  (3)Shortness of breath - CHF+/- COPD exacerbation. On IV lasix.    RECOMMEND:  (1)IV diuretics; Is<Os for now - Lasix 80mg iv bid as ordered  (2)Dose new meds for GFR 40-50ml/min  (3)No HD for now          Perfecto Rae MD  [facility]  [phone]

## 2017-12-08 ENCOUNTER — TRANSCRIPTION ENCOUNTER (OUTPATIENT)
Age: 57
End: 2017-12-08

## 2017-12-08 LAB
BASOPHILS # BLD AUTO: 0.02 K/UL — SIGNIFICANT CHANGE UP (ref 0–0.2)
BASOPHILS NFR BLD AUTO: 0.5 % — SIGNIFICANT CHANGE UP (ref 0–2)
BUN SERPL-MCNC: 40 MG/DL — HIGH (ref 7–23)
CALCIUM SERPL-MCNC: 8.5 MG/DL — SIGNIFICANT CHANGE UP (ref 8.4–10.5)
CHLORIDE SERPL-SCNC: 103 MMOL/L — SIGNIFICANT CHANGE UP (ref 98–107)
CO2 SERPL-SCNC: 31 MMOL/L — SIGNIFICANT CHANGE UP (ref 22–31)
CREAT SERPL-MCNC: 1.5 MG/DL — HIGH (ref 0.5–1.3)
EOSINOPHIL # BLD AUTO: 0.24 K/UL — SIGNIFICANT CHANGE UP (ref 0–0.5)
EOSINOPHIL NFR BLD AUTO: 6.3 % — HIGH (ref 0–6)
GLUCOSE BLDC GLUCOMTR-MCNC: 101 MG/DL — HIGH (ref 70–99)
GLUCOSE BLDC GLUCOMTR-MCNC: 102 MG/DL — HIGH (ref 70–99)
GLUCOSE BLDC GLUCOMTR-MCNC: 107 MG/DL — HIGH (ref 70–99)
GLUCOSE BLDC GLUCOMTR-MCNC: 166 MG/DL — HIGH (ref 70–99)
GLUCOSE BLDC GLUCOMTR-MCNC: 211 MG/DL — HIGH (ref 70–99)
GLUCOSE BLDC GLUCOMTR-MCNC: 69 MG/DL — LOW (ref 70–99)
GLUCOSE BLDC GLUCOMTR-MCNC: 81 MG/DL — SIGNIFICANT CHANGE UP (ref 70–99)
GLUCOSE SERPL-MCNC: 97 MG/DL — SIGNIFICANT CHANGE UP (ref 70–99)
HCT VFR BLD CALC: 32 % — LOW (ref 34.5–45)
HGB BLD-MCNC: 9.9 G/DL — LOW (ref 11.5–15.5)
IMM GRANULOCYTES # BLD AUTO: 0.02 # — SIGNIFICANT CHANGE UP
IMM GRANULOCYTES NFR BLD AUTO: 0.5 % — SIGNIFICANT CHANGE UP (ref 0–1.5)
LYMPHOCYTES # BLD AUTO: 0.93 K/UL — LOW (ref 1–3.3)
LYMPHOCYTES # BLD AUTO: 24.3 % — SIGNIFICANT CHANGE UP (ref 13–44)
MAGNESIUM SERPL-MCNC: 1.8 MG/DL — SIGNIFICANT CHANGE UP (ref 1.6–2.6)
MCHC RBC-ENTMCNC: 25.6 PG — LOW (ref 27–34)
MCHC RBC-ENTMCNC: 30.9 % — LOW (ref 32–36)
MCV RBC AUTO: 82.9 FL — SIGNIFICANT CHANGE UP (ref 80–100)
MONOCYTES # BLD AUTO: 0.49 K/UL — SIGNIFICANT CHANGE UP (ref 0–0.9)
MONOCYTES NFR BLD AUTO: 12.8 % — SIGNIFICANT CHANGE UP (ref 2–14)
NEUTROPHILS # BLD AUTO: 2.13 K/UL — SIGNIFICANT CHANGE UP (ref 1.8–7.4)
NEUTROPHILS NFR BLD AUTO: 55.6 % — SIGNIFICANT CHANGE UP (ref 43–77)
NRBC # FLD: 0.02 — SIGNIFICANT CHANGE UP
PHOSPHATE SERPL-MCNC: 4.3 MG/DL — SIGNIFICANT CHANGE UP (ref 2.5–4.5)
PLATELET # BLD AUTO: 216 K/UL — SIGNIFICANT CHANGE UP (ref 150–400)
PMV BLD: 12.4 FL — SIGNIFICANT CHANGE UP (ref 7–13)
POTASSIUM SERPL-MCNC: 4 MMOL/L — SIGNIFICANT CHANGE UP (ref 3.5–5.3)
POTASSIUM SERPL-SCNC: 4 MMOL/L — SIGNIFICANT CHANGE UP (ref 3.5–5.3)
RBC # BLD: 3.86 M/UL — SIGNIFICANT CHANGE UP (ref 3.8–5.2)
RBC # FLD: 19.4 % — HIGH (ref 10.3–14.5)
SODIUM SERPL-SCNC: 146 MMOL/L — HIGH (ref 135–145)
WBC # BLD: 3.83 K/UL — SIGNIFICANT CHANGE UP (ref 3.8–10.5)
WBC # FLD AUTO: 3.83 K/UL — SIGNIFICANT CHANGE UP (ref 3.8–10.5)

## 2017-12-08 RX ORDER — INSULIN GLARGINE 100 [IU]/ML
30 INJECTION, SOLUTION SUBCUTANEOUS AT BEDTIME
Qty: 0 | Refills: 0 | Status: DISCONTINUED | OUTPATIENT
Start: 2017-12-08 | End: 2017-12-12

## 2017-12-08 RX ORDER — GLUCAGON INJECTION, SOLUTION 0.5 MG/.1ML
1 INJECTION, SOLUTION SUBCUTANEOUS ONCE
Qty: 0 | Refills: 0 | Status: DISCONTINUED | OUTPATIENT
Start: 2017-12-08 | End: 2017-12-15

## 2017-12-08 RX ORDER — DEXTROSE 50 % IN WATER 50 %
12.5 SYRINGE (ML) INTRAVENOUS ONCE
Qty: 0 | Refills: 0 | Status: DISCONTINUED | OUTPATIENT
Start: 2017-12-08 | End: 2017-12-15

## 2017-12-08 RX ORDER — DEXTROSE 50 % IN WATER 50 %
25 SYRINGE (ML) INTRAVENOUS ONCE
Qty: 0 | Refills: 0 | Status: DISCONTINUED | OUTPATIENT
Start: 2017-12-08 | End: 2017-12-15

## 2017-12-08 RX ORDER — SODIUM CHLORIDE 9 MG/ML
1000 INJECTION, SOLUTION INTRAVENOUS
Qty: 0 | Refills: 0 | Status: DISCONTINUED | OUTPATIENT
Start: 2017-12-08 | End: 2017-12-15

## 2017-12-08 RX ORDER — DEXTROSE 50 % IN WATER 50 %
1 SYRINGE (ML) INTRAVENOUS ONCE
Qty: 0 | Refills: 0 | Status: DISCONTINUED | OUTPATIENT
Start: 2017-12-08 | End: 2017-12-15

## 2017-12-08 RX ORDER — INSULIN LISPRO 100/ML
VIAL (ML) SUBCUTANEOUS
Qty: 0 | Refills: 0 | Status: DISCONTINUED | OUTPATIENT
Start: 2017-12-08 | End: 2017-12-15

## 2017-12-08 RX ADMIN — Medication 2000 UNIT(S): at 13:58

## 2017-12-08 RX ADMIN — Medication 80 MILLIGRAM(S): at 05:20

## 2017-12-08 RX ADMIN — INSULIN GLARGINE 30 UNIT(S): 100 INJECTION, SOLUTION SUBCUTANEOUS at 22:55

## 2017-12-08 RX ADMIN — HEPARIN SODIUM 5000 UNIT(S): 5000 INJECTION INTRAVENOUS; SUBCUTANEOUS at 22:37

## 2017-12-08 RX ADMIN — ATORVASTATIN CALCIUM 80 MILLIGRAM(S): 80 TABLET, FILM COATED ORAL at 22:37

## 2017-12-08 RX ADMIN — Medication 100 MILLIGRAM(S): at 05:20

## 2017-12-08 RX ADMIN — HEPARIN SODIUM 5000 UNIT(S): 5000 INJECTION INTRAVENOUS; SUBCUTANEOUS at 14:05

## 2017-12-08 RX ADMIN — FEBUXOSTAT 40 MILLIGRAM(S): 40 TABLET ORAL at 13:58

## 2017-12-08 RX ADMIN — CARVEDILOL PHOSPHATE 12.5 MILLIGRAM(S): 80 CAPSULE, EXTENDED RELEASE ORAL at 05:19

## 2017-12-08 RX ADMIN — Medication 3 MILLILITER(S): at 10:17

## 2017-12-08 RX ADMIN — Medication 6 UNIT(S): at 17:29

## 2017-12-08 RX ADMIN — BUDESONIDE AND FORMOTEROL FUMARATE DIHYDRATE 2 PUFF(S): 160; 4.5 AEROSOL RESPIRATORY (INHALATION) at 22:38

## 2017-12-08 RX ADMIN — BUDESONIDE AND FORMOTEROL FUMARATE DIHYDRATE 2 PUFF(S): 160; 4.5 AEROSOL RESPIRATORY (INHALATION) at 12:20

## 2017-12-08 RX ADMIN — Medication 3 MILLILITER(S): at 16:01

## 2017-12-08 RX ADMIN — CARVEDILOL PHOSPHATE 12.5 MILLIGRAM(S): 80 CAPSULE, EXTENDED RELEASE ORAL at 17:47

## 2017-12-08 RX ADMIN — Medication 3 MILLILITER(S): at 22:58

## 2017-12-08 RX ADMIN — LISINOPRIL 2.5 MILLIGRAM(S): 2.5 TABLET ORAL at 05:20

## 2017-12-08 RX ADMIN — Medication 3 MILLILITER(S): at 03:58

## 2017-12-08 RX ADMIN — Medication 6 UNIT(S): at 09:00

## 2017-12-08 RX ADMIN — Medication 81 MILLIGRAM(S): at 13:58

## 2017-12-08 RX ADMIN — GABAPENTIN 100 MILLIGRAM(S): 400 CAPSULE ORAL at 13:59

## 2017-12-08 RX ADMIN — Medication 80 MILLIGRAM(S): at 17:47

## 2017-12-08 RX ADMIN — HEPARIN SODIUM 5000 UNIT(S): 5000 INJECTION INTRAVENOUS; SUBCUTANEOUS at 05:19

## 2017-12-08 NOTE — DISCHARGE NOTE ADULT - MEDICATION SUMMARY - MEDICATIONS TO TAKE
I will START or STAY ON the medications listed below when I get home from the hospital:    dexamethasone 0.5 mg oral tablet  -- 1 tab(s) by mouth once a day  -- Indication: For COPD (chronic obstructive pulmonary disease)    acetaminophen 325 mg oral tablet  -- 2 tab(s) by mouth every 6 hours, As needed, Mild and Moderate Pain  -- Indication: For pain management    aspirin 81 mg oral delayed release tablet  -- 1 tab(s) by mouth once a day  -- Indication: For Cad    gabapentin 100 mg oral capsule  -- 1 cap(s) by mouth once a day  -- Indication: For Need for prophylactic measure    Toujeo SoloStar 300 units/mL subcutaneous solution  -- 20 unit(s) subcutaneous once a day (at bedtime)  -- Indication: For dm    Crestor 20 mg oral tablet  -- 1 tab(s) by mouth once a day (at bedtime)  -- Indication: For hld    Uloric 40 mg oral tablet  -- 1 tab(s) by mouth once a day  -- Indication: For Gout    carvedilol 12.5 mg oral tablet  -- 1 tab(s) by mouth every 12 hours  -- Indication: For htn    Bevespi Aerosphere 9 mcg-4.8 mcg/inh inhalation aerosol  -- 1 puff(s) inhaled once a day  -- Indication: For COPD (chronic obstructive pulmonary disease)    albuterol 2.5 mg/3 mL (0.083%) inhalation solution  -- use vial via nebulizer 4 times a day as needed  -- Indication: For COPD (chronic obstructive pulmonary disease)    furosemide 80 mg oral tablet  -- 1 tab(s) by mouth 2 times a day  -- Indication: For Chf    guaiFENesin 100 mg/5 mL oral liquid  -- 5 milliliter(s) by mouth every 6 hours, As needed, Cough  -- Indication: For Cough     omeprazole 20 mg oral delayed release capsule  -- 1 cap(s) by mouth once a day  -- Indication: For prophylactic    cholecalciferol oral tablet  -- 2000 unit(s) by mouth once a day  -- Indication: For vitamin

## 2017-12-08 NOTE — DISCHARGE NOTE ADULT - PATIENT PORTAL LINK FT
“You can access the Follow[employer] Patient Portal, offered by [facility], by registering with the following website: [email or web address]”

## 2017-12-08 NOTE — DISCHARGE NOTE ADULT - MEDICATION SUMMARY - MEDICATIONS TO CHANGE
I will SWITCH the dose or number of times a day I take the medications listed below when I get home from the hospital:    Toujeo SoloStar 300 units/mL subcutaneous solution  -- 36 unit(s) subcutaneous once a day (at bedtime)    Toujeo SoloStar 300 units/mL subcutaneous solution  -- 70 unit(s) subcutaneous once a day (at bedtime)

## 2017-12-08 NOTE — DISCHARGE NOTE ADULT - CARE PROVIDER_API CALL
Unique Lou), Medicine  [address] [place] [address]  Phone: [phone]  Fax: [phone] Unique Lou), Medicine  [address] [place] [address]  Phone: [phone]  Fax: [phone]    Kirby Macdonald), Internal Medicine; Nephrology  [address] [address]  Phone: [phone]  Fax: [phone]

## 2017-12-08 NOTE — PROGRESS NOTE ADULT - SUBJECTIVE AND OBJECTIVE BOX
Patient seen and examined  Still SOb with LE edema    REVIEW OF SYSTEMS:  As per HPI, otherwise 8 full 10 ROS were unremarkable    MEDICATIONS  (STANDING):  ALBUTerol/ipratropium for Nebulization 3 milliLiter(s) Nebulizer every 6 hours  aspirin enteric coated 81 milliGRAM(s) Oral daily  atorvastatin 80 milliGRAM(s) Oral at bedtime  buDESOnide 160 MICROgram(s)/formoterol 4.5 MICROgram(s) Inhaler 2 Puff(s) Inhalation two times a day  carvedilol 12.5 milliGRAM(s) Oral every 12 hours  cholecalciferol 2000 Unit(s) Oral daily  dextrose 5%. 1000 milliLiter(s) (50 mL/Hr) IV Continuous <Continuous>  dextrose 50% Injectable 12.5 Gram(s) IV Push once  dextrose 50% Injectable 25 Gram(s) IV Push once  dextrose 50% Injectable 25 Gram(s) IV Push once  febuxostat 40 milliGRAM(s) Oral daily  furosemide   Injectable 80 milliGRAM(s) IV Push every 12 hours  gabapentin 100 milliGRAM(s) Oral daily  heparin  Injectable 5000 Unit(s) SubCutaneous every 8 hours  insulin glargine Injectable (LANTUS) 36 Unit(s) SubCutaneous at bedtime  insulin lispro (HumaLOG) corrective regimen sliding scale   SubCutaneous three times a day before meals  insulin lispro Injectable (HumaLOG) 6 Unit(s) SubCutaneous three times a day before meals  lisinopril 2.5 milliGRAM(s) Oral daily      VITAL:  T(C): , Max: 36.9 (12-08-17 @ 13:37)  T(F): , Max: 98.4 (12-08-17 @ 13:37)  HR: 80 (12-08-17 @ 13:37)  BP: 128/72 (12-08-17 @ 13:37)  BP(mean): --  RR: 18 (12-08-17 @ 13:37)  SpO2: 95% (12-08-17 @ 13:37)  Wt(kg): --    I and O's:        PHYSICAL EXAM:    Constitutional: NAD  HEENT: PERRLA, EOMI,  MMM  Neck: No LAD, No JVD  Respiratory: Diminished at the bases  Cardiovascular: S1 and S2  Gastrointestinal: BS+, soft, NT/ND  Extremities: +1-2 LE edema  Neurological: A/O x 3, no focal deficits      LABS:                        9.9    3.83  )-----------( 216      ( 08 Dec 2017 07:33 )             32.0     12-08    146<H>  |  103  |  40<H>  ----------------------------<  97  4.0   |  31  |  1.50<H>    Ca    8.5      08 Dec 2017 07:33  Phos  4.3     12-08  Mg     1.8     12-08        ASSESSMENT: 57F w/ HTN, DM2, HFrEF, COPD, and CKD3, s/p recent ALISA requiring HD x 2 months; 12/6/17 a/w acute on chronic HFrEF  (1)Renal - CKD3 - remains overall stable- creatinine 1.5 mg/dl today  (2)Lytes acceptable  (3)Shortness of breath - CHF+/- COPD exacerbation. On IV lasix.    RECOMMEND:  - c/w lasix 80 mg IV BID  - Dose new meds for GFR 40-50ml/min  - No HD for now      Tigist Rascon MD  [facility]  [phone]

## 2017-12-08 NOTE — DISCHARGE NOTE ADULT - SECONDARY DIAGNOSIS.
Acute on chronic systolic congestive heart failure CKD (chronic kidney disease) stage 3, GFR 30-59 ml/min COPD (chronic obstructive pulmonary disease) Essential hypertension Gout Type 2 diabetes mellitus with diabetic neuropathy, with long-term current use of insulin

## 2017-12-08 NOTE — DISCHARGE NOTE ADULT - MEDICATION SUMMARY - MEDICATIONS TO STOP TAKING
I will STOP taking the medications listed below when I get home from the hospital:    Zetia 10 mg oral tablet  -- 1 tab(s) by mouth once a day    lisinopril 2.5 mg oral tablet  -- 1 tab(s) by mouth once a day    bumetanide 2 mg oral tablet  -- 1 tab(s) by mouth once a day    magnesium oxide 400 mg (241.3 mg elemental magnesium) oral tablet  -- 1 tab(s) by mouth 2 times a day (with meals)    predniSONE 20 mg oral tablet  -- 2 tab(s) by mouth once a day, As Needed -for shortness of breath and/or wheezing MDD:2 tabs   -- It is very important that you take or use this exactly as directed.  Do not skip doses or discontinue unless directed by your doctor.  Obtain medical advice before taking any non-prescription drugs as some may affect the action of this medication.  Take with food or milk.    Promethazine DM 6.25 mg-15 mg/5 mL oral syrup  -- 10 milliliter(s) by mouth 2 times a day for 15 day supply

## 2017-12-08 NOTE — DISCHARGE NOTE ADULT - PLAN OF CARE
Improved Continue home oxygen. Continue lasix Continue febuxostat 40mg once a day management and control Continue lasix as directed.  Continue supplemental oxygen as needed. Continue with inhalers and pumps.  Continue supplemental oxygen as needed.  Please follow up with your PCP within 1-2 weeks of discharge. Please continue medications as directed. Your a1c is 9.9 Your a1c is 9.9. Monitor blood sugars to keep at target levels between .  Follow up with your PCP within 1 week of discharge for further medical management.  Continue Novolog 6 units premeals, Troujeo 20 units at bedtime. Log blood sugars daily and bring to next PCP appointment.

## 2017-12-08 NOTE — DISCHARGE NOTE ADULT - HOSPITAL COURSE
57 y.o. female with , Morbid Obesity, DM2, NICM w/ CHFrEF (EF=22%) with AICD, COPD on 2L NC, Diabetic Neuropathy, CKD III, HTN, HLD, admitted for CHF exacerbation.  Acute on chronic systolic congestive heart failure  - pt c/o dyspnea at rest, on exertion, and orthopnea   - Mild crackles on exam   - BNP elevated and CXR showed interstitial edema  - TTE: EF 22%  -  Nephrology consulted and recommended  Lasix increased  to 80mg BID. No HD required.  - Goal net negative of 1L   - c/w Coreg, lisinopril.     HTN  -Continue with Lisinopril 2.5mg qd and Coreg 12.5mg BID     COPD  - Duonebs q6h   - consider adding Symbicort.     Type 2 DM  - c/w with home dose of Lantus and premeal insulin   - Lispro ISS   - consistent carb diet.     CKD  - renal dose all meds   - Nephrology following.     Gout  - c/w febuxostat 40mg once a day.    Patient is medically stable for discharge

## 2017-12-08 NOTE — PROGRESS NOTE ADULT - SUBJECTIVE AND OBJECTIVE BOX
CHIEF COMPLAINT:  no   event    over  night    SUBJECTIVE:     REVIEW OF SYSTEMS:    CONSTITUTIONAL: (-  )  weakness,  ( - ) fevers or chills  EYES/ENT: ( - )visual changes;     NECK: (  ) pain or stiffness  RESPIRATORY:   ( - )cough, wheezing, hemoptysis;  (-  ) shortness of breath  CARDIOVASCULAR:  ( - )chest pain or palpitations  GASTROINTESTINAL:   ( - )abdominal or epigastric pain.  (-  ) nausea, vomiting, or hematemesis;   (  - ) diarrhea or constipation.   GENITOURINARY:   (   - ) dysuria, frequency or hematuria  NEUROLOGICAL:  (-   ) numbness or weakness   All other review of systems is negative unless indicated above    Vital Signs Last 24 Hrs  T(C): 36.9 (08 Dec 2017 13:37), Max: 36.9 (08 Dec 2017 13:37)  T(F): 98.4 (08 Dec 2017 13:37), Max: 98.4 (08 Dec 2017 13:37)  HR: 80 (08 Dec 2017 13:37) (73 - 81)  BP: 128/72 (08 Dec 2017 13:37) (102/56 - 128/72)  BP(mean): --  RR: 18 (08 Dec 2017 13:37) (18 - 20)  SpO2: 95% (08 Dec 2017 13:37) (95% - 97%)    I&O's Summary      CAPILLARY BLOOD GLUCOSE      POCT Blood Glucose.: 102 mg/dL (08 Dec 2017 16:47)  POCT Blood Glucose.: 101 mg/dL (08 Dec 2017 13:23)  POCT Blood Glucose.: 107 mg/dL (08 Dec 2017 12:42)  POCT Blood Glucose.: 69 mg/dL (08 Dec 2017 12:08)  POCT Blood Glucose.: 81 mg/dL (08 Dec 2017 08:23)  POCT Blood Glucose.: 126 mg/dL (07 Dec 2017 21:44)      PHYSICAL EXAM:    Constitutional:  ( -  ) NAD,   ( +  )awake and alert  HEENT: PERR, EOMI,    Neck: Soft and supple, No LAD, No JVD  Respiratory:  (   + Breath sounds are clear bilaterally,    ( -  ) wheezing, rales or rhonchi  Cardiovascular:     (  + )S1 and S2, regular rate and rhythm, no Murmurs, gallops or rubs  Gastrointestinal:  (  + )Bowel Sounds present, soft,   (-  )nontender, nondistended,    Extremities:    (-  ) peripheral edema  Vascular: 2+ peripheral pulses  Neurological:    (   + )A/O x 3,   ( - ) focal deficits  Musculoskeletal:    ( +  )  normal strength b/l upper  ( +    ) normal  lower extremities  Skin: No rashes    MEDICATIONS:  MEDICATIONS  (STANDING):  ALBUTerol/ipratropium for Nebulization 3 milliLiter(s) Nebulizer every 6 hours  aspirin enteric coated 81 milliGRAM(s) Oral daily  atorvastatin 80 milliGRAM(s) Oral at bedtime  buDESOnide 160 MICROgram(s)/formoterol 4.5 MICROgram(s) Inhaler 2 Puff(s) Inhalation two times a day  carvedilol 12.5 milliGRAM(s) Oral every 12 hours  cholecalciferol 2000 Unit(s) Oral daily  dextrose 5%. 1000 milliLiter(s) (50 mL/Hr) IV Continuous <Continuous>  dextrose 50% Injectable 12.5 Gram(s) IV Push once  dextrose 50% Injectable 25 Gram(s) IV Push once  dextrose 50% Injectable 25 Gram(s) IV Push once  febuxostat 40 milliGRAM(s) Oral daily  furosemide   Injectable 80 milliGRAM(s) IV Push every 12 hours  gabapentin 100 milliGRAM(s) Oral daily  heparin  Injectable 5000 Unit(s) SubCutaneous every 8 hours  insulin glargine Injectable (LANTUS) 36 Unit(s) SubCutaneous at bedtime  insulin lispro (HumaLOG) corrective regimen sliding scale   SubCutaneous three times a day before meals  insulin lispro Injectable (HumaLOG) 6 Unit(s) SubCutaneous three times a day before meals  lisinopril 2.5 milliGRAM(s) Oral daily      LABS: All Labs Reviewed:                        9.9    3.83  )-----------( 216      ( 08 Dec 2017 07:33 )             32.0     12-08    146<H>  |  103  |  40<H>  ----------------------------<  97  4.0   |  31  |  1.50<H>    Ca    8.5      08 Dec 2017 07:33  Phos  4.3     12-08  Mg     1.8     12-08            Blood Culture:   Urine Culture      RADIOLOGY/EKG:    ASSESSMENT AND PLAN:    DVT PPX:    ADVANCED DIRECTIVE:    DISPOSITION: CHIEF COMPLAINT:  no   event    over  night except one of his of hypoglycemia before lunch    SUBJECTIVE:     REVIEW OF SYSTEMS:    CONSTITUTIONAL: (-  )  weakness,  ( - ) fevers or chills  EYES/ENT: ( - )visual changes;     NECK: (  ) pain or stiffness  RESPIRATORY:   ( - )cough, wheezing, hemoptysis;  (-  ) shortness of breath  CARDIOVASCULAR:  ( - )chest pain or palpitations  GASTROINTESTINAL:   ( - )abdominal or epigastric pain.  (-  ) nausea, vomiting, or hematemesis;   (  - ) diarrhea or constipation.   GENITOURINARY:   (   - ) dysuria, frequency or hematuria  NEUROLOGICAL:  (-   ) numbness or weakness   All other review of systems is negative unless indicated above    Vital Signs Last 24 Hrs  T(C): 36.9 (08 Dec 2017 13:37), Max: 36.9 (08 Dec 2017 13:37)  T(F): 98.4 (08 Dec 2017 13:37), Max: 98.4 (08 Dec 2017 13:37)  HR: 80 (08 Dec 2017 13:37) (73 - 81)  BP: 128/72 (08 Dec 2017 13:37) (102/56 - 128/72)  BP(mean): --  RR: 18 (08 Dec 2017 13:37) (18 - 20)  SpO2: 95% (08 Dec 2017 13:37) (95% - 97%)    I&O's Summary      CAPILLARY BLOOD GLUCOSE      POCT Blood Glucose.: 102 mg/dL (08 Dec 2017 16:47)  POCT Blood Glucose.: 101 mg/dL (08 Dec 2017 13:23)  POCT Blood Glucose.: 107 mg/dL (08 Dec 2017 12:42)  POCT Blood Glucose.: 69 mg/dL (08 Dec 2017 12:08)  POCT Blood Glucose.: 81 mg/dL (08 Dec 2017 08:23)  POCT Blood Glucose.: 126 mg/dL (07 Dec 2017 21:44)      PHYSICAL EXAM:    Constitutional:  ( -  ) NAD,   ( +  )awake and alert  HEENT: PERR, EOMI,    Neck: Soft and supple, No LAD, No JVD  Respiratory:  (   + Breath sounds are clear bilaterally,    ( -  ) wheezing, rales or rhonchi  Cardiovascular:     (  + )S1 and S2, regular rate and rhythm, no Murmurs, gallops or rubs  Gastrointestinal:  (  + )Bowel Sounds present, soft,   (-  )nontender, nondistended,    Extremities:    _+  ) peripheral edema  Vascular: 2+ peripheral pulses  Neurological:    (   + )A/O x 3,   ( - ) focal deficits  Musculoskeletal:    ( +  )  normal strength b/l upper  ( +    ) normal  lower extremities  Skin: No rashes    MEDICATIONS:  MEDICATIONS  (STANDING):  ALBUTerol/ipratropium for Nebulization 3 milliLiter(s) Nebulizer every 6 hours  aspirin enteric coated 81 milliGRAM(s) Oral daily  atorvastatin 80 milliGRAM(s) Oral at bedtime  buDESOnide 160 MICROgram(s)/formoterol 4.5 MICROgram(s) Inhaler 2 Puff(s) Inhalation two times a day  carvedilol 12.5 milliGRAM(s) Oral every 12 hours  cholecalciferol 2000 Unit(s) Oral daily  dextrose 5%. 1000 milliLiter(s) (50 mL/Hr) IV Continuous <Continuous>  dextrose 50% Injectable 12.5 Gram(s) IV Push once  dextrose 50% Injectable 25 Gram(s) IV Push once  dextrose 50% Injectable 25 Gram(s) IV Push once  febuxostat 40 milliGRAM(s) Oral daily  furosemide   Injectable 80 milliGRAM(s) IV Push every 12 hours  gabapentin 100 milliGRAM(s) Oral daily  heparin  Injectable 5000 Unit(s) SubCutaneous every 8 hours  insulin glargine Injectable (LANTUS) 36 Unit(s) SubCutaneous at bedtime  insulin lispro (HumaLOG) corrective regimen sliding scale   SubCutaneous three times a day before meals  insulin lispro Injectable (HumaLOG) 6 Unit(s) SubCutaneous three times a day before meals  lisinopril 2.5 milliGRAM(s) Oral daily      LABS: All Labs Reviewed:                        9.9    3.83  )-----------( 216      ( 08 Dec 2017 07:33 )             32.0     12-08    146<H>  |  103  |  40<H>  ----------------------------<  97  4.0   |  31  |  1.50<H>    Ca    8.5      08 Dec 2017 07:33  Phos  4.3     12-08  Mg     1.8     12-08            Blood Culture:   Urine Culture      RADIOLOGY/EKG:    ASSESSMENT AND PLAN:  57 y.o. female with , Morbid Obesity, DM2, NICM w/ CHFrEF (EF=22%) with AICD, COPD on 2L NC, Diabetic Neuropathy, CKD III, HTN, HLD, admitted for CHF exacerbation     Problem/Plan - 1:  ·  Problem: Acute on chronic systolic congestive heart failure.  Plan: - pt c/o dyspnea at rest, on exertion, and orthopnea   - Mild crackles on exam   - BNP elevated and CXR showed interstitial edema  - TTE: EF 22%  - Increased Lasix to 80mg BID   - Goal net negative of 1L   - monitor I/Os  - daily weights  - c/w Coreg, lisinopril.     Problem/Plan - 2:  ·  Problem: Essential hypertension.  Plan: -  c/w Lisinopril 2.5mg qd and Coreg 12.5mg BID   -.     Problem/Plan - 3:  ·  Problem: COPD (chronic obstructive pulmonary disease).  Plan: - Duonebs q6h   - consider adding Symbicort.     Problem/Plan - 4:  ·  Problem: Type 2 diabetes mellitus with diabetic neuropathy, with long-term current use of insulin.  Plan: -decrease Lantus to 30 min. to prevent hypoglycemia discussed with the nursing and physician assistant- consistent carb diet.     Problem/Plan - 5:  ·  Problem: CKD (chronic kidney disease) stage 3, GFR 30-59 ml/min.  Plan: - renal dose all meds   - Nephrology following.     Problem/Plan - 6:  Problem: Gout. Plan: - c/w febuxostat 40mg once a day.    Problem/Plan - 7:  ·  Problem: Need for prophylactic measure.  Plan: - heparin SQ.  DVT PPX:    ADVANCED DIRECTIVE:    DISPOSITION:

## 2017-12-08 NOTE — DISCHARGE NOTE ADULT - CARE PLAN
Principal Discharge DX:	Shortness of breath  Goal:	Improved  Instructions for follow-up, activity and diet:	Continue home oxygen.  Secondary Diagnosis:	Acute on chronic systolic congestive heart failure  Instructions for follow-up, activity and diet:	Continue lasix  Secondary Diagnosis:	CKD (chronic kidney disease) stage 3, GFR 30-59 ml/min  Secondary Diagnosis:	COPD (chronic obstructive pulmonary disease)  Secondary Diagnosis:	Essential hypertension  Secondary Diagnosis:	Gout  Instructions for follow-up, activity and diet:	Continue febuxostat 40mg once a day  Secondary Diagnosis:	Type 2 diabetes mellitus with diabetic neuropathy, with long-term current use of insulin Principal Discharge DX:	Acute on chronic systolic congestive heart failure  Goal:	management and control  Instructions for follow-up, activity and diet:	Continue lasix as directed.  Continue supplemental oxygen as needed.  Secondary Diagnosis:	COPD (chronic obstructive pulmonary disease)  Instructions for follow-up, activity and diet:	Continue with inhalers and pumps.  Continue supplemental oxygen as needed.  Please follow up with your PCP within 1-2 weeks of discharge.  Secondary Diagnosis:	Essential hypertension  Instructions for follow-up, activity and diet:	Please continue medications as directed.  Secondary Diagnosis:	Type 2 diabetes mellitus with diabetic neuropathy, with long-term current use of insulin  Instructions for follow-up, activity and diet:	Your a1c is 9.9 Principal Discharge DX:	Acute on chronic systolic congestive heart failure  Goal:	management and control  Instructions for follow-up, activity and diet:	Continue lasix as directed.  Continue supplemental oxygen as needed.  Secondary Diagnosis:	COPD (chronic obstructive pulmonary disease)  Instructions for follow-up, activity and diet:	Continue with inhalers and pumps.  Continue supplemental oxygen as needed.  Please follow up with your PCP within 1-2 weeks of discharge.  Secondary Diagnosis:	Essential hypertension  Instructions for follow-up, activity and diet:	Please continue medications as directed.  Secondary Diagnosis:	Type 2 diabetes mellitus with diabetic neuropathy, with long-term current use of insulin  Instructions for follow-up, activity and diet:	Your a1c is 9.9. Monitor blood sugars to keep at target levels between .  Follow up with your PCP within 1 week of discharge for further medical management.  Continue Novolog 6 units premeals, Troujeo 20 units at bedtime. Log blood sugars daily and bring to next PCP appointment.

## 2017-12-09 LAB
BUN SERPL-MCNC: 43 MG/DL — HIGH (ref 7–23)
CALCIUM SERPL-MCNC: 8.5 MG/DL — SIGNIFICANT CHANGE UP (ref 8.4–10.5)
CHLORIDE SERPL-SCNC: 102 MMOL/L — SIGNIFICANT CHANGE UP (ref 98–107)
CO2 SERPL-SCNC: 30 MMOL/L — SIGNIFICANT CHANGE UP (ref 22–31)
CREAT SERPL-MCNC: 1.42 MG/DL — HIGH (ref 0.5–1.3)
GLUCOSE BLDC GLUCOMTR-MCNC: 100 MG/DL — HIGH (ref 70–99)
GLUCOSE BLDC GLUCOMTR-MCNC: 106 MG/DL — HIGH (ref 70–99)
GLUCOSE BLDC GLUCOMTR-MCNC: 107 MG/DL — HIGH (ref 70–99)
GLUCOSE BLDC GLUCOMTR-MCNC: 143 MG/DL — HIGH (ref 70–99)
GLUCOSE BLDC GLUCOMTR-MCNC: 69 MG/DL — LOW (ref 70–99)
GLUCOSE BLDC GLUCOMTR-MCNC: 71 MG/DL — SIGNIFICANT CHANGE UP (ref 70–99)
GLUCOSE BLDC GLUCOMTR-MCNC: 74 MG/DL — SIGNIFICANT CHANGE UP (ref 70–99)
GLUCOSE BLDC GLUCOMTR-MCNC: 98 MG/DL — SIGNIFICANT CHANGE UP (ref 70–99)
GLUCOSE SERPL-MCNC: 121 MG/DL — HIGH (ref 70–99)
HBA1C BLD-MCNC: 9.9 % — HIGH (ref 4–5.6)
HCT VFR BLD CALC: 33.4 % — LOW (ref 34.5–45)
HGB BLD-MCNC: 10.2 G/DL — LOW (ref 11.5–15.5)
MAGNESIUM SERPL-MCNC: 1.8 MG/DL — SIGNIFICANT CHANGE UP (ref 1.6–2.6)
MCHC RBC-ENTMCNC: 25.4 PG — LOW (ref 27–34)
MCHC RBC-ENTMCNC: 30.5 % — LOW (ref 32–36)
MCV RBC AUTO: 83.3 FL — SIGNIFICANT CHANGE UP (ref 80–100)
NRBC # FLD: 0 — SIGNIFICANT CHANGE UP
PHOSPHATE SERPL-MCNC: 4.1 MG/DL — SIGNIFICANT CHANGE UP (ref 2.5–4.5)
PLATELET # BLD AUTO: 225 K/UL — SIGNIFICANT CHANGE UP (ref 150–400)
PMV BLD: 12.4 FL — SIGNIFICANT CHANGE UP (ref 7–13)
POTASSIUM SERPL-MCNC: 4 MMOL/L — SIGNIFICANT CHANGE UP (ref 3.5–5.3)
POTASSIUM SERPL-SCNC: 4 MMOL/L — SIGNIFICANT CHANGE UP (ref 3.5–5.3)
RBC # BLD: 4.01 M/UL — SIGNIFICANT CHANGE UP (ref 3.8–5.2)
RBC # FLD: 19.2 % — HIGH (ref 10.3–14.5)
SODIUM SERPL-SCNC: 146 MMOL/L — HIGH (ref 135–145)
WBC # BLD: 3.46 K/UL — LOW (ref 3.8–10.5)
WBC # FLD AUTO: 3.46 K/UL — LOW (ref 3.8–10.5)

## 2017-12-09 RX ORDER — FUROSEMIDE 40 MG
80 TABLET ORAL DAILY
Qty: 0 | Refills: 0 | Status: DISCONTINUED | OUTPATIENT
Start: 2017-12-09 | End: 2017-12-12

## 2017-12-09 RX ADMIN — Medication 6 UNIT(S): at 17:13

## 2017-12-09 RX ADMIN — Medication 80 MILLIGRAM(S): at 05:32

## 2017-12-09 RX ADMIN — ATORVASTATIN CALCIUM 80 MILLIGRAM(S): 80 TABLET, FILM COATED ORAL at 23:00

## 2017-12-09 RX ADMIN — Medication 6 UNIT(S): at 12:34

## 2017-12-09 RX ADMIN — Medication 3 MILLILITER(S): at 17:20

## 2017-12-09 RX ADMIN — BUDESONIDE AND FORMOTEROL FUMARATE DIHYDRATE 2 PUFF(S): 160; 4.5 AEROSOL RESPIRATORY (INHALATION) at 11:44

## 2017-12-09 RX ADMIN — Medication 3 MILLILITER(S): at 04:17

## 2017-12-09 RX ADMIN — Medication 80 MILLIGRAM(S): at 14:38

## 2017-12-09 RX ADMIN — LISINOPRIL 2.5 MILLIGRAM(S): 2.5 TABLET ORAL at 05:31

## 2017-12-09 RX ADMIN — FEBUXOSTAT 40 MILLIGRAM(S): 40 TABLET ORAL at 11:43

## 2017-12-09 RX ADMIN — Medication 3 MILLILITER(S): at 22:33

## 2017-12-09 RX ADMIN — HEPARIN SODIUM 5000 UNIT(S): 5000 INJECTION INTRAVENOUS; SUBCUTANEOUS at 05:32

## 2017-12-09 RX ADMIN — HEPARIN SODIUM 5000 UNIT(S): 5000 INJECTION INTRAVENOUS; SUBCUTANEOUS at 14:38

## 2017-12-09 RX ADMIN — CARVEDILOL PHOSPHATE 12.5 MILLIGRAM(S): 80 CAPSULE, EXTENDED RELEASE ORAL at 17:13

## 2017-12-09 RX ADMIN — CARVEDILOL PHOSPHATE 12.5 MILLIGRAM(S): 80 CAPSULE, EXTENDED RELEASE ORAL at 05:31

## 2017-12-09 RX ADMIN — BUDESONIDE AND FORMOTEROL FUMARATE DIHYDRATE 2 PUFF(S): 160; 4.5 AEROSOL RESPIRATORY (INHALATION) at 22:59

## 2017-12-09 RX ADMIN — GABAPENTIN 100 MILLIGRAM(S): 400 CAPSULE ORAL at 11:44

## 2017-12-09 RX ADMIN — Medication 3 MILLILITER(S): at 10:46

## 2017-12-09 RX ADMIN — Medication 81 MILLIGRAM(S): at 11:44

## 2017-12-09 RX ADMIN — Medication 2000 UNIT(S): at 11:44

## 2017-12-09 RX ADMIN — HEPARIN SODIUM 5000 UNIT(S): 5000 INJECTION INTRAVENOUS; SUBCUTANEOUS at 23:00

## 2017-12-09 RX ADMIN — INSULIN GLARGINE 30 UNIT(S): 100 INJECTION, SOLUTION SUBCUTANEOUS at 23:00

## 2017-12-09 RX ADMIN — Medication 6 UNIT(S): at 08:31

## 2017-12-09 NOTE — PROGRESS NOTE ADULT - SUBJECTIVE AND OBJECTIVE BOX
chief complaint  No pain, no shortness of breath  review of systems  Mild shortness of breath    VITAL:  T(C): , Max: 36.9 (12-08-17 @ 13:37)  T(F): , Max: 98.4 (12-08-17 @ 13:37)  HR: 80 (12-09-17 @ 10:46)  BP: 113/65 (12-09-17 @ 05:30)  BP(mean): --  RR: 18 (12-09-17 @ 05:30)  SpO2: 93% (12-09-17 @ 05:30)      PHYSICAL EXAM:  Constitutional: NAD on NCO2; obese  HEENT: NCAT, DMM  Neck: Supple, No JVD  Respiratory: distant BS b/l  Cardiovascular: distant s1s2, RRR   Gastrointestinal: BS+, soft, NT/ND  Extremities: 1+ b/l LE edema  Neurological: no focal deficits; strength grossly intact  Back: no CVAT b/l  Skin: No rashes (+)hypopigmental frontal patch      LABS:                        10.2   3.46  )-----------( 225      ( 09 Dec 2017 04:45 )             33.4     Na(146  )/K(4.0)/  Cl(102)/  HCO3(30)/  BUN(43)/Cr(1.42)          ASSESSMENT: 57F w/ HTN, DM2, HFrEF, COPD, and CKD3, s/p recent ALISA requiring HD x 2 months; 12/6/17 a/w acute on chronic HFrEF  (1)Renal - CKD3 - stable function  (2)Lytes acceptable  (3)Shortness of breath - CHF+/- COPD exacerbation. On IV lasix. 5 we will change to by mouth   t Dr. Smith called to see the patient tomorrow

## 2017-12-09 NOTE — PROGRESS NOTE ADULT - SUBJECTIVE AND OBJECTIVE BOX
No pain, no shortness of breath      VITAL:  T(C): , Max: 36.9 (12-08-17 @ 13:37)  T(F): , Max: 98.4 (12-08-17 @ 13:37)  HR: 80 (12-09-17 @ 10:46)  BP: 113/65 (12-09-17 @ 05:30)  BP(mean): --  RR: 18 (12-09-17 @ 05:30)  SpO2: 93% (12-09-17 @ 05:30)      PHYSICAL EXAM:  Constitutional: NAD on NCO2; obese  HEENT: NCAT, DMM  Neck: Supple, No JVD  Respiratory: distant BS b/l  Cardiovascular: distant s1s2, RRR   Gastrointestinal: BS+, soft, NT/ND  Extremities: 1+ b/l LE edema  Neurological: no focal deficits; strength grossly intact  Back: no CVAT b/l  Skin: No rashes (+)hypopigmental frontal patch      LABS:                        10.2   3.46  )-----------( 225      ( 09 Dec 2017 04:45 )             33.4     Na(146)/K(4.0)/Cl(102)/HCO3(30)/BUN(43)/Cr(1.42)Glu(121)/Ca(8.5)/Mg(1.8)/PO4(4.1)    12-09 @ 04:45  Na(146)/K(4.0)/Cl(103)/HCO3(31)/BUN(40)/Cr(1.50)Glu(97)/Ca(8.5)/Mg(1.8)/PO4(4.3)    12-08 @ 07:33  Na(147)/K(4.1)/Cl(103)/HCO3(34)/BUN(36)/Cr(1.41)Glu(106)/Ca(8.5)/Mg(1.8)/PO4(4.2)    12-07 @ 06:05      ASSESSMENT: 57F w/ HTN, DM2, HFrEF, COPD, and CKD3, s/p recent ALISA requiring HD x 2 months; 12/6/17 a/w acute on chronic HFrEF  (1)Renal - CKD3 - stable function  (2)Lytes acceptable  (3)Shortness of breath - CHF+/- COPD exacerbation. On IV lasix.    RECOMMEND:  (1)Could convert Lasix to PO at this point - 80mg po qd?  (2)Dose new meds for GFR 40-50ml/min          Perfecto Rae MD  [facility]  [phone] No pain, no shortness of breath      VITAL:  T(C): , Max: 36.9 (12-08-17 @ 13:37)  T(F): , Max: 98.4 (12-08-17 @ 13:37)  HR: 80 (12-09-17 @ 10:46)  BP: 113/65 (12-09-17 @ 05:30)  BP(mean): --  RR: 18 (12-09-17 @ 05:30)  SpO2: 93% (12-09-17 @ 05:30)      PHYSICAL EXAM:  Constitutional: NAD on NCO2; obese  HEENT: NCAT, DMM  Neck: Supple, No JVD  Respiratory: distant BS b/l  Cardiovascular: distant s1s2, RRR   Gastrointestinal: BS+, soft, NT/ND  Extremities: 1+ b/l LE edema  Neurological: no focal deficits; strength grossly intact  Back: no CVAT b/l  Skin: No rashes (+)hypopigmental frontal patch      LABS:                        10.2   3.46  )-----------( 225      ( 09 Dec 2017 04:45 )             33.4     Na(146)/K(4.0)/Cl(102)/HCO3(30)/BUN(43)/Cr(1.42)Glu(121)/Ca(8.5)/Mg(1.8)/PO4(4.1)    12-09 @ 04:45  Na(146)/K(4.0)/Cl(103)/HCO3(31)/BUN(40)/Cr(1.50)Glu(97)/Ca(8.5)/Mg(1.8)/PO4(4.3)    12-08 @ 07:33  Na(147)/K(4.1)/Cl(103)/HCO3(34)/BUN(36)/Cr(1.41)Glu(106)/Ca(8.5)/Mg(1.8)/PO4(4.2)    12-07 @ 06:05      ASSESSMENT: 57F w/ HTN, DM2, HFrEF, COPD, and CKD3, s/p recent ALISA requiring HD x 2 months; 12/6/17 a/w acute on chronic HFrEF  (1)Renal - CKD3 - stable function  (2)Lytes acceptable  (3)Shortness of breath - CHF+/- COPD exacerbation. On IV lasix.      RECOMMEND:  (1)Could convert Lasix to PO at this point - 80mg po qd?  (2)Dose new meds for GFR 40-50ml/min          Perfecto Rae MD  [facility]  [phone]

## 2017-12-10 LAB
BUN SERPL-MCNC: 46 MG/DL — HIGH (ref 7–23)
CALCIUM SERPL-MCNC: 8.5 MG/DL — SIGNIFICANT CHANGE UP (ref 8.4–10.5)
CHLORIDE SERPL-SCNC: 103 MMOL/L — SIGNIFICANT CHANGE UP (ref 98–107)
CO2 SERPL-SCNC: 33 MMOL/L — HIGH (ref 22–31)
CREAT SERPL-MCNC: 1.48 MG/DL — HIGH (ref 0.5–1.3)
GLUCOSE BLDC GLUCOMTR-MCNC: 108 MG/DL — HIGH (ref 70–99)
GLUCOSE BLDC GLUCOMTR-MCNC: 123 MG/DL — HIGH (ref 70–99)
GLUCOSE BLDC GLUCOMTR-MCNC: 126 MG/DL — HIGH (ref 70–99)
GLUCOSE BLDC GLUCOMTR-MCNC: 80 MG/DL — SIGNIFICANT CHANGE UP (ref 70–99)
GLUCOSE SERPL-MCNC: 150 MG/DL — HIGH (ref 70–99)
HCT VFR BLD CALC: 34.1 % — LOW (ref 34.5–45)
HGB BLD-MCNC: 10 G/DL — LOW (ref 11.5–15.5)
MAGNESIUM SERPL-MCNC: 1.7 MG/DL — SIGNIFICANT CHANGE UP (ref 1.6–2.6)
MCHC RBC-ENTMCNC: 24.3 PG — LOW (ref 27–34)
MCHC RBC-ENTMCNC: 29.3 % — LOW (ref 32–36)
MCV RBC AUTO: 83 FL — SIGNIFICANT CHANGE UP (ref 80–100)
NRBC # FLD: 0 — SIGNIFICANT CHANGE UP
PHOSPHATE SERPL-MCNC: 4 MG/DL — SIGNIFICANT CHANGE UP (ref 2.5–4.5)
PLATELET # BLD AUTO: 230 K/UL — SIGNIFICANT CHANGE UP (ref 150–400)
PMV BLD: 11.6 FL — SIGNIFICANT CHANGE UP (ref 7–13)
POTASSIUM SERPL-MCNC: 4.2 MMOL/L — SIGNIFICANT CHANGE UP (ref 3.5–5.3)
POTASSIUM SERPL-SCNC: 4.2 MMOL/L — SIGNIFICANT CHANGE UP (ref 3.5–5.3)
RBC # BLD: 4.11 M/UL — SIGNIFICANT CHANGE UP (ref 3.8–5.2)
RBC # FLD: 19.1 % — HIGH (ref 10.3–14.5)
SODIUM SERPL-SCNC: 147 MMOL/L — HIGH (ref 135–145)
WBC # BLD: 3.74 K/UL — LOW (ref 3.8–10.5)
WBC # FLD AUTO: 3.74 K/UL — LOW (ref 3.8–10.5)

## 2017-12-10 PROCEDURE — 99222 1ST HOSP IP/OBS MODERATE 55: CPT

## 2017-12-10 RX ADMIN — LISINOPRIL 2.5 MILLIGRAM(S): 2.5 TABLET ORAL at 06:02

## 2017-12-10 RX ADMIN — CARVEDILOL PHOSPHATE 12.5 MILLIGRAM(S): 80 CAPSULE, EXTENDED RELEASE ORAL at 17:31

## 2017-12-10 RX ADMIN — BUDESONIDE AND FORMOTEROL FUMARATE DIHYDRATE 2 PUFF(S): 160; 4.5 AEROSOL RESPIRATORY (INHALATION) at 08:38

## 2017-12-10 RX ADMIN — Medication 3 MILLILITER(S): at 15:50

## 2017-12-10 RX ADMIN — Medication 2000 UNIT(S): at 12:16

## 2017-12-10 RX ADMIN — HEPARIN SODIUM 5000 UNIT(S): 5000 INJECTION INTRAVENOUS; SUBCUTANEOUS at 06:02

## 2017-12-10 RX ADMIN — Medication 6 UNIT(S): at 12:16

## 2017-12-10 RX ADMIN — HEPARIN SODIUM 5000 UNIT(S): 5000 INJECTION INTRAVENOUS; SUBCUTANEOUS at 22:21

## 2017-12-10 RX ADMIN — BUDESONIDE AND FORMOTEROL FUMARATE DIHYDRATE 2 PUFF(S): 160; 4.5 AEROSOL RESPIRATORY (INHALATION) at 22:21

## 2017-12-10 RX ADMIN — Medication 3 MILLILITER(S): at 03:57

## 2017-12-10 RX ADMIN — FEBUXOSTAT 40 MILLIGRAM(S): 40 TABLET ORAL at 12:16

## 2017-12-10 RX ADMIN — Medication 6 UNIT(S): at 08:37

## 2017-12-10 RX ADMIN — CARVEDILOL PHOSPHATE 12.5 MILLIGRAM(S): 80 CAPSULE, EXTENDED RELEASE ORAL at 06:02

## 2017-12-10 RX ADMIN — Medication 81 MILLIGRAM(S): at 12:16

## 2017-12-10 RX ADMIN — ATORVASTATIN CALCIUM 80 MILLIGRAM(S): 80 TABLET, FILM COATED ORAL at 22:21

## 2017-12-10 RX ADMIN — Medication 80 MILLIGRAM(S): at 06:02

## 2017-12-10 RX ADMIN — INSULIN GLARGINE 30 UNIT(S): 100 INJECTION, SOLUTION SUBCUTANEOUS at 22:38

## 2017-12-10 RX ADMIN — GABAPENTIN 100 MILLIGRAM(S): 400 CAPSULE ORAL at 12:16

## 2017-12-10 RX ADMIN — HEPARIN SODIUM 5000 UNIT(S): 5000 INJECTION INTRAVENOUS; SUBCUTANEOUS at 14:46

## 2017-12-10 RX ADMIN — Medication 3 MILLILITER(S): at 21:29

## 2017-12-10 RX ADMIN — Medication 3 MILLILITER(S): at 09:34

## 2017-12-10 NOTE — PROGRESS NOTE ADULT - SUBJECTIVE AND OBJECTIVE BOX
No pain, no shortness of breath      VITAL:  T(C): , Max: 36.9 (12-09-17 @ 12:54)  T(F): , Max: 98.5 (12-09-17 @ 12:54)  HR: 74 (12-10-17 @ 09:34)  BP: 143/77 (12-10-17 @ 05:59)  BP(mean): --  RR: 18 (12-10-17 @ 05:59)  SpO2: 96% (12-10-17 @ 09:34)      PHYSICAL EXAM:  Constitutional: NAD on NCO2; obese  HEENT: NCAT, DMM  Neck: Supple, No JVD  Respiratory: distant BS b/l  Cardiovascular: distant s1s2, RRR   Gastrointestinal: BS+, soft, NT/ND  Extremities: 1+ b/l LE edema  Neurological: no focal deficits; strength grossly intact  Back: no CVAT b/l  Skin: No rashes (+)hypopigmental frontal patch      LABS:                        10.0   3.74  )-----------( 230      ( 10 Dec 2017 06:14 )             34.1     Na(147)/K(4.2)/Cl(103)/HCO3(33)/BUN(46)/Cr(1.48)Glu(150)/Ca(8.5)/Mg(1.7)/PO4(4.0)    12-10 @ 06:14  Na(146)/K(4.0)/Cl(102)/HCO3(30)/BUN(43)/Cr(1.42)Glu(121)/Ca(8.5)/Mg(1.8)/PO4(4.1)    12-09 @ 04:45  Na(146)/K(4.0)/Cl(103)/HCO3(31)/BUN(40)/Cr(1.50)Glu(97)/Ca(8.5)/Mg(1.8)/PO4(4.3)    12-08 @ 07:33      ASSESSMENT: 57F w/ HTN, DM2, HFrEF, COPD, and CKD3, s/p recent ALISA requiring HD x 2 months; 12/6/17 a/w acute on chronic HFrEF  (1)Renal - CKD3 - stable function  (2)Lytes acceptable  (3)Shortness of breath - CHF+/- COPD exacerbation. Converted to PO lasix as of yesterday. (+)wheeze and persist albeit mild SOB - needing Pulmonary input?      RECOMMEND:  (1)Meds as ordered  (2)Favor Pulmonary evaluation                Perfecto Rae MD  [facility]  [phone] No pain, (+)mild SOB      VITAL:  T(C): , Max: 36.9 (12-09-17 @ 12:54)  T(F): , Max: 98.5 (12-09-17 @ 12:54)  HR: 74 (12-10-17 @ 09:34)  BP: 143/77 (12-10-17 @ 05:59)  BP(mean): --  RR: 18 (12-10-17 @ 05:59)  SpO2: 96% (12-10-17 @ 09:34)      PHYSICAL EXAM:  Constitutional: NAD on NCO2; obese  HEENT: NCAT, DMM  Neck: Supple, No JVD  Respiratory: (+)mild b/l wheeze  Cardiovascular: distant s1s2, RRR   Gastrointestinal: BS+, soft, NT/ND  Extremities: 1+ b/l LE edema  Neurological: no focal deficits; strength grossly intact  Back: no CVAT b/l  Skin: No rashes (+)hypopigmental frontal patch      LABS:                        10.0   3.74  )-----------( 230      ( 10 Dec 2017 06:14 )             34.1     Na(147)/K(4.2)/Cl(103)/HCO3(33)/BUN(46)/Cr(1.48)Glu(150)/Ca(8.5)/Mg(1.7)/PO4(4.0)    12-10 @ 06:14  Na(146)/K(4.0)/Cl(102)/HCO3(30)/BUN(43)/Cr(1.42)Glu(121)/Ca(8.5)/Mg(1.8)/PO4(4.1)    12-09 @ 04:45  Na(146)/K(4.0)/Cl(103)/HCO3(31)/BUN(40)/Cr(1.50)Glu(97)/Ca(8.5)/Mg(1.8)/PO4(4.3)    12-08 @ 07:33      ASSESSMENT: 57F w/ HTN, DM2, HFrEF, COPD, and CKD3, s/p recent ALISA requiring HD x 2 months; 12/6/17 a/w acute on chronic HFrEF  (1)Renal - CKD3 - stable function  (2)Lytes acceptable  (3)Shortness of breath - CHF+/- COPD exacerbation. Converted to PO lasix as of yesterday. (+)wheeze and persist albeit mild SOB - needing Pulmonary input?      RECOMMEND:  (1)Meds as ordered  (2)Favor Pulmonary evaluation        Recommendations discussed with NP Katina        Perfecto Rae MD  [facility]  [phone] No pain, (+)mild SOB      VITAL:  T(C): , Max: 36.9 (12-09-17 @ 12:54)  T(F): , Max: 98.5 (12-09-17 @ 12:54)  HR: 74 (12-10-17 @ 09:34)  BP: 143/77 (12-10-17 @ 05:59)  BP(mean): --  RR: 18 (12-10-17 @ 05:59)  SpO2: 96% (12-10-17 @ 09:34)      PHYSICAL EXAM:  Constitutional: NAD on NCO2; obese  HEENT: NCAT, DMM  Neck: Supple, No JVD  Respiratory: (+)mild b/l wheeze  Cardiovascular: distant s1s2, RRR   Gastrointestinal: BS+, soft, NT/ND  Extremities: 1+ b/l LE edema  Neurological: no focal deficits; strength grossly intact  Back: no CVAT b/l  Skin: No rashes (+)hypopigmental frontal patch      LABS:                        10.0   3.74  )-----------( 230      ( 10 Dec 2017 06:14 )             34.1     Na(147)/K(4.2)/Cl(103)/HCO3(33)/BUN(46)/Cr(1.48)Glu(150)/Ca(8.5)/Mg(1.7)/PO4(4.0)    12-10 @ 06:14  Na(146)/K(4.0)/Cl(102)/HCO3(30)/BUN(43)/Cr(1.42)Glu(121)/Ca(8.5)/Mg(1.8)/PO4(4.1)    12-09 @ 04:45  Na(146)/K(4.0)/Cl(103)/HCO3(31)/BUN(40)/Cr(1.50)Glu(97)/Ca(8.5)/Mg(1.8)/PO4(4.3)    12-08 @ 07:33      ASSESSMENT: 57F w/ HTN, DM2, HFrEF, COPD, and CKD3, s/p recent ALISA requiring HD x 2 months; 12/6/17 a/w acute on chronic HFrEF  (1)Renal - CKD3 - stable function  (2)Lytes acceptable  (3)Shortness of breath - CHF+/- COPD exacerbation. Converted to PO lasix as of yesterday. (+)wheeze and persist albeit mild SOB - needing Pulmonary input?      RECOMMEND:  (1)Meds as ordered  (2)Favor Pulmonary evaluation        Recommendations discussed with NP Jelena Rae MD  [facility]  [phone]

## 2017-12-10 NOTE — PROGRESS NOTE ADULT - SUBJECTIVE AND OBJECTIVE BOX
The patient is a 57 yr. old  female with multiple medical problems. She is morbidly obese with a hx of 20 pack year smoking until early 2017. She has had severe CHF and was on dialysis for several weeks and off it in August of this year. She was on Bumex but is now on PO Lasix instead of IV. She is OOB in a chair with oxygen, nasally.  The patient speaks mostly of SOB as main problem. She was on Prednisone tapering dose but her sugars went up necessitating higher Insulin doses. The patient has been on Albuterol Sol. qid and Bevespi Aerosphere daily.    The patient has diabetic neuropathy, an AICD, 2 l/min nasal cannula, and has been on meds for Hyperlipidemia and HTN. Her EF is, reportedly, 22%.    She is allergic to PCN causing anaphylaxis. She doesn't use CPAP for ABDIEL. She has Gout and GERD. She is known to use drugs, drink and -The patient is a 57 yr. old  female with multiple medical problems. She is morbidly obese with a hx of 20 pack year smoking until early 2017. She has had severe CHF and was on dialysis for several weeks and off it in August of this year. She was on Bumex but is now on PO Lasix instead of IV. She is OOB in a chair with oxygen, nasally.  The patient speaks mostly of SOB as main problem. She was on Prednisone tapering dose but her sugars went up necessitating higher Insulin doses. The patient has been on Albuterol Sol. qid and Bevespi Aerosphere daily.    The patient has diabetic neuropathy, an AICD, 2 l/min nasal cannula, and has been on meds for Hyperlipidemia and HTN. Her EF is, reportedly, 22%.    She is allergic to PCN causing anaphylaxis. She doesn't use CPAP for ABDIEL. She has Gout and GERD. She is known to use drugs, drink and formerly smoke. The patient was seen in a chair in no distress, on 2 L/min nasal oxygen.     Heent-nasal cannula, alert and aware of surroundings; in no acute distress. Neck is wnl. The lungs are silent except for a few, scattered rales and rhonchi. The heart sounds are distant. Abd. is benign.  Extremities	appear unremarkable.    Impression:  The patient has combined CHF and COPD with Morbid Obesity and ABDIEL not treated.    Suggest: 1. Continue aggressive treatment of the primary problem.  	  2. Oxygen 2 L/min 16+ hours/day.  	  3. Would try Dexamethasone 0.5 mg po od.                4. Would try Symbicort 160/4.5 mcg- 2 puffs bid and rinse with water.                5. Ambulate with oxygen and get exercise.    Let her come to my office upon discharge.    Thank you,    Dr. Shawn Smith MD  Cell:  [phone]  --

## 2017-12-10 NOTE — PROGRESS NOTE ADULT - SUBJECTIVE AND OBJECTIVE BOX
CHIEF COMPLAINT:  no   event    over  night    SUBJECTIVE:     REVIEW OF SYSTEMS:    CONSTITUTIONAL: (-  )  weakness,  ( - ) fevers or chills  EYES/ENT: ( - )visual changes;     NECK: (  ) pain or stiffness  RESPIRATORY:   ( - )cough, wheezing, hemoptysis;  (-  ) shortness of breath  CARDIOVASCULAR:  ( - )chest pain or palpitations  GASTROINTESTINAL:   ( - )abdominal or epigastric pain.  (-  ) nausea, vomiting, or hematemesis;   (  - ) diarrhea or constipation.   GENITOURINARY:   (   - ) dysuria, frequency or hematuria  NEUROLOGICAL:  (-   ) numbness or weakness   All other review of systems is negative unless indicated above    Vital Signs Last 24 Hrs  T(C): 36.8 (10 Dec 2017 12:43), Max: 36.8 (09 Dec 2017 22:30)  T(F): 98.3 (10 Dec 2017 12:43), Max: 98.3 (09 Dec 2017 22:30)  HR: 88 (10 Dec 2017 12:43) (73 - 88)  BP: 126/66 (10 Dec 2017 12:43) (126/66 - 154/84)  BP(mean): --  RR: 18 (10 Dec 2017 12:43) (18 - 18)  SpO2: 96% (10 Dec 2017 12:43) (95% - 97%)    I&O's Summary      CAPILLARY BLOOD GLUCOSE      POCT Blood Glucose.: 108 mg/dL (10 Dec 2017 11:59)  POCT Blood Glucose.: 123 mg/dL (10 Dec 2017 08:16)  POCT Blood Glucose.: 143 mg/dL (09 Dec 2017 22:16)  POCT Blood Glucose.: 107 mg/dL (09 Dec 2017 16:42)      PHYSICAL EXAM:    Constitutional:  ( -  ) NAD,   ( +  )awake and alert  HEENT: PERR, EOMI,    Neck: Soft and supple, No LAD, No JVD  Respiratory:  (   + Breath sounds are clear bilaterally,    ( -  ) wheezing, rales or rhonchi  Cardiovascular:     (  + )S1 and S2, regular rate and rhythm, no Murmurs, gallops or rubs  Gastrointestinal:  (  + )Bowel Sounds present, soft,   (-  )nontender, nondistended,    Extremities:    (-  ) peripheral edema  Vascular: 2+ peripheral pulses  Neurological:    (   + )A/O x 3,   ( - ) focal deficits  Musculoskeletal:    ( +  )  normal strength b/l upper  ( +    ) normal  lower extremities  Skin: No rashes    MEDICATIONS:  MEDICATIONS  (STANDING):  ALBUTerol/ipratropium for Nebulization 3 milliLiter(s) Nebulizer every 6 hours  aspirin enteric coated 81 milliGRAM(s) Oral daily  atorvastatin 80 milliGRAM(s) Oral at bedtime  buDESOnide 160 MICROgram(s)/formoterol 4.5 MICROgram(s) Inhaler 2 Puff(s) Inhalation two times a day  carvedilol 12.5 milliGRAM(s) Oral every 12 hours  cholecalciferol 2000 Unit(s) Oral daily  dextrose 5%. 1000 milliLiter(s) (50 mL/Hr) IV Continuous <Continuous>  dextrose 50% Injectable 12.5 Gram(s) IV Push once  dextrose 50% Injectable 25 Gram(s) IV Push once  dextrose 50% Injectable 25 Gram(s) IV Push once  febuxostat 40 milliGRAM(s) Oral daily  furosemide    Tablet 80 milliGRAM(s) Oral daily  gabapentin 100 milliGRAM(s) Oral daily  heparin  Injectable 5000 Unit(s) SubCutaneous every 8 hours  insulin glargine Injectable (LANTUS) 30 Unit(s) SubCutaneous at bedtime  insulin lispro (HumaLOG) corrective regimen sliding scale   SubCutaneous three times a day before meals  insulin lispro Injectable (HumaLOG) 6 Unit(s) SubCutaneous three times a day before meals  lisinopril 2.5 milliGRAM(s) Oral daily      LABS: All Labs Reviewed:                        10.0   3.74  )-----------( 230      ( 10 Dec 2017 06:14 )             34.1     12-10    147<H>  |  103  |  46<H>  ----------------------------<  150<H>  4.2   |  33<H>  |  1.48<H>    Ca    8.5      10 Dec 2017 06:14  Phos  4.0     12-10  Mg     1.7     12-10            Blood Culture:   Urine Culture      RADIOLOGY/EKG:    ASSESSMENT AND PLAN:    DVT PPX:    ADVANCED DIRECTIVE:    DISPOSITION: CHIEF COMPLAINT:  no   event    over  night   reported had shortness of breath while she was going to the bathroom    SUBJECTIVE:     REVIEW OF SYSTEMS:    CONSTITUTIONAL: (+ )  weakness,  ( - ) fevers or chills  EYES/ENT: ( - )visual changes;     NECK: (  ) pain or stiffness  RESPIRATORY:   ( - )cough, wheezing, hemoptysis;  ( + ) shortness of breath  CARDIOVASCULAR:  ( - )chest pain or palpitations  GASTROINTESTINAL:   ( - )abdominal or epigastric pain.  (-  ) nausea, vomiting, or hematemesis;   (  - ) diarrhea or constipation.   GENITOURINARY:   (   - ) dysuria, frequency or hematuria  NEUROLOGICAL:  (-   ) numbness or weakness   All other review of systems is negative unless indicated above    Vital Signs Last 24 Hrs  T(C): 36.8 (10 Dec 2017 12:43), Max: 36.8 (09 Dec 2017 22:30)  T(F): 98.3 (10 Dec 2017 12:43), Max: 98.3 (09 Dec 2017 22:30)  HR: 88 (10 Dec 2017 12:43) (73 - 88)  BP: 126/66 (10 Dec 2017 12:43) (126/66 - 154/84)  BP(mean): --  RR: 18 (10 Dec 2017 12:43) (18 - 18)  SpO2: 96% (10 Dec 2017 12:43) (95% - 97%)    I&O's Summary      CAPILLARY BLOOD GLUCOSE      POCT Blood Glucose.: 108 mg/dL (10 Dec 2017 11:59)  POCT Blood Glucose.: 123 mg/dL (10 Dec 2017 08:16)  POCT Blood Glucose.: 143 mg/dL (09 Dec 2017 22:16)  POCT Blood Glucose.: 107 mg/dL (09 Dec 2017 16:42)      PHYSICAL EXAM:    Constitutional:  ( -  ) NAD,   ( +  )awake and alert  HEENT: PERR, EOMI,    Neck: Soft and supple, No LAD, No JVD  Respiratory:  (   + Breath sounds are clear bilaterally,    ( -  ) wheezing, rales or rhonchi  Cardiovascular:     (  + )S1 and S2, regular rate and rhythm, no Murmurs, gallops or rubs  Gastrointestinal:  (  + )Bowel Sounds present, soft,   (-  )nontender, nondistended,    Extremities:    (-  ) peripheral edema  Vascular: 2+ peripheral pulses  Neurological:    (   + )A/O x 3,   ( - ) focal deficits  Musculoskeletal:    ( +  )  normal strength b/l upper  ( +    ) normal  lower extremities  Skin: No rashes    MEDICATIONS:  MEDICATIONS  (STANDING):  ALBUTerol/ipratropium for Nebulization 3 milliLiter(s) Nebulizer every 6 hours  aspirin enteric coated 81 milliGRAM(s) Oral daily  atorvastatin 80 milliGRAM(s) Oral at bedtime  buDESOnide 160 MICROgram(s)/formoterol 4.5 MICROgram(s) Inhaler 2 Puff(s) Inhalation two times a day  carvedilol 12.5 milliGRAM(s) Oral every 12 hours  cholecalciferol 2000 Unit(s) Oral daily  dextrose 5%. 1000 milliLiter(s) (50 mL/Hr) IV Continuous <Continuous>  dextrose 50% Injectable 12.5 Gram(s) IV Push once  dextrose 50% Injectable 25 Gram(s) IV Push once  dextrose 50% Injectable 25 Gram(s) IV Push once  febuxostat 40 milliGRAM(s) Oral daily  furosemide    Tablet 80 milliGRAM(s) Oral daily  gabapentin 100 milliGRAM(s) Oral daily  heparin  Injectable 5000 Unit(s) SubCutaneous every 8 hours  insulin glargine Injectable (LANTUS) 30 Unit(s) SubCutaneous at bedtime  insulin lispro (HumaLOG) corrective regimen sliding scale   SubCutaneous three times a day before meals  insulin lispro Injectable (HumaLOG) 6 Unit(s) SubCutaneous three times a day before meals  lisinopril 2.5 milliGRAM(s) Oral daily      LABS: All Labs Reviewed:                        10.0   3.74  )-----------( 230      ( 10 Dec 2017 06:14 )             34.1     12-10    147<H>  |  103  |  46<H>  ----------------------------<  150<H>  4.2   |  33<H>  |  1.48<H>    Ca    8.5      10 Dec 2017 06:14  Phos  4.0     12-10  Mg     1.7     12-10            Blood Culture:   Urine Culture      RADIOLOGY/EKG:    ASSESSMENT AND PLAN:  ASSESSMENT: 57F w/ HTN, DM2, HFrEF, COPD, and CKD3, s/p recent ALISA requiring HD x 2 months; 12/6/17 a/w acute on chronic HFrEF  (1)Renal - CKD3 - stable function  (2)Lytes acceptable  (3)Shortness of breath - CHF+/- COPD exacerbation. On IV lasix. 5 we will change to by mouth    Dr. Smith called to see the patient today    DVT PPX:    ADVANCED DIRECTIVE:    DISPOSITION:

## 2017-12-11 LAB
BASOPHILS # BLD AUTO: 0.01 K/UL — SIGNIFICANT CHANGE UP (ref 0–0.2)
BASOPHILS NFR BLD AUTO: 0.2 % — SIGNIFICANT CHANGE UP (ref 0–2)
BUN SERPL-MCNC: 47 MG/DL — HIGH (ref 7–23)
CALCIUM SERPL-MCNC: 8.7 MG/DL — SIGNIFICANT CHANGE UP (ref 8.4–10.5)
CHLORIDE SERPL-SCNC: 100 MMOL/L — SIGNIFICANT CHANGE UP (ref 98–107)
CO2 SERPL-SCNC: 36 MMOL/L — HIGH (ref 22–31)
CREAT SERPL-MCNC: 1.3 MG/DL — SIGNIFICANT CHANGE UP (ref 0.5–1.3)
EOSINOPHIL # BLD AUTO: 0.2 K/UL — SIGNIFICANT CHANGE UP (ref 0–0.5)
EOSINOPHIL NFR BLD AUTO: 4.6 % — SIGNIFICANT CHANGE UP (ref 0–6)
GLUCOSE BLDC GLUCOMTR-MCNC: 102 MG/DL — HIGH (ref 70–99)
GLUCOSE BLDC GLUCOMTR-MCNC: 114 MG/DL — HIGH (ref 70–99)
GLUCOSE BLDC GLUCOMTR-MCNC: 116 MG/DL — HIGH (ref 70–99)
GLUCOSE BLDC GLUCOMTR-MCNC: 157 MG/DL — HIGH (ref 70–99)
GLUCOSE SERPL-MCNC: 171 MG/DL — HIGH (ref 70–99)
HCT VFR BLD CALC: 31.4 % — LOW (ref 34.5–45)
HGB BLD-MCNC: 9.5 G/DL — LOW (ref 11.5–15.5)
IMM GRANULOCYTES # BLD AUTO: 0.01 # — SIGNIFICANT CHANGE UP
IMM GRANULOCYTES NFR BLD AUTO: 0.2 % — SIGNIFICANT CHANGE UP (ref 0–1.5)
LYMPHOCYTES # BLD AUTO: 1.05 K/UL — SIGNIFICANT CHANGE UP (ref 1–3.3)
LYMPHOCYTES # BLD AUTO: 24.2 % — SIGNIFICANT CHANGE UP (ref 13–44)
MCHC RBC-ENTMCNC: 24.7 PG — LOW (ref 27–34)
MCHC RBC-ENTMCNC: 30.3 % — LOW (ref 32–36)
MCV RBC AUTO: 81.8 FL — SIGNIFICANT CHANGE UP (ref 80–100)
MONOCYTES # BLD AUTO: 0.45 K/UL — SIGNIFICANT CHANGE UP (ref 0–0.9)
MONOCYTES NFR BLD AUTO: 10.4 % — SIGNIFICANT CHANGE UP (ref 2–14)
NEUTROPHILS # BLD AUTO: 2.62 K/UL — SIGNIFICANT CHANGE UP (ref 1.8–7.4)
NEUTROPHILS NFR BLD AUTO: 60.4 % — SIGNIFICANT CHANGE UP (ref 43–77)
NRBC # FLD: 0 — SIGNIFICANT CHANGE UP
PLATELET # BLD AUTO: 220 K/UL — SIGNIFICANT CHANGE UP (ref 150–400)
PMV BLD: 11.1 FL — SIGNIFICANT CHANGE UP (ref 7–13)
POTASSIUM SERPL-MCNC: 3.9 MMOL/L — SIGNIFICANT CHANGE UP (ref 3.5–5.3)
POTASSIUM SERPL-SCNC: 3.9 MMOL/L — SIGNIFICANT CHANGE UP (ref 3.5–5.3)
RBC # BLD: 3.84 M/UL — SIGNIFICANT CHANGE UP (ref 3.8–5.2)
RBC # FLD: 19 % — HIGH (ref 10.3–14.5)
SODIUM SERPL-SCNC: 145 MMOL/L — SIGNIFICANT CHANGE UP (ref 135–145)
WBC # BLD: 4.34 K/UL — SIGNIFICANT CHANGE UP (ref 3.8–10.5)
WBC # FLD AUTO: 4.34 K/UL — SIGNIFICANT CHANGE UP (ref 3.8–10.5)

## 2017-12-11 RX ORDER — ALBUTEROL 90 UG/1
4 AEROSOL, METERED ORAL EVERY 6 HOURS
Qty: 0 | Refills: 0 | Status: DISCONTINUED | OUTPATIENT
Start: 2017-12-11 | End: 2017-12-15

## 2017-12-11 RX ORDER — DEXAMETHASONE 0.5 MG/5ML
0.5 ELIXIR ORAL DAILY
Qty: 0 | Refills: 0 | Status: DISCONTINUED | OUTPATIENT
Start: 2017-12-11 | End: 2017-12-15

## 2017-12-11 RX ORDER — ALBUTEROL 90 UG/1
4 AEROSOL, METERED ORAL EVERY 6 HOURS
Qty: 0 | Refills: 0 | Status: COMPLETED | OUTPATIENT
Start: 2017-12-11 | End: 2018-11-09

## 2017-12-11 RX ADMIN — GABAPENTIN 100 MILLIGRAM(S): 400 CAPSULE ORAL at 12:20

## 2017-12-11 RX ADMIN — HEPARIN SODIUM 5000 UNIT(S): 5000 INJECTION INTRAVENOUS; SUBCUTANEOUS at 07:40

## 2017-12-11 RX ADMIN — CARVEDILOL PHOSPHATE 12.5 MILLIGRAM(S): 80 CAPSULE, EXTENDED RELEASE ORAL at 17:17

## 2017-12-11 RX ADMIN — Medication 80 MILLIGRAM(S): at 05:31

## 2017-12-11 RX ADMIN — Medication 81 MILLIGRAM(S): at 12:20

## 2017-12-11 RX ADMIN — Medication 3 MILLILITER(S): at 03:45

## 2017-12-11 RX ADMIN — Medication 3 MILLILITER(S): at 10:43

## 2017-12-11 RX ADMIN — BUDESONIDE AND FORMOTEROL FUMARATE DIHYDRATE 2 PUFF(S): 160; 4.5 AEROSOL RESPIRATORY (INHALATION) at 08:38

## 2017-12-11 RX ADMIN — HEPARIN SODIUM 5000 UNIT(S): 5000 INJECTION INTRAVENOUS; SUBCUTANEOUS at 22:50

## 2017-12-11 RX ADMIN — Medication 1: at 12:20

## 2017-12-11 RX ADMIN — ALBUTEROL 4 PUFF(S): 90 AEROSOL, METERED ORAL at 23:42

## 2017-12-11 RX ADMIN — Medication 6 UNIT(S): at 08:37

## 2017-12-11 RX ADMIN — Medication 3 MILLILITER(S): at 15:36

## 2017-12-11 RX ADMIN — FEBUXOSTAT 40 MILLIGRAM(S): 40 TABLET ORAL at 12:20

## 2017-12-11 RX ADMIN — Medication 2000 UNIT(S): at 12:20

## 2017-12-11 RX ADMIN — ATORVASTATIN CALCIUM 80 MILLIGRAM(S): 80 TABLET, FILM COATED ORAL at 22:50

## 2017-12-11 RX ADMIN — Medication 6 UNIT(S): at 17:17

## 2017-12-11 RX ADMIN — BUDESONIDE AND FORMOTEROL FUMARATE DIHYDRATE 2 PUFF(S): 160; 4.5 AEROSOL RESPIRATORY (INHALATION) at 21:11

## 2017-12-11 RX ADMIN — Medication 100 MILLIGRAM(S): at 22:57

## 2017-12-11 RX ADMIN — HEPARIN SODIUM 5000 UNIT(S): 5000 INJECTION INTRAVENOUS; SUBCUTANEOUS at 14:16

## 2017-12-11 RX ADMIN — INSULIN GLARGINE 30 UNIT(S): 100 INJECTION, SOLUTION SUBCUTANEOUS at 22:50

## 2017-12-11 RX ADMIN — Medication 6 UNIT(S): at 12:20

## 2017-12-11 RX ADMIN — CARVEDILOL PHOSPHATE 12.5 MILLIGRAM(S): 80 CAPSULE, EXTENDED RELEASE ORAL at 05:31

## 2017-12-11 RX ADMIN — LISINOPRIL 2.5 MILLIGRAM(S): 2.5 TABLET ORAL at 05:31

## 2017-12-11 NOTE — PROGRESS NOTE ADULT - SUBJECTIVE AND OBJECTIVE BOX
No pain, no shortness of breath      VITAL:  T(C): , Max: 36.8 (12-10-17 @ 12:43)  T(F): , Max: 98.3 (12-10-17 @ 12:43)  HR: 88 (12-11-17 @ 05:30)  BP: 135/74 (12-11-17 @ 05:30)  BP(mean): --  RR: 18 (12-11-17 @ 05:30)  SpO2: 100% (12-11-17 @ 05:30)      PHYSICAL EXAM:  Constitutional: NAD on NCO2; obese  HEENT: NCAT, DMM  Neck: Supple, No JVD  Respiratory: (+)mild b/l wheeze  Cardiovascular: distant s1s2, RRR   Gastrointestinal: BS+, soft, NT/ND  Extremities: 1+ b/l LE edema  Neurological: no focal deficits; strength grossly intact  Back: no CVAT b/l  Skin: No rashes (+)hypopigmental frontal patch      LABS:                        10.0   3.74  )-----------( 230      ( 10 Dec 2017 06:14 )             34.1     Na(147)/K(4.2)/Cl(103)/HCO3(33)/BUN(46)/Cr(1.48)Glu(150)/Ca(8.5)/Mg(1.7)/PO4(4.0)    12-10 @ 06:14  Na(146)/K(4.0)/Cl(102)/HCO3(30)/BUN(43)/Cr(1.42)Glu(121)/Ca(8.5)/Mg(1.8)/PO4(4.1)    12-09 @ 04:45      ASSESSMENT: 57F w/ HTN, DM2, HFrEF, COPD, and CKD3, s/p recent ALISA requiring HD x 2 months; 12/6/17 a/w acute on chronic HFrEF  (1)Renal - CKD3 - stable function  (2)Hypernatremia - mild/acceptable for now  (3)Shortness of breath - Pulmonary input appreciated.      RECOMMEND:  (1)Lasix as ordered  (2)Dexamethasone/Pulmicort per Pulmonary recommendations  (3)No renal objection to discharge wt followup at my office in 1-2 months        Perfecto Rae MD  [facility],   [phone] No pain. SOB improving      VITAL:  T(C): , Max: 36.8 (12-10-17 @ 12:43)  T(F): , Max: 98.3 (12-10-17 @ 12:43)  HR: 88 (12-11-17 @ 05:30)  BP: 135/74 (12-11-17 @ 05:30)  BP(mean): --  RR: 18 (12-11-17 @ 05:30)  SpO2: 100% (12-11-17 @ 05:30)      PHYSICAL EXAM:  Constitutional: NAD on NCO2; obese  HEENT: NCAT, DMM  Neck: Supple, No JVD  Respiratory: (+)mild b/l wheeze  Cardiovascular: distant s1s2, RRR   Gastrointestinal: BS+, soft, NT/ND  Extremities: 1+ b/l LE edema  Neurological: no focal deficits; strength grossly intact  Back: no CVAT b/l  Skin: No rashes (+)hypopigmental frontal patch      LABS:                        10.0   3.74  )-----------( 230      ( 10 Dec 2017 06:14 )             34.1     Na(147)/K(4.2)/Cl(103)/HCO3(33)/BUN(46)/Cr(1.48)Glu(150)/Ca(8.5)/Mg(1.7)/PO4(4.0)    12-10 @ 06:14  Na(146)/K(4.0)/Cl(102)/HCO3(30)/BUN(43)/Cr(1.42)Glu(121)/Ca(8.5)/Mg(1.8)/PO4(4.1)    12-09 @ 04:45      ASSESSMENT: 57F w/ HTN, DM2, HFrEF, COPD, and CKD3, s/p recent ALISA requiring HD x 2 months; 12/6/17 a/w acute on chronic HFrEF  (1)Renal - CKD3 - stable function  (2)Hypernatremia - mild/acceptable for now  (3)Shortness of breath - Pulmonary input appreciated.      RECOMMEND:  (1)Lasix as ordered  (2)Dexamethasone/Pulmicort per Pulmonary recommendations  (3)No renal objection to discharge wtih followup at my office in 1-2 months        Perfecto Rae MD  [facility]  [phone]

## 2017-12-11 NOTE — PROGRESS NOTE ADULT - SUBJECTIVE AND OBJECTIVE BOX
CHIEF COMPLAINT:  no   event    over  night still with shortness of breath when she is walking to  BR     SUBJECTIVE:     REVIEW OF SYSTEMS:    CONSTITUTIONAL: (+ )  weakness,  ( - ) fevers or chills  EYES/ENT: ( - )visual changes;     NECK: (  ) pain or stiffness  RESPIRATORY:   ( - )cough, wheezing, hemoptysis;  (+ ) shortness of breath  CARDIOVASCULAR:  ( - )chest pain or palpitations  GASTROINTESTINAL:   ( - )abdominal or epigastric pain.  (-  ) nausea, vomiting, or hematemesis;   (  - ) diarrhea or constipation.   GENITOURINARY:   (   - ) dysuria, frequency or hematuria  NEUROLOGICAL:  (-   ) numbness or weakness   All other review of systems is negative unless indicated above    Vital Signs Last 24 Hrs  T(C): 37.3 (11 Dec 2017 13:23), Max: 37.3 (11 Dec 2017 13:23)  T(F): 99.1 (11 Dec 2017 13:23), Max: 99.1 (11 Dec 2017 13:23)  HR: 85 (11 Dec 2017 13:23) (72 - 90)  BP: 125/60 (11 Dec 2017 13:23) (125/60 - 145/74)  BP(mean): --  RR: 18 (11 Dec 2017 13:23) (18 - 18)  SpO2: 97% (11 Dec 2017 13:23) (96% - 100%)    I&O's Summary      CAPILLARY BLOOD GLUCOSE      POCT Blood Glucose.: 157 mg/dL (11 Dec 2017 11:55)  POCT Blood Glucose.: 114 mg/dL (11 Dec 2017 08:25)  POCT Blood Glucose.: 126 mg/dL (10 Dec 2017 22:32)  POCT Blood Glucose.: 80 mg/dL (10 Dec 2017 16:59)      PHYSICAL EXAM:    Constitutional:  ( -  ) NAD,   ( +  )awake and alert  HEENT: PERR, EOMI,    Neck: Soft and supple, No LAD, No JVD  Respiratory:  (   + Breath sounds are clear bilaterally,    ( -  ) wheezing, rales or rhonchi  Cardiovascular:     (  + )S1 and S2, regular rate and rhythm, no Murmurs, gallops or rubs  Gastrointestinal:  (  + )Bowel Sounds present, soft,   (-  )nontender, nondistended,    Extremities:    (-  ) peripheral edema  Vascular: 2+ peripheral pulses  Neurological:    (   + )A/O x 3,   ( - ) focal deficits  Musculoskeletal:    ( +  )  normal strength b/l upper  ( +    ) normal  lower extremities  Skin: No rashes    MEDICATIONS:  MEDICATIONS  (STANDING):  ALBUTerol/ipratropium for Nebulization 3 milliLiter(s) Nebulizer every 6 hours  aspirin enteric coated 81 milliGRAM(s) Oral daily  atorvastatin 80 milliGRAM(s) Oral at bedtime  buDESOnide 160 MICROgram(s)/formoterol 4.5 MICROgram(s) Inhaler 2 Puff(s) Inhalation two times a day  carvedilol 12.5 milliGRAM(s) Oral every 12 hours  cholecalciferol 2000 Unit(s) Oral daily  dextrose 5%. 1000 milliLiter(s) (50 mL/Hr) IV Continuous <Continuous>  dextrose 50% Injectable 12.5 Gram(s) IV Push once  dextrose 50% Injectable 25 Gram(s) IV Push once  dextrose 50% Injectable 25 Gram(s) IV Push once  febuxostat 40 milliGRAM(s) Oral daily  furosemide    Tablet 80 milliGRAM(s) Oral daily  gabapentin 100 milliGRAM(s) Oral daily  heparin  Injectable 5000 Unit(s) SubCutaneous every 8 hours  insulin glargine Injectable (LANTUS) 30 Unit(s) SubCutaneous at bedtime  insulin lispro (HumaLOG) corrective regimen sliding scale   SubCutaneous three times a day before meals  insulin lispro Injectable (HumaLOG) 6 Unit(s) SubCutaneous three times a day before meals  lisinopril 2.5 milliGRAM(s) Oral daily      LABS: All Labs Reviewed:                        9.5    4.34  )-----------( 220      ( 11 Dec 2017 09:53 )             31.4     12-11    145  |  100  |  47<H>  ----------------------------<  171<H>  3.9   |  36<H>  |  1.30    Ca    8.7      11 Dec 2017 09:53  Phos  4.0     12-10  Mg     1.7     12-10            Blood Culture:   Urine Culture      RADIOLOGY/EKG:    ASSESSMENT AND PLAN::   57F w/ HTN, DM2, HFrEF, COPD, and CKD3, s/p recent ALISA requiring HD x 2 months; 12/6/17 a/w acute on chronic HFrEF  (1)Renal - CKD3 - stable function continue Lasix as ordered  (2)Hypernatremia - mild/acceptable for now  (3)Shortness of breath - Pulmonary input appreciated. continue dexamethasone/Pulmicort per Pulmonary recommendations      ADVANCED DIRECTIVE:    DISPOSITION:discharge home in 1-2 day

## 2017-12-12 LAB
BASOPHILS # BLD AUTO: 0.02 K/UL — SIGNIFICANT CHANGE UP (ref 0–0.2)
BASOPHILS NFR BLD AUTO: 0.5 % — SIGNIFICANT CHANGE UP (ref 0–2)
BUN SERPL-MCNC: 46 MG/DL — HIGH (ref 7–23)
CALCIUM SERPL-MCNC: 8.6 MG/DL — SIGNIFICANT CHANGE UP (ref 8.4–10.5)
CHLORIDE SERPL-SCNC: 103 MMOL/L — SIGNIFICANT CHANGE UP (ref 98–107)
CO2 SERPL-SCNC: 32 MMOL/L — HIGH (ref 22–31)
CREAT SERPL-MCNC: 1.29 MG/DL — SIGNIFICANT CHANGE UP (ref 0.5–1.3)
EOSINOPHIL # BLD AUTO: 0.14 K/UL — SIGNIFICANT CHANGE UP (ref 0–0.5)
EOSINOPHIL NFR BLD AUTO: 3.4 % — SIGNIFICANT CHANGE UP (ref 0–6)
GLUCOSE BLDC GLUCOMTR-MCNC: 129 MG/DL — HIGH (ref 70–99)
GLUCOSE BLDC GLUCOMTR-MCNC: 144 MG/DL — HIGH (ref 70–99)
GLUCOSE BLDC GLUCOMTR-MCNC: 148 MG/DL — HIGH (ref 70–99)
GLUCOSE BLDC GLUCOMTR-MCNC: 60 MG/DL — LOW (ref 70–99)
GLUCOSE BLDC GLUCOMTR-MCNC: 61 MG/DL — LOW (ref 70–99)
GLUCOSE BLDC GLUCOMTR-MCNC: 97 MG/DL — SIGNIFICANT CHANGE UP (ref 70–99)
GLUCOSE BLDC GLUCOMTR-MCNC: 99 MG/DL — SIGNIFICANT CHANGE UP (ref 70–99)
GLUCOSE SERPL-MCNC: 81 MG/DL — SIGNIFICANT CHANGE UP (ref 70–99)
HCT VFR BLD CALC: 33.2 % — LOW (ref 34.5–45)
HGB BLD-MCNC: 9.8 G/DL — LOW (ref 11.5–15.5)
IMM GRANULOCYTES # BLD AUTO: 0.01 # — SIGNIFICANT CHANGE UP
IMM GRANULOCYTES NFR BLD AUTO: 0.2 % — SIGNIFICANT CHANGE UP (ref 0–1.5)
LYMPHOCYTES # BLD AUTO: 1.08 K/UL — SIGNIFICANT CHANGE UP (ref 1–3.3)
LYMPHOCYTES # BLD AUTO: 26.1 % — SIGNIFICANT CHANGE UP (ref 13–44)
MCHC RBC-ENTMCNC: 24.6 PG — LOW (ref 27–34)
MCHC RBC-ENTMCNC: 29.5 % — LOW (ref 32–36)
MCV RBC AUTO: 83.2 FL — SIGNIFICANT CHANGE UP (ref 80–100)
MONOCYTES # BLD AUTO: 0.45 K/UL — SIGNIFICANT CHANGE UP (ref 0–0.9)
MONOCYTES NFR BLD AUTO: 10.9 % — SIGNIFICANT CHANGE UP (ref 2–14)
NEUTROPHILS # BLD AUTO: 2.44 K/UL — SIGNIFICANT CHANGE UP (ref 1.8–7.4)
NEUTROPHILS NFR BLD AUTO: 58.9 % — SIGNIFICANT CHANGE UP (ref 43–77)
NRBC # FLD: 0 — SIGNIFICANT CHANGE UP
PLATELET # BLD AUTO: 237 K/UL — SIGNIFICANT CHANGE UP (ref 150–400)
PMV BLD: 12.4 FL — SIGNIFICANT CHANGE UP (ref 7–13)
POTASSIUM SERPL-MCNC: 4.1 MMOL/L — SIGNIFICANT CHANGE UP (ref 3.5–5.3)
POTASSIUM SERPL-SCNC: 4.1 MMOL/L — SIGNIFICANT CHANGE UP (ref 3.5–5.3)
RBC # BLD: 3.99 M/UL — SIGNIFICANT CHANGE UP (ref 3.8–5.2)
RBC # FLD: 19.1 % — HIGH (ref 10.3–14.5)
SODIUM SERPL-SCNC: 147 MMOL/L — HIGH (ref 135–145)
WBC # BLD: 4.14 K/UL — SIGNIFICANT CHANGE UP (ref 3.8–10.5)
WBC # FLD AUTO: 4.14 K/UL — SIGNIFICANT CHANGE UP (ref 3.8–10.5)

## 2017-12-12 RX ORDER — INSULIN GLARGINE 100 [IU]/ML
20 INJECTION, SOLUTION SUBCUTANEOUS AT BEDTIME
Qty: 0 | Refills: 0 | Status: DISCONTINUED | OUTPATIENT
Start: 2017-12-12 | End: 2017-12-15

## 2017-12-12 RX ORDER — INSULIN GLARGINE 100 [IU]/ML
25 INJECTION, SOLUTION SUBCUTANEOUS AT BEDTIME
Qty: 0 | Refills: 0 | Status: DISCONTINUED | OUTPATIENT
Start: 2017-12-12 | End: 2017-12-12

## 2017-12-12 RX ORDER — FUROSEMIDE 40 MG
40 TABLET ORAL ONCE
Qty: 0 | Refills: 0 | Status: COMPLETED | OUTPATIENT
Start: 2017-12-12 | End: 2017-12-12

## 2017-12-12 RX ORDER — FUROSEMIDE 40 MG
80 TABLET ORAL
Qty: 0 | Refills: 0 | Status: DISCONTINUED | OUTPATIENT
Start: 2017-12-12 | End: 2017-12-15

## 2017-12-12 RX ORDER — DEXTROSE 50 % IN WATER 50 %
1 SYRINGE (ML) INTRAVENOUS ONCE
Qty: 0 | Refills: 0 | Status: COMPLETED | OUTPATIENT
Start: 2017-12-12 | End: 2017-12-12

## 2017-12-12 RX ADMIN — Medication 2000 UNIT(S): at 11:30

## 2017-12-12 RX ADMIN — Medication 80 MILLIGRAM(S): at 22:59

## 2017-12-12 RX ADMIN — ATORVASTATIN CALCIUM 80 MILLIGRAM(S): 80 TABLET, FILM COATED ORAL at 22:53

## 2017-12-12 RX ADMIN — Medication 6 UNIT(S): at 17:44

## 2017-12-12 RX ADMIN — BUDESONIDE AND FORMOTEROL FUMARATE DIHYDRATE 2 PUFF(S): 160; 4.5 AEROSOL RESPIRATORY (INHALATION) at 22:53

## 2017-12-12 RX ADMIN — Medication 1 DOSE(S): at 08:32

## 2017-12-12 RX ADMIN — BUDESONIDE AND FORMOTEROL FUMARATE DIHYDRATE 2 PUFF(S): 160; 4.5 AEROSOL RESPIRATORY (INHALATION) at 10:20

## 2017-12-12 RX ADMIN — CARVEDILOL PHOSPHATE 12.5 MILLIGRAM(S): 80 CAPSULE, EXTENDED RELEASE ORAL at 17:45

## 2017-12-12 RX ADMIN — Medication 80 MILLIGRAM(S): at 06:23

## 2017-12-12 RX ADMIN — GABAPENTIN 100 MILLIGRAM(S): 400 CAPSULE ORAL at 11:30

## 2017-12-12 RX ADMIN — ALBUTEROL 4 PUFF(S): 90 AEROSOL, METERED ORAL at 17:44

## 2017-12-12 RX ADMIN — Medication 40 MILLIGRAM(S): at 13:05

## 2017-12-12 RX ADMIN — Medication 81 MILLIGRAM(S): at 11:30

## 2017-12-12 RX ADMIN — Medication 6 UNIT(S): at 13:05

## 2017-12-12 RX ADMIN — HEPARIN SODIUM 5000 UNIT(S): 5000 INJECTION INTRAVENOUS; SUBCUTANEOUS at 13:10

## 2017-12-12 RX ADMIN — ALBUTEROL 4 PUFF(S): 90 AEROSOL, METERED ORAL at 22:54

## 2017-12-12 RX ADMIN — FEBUXOSTAT 40 MILLIGRAM(S): 40 TABLET ORAL at 11:30

## 2017-12-12 RX ADMIN — Medication 0.5 MILLIGRAM(S): at 06:23

## 2017-12-12 RX ADMIN — HEPARIN SODIUM 5000 UNIT(S): 5000 INJECTION INTRAVENOUS; SUBCUTANEOUS at 06:23

## 2017-12-12 RX ADMIN — ALBUTEROL 4 PUFF(S): 90 AEROSOL, METERED ORAL at 06:18

## 2017-12-12 RX ADMIN — ALBUTEROL 4 PUFF(S): 90 AEROSOL, METERED ORAL at 10:21

## 2017-12-12 RX ADMIN — HEPARIN SODIUM 5000 UNIT(S): 5000 INJECTION INTRAVENOUS; SUBCUTANEOUS at 22:59

## 2017-12-12 RX ADMIN — CARVEDILOL PHOSPHATE 12.5 MILLIGRAM(S): 80 CAPSULE, EXTENDED RELEASE ORAL at 06:23

## 2017-12-12 RX ADMIN — LISINOPRIL 2.5 MILLIGRAM(S): 2.5 TABLET ORAL at 06:23

## 2017-12-12 RX ADMIN — INSULIN GLARGINE 20 UNIT(S): 100 INJECTION, SOLUTION SUBCUTANEOUS at 22:55

## 2017-12-12 NOTE — PROGRESS NOTE ADULT - SUBJECTIVE AND OBJECTIVE BOX
CHIEF COMPLAINT: the patient does not feel good she  complaining of shortnes    SUBJECTIVE:     REVIEW OF SYSTEMS:    CONSTITUTIONAL: ( positive )  weakness,  ( - ) fevers or chills  EYES/ENT: (-  )visual changes;     NECK: ( - ) pain or stiffness  RESPIRATORY:   (-  )cough, wheezing, hemoptysis;  (positive  ) shortness of breath  CARDIOVASCULAR:  ( - )chest pain or palpitations  GASTROINTESTINAL:   ( - )abdominal or epigastric pain.  (  ) nausea, vomiting, or hematemesis;   (   ) diarrhea or constipation.   GENITOURINARY:   (   - ) dysuria, frequency or hematuria  NEUROLOGICAL:  (  - ) numbness or weakness   All other review of systems is negative unless indicated above    Vital Signs Last 24 Hrs  T(C): 36.8 (12 Dec 2017 13:46), Max: 36.9 (11 Dec 2017 22:35)  T(F): 98.2 (12 Dec 2017 13:46), Max: 98.4 (11 Dec 2017 22:35)  HR: 84 (12 Dec 2017 17:40) (80 - 87)  BP: 136/70 (12 Dec 2017 17:40) (132/68 - 150/72)  BP(mean): --  RR: 18 (12 Dec 2017 13:46) (18 - 18)  SpO2: 95% (12 Dec 2017 13:46) (95% - 97%)    I&O's Summary    12 Dec 2017 07:01  -  12 Dec 2017 22:20  --------------------------------------------------------  IN: 0 mL / OUT: 350 mL / NET: -350 mL        CAPILLARY BLOOD GLUCOSE      POCT Blood Glucose.: 99 mg/dL (12 Dec 2017 17:27)  POCT Blood Glucose.: 148 mg/dL (12 Dec 2017 12:18)  POCT Blood Glucose.: 144 mg/dL (12 Dec 2017 09:16)  POCT Blood Glucose.: 97 mg/dL (12 Dec 2017 08:40)  POCT Blood Glucose.: 61 mg/dL (12 Dec 2017 08:18)  POCT Blood Glucose.: 60 mg/dL (12 Dec 2017 08:16)  POCT Blood Glucose.: 116 mg/dL (11 Dec 2017 22:33)      PHYSICAL EXAM:    Constitutional:  (  - ) NAD,   ( positive  )awake and alert  HEENT: PERR, EOMI,    Neck: Soft and supple, No LAD, No JVD  Respiratory:  (   - Breath sounds are clear bilaterally,    (  mild ) wheezing, rales or rhonchi  Cardiovascular:     ( positive  )S1 and S2, regular rate and rhythm, no Murmurs, gallops or rubs  Gastrointestinal:  (  positive )Bowel Sounds present, soft,   (  )nontender, nondistended,    Extremities:    ( - ) peripheral edema  Vascular: 2+ peripheral pulses  Neurological:    (  ax O x 3,   ( - ) focal deficits  Musculoskeletal:    (  - )  normal strength b/l upper  (  -   ) normal  lower extremities  Skin: No rashes    MEDICATIONS:  MEDICATIONS  (STANDING):  ALBUTerol    90 MICROgram(s) HFA Inhaler 4 Puff(s) Inhalation every 6 hours  aspirin enteric coated 81 milliGRAM(s) Oral daily  atorvastatin 80 milliGRAM(s) Oral at bedtime  buDESOnide 160 MICROgram(s)/formoterol 4.5 MICROgram(s) Inhaler 2 Puff(s) Inhalation two times a day  carvedilol 12.5 milliGRAM(s) Oral every 12 hours  cholecalciferol 2000 Unit(s) Oral daily  dexamethasone     Tablet 0.5 milliGRAM(s) Oral daily  dextrose 5%. 1000 milliLiter(s) (50 mL/Hr) IV Continuous <Continuous>  dextrose 50% Injectable 12.5 Gram(s) IV Push once  dextrose 50% Injectable 25 Gram(s) IV Push once  dextrose 50% Injectable 25 Gram(s) IV Push once  febuxostat 40 milliGRAM(s) Oral daily  furosemide    Tablet 80 milliGRAM(s) Oral two times a day  gabapentin 100 milliGRAM(s) Oral daily  heparin  Injectable 5000 Unit(s) SubCutaneous every 8 hours  insulin glargine Injectable (LANTUS) 20 Unit(s) SubCutaneous at bedtime  insulin lispro (HumaLOG) corrective regimen sliding scale   SubCutaneous three times a day before meals  lisinopril 2.5 milliGRAM(s) Oral daily      LABS: All Labs Reviewed:                        9.8    4.14  )-----------( 237      ( 12 Dec 2017 07:16 )             33.2     12-12    147<H>  |  103  |  46<H>  ----------------------------<  81  4.1   |  32<H>  |  1.29    Ca    8.6      12 Dec 2017 07:16            Blood Culture:   Urine Culture      RADIOLOGY/EKG:    ASSESSMENT AND PLAN: 57F w/ HTN, DM2, HFrEF, COPD, and CKD3, s/p recent ALISA requiring HD x 2 months; 12/6/17 a/w acute on chronic HFrEF  (1)Renal - CKD3 - stable function continue Lasix as ordered we'll increase  LasiX  (2)Hypernatremia - mild/acceptable for now  (3)Shortness of breath - Pulmonary input appreciated. continue dexamethasone/Pulmicort per Pulmonary recommendations    DVT PPX:    ADVANCED DIRECTIVE:    DISPOSITION:

## 2017-12-12 NOTE — PROGRESS NOTE ADULT - SUBJECTIVE AND OBJECTIVE BOX
No pain, no shortness of breath      VITAL:  T(C): , Max: 37.3 (12-11-17 @ 13:23)  T(F): , Max: 99.1 (12-11-17 @ 13:23)  HR: 80 (12-12-17 @ 06:23)  BP: 150/72 (12-12-17 @ 06:23)  RR: 18 (12-12-17 @ 06:23)  SpO2: 97% (12-12-17 @ 06:23)      PHYSICAL EXAM:  Constitutional: NAD on NCO2; obese  HEENT: NCAT, DMM  Neck: Supple, No JVD  Respiratory: (+)mild b/l wheeze  Cardiovascular: distant s1s2, RRR   Gastrointestinal: BS+, soft, NT/ND  Extremities: 1+ b/l LE edema  Neurological: no focal deficits; strength grossly intact  Back: no CVAT b/l  Skin: No rashes (+)hypopigmental frontal patch      LABS:                        9.8    4.14  )-----------( 237      ( 12 Dec 2017 07:16 )             33.2     Na(147)/K(4.1)/Cl(103)/HCO3(32)/BUN(46)/Cr(1.29)Glu(81)/Ca(8.6)/Mg(--)/PO4(--)    12-12 @ 07:16  Na(145)/K(3.9)/Cl(100)/HCO3(36)/BUN(47)/Cr(1.30)Glu(171)/Ca(8.7)/Mg(--)/PO4(--)    12-11 @ 09:53  Na(147)/K(4.2)/Cl(103)/HCO3(33)/BUN(46)/Cr(1.48)Glu(150)/Ca(8.5)/Mg(1.7)/PO4(4.0)    12-10 @ 06:14      ASSESSMENT: 57F w/ HTN, DM2, HFrEF, COPD, and CKD3, s/p recent ALISA requiring HD x 2 months; 12/6/17 a/w acute on chronic HFrEF  (1)Renal - CKD3 - stable function  (2)Lytes acceptable  (3)Shortness of breath - CHF/COPD exacerbation. Improved. On oral Lasix, Decadron, and on inhaled Symbicort      RECOMMEND:  (1)Meds as ordered  (2)No objection to discharge with followup at my office in 1-2 months                Perfecto Rae MD  [facility]  [phone] (+)intermittent SOB. No pain.      VITAL:  T(C): , Max: 37.3 (12-11-17 @ 13:23)  T(F): , Max: 99.1 (12-11-17 @ 13:23)  HR: 80 (12-12-17 @ 06:23)  BP: 150/72 (12-12-17 @ 06:23)  RR: 18 (12-12-17 @ 06:23)  SpO2: 97% (12-12-17 @ 06:23)      PHYSICAL EXAM:  Constitutional: NAD on NCO2; obese  HEENT: NCAT, DMM  Neck: Supple, No JVD  Respiratory: mildly distant b/l. No C/R/W at present (s/p inhalers minutes ago)  Cardiovascular: distant s1s2, RRR   Gastrointestinal: BS+, soft, NT/ND  Extremities: 2-3+ b/l LE edema  Neurological: no focal deficits; strength grossly intact  Back: no CVAT b/l  Skin: No rashes (+)hypopigmental frontal patch      LABS:                        9.8    4.14  )-----------( 237      ( 12 Dec 2017 07:16 )             33.2     Na(147)/K(4.1)/Cl(103)/HCO3(32)/BUN(46)/Cr(1.29)Glu(81)/Ca(8.6)/Mg(--)/PO4(--)    12-12 @ 07:16  Na(145)/K(3.9)/Cl(100)/HCO3(36)/BUN(47)/Cr(1.30)Glu(171)/Ca(8.7)/Mg(--)/PO4(--)    12-11 @ 09:53  Na(147)/K(4.2)/Cl(103)/HCO3(33)/BUN(46)/Cr(1.48)Glu(150)/Ca(8.5)/Mg(1.7)/PO4(4.0)    12-10 @ 06:14      ASSESSMENT: 57F w/ HTN, DM2, HFrEF, COPD, and CKD3, s/p recent ALISA requiring HD x 2 months; 12/6/17 a/w acute on chronic HFrEF    (1)Renal - CKD3 - stable function  (2)Hypernatremia - mild, acceptable for now  (3)Shortness of breath - CHF/COPD exacerbation. On oral Lasix, Decadron, and on inhaled Symbicort. Swelling seems a bit worse. No renal contraindication to increasing Lasix.        RECOMMEND:  (1)Increase Lasix to 80mg po bid for now          Perfecto Rae MD  [facility]  [phone]

## 2017-12-13 LAB
BUN SERPL-MCNC: 47 MG/DL — HIGH (ref 7–23)
CALCIUM SERPL-MCNC: 9 MG/DL — SIGNIFICANT CHANGE UP (ref 8.4–10.5)
CHLORIDE SERPL-SCNC: 99 MMOL/L — SIGNIFICANT CHANGE UP (ref 98–107)
CO2 SERPL-SCNC: 33 MMOL/L — HIGH (ref 22–31)
CREAT SERPL-MCNC: 1.27 MG/DL — SIGNIFICANT CHANGE UP (ref 0.5–1.3)
GLUCOSE BLDC GLUCOMTR-MCNC: 133 MG/DL — HIGH (ref 70–99)
GLUCOSE BLDC GLUCOMTR-MCNC: 157 MG/DL — HIGH (ref 70–99)
GLUCOSE BLDC GLUCOMTR-MCNC: 161 MG/DL — HIGH (ref 70–99)
GLUCOSE BLDC GLUCOMTR-MCNC: 165 MG/DL — HIGH (ref 70–99)
GLUCOSE SERPL-MCNC: 141 MG/DL — HIGH (ref 70–99)
HCT VFR BLD CALC: 33.1 % — LOW (ref 34.5–45)
HGB BLD-MCNC: 9.9 G/DL — LOW (ref 11.5–15.5)
MCHC RBC-ENTMCNC: 24.7 PG — LOW (ref 27–34)
MCHC RBC-ENTMCNC: 29.9 % — LOW (ref 32–36)
MCV RBC AUTO: 82.5 FL — SIGNIFICANT CHANGE UP (ref 80–100)
NRBC # FLD: 0 — SIGNIFICANT CHANGE UP
PLATELET # BLD AUTO: 227 K/UL — SIGNIFICANT CHANGE UP (ref 150–400)
PMV BLD: 11.9 FL — SIGNIFICANT CHANGE UP (ref 7–13)
POTASSIUM SERPL-MCNC: 4.2 MMOL/L — SIGNIFICANT CHANGE UP (ref 3.5–5.3)
POTASSIUM SERPL-SCNC: 4.2 MMOL/L — SIGNIFICANT CHANGE UP (ref 3.5–5.3)
RBC # BLD: 4.01 M/UL — SIGNIFICANT CHANGE UP (ref 3.8–5.2)
RBC # FLD: 19.2 % — HIGH (ref 10.3–14.5)
SODIUM SERPL-SCNC: 143 MMOL/L — SIGNIFICANT CHANGE UP (ref 135–145)
WBC # BLD: 4.47 K/UL — SIGNIFICANT CHANGE UP (ref 3.8–10.5)
WBC # FLD AUTO: 4.47 K/UL — SIGNIFICANT CHANGE UP (ref 3.8–10.5)

## 2017-12-13 RX ORDER — LISINOPRIL 2.5 MG/1
5 TABLET ORAL DAILY
Qty: 0 | Refills: 0 | Status: DISCONTINUED | OUTPATIENT
Start: 2017-12-14 | End: 2017-12-14

## 2017-12-13 RX ORDER — FUROSEMIDE 40 MG
40 TABLET ORAL ONCE
Qty: 0 | Refills: 0 | Status: COMPLETED | OUTPATIENT
Start: 2017-12-13 | End: 2017-12-13

## 2017-12-13 RX ADMIN — FEBUXOSTAT 40 MILLIGRAM(S): 40 TABLET ORAL at 12:21

## 2017-12-13 RX ADMIN — ALBUTEROL 4 PUFF(S): 90 AEROSOL, METERED ORAL at 22:57

## 2017-12-13 RX ADMIN — Medication 2000 UNIT(S): at 12:22

## 2017-12-13 RX ADMIN — Medication 100 MILLIGRAM(S): at 23:08

## 2017-12-13 RX ADMIN — Medication 100 MILLIGRAM(S): at 13:43

## 2017-12-13 RX ADMIN — BUDESONIDE AND FORMOTEROL FUMARATE DIHYDRATE 2 PUFF(S): 160; 4.5 AEROSOL RESPIRATORY (INHALATION) at 21:31

## 2017-12-13 RX ADMIN — INSULIN GLARGINE 20 UNIT(S): 100 INJECTION, SOLUTION SUBCUTANEOUS at 22:57

## 2017-12-13 RX ADMIN — Medication 81 MILLIGRAM(S): at 12:21

## 2017-12-13 RX ADMIN — Medication 1: at 08:33

## 2017-12-13 RX ADMIN — Medication 1: at 12:20

## 2017-12-13 RX ADMIN — ALBUTEROL 4 PUFF(S): 90 AEROSOL, METERED ORAL at 18:51

## 2017-12-13 RX ADMIN — BUDESONIDE AND FORMOTEROL FUMARATE DIHYDRATE 2 PUFF(S): 160; 4.5 AEROSOL RESPIRATORY (INHALATION) at 08:33

## 2017-12-13 RX ADMIN — ALBUTEROL 4 PUFF(S): 90 AEROSOL, METERED ORAL at 05:29

## 2017-12-13 RX ADMIN — LISINOPRIL 2.5 MILLIGRAM(S): 2.5 TABLET ORAL at 05:28

## 2017-12-13 RX ADMIN — HEPARIN SODIUM 5000 UNIT(S): 5000 INJECTION INTRAVENOUS; SUBCUTANEOUS at 12:21

## 2017-12-13 RX ADMIN — GABAPENTIN 100 MILLIGRAM(S): 400 CAPSULE ORAL at 12:22

## 2017-12-13 RX ADMIN — HEPARIN SODIUM 5000 UNIT(S): 5000 INJECTION INTRAVENOUS; SUBCUTANEOUS at 22:57

## 2017-12-13 RX ADMIN — ALBUTEROL 4 PUFF(S): 90 AEROSOL, METERED ORAL at 08:35

## 2017-12-13 RX ADMIN — Medication 80 MILLIGRAM(S): at 17:32

## 2017-12-13 RX ADMIN — CARVEDILOL PHOSPHATE 12.5 MILLIGRAM(S): 80 CAPSULE, EXTENDED RELEASE ORAL at 17:32

## 2017-12-13 RX ADMIN — ATORVASTATIN CALCIUM 80 MILLIGRAM(S): 80 TABLET, FILM COATED ORAL at 22:57

## 2017-12-13 RX ADMIN — CARVEDILOL PHOSPHATE 12.5 MILLIGRAM(S): 80 CAPSULE, EXTENDED RELEASE ORAL at 05:28

## 2017-12-13 RX ADMIN — Medication 80 MILLIGRAM(S): at 05:28

## 2017-12-13 RX ADMIN — Medication 0.5 MILLIGRAM(S): at 05:28

## 2017-12-13 RX ADMIN — Medication 40 MILLIGRAM(S): at 12:20

## 2017-12-13 RX ADMIN — HEPARIN SODIUM 5000 UNIT(S): 5000 INJECTION INTRAVENOUS; SUBCUTANEOUS at 05:28

## 2017-12-13 NOTE — PROGRESS NOTE ADULT - SUBJECTIVE AND OBJECTIVE BOX
CHIEF COMPLAINT:  no   event    over  night  but  c/o  [place]  feet    SUBJECTIVE:     REVIEW OF SYSTEMS:    CONSTITUTIONAL: +  )  weakness,  ( - ) fevers or chills  EYES/ENT: ( - )visual changes;     NECK: (  ) pain or stiffness  RESPIRATORY:   ( - )cough, wheezing, hemoptysis;  (-  ) shortness of breath  CARDIOVASCULAR:  ( - )chest pain or palpitations  GASTROINTESTINAL:   ( - )abdominal or epigastric pain.  (-  ) nausea, vomiting, or hematemesis;   (  - ) diarrhea or constipation.   GENITOURINARY:   (   - ) dysuria, frequency or hematuria  NEUROLOGICAL:  (-   ) numbness or weakness   All other review of systems is negative unless indicated above    Vital Signs Last 24 Hrs  T(C): 36.4 (13 Dec 2017 05:26), Max: 36.9 (12 Dec 2017 22:28)  T(F): 97.5 (13 Dec 2017 05:26), Max: 98.5 (12 Dec 2017 22:28)  HR: 86 (13 Dec 2017 05:26) (83 - 87)  BP: 129/72 (13 Dec 2017 05:26) (122/71 - 136/70)  BP(mean): --  RR: 18 (13 Dec 2017 05:26) (18 - 18)  SpO2: 97% (13 Dec 2017 05:26) (95% - 100%)    I&O's Summary    12 Dec 2017 07:01  -  13 Dec 2017 07:00  --------------------------------------------------------  IN: 0 mL / OUT: 1150 mL / NET: -1150 mL        CAPILLARY BLOOD GLUCOSE      POCT Blood Glucose.: 161 mg/dL (13 Dec 2017 08:19)  POCT Blood Glucose.: 129 mg/dL (12 Dec 2017 22:32)  POCT Blood Glucose.: 99 mg/dL (12 Dec 2017 17:27)  POCT Blood Glucose.: 148 mg/dL (12 Dec 2017 12:18)      PHYSICAL EXAM:    Constitutional:  ( -  ) NAD,   ( +  )awake and alert  HEENT: PERR, EOMI,    Neck: Soft and supple, No LAD, No JVD  Respiratory:  (   + Breath sounds are clear bilaterally,    ( -  ) wheezing, rales or rhonchi  Cardiovascular:     (  + )S1 and S2, regular rate and rhythm, no Murmurs, gallops or rubs  Gastrointestinal:  (  + )Bowel Sounds present, soft,   (-  )nontender, nondistended,    Extremities:    (+  ) peripheral edema  Vascular: 2+ peripheral pulses  Neurological:    (   + )A/O x 3,   ( - ) focal deficits  Musculoskeletal:    ( +  )  normal strength b/l upper  ( +    ) normal  lower extremities  Skin: No rashes    MEDICATIONS:  MEDICATIONS  (STANDING):  ALBUTerol    90 MICROgram(s) HFA Inhaler 4 Puff(s) Inhalation every 6 hours  aspirin enteric coated 81 milliGRAM(s) Oral daily  atorvastatin 80 milliGRAM(s) Oral at bedtime  buDESOnide 160 MICROgram(s)/formoterol 4.5 MICROgram(s) Inhaler 2 Puff(s) Inhalation two times a day  carvedilol 12.5 milliGRAM(s) Oral every 12 hours  cholecalciferol 2000 Unit(s) Oral daily  dexamethasone     Tablet 0.5 milliGRAM(s) Oral daily  dextrose 5%. 1000 milliLiter(s) (50 mL/Hr) IV Continuous <Continuous>  dextrose 50% Injectable 12.5 Gram(s) IV Push once  dextrose 50% Injectable 25 Gram(s) IV Push once  dextrose 50% Injectable 25 Gram(s) IV Push once  febuxostat 40 milliGRAM(s) Oral daily  furosemide    Tablet 80 milliGRAM(s) Oral two times a day  gabapentin 100 milliGRAM(s) Oral daily  heparin  Injectable 5000 Unit(s) SubCutaneous every 8 hours  insulin glargine Injectable (LANTUS) 20 Unit(s) SubCutaneous at bedtime  insulin lispro (HumaLOG) corrective regimen sliding scale   SubCutaneous three times a day before meals  lisinopril 2.5 milliGRAM(s) Oral daily      LABS: All Labs Reviewed:                        9.9    4.47  )-----------( 227      ( 13 Dec 2017 07:45 )             33.1     12-13    143  |  99  |  47<H>  ----------------------------<  141<H>  4.2   |  33<H>  |  1.27    Ca    9.0      13 Dec 2017 07:45            Blood Culture:   Urine Culture      RADIOLOGY/EKG:    ASSESSMENT AND PLAN:57F w/ HTN, DM2, HFrEF, COPD, and CKD3, s/p recent ALISA requiring HD x 2 months; 12/6/17 a/w acute on chronic HFrEF  (1)Renal - CKD3 - stable function continue Lasix as ordered we'll increase  LasiX  (2)Hypernatremia - mild/acceptable for now  (3)Shortness of breath - Pulmonary input appreciated. continue dexamethasone/Pulmicort per lasix  40  mg  today    DVT PPX:    ADVANCED DIRECTIVE:    DISPOSITION: CHIEF COMPLAINT:  no   event    over  night  but  c/o   shortness of breath and  and swollen foot     SUBJECTIVE:     REVIEW OF SYSTEMS:    CONSTITUTIONAL: +  )  weakness,  ( - ) fevers or chills  EYES/ENT: ( - )visual changes;     NECK: (  ) pain or stiffness  RESPIRATORY:   ( - )cough, wheezing, hemoptysis;  ( + ) shortness of breath  CARDIOVASCULAR:  ( - )chest pain or palpitations  GASTROINTESTINAL:   ( - )abdominal or epigastric pain.  (-  ) nausea, vomiting, or hematemesis;   (  - ) diarrhea or constipation.   GENITOURINARY:   (   - ) dysuria, frequency or hematuria  NEUROLOGICAL:  (-   ) numbness or weakness   All other review of systems is negative unless indicated above    Vital Signs Last 24 Hrs  T(C): 36.4 (13 Dec 2017 05:26), Max: 36.9 (12 Dec 2017 22:28)  T(F): 97.5 (13 Dec 2017 05:26), Max: 98.5 (12 Dec 2017 22:28)  HR: 86 (13 Dec 2017 05:26) (83 - 87)  BP: 129/72 (13 Dec 2017 05:26) (122/71 - 136/70)  BP(mean): --  RR: 18 (13 Dec 2017 05:26) (18 - 18)  SpO2: 97% (13 Dec 2017 05:26) (95% - 100%)    I&O's Summary    12 Dec 2017 07:01  -  13 Dec 2017 07:00  --------------------------------------------------------  IN: 0 mL / OUT: 1150 mL / NET: -1150 mL        CAPILLARY BLOOD GLUCOSE      POCT Blood Glucose.: 161 mg/dL (13 Dec 2017 08:19)  POCT Blood Glucose.: 129 mg/dL (12 Dec 2017 22:32)  POCT Blood Glucose.: 99 mg/dL (12 Dec 2017 17:27)  POCT Blood Glucose.: 148 mg/dL (12 Dec 2017 12:18)      PHYSICAL EXAM:    Constitutional:  ( -  ) NAD,   ( +  )awake and alert  HEENT: PERR, EOMI,    Neck: Soft and supple, No LAD, No JVD  Respiratory:  (   + Breath sounds are clear bilaterally,    ( -  ) wheezing, rales or rhonchi  Cardiovascular:     (  + )S1 and S2, regular rate and rhythm, no Murmurs, gallops or rubs  Gastrointestinal:  (  + )Bowel Sounds present, soft,   (-  )nontender, nondistended,    Extremities:    (+  ) peripheral edema  Vascular: 2+ peripheral pulses  Neurological:    (   + )A/O x 3,   ( - ) focal deficits  Musculoskeletal:    ( +  )  normal strength b/l upper  ( +    ) normal  lower extremities  Skin: No rashes    MEDICATIONS:  MEDICATIONS  (STANDING):  ALBUTerol    90 MICROgram(s) HFA Inhaler 4 Puff(s) Inhalation every 6 hours  aspirin enteric coated 81 milliGRAM(s) Oral daily  atorvastatin 80 milliGRAM(s) Oral at bedtime  buDESOnide 160 MICROgram(s)/formoterol 4.5 MICROgram(s) Inhaler 2 Puff(s) Inhalation two times a day  carvedilol 12.5 milliGRAM(s) Oral every 12 hours  cholecalciferol 2000 Unit(s) Oral daily  dexamethasone     Tablet 0.5 milliGRAM(s) Oral daily  dextrose 5%. 1000 milliLiter(s) (50 mL/Hr) IV Continuous <Continuous>  dextrose 50% Injectable 12.5 Gram(s) IV Push once  dextrose 50% Injectable 25 Gram(s) IV Push once  dextrose 50% Injectable 25 Gram(s) IV Push once  febuxostat 40 milliGRAM(s) Oral daily  furosemide    Tablet 80 milliGRAM(s) Oral two times a day  gabapentin 100 milliGRAM(s) Oral daily  heparin  Injectable 5000 Unit(s) SubCutaneous every 8 hours  insulin glargine Injectable (LANTUS) 20 Unit(s) SubCutaneous at bedtime  insulin lispro (HumaLOG) corrective regimen sliding scale   SubCutaneous three times a day before meals  lisinopril 2.5 milliGRAM(s) Oral daily      LABS: All Labs Reviewed:                        9.9    4.47  )-----------( 227      ( 13 Dec 2017 07:45 )             33.1     12-13    143  |  99  |  47<H>  ----------------------------<  141<H>  4.2   |  33<H>  |  1.27    Ca    9.0      13 Dec 2017 07:45            Blood Culture:   Urine Culture      RADIOLOGY/EKG:    ASSESSMENT AND PLAN:57F w/ HTN, DM2, HFrEF, COPD, and CKD3, s/p recent ALISA requiring HD x 2 months; 12/6/17 a/w acute on chronic HFrEF  (1)Renal - CKD3 - stable function continue Lasix as ordered we'll increase  LasiX  (2)Hypernatremia - mild/acceptable for now  (3)Shortness of breath - Pulmonary input appreciated. continue dexamethasone/Pulmicort per lasix  40  mg  today    DVT PPX:    ADVANCED DIRECTIVE:    DISPOSITION:

## 2017-12-13 NOTE — PROGRESS NOTE ADULT - SUBJECTIVE AND OBJECTIVE BOX
No pain, (+)intermittent shortness of breath      VITAL:  T(C): , Max: 36.9 (12-12-17 @ 22:28)  T(F): , Max: 98.5 (12-12-17 @ 22:28)  HR: 86 (12-13-17 @ 05:26)  BP: 129/72 (12-13-17 @ 05:26)  BP(mean): --  RR: 18 (12-13-17 @ 05:26)  SpO2: 97% (12-13-17 @ 05:26)      PHYSICAL EXAM:    Constitutional: NAD; Alert; overweight  HEENT:  NCAT; DMM  Neck: No JVD; supple  Respiratory: fair air movement; no wheeze appreciated  Cardiac: RRR s1s2  Gastrointestinal: BS+, soft, NT/ND  Urologic: No correa  Extremities: No peripheral edema  Back: No CVAT b/l    LABS:                        9.9    4.47  )-----------( 227      ( 13 Dec 2017 07:45 )             33.1     Na(143)/K(4.2)/Cl(99)/HCO3(33)/BUN(47)/Cr(1.27)Glu(141)/Ca(9.0)/Mg(--)/PO4(--)    12-13 @ 07:45  Na(147)/K(4.1)/Cl(103)/HCO3(32)/BUN(46)/Cr(1.29)Glu(81)/Ca(8.6)/Mg(--)/PO4(--)    12-12 @ 07:16  Na(145)/K(3.9)/Cl(100)/HCO3(36)/BUN(47)/Cr(1.30)Glu(171)/Ca(8.7)/Mg(--)/PO4(--)    12-11 @ 09:53      ASSESSMENT: 57F w/ HTN, DM2, HFrEF, COPD, and CKD3, s/p recent ALISA requiring HD x 2 months; 12/6/17 a/w acute on chronic HFrEF  (1)Renal - CKD3 - stable function  (2)Hypernatremia - mild, acceptable for now  (3)Shortness of breath - CHF/COPD exacerbation.       RECOMMEND  (1)Lasix as ordered  (2)Pulmonary followup            Perfecto Rae MD  [facility],   [phone]

## 2017-12-14 LAB
BUN SERPL-MCNC: 50 MG/DL — HIGH (ref 7–23)
CALCIUM SERPL-MCNC: 8.7 MG/DL — SIGNIFICANT CHANGE UP (ref 8.4–10.5)
CHLORIDE SERPL-SCNC: 100 MMOL/L — SIGNIFICANT CHANGE UP (ref 98–107)
CO2 SERPL-SCNC: 33 MMOL/L — HIGH (ref 22–31)
CREAT SERPL-MCNC: 1.39 MG/DL — HIGH (ref 0.5–1.3)
GLUCOSE BLDC GLUCOMTR-MCNC: 115 MG/DL — HIGH (ref 70–99)
GLUCOSE BLDC GLUCOMTR-MCNC: 151 MG/DL — HIGH (ref 70–99)
GLUCOSE BLDC GLUCOMTR-MCNC: 189 MG/DL — HIGH (ref 70–99)
GLUCOSE BLDC GLUCOMTR-MCNC: 195 MG/DL — HIGH (ref 70–99)
GLUCOSE SERPL-MCNC: 93 MG/DL — SIGNIFICANT CHANGE UP (ref 70–99)
HCT VFR BLD CALC: 33.6 % — LOW (ref 34.5–45)
HGB BLD-MCNC: 9.9 G/DL — LOW (ref 11.5–15.5)
MCHC RBC-ENTMCNC: 24 PG — LOW (ref 27–34)
MCHC RBC-ENTMCNC: 29.5 % — LOW (ref 32–36)
MCV RBC AUTO: 81.6 FL — SIGNIFICANT CHANGE UP (ref 80–100)
NRBC # FLD: 0 — SIGNIFICANT CHANGE UP
PLATELET # BLD AUTO: 253 K/UL — SIGNIFICANT CHANGE UP (ref 150–400)
PMV BLD: 12 FL — SIGNIFICANT CHANGE UP (ref 7–13)
POTASSIUM SERPL-MCNC: 4.1 MMOL/L — SIGNIFICANT CHANGE UP (ref 3.5–5.3)
POTASSIUM SERPL-SCNC: 4.1 MMOL/L — SIGNIFICANT CHANGE UP (ref 3.5–5.3)
RBC # BLD: 4.12 M/UL — SIGNIFICANT CHANGE UP (ref 3.8–5.2)
RBC # FLD: 19.5 % — HIGH (ref 10.3–14.5)
SODIUM SERPL-SCNC: 145 MMOL/L — SIGNIFICANT CHANGE UP (ref 135–145)
WBC # BLD: 4.45 K/UL — SIGNIFICANT CHANGE UP (ref 3.8–10.5)
WBC # FLD AUTO: 4.45 K/UL — SIGNIFICANT CHANGE UP (ref 3.8–10.5)

## 2017-12-14 RX ADMIN — Medication 650 MILLIGRAM(S): at 18:14

## 2017-12-14 RX ADMIN — HEPARIN SODIUM 5000 UNIT(S): 5000 INJECTION INTRAVENOUS; SUBCUTANEOUS at 05:05

## 2017-12-14 RX ADMIN — BUDESONIDE AND FORMOTEROL FUMARATE DIHYDRATE 2 PUFF(S): 160; 4.5 AEROSOL RESPIRATORY (INHALATION) at 10:00

## 2017-12-14 RX ADMIN — Medication 1: at 12:16

## 2017-12-14 RX ADMIN — ALBUTEROL 4 PUFF(S): 90 AEROSOL, METERED ORAL at 18:10

## 2017-12-14 RX ADMIN — HEPARIN SODIUM 5000 UNIT(S): 5000 INJECTION INTRAVENOUS; SUBCUTANEOUS at 23:18

## 2017-12-14 RX ADMIN — FEBUXOSTAT 40 MILLIGRAM(S): 40 TABLET ORAL at 12:17

## 2017-12-14 RX ADMIN — Medication 80 MILLIGRAM(S): at 05:05

## 2017-12-14 RX ADMIN — Medication 2000 UNIT(S): at 12:17

## 2017-12-14 RX ADMIN — CARVEDILOL PHOSPHATE 12.5 MILLIGRAM(S): 80 CAPSULE, EXTENDED RELEASE ORAL at 17:18

## 2017-12-14 RX ADMIN — ALBUTEROL 4 PUFF(S): 90 AEROSOL, METERED ORAL at 23:16

## 2017-12-14 RX ADMIN — ATORVASTATIN CALCIUM 80 MILLIGRAM(S): 80 TABLET, FILM COATED ORAL at 23:18

## 2017-12-14 RX ADMIN — ALBUTEROL 4 PUFF(S): 90 AEROSOL, METERED ORAL at 04:58

## 2017-12-14 RX ADMIN — LISINOPRIL 5 MILLIGRAM(S): 2.5 TABLET ORAL at 05:05

## 2017-12-14 RX ADMIN — Medication 80 MILLIGRAM(S): at 17:18

## 2017-12-14 RX ADMIN — INSULIN GLARGINE 20 UNIT(S): 100 INJECTION, SOLUTION SUBCUTANEOUS at 23:18

## 2017-12-14 RX ADMIN — Medication 1: at 17:17

## 2017-12-14 RX ADMIN — Medication 650 MILLIGRAM(S): at 19:12

## 2017-12-14 RX ADMIN — BUDESONIDE AND FORMOTEROL FUMARATE DIHYDRATE 2 PUFF(S): 160; 4.5 AEROSOL RESPIRATORY (INHALATION) at 23:16

## 2017-12-14 RX ADMIN — Medication 81 MILLIGRAM(S): at 12:17

## 2017-12-14 RX ADMIN — CARVEDILOL PHOSPHATE 12.5 MILLIGRAM(S): 80 CAPSULE, EXTENDED RELEASE ORAL at 05:05

## 2017-12-14 RX ADMIN — HEPARIN SODIUM 5000 UNIT(S): 5000 INJECTION INTRAVENOUS; SUBCUTANEOUS at 15:00

## 2017-12-14 RX ADMIN — Medication 0.5 MILLIGRAM(S): at 05:05

## 2017-12-14 RX ADMIN — ALBUTEROL 4 PUFF(S): 90 AEROSOL, METERED ORAL at 10:00

## 2017-12-14 RX ADMIN — GABAPENTIN 100 MILLIGRAM(S): 400 CAPSULE ORAL at 12:17

## 2017-12-14 NOTE — PROGRESS NOTE ADULT - SUBJECTIVE AND OBJECTIVE BOX
CHIEF COMPLAINT:  no   event    over  night    SUBJECTIVE:     REVIEW OF SYSTEMS:    CONSTITUTIONAL: (-  )  weakness,  ( - ) fevers or chills  EYES/ENT: ( - )visual changes;     NECK: (  ) pain or stiffness  RESPIRATORY:   ( - )cough, wheezing, hemoptysis;  (-  ) shortness of breath  CARDIOVASCULAR:  ( - )chest pain or palpitations  GASTROINTESTINAL:   ( - )abdominal or epigastric pain.  (-  ) nausea, vomiting, or hematemesis;   (  - ) diarrhea or constipation.   GENITOURINARY:   (   - ) dysuria, frequency or hematuria  NEUROLOGICAL:  (-   ) numbness or weakness   All other review of systems is negative unless indicated above    Vital Signs Last 24 Hrs  T(C): 36.9 (14 Dec 2017 13:48), Max: 36.9 (14 Dec 2017 13:48)  T(F): 98.4 (14 Dec 2017 13:48), Max: 98.4 (14 Dec 2017 13:48)  HR: 80 (14 Dec 2017 17:18) (80 - 95)  BP: 125/70 (14 Dec 2017 17:18) (121/62 - 128/80)  BP(mean): --  RR: 17 (14 Dec 2017 17:18) (17 - 19)  SpO2: 100% (14 Dec 2017 17:18) (95% - 100%)    I&O's Summary    13 Dec 2017 07:01  -  14 Dec 2017 07:00  --------------------------------------------------------  IN: 0 mL / OUT: 750 mL / NET: -750 mL        CAPILLARY BLOOD GLUCOSE      POCT Blood Glucose.: 151 mg/dL (14 Dec 2017 16:37)  POCT Blood Glucose.: 195 mg/dL (14 Dec 2017 12:10)  POCT Blood Glucose.: 115 mg/dL (14 Dec 2017 08:20)  POCT Blood Glucose.: 157 mg/dL (13 Dec 2017 21:56)      PHYSICAL EXAM:    Constitutional:  ( -  ) NAD,   ( +  )awake and alert  HEENT: PERR, EOMI,    Neck: Soft and supple, No LAD, No JVD  Respiratory:  (   + Breath sounds are clear bilaterally,    ( -  ) wheezing, rales or rhonchi  Cardiovascular:     (  + )S1 and S2, regular rate and rhythm, no Murmurs, gallops or rubs  Gastrointestinal:  (  + )Bowel Sounds present, soft,   (-  )nontender, nondistended,    Extremities:    (-  ) peripheral edema  Vascular: 2+ peripheral pulses  Neurological:    (   + )A/O x 3,   ( - ) focal deficits  Musculoskeletal:    ( +  )  normal strength b/l upper  ( +    ) normal  lower extremities  Skin: No rashes    MEDICATIONS:  MEDICATIONS  (STANDING):  ALBUTerol    90 MICROgram(s) HFA Inhaler 4 Puff(s) Inhalation every 6 hours  aspirin enteric coated 81 milliGRAM(s) Oral daily  atorvastatin 80 milliGRAM(s) Oral at bedtime  buDESOnide 160 MICROgram(s)/formoterol 4.5 MICROgram(s) Inhaler 2 Puff(s) Inhalation two times a day  carvedilol 12.5 milliGRAM(s) Oral every 12 hours  cholecalciferol 2000 Unit(s) Oral daily  dexamethasone     Tablet 0.5 milliGRAM(s) Oral daily  dextrose 5%. 1000 milliLiter(s) (50 mL/Hr) IV Continuous <Continuous>  dextrose 50% Injectable 12.5 Gram(s) IV Push once  dextrose 50% Injectable 25 Gram(s) IV Push once  dextrose 50% Injectable 25 Gram(s) IV Push once  febuxostat 40 milliGRAM(s) Oral daily  furosemide    Tablet 80 milliGRAM(s) Oral two times a day  gabapentin 100 milliGRAM(s) Oral daily  heparin  Injectable 5000 Unit(s) SubCutaneous every 8 hours  insulin glargine Injectable (LANTUS) 20 Unit(s) SubCutaneous at bedtime  insulin lispro (HumaLOG) corrective regimen sliding scale   SubCutaneous three times a day before meals      LABS: All Labs Reviewed:                        9.9    4.45  )-----------( 253      ( 14 Dec 2017 05:30 )             33.6     12-14    145  |  100  |  50<H>  ----------------------------<  93  4.1   |  33<H>  |  1.39<H>    Ca    8.7      14 Dec 2017 05:30            Blood Culture:   Urine Culture      RADIOLOGY/EKG:    ASSESSMENT AND PLAN:    DVT PPX:    ADVANCED DIRECTIVE:    DISPOSITION: CHIEF COMPLAINT:  no   event    over  night but she still complaining    SUBJECTIVE:     REVIEW OF SYSTEMS:    CONSTITUTIONAL: (-+ )  weakness,  ( - ) fevers or chills  EYES/ENT: ( - )visual changes;     NECK: (  ) pain or stiffness  RESPIRATORY:   ( - )cough, wheezing, hemoptysis;  (-  ) shortness of breath  CARDIOVASCULAR:  ( - )chest pain or palpitations  GASTROINTESTINAL:   ( - )abdominal or epigastric pain.  (-  ) nausea, vomiting, or hematemesis;   (  - ) diarrhea or constipation.   GENITOURINARY:   (   - ) dysuria, frequency or hematuria  NEUROLOGICAL:  (-   ) numbness or weakness   All other review of systems is negative unless indicated above    Vital Signs Last 24 Hrs  T(C): 36.9 (14 Dec 2017 13:48), Max: 36.9 (14 Dec 2017 13:48)  T(F): 98.4 (14 Dec 2017 13:48), Max: 98.4 (14 Dec 2017 13:48)  HR: 80 (14 Dec 2017 17:18) (80 - 95)  BP: 125/70 (14 Dec 2017 17:18) (121/62 - 128/80)  BP(mean): --  RR: 17 (14 Dec 2017 17:18) (17 - 19)  SpO2: 100% (14 Dec 2017 17:18) (95% - 100%)    I&O's Summary    13 Dec 2017 07:01  -  14 Dec 2017 07:00  --------------------------------------------------------  IN: 0 mL / OUT: 750 mL / NET: -750 mL        CAPILLARY BLOOD GLUCOSE      POCT Blood Glucose.: 151 mg/dL (14 Dec 2017 16:37)  POCT Blood Glucose.: 195 mg/dL (14 Dec 2017 12:10)  POCT Blood Glucose.: 115 mg/dL (14 Dec 2017 08:20)  POCT Blood Glucose.: 157 mg/dL (13 Dec 2017 21:56)      PHYSICAL EXAM:    Constitutional:  ( -  ) NAD,   ( +  )awake and alert  HEENT: PERR, EOMI,    Neck: Soft and supple, No LAD, No JVD  Respiratory:  (   + Breath sounds are clear bilaterally,    ( -  ) wheezing, rales or rhonchi  Cardiovascular:     (  + )S1 and S2, regular rate and rhythm, no Murmurs, gallops or rubs  Gastrointestinal:  (  + )Bowel Sounds present, soft,   (-  )nontender, nondistended,    Extremities:    (++  ) peripheral edema  Vascular: 2+ peripheral pulses  Neurological:    (   + )A/O x 3,   ( - ) focal deficits  Musculoskeletal:    ( +  )  normal strength b/l upper  ( +    ) normal  lower extremities  Skin: No rashes    MEDICATIONS:  MEDICATIONS  (STANDING):  ALBUTerol    90 MICROgram(s) HFA Inhaler 4 Puff(s) Inhalation every 6 hours  aspirin enteric coated 81 milliGRAM(s) Oral daily  atorvastatin 80 milliGRAM(s) Oral at bedtime  buDESOnide 160 MICROgram(s)/formoterol 4.5 MICROgram(s) Inhaler 2 Puff(s) Inhalation two times a day  carvedilol 12.5 milliGRAM(s) Oral every 12 hours  cholecalciferol 2000 Unit(s) Oral daily  dexamethasone     Tablet 0.5 milliGRAM(s) Oral daily  dextrose 5%. 1000 milliLiter(s) (50 mL/Hr) IV Continuous <Continuous>  dextrose 50% Injectable 12.5 Gram(s) IV Push once  dextrose 50% Injectable 25 Gram(s) IV Push once  dextrose 50% Injectable 25 Gram(s) IV Push once  febuxostat 40 milliGRAM(s) Oral daily  furosemide    Tablet 80 milliGRAM(s) Oral two times a day  gabapentin 100 milliGRAM(s) Oral daily  heparin  Injectable 5000 Unit(s) SubCutaneous every 8 hours  insulin glargine Injectable (LANTUS) 20 Unit(s) SubCutaneous at bedtime  insulin lispro (HumaLOG) corrective regimen sliding scale   SubCutaneous three times a day before meals      LABS: All Labs Reviewed:                        9.9    4.45  )-----------( 253      ( 14 Dec 2017 05:30 )             33.6     12-14    145  |  100  |  50<H>  ----------------------------<  93  4.1   |  33<H>  |  1.39<H>    Ca    8.7      14 Dec 2017 05:30            Blood Culture:   Urine Culture      RADIOLOGY/EKG:    ASSESSMENT AND PLAN:  57F w/ HTN, DM2, HFrEF, COPD, and CKD3, s/p recent ALISA requiring HD x 2 months; 12/6/17 a/w acute on chronic HFrEF  (1)Renal - CKD3 - stable function continue Lasix as ordered we'll increase  LasiX  (2)Hypernatremia - mild/acceptable for now  (3)Shortness of breath   . continue dexamethasone/Pulmicort      lasix  iiv  40  mg  today    DVT PPX:    ADVANCED DIRECTIVE:    DISPOSITION:

## 2017-12-14 NOTE — DIETITIAN INITIAL EVALUATION ADULT. - OTHER INFO
Patient seen for LOS. Admitted for CHF exacerbation. Endorses good appetite and PO intake > 75%. Patient denies any nausea/vomiting/diarrhea/constipation or difficulty chewing and swallowing. NKFA. Therapeutic diet recommendations reviewed with patient. Noted HbA1c trending high from 7.1 (9/17) to 9.9 (12/9/17). Discussed importance of adhering to consistent carbohydrate diet and DM education provided. As per last RDN note, patient weigh 231.4 (9/22/17), she currently weighs 238lbs (12/10) + 2 b/l foot edema noted.

## 2017-12-14 NOTE — DIETITIAN INITIAL EVALUATION ADULT. - NS AS NUTRI INTERV MEALS SNACK
Carbohydrate - modified diet/1. Continue current diet order, which remains appropriate at this time. 2. Monitor weights, labs, BM's, skin integrity, p.o. intake./Fat - modified diet/Diets modified for specific foods and ingredients/Composition of meals/snacks/Mineral - modified diet

## 2017-12-14 NOTE — DIETITIAN INITIAL EVALUATION ADULT. - PERTINENT LABORATORY DATA
12-14 Na145 mmol/L Glu 93 mg/dL K+ 4.1 mmol/L Cr  1.39 mg/dL<H> BUN 50 mg/dL<H> Phos n/a   Alb n/a   PAB n/a

## 2017-12-14 NOTE — DIETITIAN INITIAL EVALUATION ADULT. - NS AS NUTRI INTERV COLLABORAT
RD advised to Pt to f/u with an outpatient RD upon discharge./Collaboration with other nutrition professionals

## 2017-12-15 VITALS
SYSTOLIC BLOOD PRESSURE: 132 MMHG | HEART RATE: 90 BPM | RESPIRATION RATE: 18 BRPM | OXYGEN SATURATION: 99 % | TEMPERATURE: 99 F | DIASTOLIC BLOOD PRESSURE: 82 MMHG

## 2017-12-15 LAB
BASOPHILS # BLD AUTO: 0.02 K/UL — SIGNIFICANT CHANGE UP (ref 0–0.2)
BASOPHILS NFR BLD AUTO: 0.5 % — SIGNIFICANT CHANGE UP (ref 0–2)
BUN SERPL-MCNC: 55 MG/DL — HIGH (ref 7–23)
CALCIUM SERPL-MCNC: 8.9 MG/DL — SIGNIFICANT CHANGE UP (ref 8.4–10.5)
CHLORIDE SERPL-SCNC: 100 MMOL/L — SIGNIFICANT CHANGE UP (ref 98–107)
CO2 SERPL-SCNC: 32 MMOL/L — HIGH (ref 22–31)
CREAT SERPL-MCNC: 1.41 MG/DL — HIGH (ref 0.5–1.3)
EOSINOPHIL # BLD AUTO: 0.13 K/UL — SIGNIFICANT CHANGE UP (ref 0–0.5)
EOSINOPHIL NFR BLD AUTO: 3 % — SIGNIFICANT CHANGE UP (ref 0–6)
GLUCOSE BLDC GLUCOMTR-MCNC: 127 MG/DL — HIGH (ref 70–99)
GLUCOSE BLDC GLUCOMTR-MCNC: 145 MG/DL — HIGH (ref 70–99)
GLUCOSE BLDC GLUCOMTR-MCNC: 212 MG/DL — HIGH (ref 70–99)
GLUCOSE BLDC GLUCOMTR-MCNC: 221 MG/DL — HIGH (ref 70–99)
GLUCOSE SERPL-MCNC: 133 MG/DL — HIGH (ref 70–99)
HCT VFR BLD CALC: 32.8 % — LOW (ref 34.5–45)
HGB BLD-MCNC: 10.1 G/DL — LOW (ref 11.5–15.5)
IMM GRANULOCYTES # BLD AUTO: 0.02 # — SIGNIFICANT CHANGE UP
IMM GRANULOCYTES NFR BLD AUTO: 0.5 % — SIGNIFICANT CHANGE UP (ref 0–1.5)
LYMPHOCYTES # BLD AUTO: 1.35 K/UL — SIGNIFICANT CHANGE UP (ref 1–3.3)
LYMPHOCYTES # BLD AUTO: 31.5 % — SIGNIFICANT CHANGE UP (ref 13–44)
MCHC RBC-ENTMCNC: 25.1 PG — LOW (ref 27–34)
MCHC RBC-ENTMCNC: 30.8 % — LOW (ref 32–36)
MCV RBC AUTO: 81.6 FL — SIGNIFICANT CHANGE UP (ref 80–100)
MONOCYTES # BLD AUTO: 0.53 K/UL — SIGNIFICANT CHANGE UP (ref 0–0.9)
MONOCYTES NFR BLD AUTO: 12.4 % — SIGNIFICANT CHANGE UP (ref 2–14)
NEUTROPHILS # BLD AUTO: 2.23 K/UL — SIGNIFICANT CHANGE UP (ref 1.8–7.4)
NEUTROPHILS NFR BLD AUTO: 52.1 % — SIGNIFICANT CHANGE UP (ref 43–77)
NRBC # FLD: 0.02 — SIGNIFICANT CHANGE UP
PLATELET # BLD AUTO: 230 K/UL — SIGNIFICANT CHANGE UP (ref 150–400)
PMV BLD: 12.5 FL — SIGNIFICANT CHANGE UP (ref 7–13)
POTASSIUM SERPL-MCNC: 4.2 MMOL/L — SIGNIFICANT CHANGE UP (ref 3.5–5.3)
POTASSIUM SERPL-SCNC: 4.2 MMOL/L — SIGNIFICANT CHANGE UP (ref 3.5–5.3)
RBC # BLD: 4.02 M/UL — SIGNIFICANT CHANGE UP (ref 3.8–5.2)
RBC # FLD: 19.3 % — HIGH (ref 10.3–14.5)
SODIUM SERPL-SCNC: 144 MMOL/L — SIGNIFICANT CHANGE UP (ref 135–145)
WBC # BLD: 4.28 K/UL — SIGNIFICANT CHANGE UP (ref 3.8–10.5)
WBC # FLD AUTO: 4.28 K/UL — SIGNIFICANT CHANGE UP (ref 3.8–10.5)

## 2017-12-15 RX ORDER — ACETAMINOPHEN 500 MG
2 TABLET ORAL
Qty: 0 | Refills: 0 | COMMUNITY
Start: 2017-12-15

## 2017-12-15 RX ORDER — OMEPRAZOLE 10 MG/1
1 CAPSULE, DELAYED RELEASE ORAL
Qty: 0 | Refills: 0 | COMMUNITY

## 2017-12-15 RX ORDER — ASPIRIN/CALCIUM CARB/MAGNESIUM 324 MG
1 TABLET ORAL
Qty: 0 | Refills: 0 | COMMUNITY
Start: 2017-12-15

## 2017-12-15 RX ORDER — CHOLECALCIFEROL (VITAMIN D3) 125 MCG
2000 CAPSULE ORAL
Qty: 0 | Refills: 0 | COMMUNITY
Start: 2017-12-15

## 2017-12-15 RX ORDER — DEXAMETHASONE 0.5 MG/5ML
1 ELIXIR ORAL
Qty: 15 | Refills: 0 | OUTPATIENT
Start: 2017-12-15 | End: 2017-12-29

## 2017-12-15 RX ORDER — CARVEDILOL PHOSPHATE 80 MG/1
1 CAPSULE, EXTENDED RELEASE ORAL
Qty: 60 | Refills: 0 | OUTPATIENT
Start: 2017-12-15 | End: 2018-01-13

## 2017-12-15 RX ORDER — GABAPENTIN 400 MG/1
1 CAPSULE ORAL
Qty: 30 | Refills: 0 | OUTPATIENT
Start: 2017-12-15 | End: 2018-01-13

## 2017-12-15 RX ORDER — FUROSEMIDE 40 MG
1 TABLET ORAL
Qty: 60 | Refills: 0 | OUTPATIENT
Start: 2017-12-15 | End: 2018-01-13

## 2017-12-15 RX ORDER — INSULIN GLARGINE 100 [IU]/ML
20 INJECTION, SOLUTION SUBCUTANEOUS
Qty: 0 | Refills: 0 | COMMUNITY

## 2017-12-15 RX ADMIN — Medication 650 MILLIGRAM(S): at 13:56

## 2017-12-15 RX ADMIN — HEPARIN SODIUM 5000 UNIT(S): 5000 INJECTION INTRAVENOUS; SUBCUTANEOUS at 15:50

## 2017-12-15 RX ADMIN — HEPARIN SODIUM 5000 UNIT(S): 5000 INJECTION INTRAVENOUS; SUBCUTANEOUS at 05:24

## 2017-12-15 RX ADMIN — ALBUTEROL 4 PUFF(S): 90 AEROSOL, METERED ORAL at 11:36

## 2017-12-15 RX ADMIN — BUDESONIDE AND FORMOTEROL FUMARATE DIHYDRATE 2 PUFF(S): 160; 4.5 AEROSOL RESPIRATORY (INHALATION) at 11:37

## 2017-12-15 RX ADMIN — ALBUTEROL 4 PUFF(S): 90 AEROSOL, METERED ORAL at 05:24

## 2017-12-15 RX ADMIN — Medication 80 MILLIGRAM(S): at 17:56

## 2017-12-15 RX ADMIN — Medication 100 MILLIGRAM(S): at 05:30

## 2017-12-15 RX ADMIN — GABAPENTIN 100 MILLIGRAM(S): 400 CAPSULE ORAL at 11:37

## 2017-12-15 RX ADMIN — FEBUXOSTAT 40 MILLIGRAM(S): 40 TABLET ORAL at 11:37

## 2017-12-15 RX ADMIN — CARVEDILOL PHOSPHATE 12.5 MILLIGRAM(S): 80 CAPSULE, EXTENDED RELEASE ORAL at 05:24

## 2017-12-15 RX ADMIN — CARVEDILOL PHOSPHATE 12.5 MILLIGRAM(S): 80 CAPSULE, EXTENDED RELEASE ORAL at 17:56

## 2017-12-15 RX ADMIN — Medication 650 MILLIGRAM(S): at 13:26

## 2017-12-15 RX ADMIN — Medication 81 MILLIGRAM(S): at 11:37

## 2017-12-15 RX ADMIN — Medication 2: at 12:30

## 2017-12-15 RX ADMIN — ALBUTEROL 4 PUFF(S): 90 AEROSOL, METERED ORAL at 17:52

## 2017-12-15 RX ADMIN — Medication 2000 UNIT(S): at 11:37

## 2017-12-15 RX ADMIN — Medication 0.5 MILLIGRAM(S): at 05:24

## 2017-12-15 RX ADMIN — Medication 80 MILLIGRAM(S): at 05:24

## 2017-12-15 NOTE — CHART NOTE - NSCHARTNOTEFT_GEN_A_CORE
Notified by RN that SW has arranged ambulance for discharge as pt was not able to get discharged earlier in the day without the oxygen. Discharge pt when transport arrives.

## 2017-12-15 NOTE — PROGRESS NOTE ADULT - SUBJECTIVE AND OBJECTIVE BOX
No pain, no shortness of breath      VITAL:  T(C): , Max: 36.9 (12-14-17 @ 13:48)  T(F): , Max: 98.4 (12-14-17 @ 13:48)  HR: 85 (12-15-17 @ 05:23)  BP: 106/58 (12-15-17 @ 05:23)  BP(mean): --  RR: 18 (12-15-17 @ 05:23)  SpO2: 92% (12-15-17 @ 05:23)      PHYSICAL EXAM:  Constitutional: NAD; Alert; overweight  HEENT:  NCAT; DMM  Neck: No JVD; supple  Respiratory: fair air movement; no wheeze appreciated  Cardiac: RRR s1s2  Gastrointestinal: BS+, soft, NT/ND  Urologic: No correa  Extremities: No peripheral edema  Back: No CVAT b/l      LABS:                        10.1   4.28  )-----------( 230      ( 15 Dec 2017 06:43 )             32.8     Na(144)/K(4.2)/Cl(100)/HCO3(32)/BUN(55)/Cr(1.41)Glu(133)/Ca(8.9)/Mg(--)/PO4(--)    12-15 @ 06:43  Na(145)/K(4.1)/Cl(100)/HCO3(33)/BUN(50)/Cr(1.39)Glu(93)/Ca(8.7)/Mg(--)/PO4(--)    12-14 @ 05:30  Na(143)/K(4.2)/Cl(99)/HCO3(33)/BUN(47)/Cr(1.27)Glu(141)/Ca(9.0)/Mg(--)/PO4(--)    12-13 @ 07:45      ASSESSMENT: 57F w/ HTN, DM2, HFrEF, COPD, and CKD3, s/p recent ALISA requiring HD x 2 months; 12/6/17 a/w acute on chronic HFrEF  (1)Renal - CKD3 - stable function  (2)Hypernatremia - mild, acceptable for now  (3)Shortness of breath - CHF/COPD exacerbation. Improved      RECOMMEND  (1)Lasix as ordered  (2)No renal objection to discharge - could follow up at my office in 1-2 months              Perfecto Rae MD  [facility]  [phone] Shortness of breath grossly improved.      VITAL:  T(C): , Max: 36.9 (12-14-17 @ 13:48)  T(F): , Max: 98.4 (12-14-17 @ 13:48)  HR: 85 (12-15-17 @ 05:23)  BP: 106/58 (12-15-17 @ 05:23)  BP(mean): --  RR: 18 (12-15-17 @ 05:23)  SpO2: 92% (12-15-17 @ 05:23)      PHYSICAL EXAM:  Constitutional: NAD on NCO2  HEENT:  NCAT; DMM  Neck: No JVD; supple  Respiratory: fair air movement; no wheeze appreciated  Cardiac: RRR s1s2  Gastrointestinal: BS+, soft, NT/ND  Urologic: No correa  Extremities: No peripheral edema  Back: No CVAT b/l      LABS:                        10.1   4.28  )-----------( 230      ( 15 Dec 2017 06:43 )             32.8     Na(144)/K(4.2)/Cl(100)/HCO3(32)/BUN(55)/Cr(1.41)Glu(133)/Ca(8.9)/Mg(--)/PO4(--)    12-15 @ 06:43  Na(145)/K(4.1)/Cl(100)/HCO3(33)/BUN(50)/Cr(1.39)Glu(93)/Ca(8.7)/Mg(--)/PO4(--)    12-14 @ 05:30  Na(143)/K(4.2)/Cl(99)/HCO3(33)/BUN(47)/Cr(1.27)Glu(141)/Ca(9.0)/Mg(--)/PO4(--)    12-13 @ 07:45      ASSESSMENT: 57F w/ HTN, DM2, HFrEF, COPD, and CKD3, s/p recent ALISA requiring HD x 2 months; 12/6/17 a/w acute on chronic HFrEF  (1)Renal - CKD3 - stable function  (2)Hypernatremia - mild, acceptable for now  (3)Shortness of breath - CHF/COPD exacerbation. Improved      RECOMMEND  (1)Lasix as ordered  (2)No renal objection to discharge - could follow up at my office in 1-2 months              Perfecto Rae MD  [facility],   [phone]

## 2017-12-15 NOTE — CHART NOTE - NSCHARTNOTEFT_GEN_A_CORE
Patient's daughter arrived at hospital without patient's portable oxygen tank.  Advised patient and daughter that this is an unsafe discharge.  Daughter refuses to go home to obtain oxygen tank.  Dr. Lou notified and in agreement that this is not a safe discharge.  Patient will need to leave against medical advise if refuses to leave with home O2.  Will discuss case with night NP.

## 2017-12-15 NOTE — PROGRESS NOTE ADULT - SUBJECTIVE AND OBJECTIVE BOX
CHIEF COMPLAINT:  Shortness of breath grossly improved.      VITAL:  T(C): , Max: 36.9 (12-14-17 @ 13:48)  T(F): , Max: 98.4 (12-14-17 @ 13:48)  HR: 85 (12-15-17 @ 05:23)  BP: 106/58 (12-15-17 @ 05:23)  BP(mean): --  RR: 18 (12-15-17 @ 05:23)  SpO2: 92% (12-15-17 @ 05:23)      PHYSICAL EXAM:  Constitutional: NAD on NCO2  HEENT:  NCAT; DMM  Neck: No JVD; supple  Respiratory: fair air movement; no wheeze appreciated  Cardiac: RRR s1s2  Gastrointestinal: BS+, soft, NT/ND  Urologic: No correa  Extremities: No peripheral edema  Back: No CVAT b/l      LABS:                        10.1   4.28  )-----------( 230      ( 15 Dec 2017 06:43 )             32.8     Na(144)/K(4.2)/Cl(100)/HCO3(32)/BUN(55)/Cr(1.41)Glu(133)/Ca(8.9)/Mg(--)/PO4(--)    12-15 @ 06:43  Na(145)/K(4.1)/Cl(100)/HCO3(33)/BUN(50)/Cr(1.39)Glu(93)/Ca(8.7)/Mg(--)/PO4(--)    12-14 @ 05:30  Na(143)/K(4.2)/Cl(99)/HCO3(33)/BUN(47)/Cr(1.27)Glu(141)/Ca(9.0)/Mg(--)/PO4(--)    12-13 @ 07:45      ASSESSMENT: 57F w/ HTN, DM2, HFrEF, COPD, and CKD3, s/p recent ALISA requiring HD x 2 months; 12/6/17 a/w acute on chronic HFrEF  (1)Renal - CKD3 - stable function  (2)Hypernatremia - mild, acceptable for now  (3)Shortness of breath - CHF/COPD exacerbation. Improved      RECOMMEND  (1)Lasix as ordered  (2)discharge -   follow up at my office in 1-2  weekss          DISPOSITION: dc  home  today

## 2017-12-15 NOTE — PROGRESS NOTE ADULT - PROVIDER SPECIALTY LIST ADULT
Internal Medicine
Nephrology
Pulmonology
Internal Medicine

## 2018-11-19 NOTE — CHART NOTE - NSCHARTNOTEFT_GEN_A_CORE
NUTRITION SERVICES                                                                                  MALNUTRITION ALERT     Attention Health Care Provider: Upon nutritional assessment by the Registered Dietitian your patient was determined to meet criteria / has evidence of the following diagnosis/diagnoses:    [ ] Mild Protein Calorie Malnutrition   [ ] Moderate Protein Calorie Malnutrition   [ ] Severe Protein Calorie Malnutrition   [ ] Unspecified Protein Calorie Malnutrition   [ ] Underweight / BMI <19  [x ] Morbid Obesity / BMI >40      TO BE COMPLETED BY Physician / PA / NP :    [ ] AGREE  [ ] DISAGREE       By signing this assessment you are acknowledging the diagnosis/diagnoses.       PLAN OF CARE: Refer to Initial Dietitian Evaluation or Nutrition Follow-Up Documentation for Nutritional Recommendations. No Exposure

## 2018-11-22 NOTE — DISCHARGE NOTE ADULT - NS MD DC FALL RISK RISK
Ventricular Rate : 85   Atrial Rate : 85   P-R Interval : 190   QRS Duration : 82   Q-T Interval : 406   QTC Calculation(Bezet) : 483   P Axis : 66   R Axis : 96   T Axis : 67   Diagnosis : Normal sinus rhythm~Rightward axis~Borderline ECG~When compared with ECG of 14-JAN-2017 15:56,~Criteria for Lateral infarct are no longer present~Minimal criteria for Inferior infarct are no longer present~Confirmed by Heike BOSS, Aaron ([id number]) on 10/29/2017 6:38:34 AM     
For information on Fall & Injury Prevention, visit [email or web address]

## 2019-02-14 NOTE — ED PROVIDER NOTE - AGGRAVATING FACTORS
Called Lucy.  She was seen in the ER for a couple of issues, one of which was a scratch on her right lower eye, on 1-24-19.  She was given an oral antibiotic in the ER to take for 1 week, which she did.  While her complaints are quite vague and she cannot elaborate on things well, she said it feels like the eyelid may still be a little puffy or swollen.  She said there is an area between her nose and her right eye and an area on her right lower eyelid that \"just don't seem right\".  She denies any redness of the area or any discharge.   I offered an appointment today or a period of observation to see if her symptoms improve or change.  She wants to \"see how it goes\".  We did schedule an appt for next week in the event her symptoms do not resolve on their own before then.  She is aware that should her symptoms worsen significantly over the weekend, her options would be calling the clinic to access the on call ophthalmologist or going back to the ER.   none

## 2019-04-15 NOTE — H&P PST ADULT - NEGATIVE OPHTHALMOLOGIC SYMPTOMS
Patient reports onset 2 days ago of some tenderness and swelling on the left side of his neck up to the level of his jaw.  Also reports a mild sore throat and a mild intermittent cough nonproductive.  Has not noted any fevers.  Denies current ear pain.   no diplopia/no pain L/no photophobia/no lacrimation L/no pain R

## 2019-04-22 NOTE — ASU PATIENT PROFILE, ADULT - AS SC [employer]
From: Tigre Salinas  To: Marshall Mckeon Barney Mclain (4) no limitation

## 2019-05-06 NOTE — H&P ADULT - NSHPPHYSICALEXAM_GEN_ALL_CORE
-- Message is from the [facility] Contact Center--    Reason for Call: ***        Alternative phone number: ***    {Message Turnaround:[phone]}    
Vital Signs Last 24 Hrs  T(C): 37.1 (21 Sep 2017 09:40), Max: 37.1 (21 Sep 2017 09:40)  T(F): 98.8 (21 Sep 2017 09:40), Max: 98.8 (21 Sep 2017 09:40)  HR: 81 (21 Sep 2017 12:00) (81 - 86)  BP: 130/90 (21 Sep 2017 12:00) (127/93 - 130/90)  BP(mean): --  RR: 18 (21 Sep 2017 12:00) (18 - 20)  SpO2: 98% (21 Sep 2017 12:00) (98% - 100%)    General: NAD, non-toxic appearing, speaking in full sentences  HEENT: EOMI, PERRLA, no conjunctival pallor, MMM, no JVD, no thyromegaly, neck supple, trachea midline  CV: S1S2 RRR no MRG, (+) dialysis cath on right chest   Lungs: scant bibasilar crackles and decreased bibasilar breath sounds  Abdomen: soft obese NTND +BS   Extremities: +1 edema, no clubbing/cyanosis +WWP  Skin/MSK: No rashes, preserved ROM on active & passive movement  Neuro: AAOx3, no focal deficits (5/5 strength all extremities), no sensory deficits

## 2019-07-12 NOTE — ED ADULT TRIAGE NOTE - ADDITIONAL SAFETY/BANDS...
Call to pt. Advise per Dr Ricketts that SBFT did show a distal esophageal and small bowel diverticulae - which Dr Ricketts not worried about.  Will discuss in more detail when seen next.    Verb understanding.  States doing well - does note bloating in late afternoon, and prednisone sometimes seems to interfere with sleep.      Asking if should continue prednisone 20 mg daily until c/s on 8/9 - concerned about maintaining that long.     Update/question to DR Ricketts.   Additional Safety/Bands:

## 2019-07-30 NOTE — DIETITIAN INITIAL EVALUATION ADULT. - DIET TYPE
regular/consistent carbohydrate (no snacks)/DASH/TLC (sodium and cholesterol restricted diet)/renal replacement pts:no protein restr,no conc K & phos, low sodium
Home

## 2020-04-22 NOTE — ASU PATIENT PROFILE, ADULT - CENTRAL VENOUS CATHETER
Physical Therapy Video Visit - Evaluation    Patient was informed of the requirements, reasons for the rehab video-visit due to COVID-19 crisis and right to refuse the visit at any time. Patient was informed of the option to be seen for an in-person clinic visit.  In-person clinic visit risk and requirements were reviewed with patient related to COVID-19.     Patient verbally consented to video-visit. Video-visit was not recorded.    Therapist location: [place], [place]  Patient location: [place], [place]  Platform used: Zoom    Visit: 1  Referred by: Rupesh Bruce MD  Medical Diagnosis (from order):   Diagnosis Information      Diagnosis    724.2 (ICD-9-CM) - M54.5 (ICD-10-CM) - Acute right-sided low back pain without sciatica              Treatment Diagnosis: lumbar symptoms with increased pain/symptoms, impaired posture, impaired strength, impaired range of motion, impaired muscle length/flexibility, impaired tissue mobility, impaired activity tolerance    Date of onset/injury: over a year ago  Diagnosis Precautions: none  Chart reviewed at time of initial evaluation (relevant co-morbidities, allergies, tests and medications listed):   Past Medical History:   Diagnosis Date   • Essential (primary) hypertension    • Fracture          SUBJECTIVE   Description of Problem and/or Mechanism of Injury: Patient has had back pain for over a year now, noting it has recently gotten worse. He just recently started taking muscle relaxers and now has very little pain. He has been stretching which also helps the pain. Overall he is very active, walks 3+ miles per day and has always gone to the gym. He can do almost everything he needs to at work and at home, however notes hesitancy and some pain with certain activities. Pain is the worst when he is standing and walking for long periods of time and is usually relieved when he sits down or lays down.   Pain:  Intensity (0-10 scale): Current: 0; Pain Range (best-worst): 0-4  Location: right  side of low back  Quality/Description: Ache, Sharp, Stiff  Relieving/Alleviating factors: rest, stretching    Function:  Limitations/exacerbating factors: pain, difficulty with bending/squatting/lifting, lifting/carrying, standing limited  Prior level: independent with all activities of daily living and instrumental activities of daily living  Patient Goal: to reduce pain and get stronger    Prior Treatment: no therapies in the past year for current condition. Hospitalization, home health services or skilled nursing facility in the last 30 days: No, per patient.  Home Environment/Social Support: Patient lives with significant other, in a 1 story home; consistent assist from family/friends available.    Safety:  Do you feel safe at home, work and/or school? yes, per patient  Patient denies 2 or more falls or an unexplained fall with injury in the last year, further testing not required     OBJECTIVE   All documentation below is based on observation via video unless otherwise indicated.     Observation:  Slightly increased lumbar lordosis    Visual Range of Motion (%)  Bend forward as if to touch toes (flexion) 100- notes stretching in hamstrings and lats    Bend backward (extension) 75 pain     Left Right   Tip to side reaching toward knee (lateral flexion) 75* 75*   Rotate/twist (rotation) 75* 75*   active range of motion recorded unless noted as AA=active assistive or P=passive; standard testing positions unless otherwise noted, *=pain   Only those motions that were assessed are noted.     Lower Extremity Functional Strength:  Sit to stand: able to complete without upper extremity assist    Sensation: (reported)  Clothing is equal on all aspects of each arm: Yes  Clothing is equal on all aspects of each leg: Yes  Hot and cold water feels the same on all aspects of each arm: Yes  Hot and cold water feels the same on all aspects of each leg: Yes      Muscle Flexibility:    Left Right    Side lunge (hip adductors)      Forward lunge step (iliopsoas)      Pull heel toward buttock (quadriceps)      Place heel on chair (hamstring)     Wall stretch position for calf (gastroc and soleus)     Supine knee to opposite shoulder (piriformis)     Only those observed noted    Palpation:  Reported areas tender to touch or pressure: NA    Initial Treatment   Initial evaluation completed.    Therapeutic Exercise:   Access Code: [email or web address]   URL: [email or web address]   Date: 04/22/2020   Prepared by: Katarina Clancy     Exercises   Hooklying Single Knee to Chest Stretch - 2 sets - 30 seconds hold - 1x daily   Supine Lower Trunk Rotation - 2 sets - 30 seconds hold - 1x daily   Seated Hamstring Stretch - 2 sets - 30 seconds hold - 1x daily   Supine Hamstring Stretch - 2 sets - 30 seconds hold - 1x daily   Standing Hamstring Stretch on Chair - 2 sets - 30 seconds hold - 1x daily   Supine Posterior Pelvic Tilt - 10 reps - 2 sets - 1x daily   Supine March with Posterior Pelvic Tilt - 10 reps - 2 sets - 1x daily       Therapeutic Activity:  Patient education regarding anatomy of lumbar spine/pelvis and muscles that pass over these joints. Discussed how limitations in muscle elasticity can cause lumbar pain as well as improper movement techniques. Patient educated on the importance of core strength, including gluteals to decrease recruitment of lumbar spinal extensors for movement and stabilization.       Skilled input: patient education as above      Suggestions for next session as indicated: progress per plan of care, progress core strengthening, manual intervention if in person visit    ASSESSMENT   49 year old male patient has signs and symptoms consistent with lumbar pain with flexion bias based on observation and reported symptoms and responses that has reported functional limitations listed above. Patient demonstrates tissue tightness with all movements and would likely have a decent amount of pain relief with home program set up  above to improve flexibility and core strength.     Patient will benefit from skilled therapy and Rehabilitative potential is good based on assessment above  Predicted patient presentation: Low (stable) - Patient comorbidities and complexities, as defined above, will have little effect on progress for prescribed plan of care.    PLAN   Goals: To be obtained by end of this plan of care:  1. Patient will be independent with progressed and modified home exercise program   2. Decrease pain/symptoms to 2/10 at the worst in the past week  3. Improve involved range of motion to WFL and pain free  through improvements listed above patient will:  4. Be able to stand for 30 minutes without pain/difficulty to improve completion of household tasks  5. Be able to lift 30 pounds without pain/difficulty to improve house cleaning and care    The following skilled interventions to be implemented to achieve above:  Dry Needling - Unlisted physical medicine/rehabilitation service or procedure (90674/15520.02)  Gait Training (02718)  Manual Therapy (55865)  Neuromuscular Re-Education (82266)  Therapeutic Activity (76277)  Therapeutic Exercise (57379)  Electrical Stimulation (87768//27959)   Mechanical Traction (50811)  Ultrasound/Phonophoresis (09320)    Frequency/Duration: up to 10 visits over the next 10 weeks.    patient involved in and agreed to plan of care and goals.   Attendance policy provided at time of evaluation.        Patient Education:   Who will be receiving education: patient  Are they ready to learn: yes  Preferred learning style: written, verbal, demonstration  Barriers to learning: no barriers apparent at this time   Result of initial outlined education: Verbalizes understanding and Needs reinforcement    Procedures and total treatment time documented in Time Entry flowsheet.   no

## 2020-05-18 NOTE — ED PROVIDER NOTE - GASTROINTESTINAL, MLM
20y/o  PPD#31 s/p  and mirena IUD insertion (20), h/o Luis-Soulier Syndrome, presents complaining of SOB and palpitations for 1 day. She was seen on  with vag bleeding in ED, given cytotec and 1U PRBC and dc home. She saw Dr. Fitzpatrick on  who gave her an additional dose of cytotec and started her on Aygestin. Since delivery she has been following with Peds heme and receiving Levon-7 and lysteda. Since the cytotec the patient reports decreased bleeding, today changed pad twice before coming to ED for SOB/palpitations. Upon arrival patient was tachycardic to 150s and febrile to 39.6. She reports feeling relief of symptoms after receiving tylenol. Denies CP. Denies NV. Denies abdominal pain. She has not been breastfeeding and denies breast pain. Denies headache. Abdomen soft, non-tender and non-distended without organomegaly or masses. Normal bowel sounds.

## 2020-05-23 NOTE — DISCHARGE NOTE ADULT - PHYSICIAN SECTION COMPLETE
calls and says that his wife has had a nose bleed since noon today.  says that his wife has had a clamp on her nose and every time she removes the clamp, the nose bleeds. Pt. Had a nose bleed last night, too.  says that his wife is not sure what she will do.  says that if the nose still bleeds until 5 PM, he will call back and let a FNA know where he will take his wife. COVID 19 Nurse Triage Plan/Patient Instructions    Please be aware that novel coronavirus (COVID-19) may be circulating in the community. If you develop symptoms such as fever, cough, or SOB or if you have concerns about the presence of another infection including coronavirus (COVID-19), please contact your health care provider or visit [email or web address].     Disposition/Instructions    Patient to go to ED and follow protocol based instructions. Follow System Ambulatory Workflow for COVID 19.     Bring Your Own Device:  Please also bring your smart device(s) (smart phones, tablets, laptops) and their charging cables for your personal use and to communicate with your care team during your visit.    Thank you for limiting contact with others, wearing a simple mask to cover your cough, practice good hand hygiene habits and accessing our virtual services where possible to limit the spread of this virus.    For more information about COVID19 and options for caring for yourself at home, please visit the CDC website at https://www.cdc.gov/coronavirus/2019-ncov/about/steps-when-sick.html  For more options for care at [facility], please visit our website at [email or web address]    For more information, please use the [place] Department of Health COVID-19 Website: https://www.health.state.[place]/diseases/coronavirus/index.html  [facility] ([facility]) COVID-19 Hotlines (Interpreters available):      Health questions: Phone Number: [phone] or [phone] and Hours: 7 a.m. to 7  p.m.    Schools and  questions: Phone Number: [phone] or [phone] and Hours 7 a.m. to 7 p.m.                    Reason for Disposition    [1] Bleeding present > 30 minutes AND [2] using correct method of direct pressure    Additional Information    Negative: Fainted or too weak to stand following large blood loss    Negative: Sounds like a life-threatening emergency to the triager    Negative: Nosebleed followed a nose injury    Protocols used: NOSEBLEED-A-AH       Yes

## 2020-12-08 NOTE — ED PROVIDER NOTE - NS ED ATTENDING STATEMENT MOD
Instructions (Optional): SRT Detail Level: Detailed I have personally seen and examined this patient.  I have fully participated in the care of this patient. I have reviewed all pertinent clinical information, including history, physical exam, plan and the Resident’s note and agree except as noted.

## 2021-05-26 NOTE — H&P ADULT - NSHPLANGLIMITEDENGLISH_GEN_A_CORE
Patient's daughter Siria called stating patient is complaining of back pain.  Daughter is with her now and would like a call.  [place] [phone]     
Spoke with patient's daughter Siria.  Patient has sudden onset of back pain today.  Reports that she has dementia so it is hard to get a clear story of her symptoms.  States she has had sciatica in the past.  It is hard for her to sit.  First reported pain in her lower back, but then said the pain \"goes up\".  Also reported pain in the back of her ribs.  Denies shortness of breath, abdominal pain, chest pain, leg pain, urinary pain or frequency, or hematuria.  Last had BM yesterday, takes Miralax daily.  States that patient cannot accurately rate her pain.  Pt reported pain 10/10, but does not seem too uncomfortable while sitting down.  Advised that patient should be evaluated in UC/ED due to sudden onset of pain.  She will take patient to UC this afternoon.    
No

## 2021-07-28 NOTE — PROVIDER CONTACT NOTE (OTHER) - REASON
Low BP
MD augustine approached RN about no thrill
Fingerstick of 97. Should Lantus be given?
Low BP
Patient complains of severe pain. No severe pain orders.
Patient refusing BiPAP machine.
done

## 2021-09-15 NOTE — PHYSICAL THERAPY INITIAL EVALUATION ADULT - DISCHARGE DISPOSITION, PT EVAL
Routine safety standards initiated.  Routine nursing standards initiated.   anticipate home w/ no skilled PT need

## 2021-10-15 NOTE — CHART NOTE - NSCHARTNOTESELECT_GEN_ALL_CORE
Date of Visit:  10/15/2021  Patient Name:  Mau Faustin  Medical Record Number:  [id number]  YOB: 1980  Primary Physician:  Fredi Caraballo DO     Chief Complaint:  Chief Complaint   Patient presents with   • Depression        SUBJECTIVE:  Mau Faustin is a 41 year old male who presented requesting evaluation for     Diagnosed with depression for his adulthood  On medications the Abilify and the lexapro  But for the past 6 month  Mental health state has deteriorated   Patient has a consultation with his psychiatrist    Depression - young adulthood - it was a mood change, days where he would be energetic and positive and days where he did not want to do anything  And for a long period of time, he went to counselors and doctos, and tried to get some type of treatment but would never stick to the threatment  About 7 years ago, the situation got to a point where he did not even want to be at his job  He asked for FMLA, out for 3 weeks  [facility]  And since then, he has been treated and been on medications that did not sit well with him  effexor was one of these medications that did not work wel l for him  And then he was placed on lexparo  And since 2014, has been using the lexapro, and there has been good and bad with the medication, and at times, he wants to just stop taking it, and he just does not feel like he is getting much from it  Couple of years ago, explained to his doctor with his mood swings and how the mood swings were more pronounced with normal high feeling and other time with significant lows with depression.  No desire of doing anything at times  Affects his job and how he deals with his family.    The first time he brought this up to his doctor and the term bipolar was used.  Patent feels he diagnosed himself with bipolar and was placed on another medication.    High school  Patient graduated from high school, he was a very good student, until about 10th  F/[employer] grade, all straight A's but in HS he just wanted to fit in, he just wanted to fit in.  Went in to college, and was just barely 15 years old  Went to college young, no clear path, and went into the courses and major because everyone was going into it  And after 2 years he was out of college, and he did not know he was suffering from depression --> he did not know what was wrong with him  Was always concerned about what others were thinking about him    Putting too much emphasis on what others were saying and thinking about him.    employment history --> same job for 22 years.  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  Never had issues with his performance.    Job has a lot to do with triggering his emotions  Patient states he is afraid to go to work at this time    Been so good at his job for so long, and he is going through a phase where he has changed the system at the work, and the old system he knew from A to Z, but this new system he is struggling to get to that same level of expertise.    Performance anxiety with the job    Sold his house  Living in a condo  And now living with his parents  While he is looking for a new house  He is living 7 people in the house, In a 2 room condo  And with the way that he is, with his emotions, he feels like he is in skilled nursing.      REVIEW OF SYSTEMS:  Pertinent positive from HPI with below Positives and Negatives  Constitutional:  [x] no fever  [] no chills  [] no weakness  [] no fatigue    Skin:  [] no rash  [] no bruising  [] no wound  [] no lesion  Head:  [] no head injury   [] no headache   [] no dizziness  Eyes:  [] no vision changes   [] wears glasses or contacts   [] no redness   [] no drainage  Ears:  [] no tinnitus   [] no earache   [] hearing aids   [] no hearing changes  Nose:  [] no stuffiness   [] no nosebleeds   [] no drainage  Throat:  [] no soreness   [] no dry mouth   [] no  hoarseness  Neck:  [] no swollen glands   [] no pain   [] no stiffness  Respiratory:  [x] no cough  [] no wheezing  [] no shortness of breath [] no hemoptysis   Cardiovascular:  [x] no chest pain  [] no chest pressure  [] no palpitations  [] diaphoresis   Gastrointestinal:  [] no nausea [] no vomiting  [] no diarrhea [] no abdominal pain  [] no heartburn  Genitourinary:  [] no urgency  [] no frequency  [] no hematuria  []no flank pain  Extremities:  [] no joint swelling  [] no joint pain  Neurologic:  [] no change in sensory  [] no change in motor function  [] no headache  [] no change in speech  Endocrine:  [] no heat or cold intolerance  [] no abnormal weight loss or gain  [] no excessive sweating  Hematological:  [] no bleeding  [] no bruising  [] no adenopathy  Psychiatric:  [x] change in affect  [x] no change in mentation  [] no sleep disturbance     Past Medical History:   Diagnosis Date   • Depression    • Dyslipidemia    • Kidney stone        Past Surgical History:   Procedure Laterality Date   • Foot surgery     • Vasectomy         Medications:  Current Outpatient Medications   Medication   • ARIPiprazole (ABILIFY) 5 MG tablet   • escitalopram (LEXAPRO) 20 MG tablet   • methylPREDNISolone (MEDROL DOSEPAK) 4 MG tablet   • ibuprofen (MOTRIN) 800 MG tablet   • amoxicillin (AMOXIL) 875 MG tablet   • fluticasone (FLONASE) 50 MCG/ACT nasal spray   • lamoTRIgine (LaMICtal) 25 MG tablet   • tiZANidine (ZANAFLEX) 4 MG tablet     Current Facility-Administered Medications   Medication   • acetaminophen (TYLENOL) tablet 500 mg   • albuterol (VENTOLIN) nebulizer 2.5 mg       ALLERGIES:  No Known Allergies    Family History   Problem Relation Age of Onset   • Depression Mother    • Hypertension Father    • Cancer, Prostate Father         State 4       Social History:  Social History     Tobacco Use   • Smoking status: Never Smoker   • Smokeless tobacco: Never Used   Substance Use Topics   • Alcohol use: Yes      Alcohol/week: 0.0 standard drinks     Comment: rare       Objective:  Vital Most Recent Value   Temperature 99 °F (37.2 °C)   Pulse 62   Respiratory 20   Blood Pressure 102/62   Pulse Oximetry    O2      Vital Most Recent Value   Weight 96 kg (211 lb 10.3 oz)   Height 5' 7\" (170.2 cm)       BP Readings from Last 3 Encounters:   10/15/21 102/62   01/22/21 130/61   12/10/20 122/64     Wt Readings from Last 3 Encounters:   10/15/21 96 kg (211 lb 10.3 oz)   01/22/21 106.5 kg (234 lb 12.6 oz)   12/10/20 106.7 kg (235 lb 3.7 oz)       PHYSICAL EXAM:  General:  Alert, cooperative  Respiratory:  Normal respiratory effort.  Neuro:  Oriented x4.  .  Psychiatric:  Cooperative.     Lab Review:  Lab Results   Component Value Date    SODIUM 139 12/10/2020    POTASSIUM 3.9 12/10/2020    CHLORIDE 103 12/10/2020    CO2 28 12/10/2020    BUN 13 12/10/2020    CREATININE 1.06 12/10/2020    WBC 9.5 12/10/2020    HGB 13.7 12/10/2020    HCT 41.9 12/10/2020     12/10/2020    TSH 1.253 09/30/2016    CHOLESTEROL 167 10/19/2019    HDL 35 (L) 10/19/2019    CHOHDL 4.8 (H) 10/19/2019    TRIGLYCERIDE 130 10/19/2019    CALCLDL 106 10/19/2019    PSA 0.21 12/24/2018     No results found for: 5GLYH    Assessment & Plan:    Mau was seen today for depression.    Diagnoses and all orders for this visit:    Depression with anxiety  -     SERVICE TO BEHAVIORAL HEALTH    Need for influenza vaccination  -     INFLUENZA QUADRIVALENT SPLIT PRES FREE 0.5 ML VACC, IM (FLULAVAL,FLUARIX,FLUZONE)    Burn-out  -     SERVICE TO BEHAVIORAL HEALTH    Other social stressor  -     SERVICE TO BEHAVIORAL HEALTH    Other orders  -     busPIRone (BUSPAR) 7.5 MG tablet; Take 1 tablet by mouth 2 times daily.      I spent a total of 36 minutes on the day of the visit.  This includes documenting.    Adding buspar  Continue the lexapro  Continue the abilify  Keep appts with his current psychiatrist  Consider psychology evaluation --> order placed    Work on burn  out  FMLA:    To whom it may concern,     Mau Faustin was seen in clinic, and I am requesting FMLA documentation so we can create an intermittent leave of allowing 1-2 days of work leave a month (every 30 days) for helping to treat his underlying medical condition.     Will have to fill out the form for FMLA when it comes for intermittent leave as above.    F/u in 1 month    Try to delegate more at work  Awaiting completion of house to move out of 2 room condo  Try to spend less time at work over things that he is not in control over      Patient Care Team:  Fredi Caraballo DO as PCP - General (Family Practice)    DO Patience Palumbo Physician:  Family Medicine  [address] [place] [address]  10/15/2021

## 2022-04-01 NOTE — PATIENT PROFILE ADULT. - HEALTHCARE INFORMATION NEEDED, PROFILE
MD recently to room and completed assessment and review of plan with patient and her ; MD leaving room.  
Patient being transferred from CCU to  NPU via WC with RN and pt's .  Jackson to unit,  Room and staff;  Instruct on plan of care and to call for needs.  Patient and her spouse verbalize understanding.  Continue to monitor.    
none

## 2022-05-23 NOTE — ED PROVIDER NOTE - TIMING
gradual onset Excisional Biopsy Additional Text (Leave Blank If You Do Not Want): The margin was drawn around the clinically apparent lesion. An elliptical shape was then drawn on the skin incorporating the lesion and margins.  Incisions were then made along these lines to the appropriate tissue plane and the lesion was extirpated.

## 2022-08-11 NOTE — DISCHARGE NOTE ADULT - NSCORESITESY/N_GEN_A_CORE_RD
Problem: Adult Inpatient Plan of Care  Goal: Plan of Care Review  Outcome: Ongoing, Progressing  Goal: Patient-Specific Goal (Individualized)  Outcome: Ongoing, Progressing  Goal: Absence of Hospital-Acquired Illness or Injury  Outcome: Ongoing, Progressing  Goal: Optimal Comfort and Wellbeing  Outcome: Ongoing, Progressing  Goal: Readiness for Transition of Care  Outcome: Ongoing, Progressing     Problem: Skin Injury Risk Increased  Goal: Skin Health and Integrity  Outcome: Ongoing, Progressing     Problem: Fall Injury Risk  Goal: Absence of Fall and Fall-Related Injury  Outcome: Ongoing, Progressing      No

## 2023-05-22 NOTE — ASU PATIENT PROFILE, ADULT - SURGICAL SITE INCISION
Detail Level: Detailed Depth Of Biopsy: dermis Was A Bandage Applied: Yes Size Of Lesion In Cm: 1 Biopsy Type: H and E Biopsy Method: Dermablade Anesthesia Type: 1% lidocaine with epinephrine Additional Anesthesia Volume In Cc (Will Not Render If 0): 0 Hemostasis: Aluminum Chloride Wound Care: Petrolatum Dressing: Band-Aid Destruction After The Procedure: No Type Of Destruction Used: Curettage Curettage Text: The wound bed was treated with curettage after the biopsy was performed. Cryotherapy Text: The wound bed was treated with cryotherapy after the biopsy was performed. Electrodesiccation Text: The wound bed was treated with electrodesiccation after the biopsy was performed. Electrodesiccation And Curettage Text: The wound bed was treated with electrodesiccation and curettage after the biopsy was performed. Silver Nitrate Text: The wound bed was treated with silver nitrate after the biopsy was performed. Lab: 6 Lab Facility: 3 Consent: Written consent was obtained and risks were reviewed including but not limited to scarring, infection, bleeding, scabbing, incomplete removal, nerve damage and allergy to anesthesia. Post-Care Instructions: I reviewed with the patient in detail post-care instructions. Patient is to keep the biopsy site dry overnight, and then apply bacitracin twice daily until healed. Patient may apply hydrogen peroxide soaks to remove any crusting. Notification Instructions: Patient will be notified of biopsy results. However, patient instructed to call the office if not contacted within 2 weeks. Billing Type: Third-Party Bill Information: Selecting Yes will display possible errors in your note based on the variables you have selected. This validation is only offered as a suggestion for you. PLEASE NOTE THAT THE VALIDATION TEXT WILL BE REMOVED WHEN YOU FINALIZE YOUR NOTE. IF YOU WANT TO FAX A PRELIMINARY NOTE YOU WILL NEED TO TOGGLE THIS TO 'NO' IF YOU DO NOT WANT IT IN YOUR FAXED NOTE. no

## 2023-05-31 NOTE — ED ADULT TRIAGE NOTE - HEART RATE (BEATS/MIN)
Please have request for this my the physician that has been filling it  Should be her cardiologist   Thanks  92

## 2023-08-12 NOTE — PATIENT PROFILE ADULT. - BRADEN SCORE (IF 18 OR LESS ACTIVATE SKIN INJURY RISK INCREASED GUIDELINE), MLM
[Consult Letter:] : I had the pleasure of evaluating your patient, [unfilled]. [Please see my note below.] : Please see my note below. [Consult Closing:] : Thank you very much for allowing me to participate in the care of this patient.  If you have any questions, please do not hesitate to contact me. [Sincerely,] : Sincerely, [Dear  ___] : Dear  [unfilled], [FreeTextEntry3] : Johann El MD, PhD Chief, Division of Laryngology Department of Otolaryngology [facility] Pediatric Otolaryngology, [facility]  of Otolaryngology [facility] [employer] at [facility]   21

## 2023-08-17 NOTE — H&P ADULT - PROBLEM SELECTOR PROBLEM 2
Detail Level: Detailed Quality 110: Preventive Care And Screening: Influenza Immunization: Influenza Immunization Administered during Influenza season Chronic systolic congestive heart failure

## 2023-08-29 NOTE — PROCEDURE NOTE - NSINFORMCONSENT_GEN_A_CORE
Benefits, risks, and possible complications of procedure explained to patient/caregiver who verbalized understanding and gave verbal consent.
Home

## 2023-09-06 NOTE — H&P ADULT - PROBLEM SELECTOR PROBLEM 1
Benefits, risks, and possible complications of procedure explained to patient/caregiver who verbalized understanding and gave verbal consent. Acute on chronic kidney failure

## 2023-10-07 NOTE — ED PROVIDER NOTE - PROGRESS NOTE DETAILS
Pt given dinner, repeat finger sticks have been >200, labs show Cr 7.7, d/w Dr Lou (PMD) who recommended admission for further observation and treatment, he will admit to his service. Also noted to have 8 second run of V-tach on monitor, Dr Lou noted this has happened before due to pt's poor EF (20%) 4 = No assist / stand by assistance Compound

## 2024-03-09 NOTE — H&P PST ADULT - SPO2 (%)
- 98

## 2024-04-16 NOTE — PATIENT PROFILE ADULT. - AS SC BRADEN ACTIVITY
What Type Of Note Output Would You Prefer (Optional)?: Standard Output Hpi Title: Evaluation of Skin Lesions (3) walks occasionally

## 2024-07-09 NOTE — DISCHARGE NOTE ADULT - LEARNING BEHAVIORAL ACTIVITIES TO COPE WITH URGES. FOR EXAMPLE, DISTRACTION AND CHANGING ROUTINES.
HEP as tolerated.     []  Decrease # of leaks   [] No change []  Improving [] Resolved     []  Decrease hypertonus [] No change []  Improving [] Resolved     []  Increase void interval [] No change []  Improving [] Resolved     []  Increase PF strength [] No change []  Improving [] Resolved     []  Increase PF endurance [] No change []  Improving [] Resolved     []  Increase endurance [] No change []  Improving [] Resolved     []  Decrease # of pads [] No change []  Improving [] Resolved     []  Decrease pain [] No change []  Improving [] Resolved     []  Increased coordination [] No change []  Improving [] Resolved     []  Increased Bowel Frequency [] No change []  Improving [] Resolved       Patient will continue to benefit from skilled PT / OT services to modify and progress therapeutic interventions, analyze and address functional mobility deficits, analyze and address ROM deficits, analyze and address strength deficits, analyze and address soft tissue restrictions, analyze and cue for proper movement patterns, analyze and modify for postural abnormalities, analyze and address imbalance/dizziness, and instruct in home and community integration to address functional deficits and attain remaining goals.     [x]  See Plan of Care  []  See progress note/recertification  []  See Discharge Summary         Progress towards goals / Updated goals:  Short Term Goals: To be accomplished in 4-6 weeks:  1. Patient will demonstrate accurate performance of home exercise program/pelvic floor contractions as adjunct to physical therapy clinic visits to promote healthy lifestyle and improved quality of life.  Status at evaluation: given HEP  Current: good understanding with pelvic wand for PFM MRR & current HEP 6-25-24     2. Patient will report at least 25% improvement of ease with complete emptying her bowel to improve her QOL.  Status at evaluation: incomplete emptying most of time even with significant straining.  Current: 
Statement Selected

## 2025-04-14 NOTE — ED ADULT TRIAGE NOTE - BP NONINVASIVE SYSTOLIC (MM HG)
"Replied via [employer]:    \"Hi!     I'm so sorry your endocrinologist has retired and the handoff has been frustrating! Of course you have every right to establish with a new provider with whom you (hopefully) have better rapport.    So yes, you see an endocrinologist for Low Testosterone, which does encompass low FSH and LH and thyroid issues. FSH and LH are part of the initial diagnosis workup and will always be low for you, so it's not necessary to recheck those. Thyroid issues and testosterone are connected because they both play a part in metabolism. We can easily discuss/review if you have any symptoms which would warrant testing during your May 1 appointment !     I do agree, though, with not testing estrogen and HGH at this time. Estrogen levels are only tested 1. prior to starting Testosterone or 2. You develop breast/pectoral pain and/ or experience gynecomastia (appearance of pectorals similar to breasts). If you are experiencing either of these scenarios, I will gladly place for estrogen level.    On the other hand, HGH (human growth hormone) is only tested if children/adolescents are undergoing hormonal therapy; since you are fully developed, thankfully, we don't need to monitor that level.    So just a brief recap: I can order estrogen if you're developing breast tissue; otherwise, I will not be ordering estrogen or HGH since their levels don't help or give insight regarding your T replacement therapy. I will also place a referral to [facility] endocrinology for you. I personally recommend Dr. Darcie Muhammad or Dr. Quinn Espino, both very intelligent and evidence based practicing physicians!    I hope this all makes sense and of course, Let me know if you have any other questions. Looking forward to seeing you May 1.    Best,  Dr. Antoine\"      Thanks!" 145